# Patient Record
Sex: MALE | Race: WHITE | NOT HISPANIC OR LATINO | Employment: FULL TIME | URBAN - METROPOLITAN AREA
[De-identification: names, ages, dates, MRNs, and addresses within clinical notes are randomized per-mention and may not be internally consistent; named-entity substitution may affect disease eponyms.]

---

## 2020-11-16 ENCOUNTER — VIRTUAL VISIT (OUTPATIENT)
Dept: FAMILY MEDICINE | Facility: CLINIC | Age: 40
End: 2020-11-16
Payer: COMMERCIAL

## 2020-11-16 DIAGNOSIS — J01.90 ACUTE SINUSITIS WITH SYMPTOMS > 10 DAYS: Primary | ICD-10-CM

## 2020-11-16 PROCEDURE — 99203 OFFICE O/P NEW LOW 30 MIN: CPT | Mod: 95 | Performed by: PHYSICIAN ASSISTANT

## 2020-11-16 NOTE — PROGRESS NOTES
"Tavo Key is a 40 year old male who is being evaluated via a billable telephone visit.      The patient has been notified of following:     \"This telephone visit will be conducted via a call between you and your physician/provider. We have found that certain health care needs can be provided without the need for a physical exam.  This service lets us provide the care you need with a short phone conversation.  If a prescription is necessary we can send it directly to your pharmacy.  If lab work is needed we can place an order for that and you can then stop by our lab to have the test done at a later time.    Telephone visits are billed at different rates depending on your insurance coverage. During this emergency period, for some insurers they may be billed the same as an in-person visit.  Please reach out to your insurance provider with any questions.    If during the course of the call the physician/provider feels a telephone visit is not appropriate, you will not be charged for this service.\"    Patient has given verbal consent for Telephone visit?  Yes    What phone number would you like to be contacted at? [phone]    How would you like to obtain your AVS? Mail a copy    Subjective     Tavo Key is a 40 year old male who presents via phone visit today for the following health issues:    HPI     Acute Illness  Acute illness concerns: sinus infection  Onset/Duration: 3 days ago, had headache could not hear out of right ear  Symptoms:  Fever: no  Chills/Sweats: YES, chills  Headache (location?): YES  Sinus Pressure: YES  Conjunctivitis:  no  Ear Pain: no, right ear feels plugged  Rhinorrhea: no  Congestion: YES  Sore Throat: no  Cough: no  Wheeze: no  Decreased Appetite: no  Nausea: no  Vomiting: no  Diarrhea: no  Dysuria/Freq.: no  Dysuria or Hematuria: no  Fatigue/Achiness: YES. fatigue  Sick/Strep Exposure: no  Therapies tried and outcome: Advil cold and sinus has not helped much, OTC ear " cleanser did not help.   There is no problem list on file for this patient.     Current Outpatient Medications   Medication     amoxicillin-clavulanate (AUGMENTIN) 875-125 MG tablet     No current facility-administered medications for this visit.            Review of Systems   Constitutional, HEENT, cardiovascular, pulmonary, gi and gu systems are negative, except as otherwise noted.       Objective          Vitals:  No vitals were obtained today due to virtual visit.    healthy, alert and no distress  PSYCH: Alert and oriented times 3; coherent speech, normal   rate and volume, able to articulate logical thoughts, able   to abstract reason, no tangential thoughts, no hallucinations   or delusions  His affect is normal  RESP: No cough, no audible wheezing, able to talk in full sentences  Remainder of exam unable to be completed due to telephone visits          Assessment/Plan:    Assessment & Plan     Acute sinusitis with symptoms > 10 days  10+ days of symptoms. This prescription is given with a discussion of side effects, risks and proper use.  Instructions are given to follow up if not improving or symptoms change or worsen as discussed.   - amoxicillin-clavulanate (AUGMENTIN) 875-125 MG tablet; Take 1 tablet by mouth 2 times daily        No follow-ups on file.    YEVGENIY ABRAMS PA-C  [facility]    Phone call duration:  5 minutes

## 2020-12-27 ENCOUNTER — OFFICE VISIT (OUTPATIENT)
Dept: URGENT CARE | Facility: URGENT CARE | Age: 40
End: 2020-12-27
Payer: COMMERCIAL

## 2020-12-27 VITALS
DIASTOLIC BLOOD PRESSURE: 84 MMHG | TEMPERATURE: 97.4 F | OXYGEN SATURATION: 96 % | RESPIRATION RATE: 18 BRPM | SYSTOLIC BLOOD PRESSURE: 126 MMHG | WEIGHT: 252.4 LBS | HEART RATE: 73 BPM

## 2020-12-27 DIAGNOSIS — R10.9 RIGHT FLANK PAIN: ICD-10-CM

## 2020-12-27 DIAGNOSIS — M54.41 ACUTE RIGHT-SIDED LOW BACK PAIN WITH RIGHT-SIDED SCIATICA: Primary | ICD-10-CM

## 2020-12-27 DIAGNOSIS — Z87.442 HISTORY OF KIDNEY STONES: ICD-10-CM

## 2020-12-27 LAB
ALBUMIN UR-MCNC: NEGATIVE MG/DL
APPEARANCE UR: CLEAR
BILIRUB UR QL STRIP: NEGATIVE
COLOR UR AUTO: YELLOW
GLUCOSE UR STRIP-MCNC: NEGATIVE MG/DL
HGB UR QL STRIP: NEGATIVE
KETONES UR STRIP-MCNC: NEGATIVE MG/DL
LEUKOCYTE ESTERASE UR QL STRIP: NEGATIVE
NITRATE UR QL: NEGATIVE
PH UR STRIP: 6 PH (ref 5–7)
SOURCE: NORMAL
SP GR UR STRIP: >1.03 (ref 1–1.03)
UROBILINOGEN UR STRIP-ACNC: 0.2 EU/DL (ref 0.2–1)

## 2020-12-27 PROCEDURE — 99214 OFFICE O/P EST MOD 30 MIN: CPT | Performed by: NURSE PRACTITIONER

## 2020-12-27 PROCEDURE — 81003 URINALYSIS AUTO W/O SCOPE: CPT | Performed by: NURSE PRACTITIONER

## 2020-12-27 RX ORDER — CYCLOBENZAPRINE HCL 5 MG
5-10 TABLET ORAL 3 TIMES DAILY PRN
Qty: 24 TABLET | Refills: 0 | Status: SHIPPED | OUTPATIENT
Start: 2020-12-27 | End: 2021-08-23

## 2020-12-27 RX ORDER — NAPROXEN 500 MG/1
500 TABLET ORAL 2 TIMES DAILY WITH MEALS
Qty: 20 TABLET | Refills: 0 | Status: SHIPPED | OUTPATIENT
Start: 2020-12-27 | End: 2021-08-23

## 2020-12-27 RX ORDER — PROPRANOLOL HYDROCHLORIDE 160 MG/1
CAPSULE, EXTENDED RELEASE ORAL
COMMUNITY
Start: 2020-12-09 | End: 2021-12-09

## 2020-12-27 ASSESSMENT — PAIN SCALES - GENERAL: PAINLEVEL: MODERATE PAIN (5)

## 2020-12-27 NOTE — PATIENT INSTRUCTIONS
Patient Education     Back Care Tips     Caring for your back  These are things you can do to prevent a recurrence of acute back pain and to reduce symptoms from chronic back pain:    Stay at a healthy weight. If you are overweight, losing weight will help most types of back pain.    Exercise is an important part of recovery from most types of back pain. The muscles behind and in front of the spine support the back. This means strengthening both the back muscles and the abdominal muscles will provide better support for your spine.     Swimming and brisk walking are good overall exercises to improve your fitness level.    Practice safe lifting methods (see below).    Practice good posture when sitting, standing, and walking. Don't sit for a long time. This puts more stress on the lower back than standing or walking.    Wear quality shoes with good arch support. Foot and ankle alignment can affect back symptoms. Don't wear high heels.    Therapeutic massage can help relax the back muscles without stretching them.    During the first 24 to 72 hours after an acute injury or flare-up of chronic back pain, put an ice pack on the painful area for 20 minutes and then remove it for 20 minutes. Do thisover a period of 60 to 90 minutes, or several times a day. As a safety precaution, don't use a heating pad at bedtime. Sleeping on a heating pad can lead to skin burns or tissue damage.    You can alternate using ice and heat.  Medicines  Talk with your healthcare provider before using medicines, especially if you have other health problems or are taking other medicines.    You may use over-the-counter medicines, such as acetaminophen, ibuprofen, or naprosyn to control pain, unless your healthcare provider prescribed other pain medicine. Talk with your healthcare provider before taking any medicines if you have a chronic condition such as diabetes, liver or kidney disease, stomach ulcers, or digestive bleeding, or are taking  blood thinners.    Be careful if you are given prescription pain medicines, opioids, or medicine for muscle spasm. They can cause drowsiness, and affect your coordination, reflexes, and judgment. Don't drive or operate heavy machinery while taking these types of medicines. Take prescription pain medicine only as prescribed by your healthcare provider.  Lumbar stretch  This simple stretch will help relax muscle spasm and keep your back more limber. If exercise makes your back pain worse, don t do it.    Lie on your back with your knees bent and both feet on the ground.    Slowly raise your left knee to your chest as you flatten your lower back against the floor. Hold for 5 seconds.    Relax and repeat the exercise with your right knee.    Do 10 of these exercises for each leg.  Safe lifting method    Don t bend over at the waist to lift an object off the floor.  Instead, bend your knees and hips in a squat.     Keep your back and head upright    Hold the object close to your body, directly in front of you.    Straighten your legs to lift the object.     Lower the object to the floor in the reverse fashion.    If you must slide something across the floor, push it.    Posture tips  Sitting  Sit in chairs with straight backs or low-back support. Keep your knees lower than your hips, with your feet flat on the floor.  When driving, sit up straight. Adjust the seat forward so you are not leaning toward the steering wheel.  A small pillow or rolled towel behind your lower back may help if you are driving long distances.   Standing  When standing for long periods, shift most of your weight to one leg at a time. Switch legs every few minutes.   Sleeping  The best way to sleep is on your side with your knees bent. Put a low pillow under your head to support your neck in a neutral spine position. Don't use thick pillows that bend your neck to one side. Put a pillow between your legs to further relax your lower back. If you  sleep on your back, put pillows under your knees to support your legs in a slightly flexed position. Use a firm mattress. If your mattress sags, replace it, or use a 1/2-inch plywood board under the mattress to add support.  Follow-up care  Follow up with your healthcare provider, or as advised.  If X-rays, a CT scan or an MRI scan were taken, they may be reviewed by a radiologist. You will be told of any new findings that may affect your care.  Call 911  Call 911 if any of the following occur:    Trouble breathing    Confusion    Very drowsy    Fainting or loss of consciousness    Rapid or very slow heart rate    Loss of  bowel or bladder control  When to seek medical advice  Call your healthcare provider right away if any of the following occur:    Pain becomes worse or spreads to your arms or legs    Weakness or numbness in one or both arms or legs    Numbness in the groin area  [employer] last reviewed this educational content on 11/1/2019 2000-2020 [employer]. [address] [place] [address]. All rights reserved. This information is not intended as a substitute for professional medical care. Always follow your healthcare professional's instructions.           Patient Education     Exercises to Strengthen Your Lower Back  Strong lower back and abdominal muscles work together to support your spine. The exercises below will help strengthen the lower back. It's important that you start exercising slowly and increase levels gradually.   Always start any exercise program with stretching. If you feel pain while doing any of these exercises, stop and talk to your doctor about a more specific exercise program that better suits your condition.    Low back stretch  The point of stretching is to make you more flexible and increase your range of motion. Stretch only as much as you are able. Stretch slowly. Don't push your stretch to the limit. If at any point you feel pain while stretching, this is  your (temporary) limit.     Lie on your back with your knees bent and both feet on the ground.    Slowly raise your left knee to your chest as you flatten your lower back against the floor. Hold for 5 seconds.    Relax and repeat the exercise with your right knee.    Do 10 of these exercises for each leg.    Repeat hugging both knees to your chest at the same time.  Building lower back strength  Start your exercise routine with 10 to 30 minutes a day, 1 to 3 times a day.  Initial exercises  Lying on your back:  1. Ankle pumps. Move your foot up and down, towards your head, and then away. Repeat 10 times with each foot.  2. Heel slides. Slowly bend your knee, drawing the heel of your foot towards you. Then slide your heel/foot from you, straightening your knee. Don't lift your foot off the floor (this is not a leg lift).  3. Abdominal contraction. Bend your knees and put your hands on your stomach. Tighten your stomach muscles. Hold for 5 seconds, then relax. Repeat 10 times.  4. Straight leg raise. Bend one leg at the knee and keep the other leg straight. Tighten your stomach muscles. Slowly lift your straight leg 6 to 12 inches off the floor and hold for up to 5 seconds. Repeat 10 times on each side.  Standin. Wall squats. Stand with your back against the wall. Move your feet about 12 inches away from the wall. Tighten your stomach muscles, and slowly bend your knees until they are at about a 45 degree angle. Don't go down too far. Hold about 5 seconds. Then slowly return to your starting position. Repeat 10 times.  2. Heel raises. Stand facing the wall. Slowly raise the heels of your feet up and down, while keeping your toes on the floor. If you have trouble balancing, you can touch the wall with your hands. Repeat 10 times.  More advanced exercises  When you feel comfortable enough, try these exercises.  1. Kneeling lumbar extension. Start on your hands and knees. At the same time, raise and straighten your  right arm and left leg until they are parallel to the ground. Hold for 2 seconds and come back slowly to a starting position. Repeat with left arm and right leg, alternating 10 times.  2. Prone lumbar extension. Lie face down, arms extended overhead, palms on the floor. At the same time, raise your right arm and left leg as high as comfortably possible. Hold for 10 seconds and slowly return to start. Repeat with left arm and right leg, alternating 10 times. Gradually build up to 20 times. (Advanced: Repeat this exercise raising both arms and both legs a few inches off the floor at the same time. Hold for 5 seconds and release.)  3. Pelvic tilt. Lie on the floor on your back with your knees bent at 90 degrees. Your feet should be flat on the floor. Inhale, exhale, then slowly contract your abdominal muscles bringing your navel toward your spine. Let your pelvis rock back until your lower back is flat on the floor. Hold for 10 seconds while breathing smoothly.  4. Abdominal crunch. Perform a pelvic tilt (above) flattening your lower back against the floor. Holding the tension in your abdominal muscles, take another breath and raise your shoulder blades off the ground (this is not a full sit-up). Keep your head in line with your body (don t bend your neck forward). Hold for 2 seconds, then slowly lower.  [employer] last reviewed this educational content on 8/1/2019 2000-2020 [employer]. [address], [place] [address]. All rights reserved. This information is not intended as a substitute for professional medical care. Always follow your healthcare professional's instructions.

## 2020-12-27 NOTE — PROGRESS NOTES
SUBJECTIVE:   Tavo Key is a 40 year old male presenting with a chief complaint of   Chief Complaint   Patient presents with     Back Pain     Injured his back on 12/18/20- pain was improving but stopped getting better a few days ago. Pt has been having pain down right leg also      Back Pain    Onset of symptoms was 9 day(s) ago.  Location: right low back  Radiation: radiates into the right buttocks and radiates into the right leg  Context: No injury  Course of symptoms is waxing and waning. Started mid-day then progressively worsened. Had been improving, but is no longer improving  Severity moderate 5-6/10 currently, does get up to 7/10, does not wake him at night  Current and Associated symptoms: pain  Denies: fecal incontinence, urinary incontinence, lower extremity numbness, lower extremity weakness and paresthesia    Aggravating Factors: standing and sitting  Alleviating: laying down  Therapies to improve symptoms include: ibuprofen 800 mg every 6-8 hours which helps temporarily. Ice twice daily helps temporarily  Past history: no prior back problems or back surgery  Denies a history of kidney disease. He does have a history of kidney stones. Denies dysuria, hematuria, urinary frequency, fever, chills. He has not had any kidney stones for the past 6 years. This pain feels different than past kidney stone pain. No history of HTN.    Problem list, Medication list, Allergies, and Medical history reviewed in EPIC.    ROS:  Review of systems negative except for noted above    OBJECTIVE  /84 (BP Location: Left arm, Patient Position: Sitting, Cuff Size: Adult Large)   Pulse 73   Temp 97.4  F (36.3  C) (Tympanic)   Resp 18   Wt 114.5 kg (252 lb 6.4 oz)   SpO2 96%     Physical Exam  Constitutional:       General: He is not in acute distress.     Appearance: He is obese. He is not ill-appearing, toxic-appearing or diaphoretic.   Cardiovascular:      Rate and Rhythm: Normal rate and regular rhythm.       Heart sounds: Normal heart sounds.   Pulmonary:      Effort: Pulmonary effort is normal. No respiratory distress.      Breath sounds: Normal breath sounds. No wheezing, rhonchi or rales.   Abdominal:      Tenderness: There is right CVA tenderness. There is no left CVA tenderness.   Musculoskeletal:      Lumbar back: He exhibits decreased range of motion. He exhibits no bony tenderness.      Comments: Negative bilateral straight leg raises, no spinal or paraspinal pain with palpation, Decreased ROM with lumbar flexion, left lateral flexion, full extension and right lateral flexion.   Skin:     General: Skin is warm and dry.      Findings: No bruising, erythema or rash.   Neurological:      General: No focal deficit present.      Sensory: No sensory deficit.      Coordination: Coordination normal.      Deep Tendon Reflexes: Reflexes normal.      Comments: Able to walk on heels, toes, and straight line walk       Labs:  Results for orders placed or performed in visit on 12/27/20 (from the past 24 hour(s))   UA reflex to Microscopic and Culture    Specimen: Midstream Urine   Result Value Ref Range    Color Urine Yellow     Appearance Urine Clear     Glucose Urine Negative NEG^Negative mg/dL    Bilirubin Urine Negative NEG^Negative    Ketones Urine Negative NEG^Negative mg/dL    Specific Gravity Urine >1.030 1.003 - 1.035    Blood Urine Negative NEG^Negative    pH Urine 6.0 5.0 - 7.0 pH    Protein Albumin Urine Negative NEG^Negative mg/dL    Urobilinogen Urine 0.2 0.2 - 1.0 EU/dL    Nitrite Urine Negative NEG^Negative    Leukocyte Esterase Urine Negative NEG^Negative    Source Midstream Urine      ASSESSMENT:      ICD-10-CM    1. Acute right-sided low back pain with right-sided sciatica  M54.41 naproxen (NAPROSYN) 500 MG tablet     cyclobenzaprine (FLEXERIL) 5 MG tablet   2. Right flank pain  R10.9 UA reflex to Microscopic and Culture   3. History of kidney stones  Z87.442 UA reflex to Microscopic and Culture       PLAN:    UA completed to rule out hematuria with history of kidney stones and right CVA tenderness which was reviewed with patient during visit and normal- no sign of kidney infection or kidney stone. Xray not indicated at this time. Discussed most acute back pain will resolve in 2-4 weeks. Recommended walking, gentle stretching, avoiding lifting. Prescriptions sent to pharmacy for naproxen 500 mg every 12 hours with food for up to 10 days- stop ibuprofen and do not take with NSAIDs. May take tylenol as needed. Prescription sent to pharmacy for cyclobenzaprine 5-10 mg every 8 hours as needed for pain, potential side effects discussed. Start with 1 tab at a time at bedtime, may add another if not effective after an hour. Do not drive or operate machinery while taking. Continue alternating ice and heat three times daily for 20 minutes at a time.     Follow-up with PCP if symptoms persist for 7 days, and sooner if symptoms worsen or new symptoms develop.     Discussed red flag symptoms which warrant immediate visit in emergency room    All questions were answered and patient verbalized understanding. AVS reviewed with patient.     Emmy Caal, RUDDY, APRN, CNP 12/27/2020 10:03 AM

## 2021-03-05 ENCOUNTER — VIRTUAL VISIT (OUTPATIENT)
Dept: FAMILY MEDICINE | Facility: CLINIC | Age: 41
End: 2021-03-05
Payer: COMMERCIAL

## 2021-03-05 DIAGNOSIS — G47.30 SLEEP APNEA WITH USE OF CONTINUOUS POSITIVE AIRWAY PRESSURE (CPAP): ICD-10-CM

## 2021-03-05 DIAGNOSIS — I10 HYPERTENSION GOAL BP (BLOOD PRESSURE) < 140/90: ICD-10-CM

## 2021-03-05 DIAGNOSIS — K21.00 GASTROESOPHAGEAL REFLUX DISEASE WITH ESOPHAGITIS, UNSPECIFIED WHETHER HEMORRHAGE: ICD-10-CM

## 2021-03-05 DIAGNOSIS — E11.9 TYPE 2 DIABETES MELLITUS WITHOUT COMPLICATION, UNSPECIFIED WHETHER LONG TERM INSULIN USE (H): Primary | ICD-10-CM

## 2021-03-05 DIAGNOSIS — F90.9 ATTENTION DEFICIT HYPERACTIVITY DISORDER (ADHD), UNSPECIFIED ADHD TYPE: ICD-10-CM

## 2021-03-05 DIAGNOSIS — J45.30 MILD PERSISTENT ASTHMA WITHOUT COMPLICATION: ICD-10-CM

## 2021-03-05 DIAGNOSIS — G43.809 OTHER MIGRAINE WITHOUT STATUS MIGRAINOSUS, NOT INTRACTABLE: ICD-10-CM

## 2021-03-05 DIAGNOSIS — L85.3 DRY SKIN: ICD-10-CM

## 2021-03-05 DIAGNOSIS — N52.9 ERECTILE DYSFUNCTION, UNSPECIFIED ERECTILE DYSFUNCTION TYPE: ICD-10-CM

## 2021-03-05 DIAGNOSIS — F41.1 GAD (GENERALIZED ANXIETY DISORDER): ICD-10-CM

## 2021-03-05 DIAGNOSIS — F31.62 BIPOLAR MIXED AFFECTIVE DISORDER, MODERATE (H): ICD-10-CM

## 2021-03-05 DIAGNOSIS — E66.9 OBESITY, UNSPECIFIED CLASSIFICATION, UNSPECIFIED OBESITY TYPE, UNSPECIFIED WHETHER SERIOUS COMORBIDITY PRESENT: ICD-10-CM

## 2021-03-05 PROCEDURE — 99214 OFFICE O/P EST MOD 30 MIN: CPT | Mod: 95 | Performed by: PHYSICIAN ASSISTANT

## 2021-03-05 RX ORDER — LAMOTRIGINE 200 MG/1
200 TABLET ORAL DAILY
COMMUNITY
Start: 2021-01-05 | End: 2023-11-01

## 2021-03-05 RX ORDER — CLONAZEPAM 0.5 MG/1
0.5 TABLET ORAL
COMMUNITY
Start: 2021-01-05 | End: 2022-08-30

## 2021-03-05 RX ORDER — NARATRIPTAN 2.5 MG/1
TABLET ORAL
COMMUNITY
Start: 2020-12-09 | End: 2022-01-03

## 2021-03-05 RX ORDER — AMLODIPINE BESYLATE 10 MG/1
10 TABLET ORAL
COMMUNITY
Start: 2020-12-09 | End: 2021-12-23

## 2021-03-05 RX ORDER — ARIPIPRAZOLE 5 MG/1
5 TABLET ORAL
COMMUNITY
Start: 2021-01-05 | End: 2022-08-30

## 2021-03-05 RX ORDER — HYDROCHLOROTHIAZIDE 25 MG/1
25 TABLET ORAL
COMMUNITY
Start: 2020-10-05 | End: 2021-10-01

## 2021-03-05 RX ORDER — LITHIUM CARBONATE 450 MG
450 TABLET, EXTENDED RELEASE ORAL
COMMUNITY
Start: 2020-09-01 | End: 2022-01-03

## 2021-03-05 RX ORDER — DEXTROAMPHETAMINE SACCHARATE, AMPHETAMINE ASPARTATE MONOHYDRATE, DEXTROAMPHETAMINE SULFATE AND AMPHETAMINE SULFATE 3.75; 3.75; 3.75; 3.75 MG/1; MG/1; MG/1; MG/1
15 CAPSULE, EXTENDED RELEASE ORAL EVERY MORNING
COMMUNITY
Start: 2021-02-26 | End: 2023-11-01 | Stop reason: DRUGHIGH

## 2021-03-05 RX ORDER — TADALAFIL 10 MG/1
10 TABLET ORAL
COMMUNITY
Start: 2020-12-09 | End: 2022-01-03

## 2021-03-05 RX ORDER — ATORVASTATIN CALCIUM 20 MG/1
20 TABLET, FILM COATED ORAL DAILY
Qty: 90 TABLET | Refills: 3 | Status: SHIPPED | OUTPATIENT
Start: 2021-03-05 | End: 2022-02-21

## 2021-03-05 RX ORDER — ALBUTEROL SULFATE 90 UG/1
2 AEROSOL, METERED RESPIRATORY (INHALATION)
COMMUNITY
Start: 2019-11-19 | End: 2021-10-26

## 2021-03-05 RX ORDER — FLUTICASONE PROPIONATE AND SALMETEROL XINAFOATE 230; 21 UG/1; UG/1
2 AEROSOL, METERED RESPIRATORY (INHALATION)
COMMUNITY
Start: 2020-10-05 | End: 2021-10-26

## 2021-03-05 RX ORDER — METFORMIN HCL 500 MG
1000 TABLET, EXTENDED RELEASE 24 HR ORAL
COMMUNITY
Start: 2020-10-05 | End: 2021-03-05

## 2021-03-05 RX ORDER — METFORMIN HCL 500 MG
1000 TABLET, EXTENDED RELEASE 24 HR ORAL 2 TIMES DAILY WITH MEALS
COMMUNITY
Start: 2021-03-05 | End: 2021-12-09

## 2021-03-05 NOTE — PROGRESS NOTES
Tavo is a 40 year old who is being evaluated via a billable video visit.      How would you like to obtain your AVS? MyChart  If the video visit is dropped, the invitation should be resent by: Text to cell phone: [phone]  Will anyone else be joining your video visit? No    Video Start Time: 930 AM     Assessment & Plan     Establish care visit - previously getting care in [place].    Type 2 diabetes mellitus without complication, unspecified whether long term insulin use (H)  Will see me in the spring.  - atorvastatin (LIPITOR) 20 MG tablet; Take 1 tablet (20 mg) by mouth daily (at bedtime)    Bipolar mixed affective disorder, moderate (H)  Managed by psychiatry  - [facility] HEALTH [facility]FERR[facility]  [phone]; We will contact you to schedule the appointment or please call with any questions    Mild persistent asthma without complication  Stable, by report.    MATTI (generalized anxiety disorder)  He has a therapist and a psychiatrist.  He will need a psychiatrist locally a referral is given.  - MENTAL HEALTH REFERRAL  - Adult; Psychiatry; Psychiatry; [facility] [phone]; We will contact you to schedule the appointment or please call with any questions    Other migraine without status migrainosus, not intractable  Stable, no major concerns.    Attention deficit hyperactivity disorder (ADHD), unspecified ADHD type  No concerns, managed by psychiatry.  - MENTAL HEALTH REFERRAL  - [phone]; We will contact you to schedule the appointment or please call with any questions    Erectile dysfunction, unspecified erectile dysfunction type  Probably related to medications.    Gastroesophageal reflux disease with esophagitis, unspecified whether hemorrhage  Currently controlled with over-the-counter meds.    Sleep apnea with use of continuous  positive airway pressure (CPAP)  Using his CPAP regularly.    Obesity, unspecified classification, unspecified obesity type, unspecified whether serious comorbidity present  He has tried weight loss meds in the past, this might be something we can explore in the future.    Hypertension goal BP (blood pressure) < 140/90  Stable by report.    Dry skin  Use more lotion     No follow-ups on file.    VEENA SULLIVAN [facility] LIZETT Vo is a 40 year old who presents for the following health issues     HPI       New Patient/Transfer of Care  He was living in [place] and has moved to our area recently.  He would like to establish care and get a new psychiatrist.  He has a chronic psychiatric history that is well controlled.  He has a new diagnosis of type 2 diabetes.  He has controlled asthma, sleep apnea, hypertension, hyperlipidemia    He has no concerns today.    Patient Active Problem List   Diagnosis     Bipolar mixed affective disorder, moderate (H)     Mild persistent asthma without complication     MATTI (generalized anxiety disorder)     Other migraine without status migrainosus, not intractable     Attention deficit hyperactivity disorder (ADHD), unspecified ADHD type     Erectile dysfunction, unspecified erectile dysfunction type     Gastroesophageal reflux disease with esophagitis, unspecified whether hemorrhage     Sleep apnea with use of continuous positive airway pressure (CPAP)     Obesity, unspecified classification, unspecified obesity type, unspecified whether serious comorbidity present      Current Outpatient Medications   Medication     albuterol (PROAIR HFA/PROVENTIL HFA/VENTOLIN HFA) 108 (90 Base) MCG/ACT inhaler     amLODIPine (NORVASC) 10 MG tablet     amphetamine-dextroamphetamine (ADDERALL XR) 15 MG 24 hr capsule     ARIPiprazole (ABILIFY) 5 MG tablet     atorvastatin (LIPITOR) 20 MG tablet     clonazePAM (KLONOPIN) 0.5 MG tablet      cyclobenzaprine (FLEXERIL) 5 MG tablet     fluticasone-salmeterol (ADVAIR-HFA) 230-21 MCG/ACT inhaler     hydrochlorothiazide (HYDRODIURIL) 25 MG tablet     lamoTRIgine (LAMICTAL) 200 MG tablet     lithium (ESKALITH CR/LITHOBID) 450 MG CR tablet     metFORMIN (GLUCOPHAGE-XR) 500 MG 24 hr tablet     naproxen (NAPROSYN) 500 MG tablet     naratriptan (AMERGE) 2.5 MG tablet     propranolol ER (INDERAL LA) 160 MG 24 hr capsule     tadalafil (CIALIS) 10 MG tablet     No current facility-administered medications for this visit.     Review of Systems   Constitutional, HEENT, cardiovascular, pulmonary, GI, , musculoskeletal, neuro, skin, endocrine and psych systems are negative, except as otherwise noted.      Objective           Vitals:  No vitals were obtained today due to virtual visit.    Physical Exam   GENERAL: Healthy, alert and no distress  EYES: Eyes grossly normal to inspection.  No discharge or erythema, or obvious scleral/conjunctival abnormalities.  RESP: No audible wheeze, cough, or visible cyanosis.  No visible retractions or increased work of breathing.    SKIN: Visible skin clear. No significant rash, abnormal pigmentation or lesions.  NEURO: Cranial nerves grossly intact.  Mentation and speech appropriate for age.  PSYCH: Mentation appears normal, affect normal/bright, judgement and insight intact, normal speech and appearance well-groomed.          Video-Visit Details    Type of service:  Video Visit    Video End Time:1000 AM     Originating Location (pt. Location): Home    Distant Location (provider location):  [facility]     Platform used for Video Visit: [employer]

## 2021-03-07 ENCOUNTER — HEALTH MAINTENANCE LETTER (OUTPATIENT)
Age: 41
End: 2021-03-07

## 2021-03-25 ENCOUNTER — MYC MEDICAL ADVICE (OUTPATIENT)
Dept: FAMILY MEDICINE | Facility: CLINIC | Age: 41
End: 2021-03-25

## 2021-03-25 DIAGNOSIS — G43.809 OTHER MIGRAINE WITHOUT STATUS MIGRAINOSUS, NOT INTRACTABLE: Primary | ICD-10-CM

## 2021-03-25 RX ORDER — NARATRIPTAN 2.5 MG/1
2.5 TABLET ORAL
Qty: 12 TABLET | Refills: 5 | Status: SHIPPED | OUTPATIENT
Start: 2021-03-25 | End: 2022-01-03

## 2021-03-25 NOTE — TELEPHONE ENCOUNTER
"[employer] message to PCP      Patient want information updated regarding his family history, not sure how to update this.    \"my older sister (Blaire) passed away from a grand mal seizure due to epilepsy in 2018.\"      Ana Luisa Barrios RN  "

## 2021-03-29 ENCOUNTER — IMMUNIZATION (OUTPATIENT)
Dept: NURSING | Facility: CLINIC | Age: 41
End: 2021-03-29
Payer: COMMERCIAL

## 2021-03-29 PROCEDURE — 0001A PR COVID VAC PFIZER DIL RECON 30 MCG/0.3 ML IM: CPT

## 2021-03-29 PROCEDURE — 91300 PR COVID VAC PFIZER DIL RECON 30 MCG/0.3 ML IM: CPT

## 2021-04-19 ENCOUNTER — IMMUNIZATION (OUTPATIENT)
Dept: NURSING | Facility: CLINIC | Age: 41
End: 2021-04-19
Attending: INTERNAL MEDICINE
Payer: COMMERCIAL

## 2021-04-19 PROCEDURE — 0002A PR COVID VAC PFIZER DIL RECON 30 MCG/0.3 ML IM: CPT

## 2021-04-19 PROCEDURE — 91300 PR COVID VAC PFIZER DIL RECON 30 MCG/0.3 ML IM: CPT

## 2021-06-20 ENCOUNTER — HEALTH MAINTENANCE LETTER (OUTPATIENT)
Age: 41
End: 2021-06-20

## 2021-08-23 ENCOUNTER — VIRTUAL VISIT (OUTPATIENT)
Dept: FAMILY MEDICINE | Facility: CLINIC | Age: 41
End: 2021-08-23
Payer: COMMERCIAL

## 2021-08-23 DIAGNOSIS — Z13.220 SCREENING FOR HYPERLIPIDEMIA: ICD-10-CM

## 2021-08-23 DIAGNOSIS — F41.1 GAD (GENERALIZED ANXIETY DISORDER): ICD-10-CM

## 2021-08-23 DIAGNOSIS — E11.9 TYPE 2 DIABETES MELLITUS WITHOUT COMPLICATION, WITHOUT LONG-TERM CURRENT USE OF INSULIN (H): ICD-10-CM

## 2021-08-23 DIAGNOSIS — F31.62 BIPOLAR MIXED AFFECTIVE DISORDER, MODERATE (H): Primary | ICD-10-CM

## 2021-08-23 DIAGNOSIS — F90.9 ATTENTION DEFICIT HYPERACTIVITY DISORDER (ADHD), UNSPECIFIED ADHD TYPE: ICD-10-CM

## 2021-08-23 DIAGNOSIS — E34.8 PINEAL GLAND CYST: ICD-10-CM

## 2021-08-23 PROCEDURE — 99214 OFFICE O/P EST MOD 30 MIN: CPT | Mod: 95 | Performed by: PHYSICIAN ASSISTANT

## 2021-08-23 RX ORDER — LITHIUM CARBONATE 300 MG/1
300 TABLET, FILM COATED, EXTENDED RELEASE ORAL DAILY
Qty: 90 TABLET | Refills: 1 | Status: SHIPPED | OUTPATIENT
Start: 2021-08-23 | End: 2023-12-01

## 2021-08-23 RX ORDER — ALPRAZOLAM 1 MG
TABLET ORAL
Qty: 3 TABLET | Refills: 0 | Status: SHIPPED | OUTPATIENT
Start: 2021-08-23 | End: 2022-08-30

## 2021-08-23 NOTE — PATIENT INSTRUCTIONS
To Schedule Imaging (To Schedule Imaging)  [facility] Imaging Scheduling Phone Number: [phone]  Or   [facility] Scheduling Phone number: [phone]  Or  [facility] Scheduling Phone number: [phone]

## 2021-08-23 NOTE — PROGRESS NOTES
Tavo is a 41 year old who is being evaluated via a billable video visit.      How would you like to obtain your AVS? MyChart  If the video visit is dropped, the invitation should be resent by: Send to e-mail at: [email or web address]  Will anyone else be joining your video visit? No     Video Start Time: 910 AM     Assessment & Plan     Bipolar mixed affective disorder, moderate (H)  Stable  Hasn't found a local psychiatrist yet. Referral placed  Off his Lithium but wants to get back on for better mood stability - will restart at lower dose (used to be on 1500 mg).   Follow up PRN   - MENTAL HEALTH REFERRAL  - Adult; Psychiatry; Psychiatry; [facility] [phone]; We will contact you to schedule the appointment or please call with any questions; Future  - lithium ER (LITHOBID) 300 MG CR tablet; Take 1 tablet (300 mg) by mouth daily    Type 2 diabetes mellitus without complication, without long-term current use of insulin (H)  Recheck labs  We've not seen him face to face yet  Plan to in the spring?    - HEMOGLOBIN A1C; Future  - BASIC METABOLIC PANEL; Future  - Lipid panel reflex to direct LDL Fasting; Future  - Albumin Random Urine Quantitative with Creat Ratio; Future  - Hepatitis C Screen Reflex to HCV RNA Quant and Genotype; Future    MATTI (generalized anxiety disorder)  Stable   - MENTAL HEALTH REFERRAL  - Adult; Psychiatry; Psychiatry; [facility] [phone]; We will contact you to schedule the appointment or please call with any questions; Future    Attention deficit hyperactivity disorder (ADHD), unspecified ADHD type  Stable   - MENTAL HEALTH REFERRAL  - Adult; Psychiatry; Psychiatry; [facility] [phone]; We will contact you to schedule the appointment or please call with any questions; Future    Screening for hyperlipidemia  Recheck     Pineal gland cyst  History of. I reviewed his chart and [facility] notes. He is required to have an MRI yearly. Has  claustrophobia. Managed fine with oral Alprazolam.   - MR Brain w/o & w Contrast; Future  - ALPRAZolam (XANAX) 1 MG tablet; 1/2 to 1 tablet, 30 minutes before MRI      Return in about 3 months (around 11/23/2021) for Routine Visit.    YEVGENIY ABRAMS PA-C  [facility] LIZETT Vo is a 41 year old who presents for the following health issues     HPI     Diabetes Follow-up    How often are you checking your blood sugar? Two times daily  Blood sugar testing frequency justification:  Uncontrolled diabetes  What time of day are you checking your blood sugars (select all that apply)?  Before and after meals  Have you had any blood sugars above 200?  Yes 200s  Have you had any blood sugars below 70?  No    What symptoms do you notice when your blood sugar is low?  None    What concerns do you have today about your diabetes? Other: sweats when eating and trying to lower glucose readings     Do you have any of these symptoms? (Select all that apply)  No numbness or tingling in feet.  No redness, sores or blisters on feet.  No complaints of excessive thirst.  No reports of blurry vision.  No significant changes to weight.    Have you had a diabetic eye exam in the last 12 months? No        BP Readings from Last 2 Encounters:   12/27/20 126/84     No results found for: A1C, LDL      How many servings of fruits and vegetables do you eat daily?  1-2    On average, how many sweetened beverages do you drink each day (Examples: soda, juice, sweet tea, etc.  Do NOT count diet or artificially sweetened beverages)?   0    How many days per week do you exercise enough to make your heart beat faster? 3 or less    How many minutes a day do you exercise enough to make your heart beat faster? 20 - 29    How many days per week do you miss taking your medication? 0    Patient would like to get an MRI order to check on penial gland cyst.     Mood recheck - wants a new psychiatrist.     Review of Systems    Constitutional, HEENT, cardiovascular, pulmonary, GI, , musculoskeletal, neuro, skin, endocrine and psych systems are negative, except as otherwise noted.      Objective           Vitals:  No vitals were obtained today due to virtual visit.    Physical Exam   GENERAL: Healthy, alert and no distress  EYES: Eyes grossly normal to inspection.  No discharge or erythema, or obvious scleral/conjunctival abnormalities.  RESP: No audible wheeze, cough, or visible cyanosis.  No visible retractions or increased work of breathing.    SKIN: Visible skin clear. No significant rash, abnormal pigmentation or lesions.  NEURO: Cranial nerves grossly intact.  Mentation and speech appropriate for age.  PSYCH: Mentation appears normal, affect normal/bright, judgement and insight intact, normal speech and appearance well-groomed.            Video-Visit Details    Type of service:  Video Visit    Video End Time:9:30 AM    Originating Location (pt. Location): Home    Distant Location (provider location):  [facility]     Platform used for Video Visit: [email or web address]

## 2021-09-01 ENCOUNTER — LAB (OUTPATIENT)
Dept: LAB | Facility: CLINIC | Age: 41
End: 2021-09-01
Payer: COMMERCIAL

## 2021-09-01 DIAGNOSIS — E11.9 TYPE 2 DIABETES MELLITUS WITHOUT COMPLICATION, WITHOUT LONG-TERM CURRENT USE OF INSULIN (H): ICD-10-CM

## 2021-09-01 LAB
ANION GAP SERPL CALCULATED.3IONS-SCNC: 5 MMOL/L (ref 3–14)
BUN SERPL-MCNC: 21 MG/DL (ref 7–30)
CALCIUM SERPL-MCNC: 9.7 MG/DL (ref 8.5–10.1)
CHLORIDE BLD-SCNC: 105 MMOL/L (ref 94–109)
CHOLEST SERPL-MCNC: 192 MG/DL
CO2 SERPL-SCNC: 27 MMOL/L (ref 20–32)
CREAT SERPL-MCNC: 0.88 MG/DL (ref 0.66–1.25)
CREAT UR-MCNC: 67 MG/DL
FASTING STATUS PATIENT QL REPORTED: ABNORMAL
GFR SERPL CREATININE-BSD FRML MDRD: >90 ML/MIN/1.73M2
GLUCOSE BLD-MCNC: 178 MG/DL (ref 70–99)
HBA1C MFR BLD: 7.5 % (ref 0–5.6)
HCV AB SERPL QL IA: NONREACTIVE
HDLC SERPL-MCNC: 42 MG/DL
LDLC SERPL CALC-MCNC: 101 MG/DL
MICROALBUMIN UR-MCNC: 12 MG/L
MICROALBUMIN/CREAT UR: 17.91 MG/G CR (ref 0–17)
NONHDLC SERPL-MCNC: 150 MG/DL
POTASSIUM BLD-SCNC: 4.2 MMOL/L (ref 3.4–5.3)
SODIUM SERPL-SCNC: 137 MMOL/L (ref 133–144)
TRIGL SERPL-MCNC: 245 MG/DL

## 2021-09-01 PROCEDURE — 80061 LIPID PANEL: CPT

## 2021-09-01 PROCEDURE — 83036 HEMOGLOBIN GLYCOSYLATED A1C: CPT

## 2021-09-01 PROCEDURE — 80048 BASIC METABOLIC PNL TOTAL CA: CPT

## 2021-09-01 PROCEDURE — 82043 UR ALBUMIN QUANTITATIVE: CPT

## 2021-09-01 PROCEDURE — 36415 COLL VENOUS BLD VENIPUNCTURE: CPT

## 2021-09-01 PROCEDURE — 86803 HEPATITIS C AB TEST: CPT

## 2021-09-27 ENCOUNTER — ANCILLARY PROCEDURE (OUTPATIENT)
Dept: MRI IMAGING | Facility: CLINIC | Age: 41
End: 2021-09-27
Attending: PHYSICIAN ASSISTANT
Payer: COMMERCIAL

## 2021-09-27 DIAGNOSIS — E34.8 PINEAL GLAND CYST: ICD-10-CM

## 2021-09-27 PROCEDURE — 70553 MRI BRAIN STEM W/O & W/DYE: CPT | Mod: TC | Performed by: RADIOLOGY

## 2021-09-27 PROCEDURE — A9585 GADOBUTROL INJECTION: HCPCS | Performed by: RADIOLOGY

## 2021-09-27 RX ORDER — GADOBUTROL 604.72 MG/ML
11 INJECTION INTRAVENOUS ONCE
Status: COMPLETED | OUTPATIENT
Start: 2021-09-27 | End: 2021-09-27

## 2021-09-27 RX ADMIN — GADOBUTROL 11 ML: 604.72 INJECTION INTRAVENOUS at 08:37

## 2021-10-01 ENCOUNTER — VIRTUAL VISIT (OUTPATIENT)
Dept: FAMILY MEDICINE | Facility: CLINIC | Age: 41
End: 2021-10-01
Payer: COMMERCIAL

## 2021-10-01 DIAGNOSIS — I10 HYPERTENSION GOAL BP (BLOOD PRESSURE) < 140/90: ICD-10-CM

## 2021-10-01 DIAGNOSIS — R80.9 PROTEINURIA, UNSPECIFIED TYPE: ICD-10-CM

## 2021-10-01 DIAGNOSIS — E66.01 MORBID OBESITY (H): ICD-10-CM

## 2021-10-01 DIAGNOSIS — E11.9 TYPE 2 DIABETES MELLITUS WITHOUT COMPLICATION, WITHOUT LONG-TERM CURRENT USE OF INSULIN (H): Primary | ICD-10-CM

## 2021-10-01 PROCEDURE — 99214 OFFICE O/P EST MOD 30 MIN: CPT | Mod: 95 | Performed by: PHYSICIAN ASSISTANT

## 2021-10-01 RX ORDER — LOSARTAN POTASSIUM 50 MG/1
50 TABLET ORAL DAILY
Qty: 90 TABLET | Refills: 0 | Status: SHIPPED | OUTPATIENT
Start: 2021-10-01 | End: 2021-12-23

## 2021-10-01 RX ORDER — LIRAGLUTIDE 6 MG/ML
1.8 INJECTION SUBCUTANEOUS DAILY
Qty: 30 ML | Refills: 0 | Status: SHIPPED | OUTPATIENT
Start: 2021-10-01 | End: 2021-12-08

## 2021-10-01 NOTE — PROGRESS NOTES
Tavo is a 41 year old who is being evaluated via a billable telephone visit.      What phone number would you like to be contacted at? [phone]  How would you like to obtain your AVS? MyChart    Assessment & Plan     Type 2 diabetes mellitus without complication, without long-term current use of insulin (H)  Lab Results   Component Value Date    A1C 7.5 09/01/2021     He wants to aggressively manage  Maxed out on Metformin  RX sent for Victoza for weight / diabetes. I expect an insurance silva that may require alternative or a lengthy process for authorization  Will have him check pharmacy later and then message me   This prescription is given with a discussion of side effects, risks and proper use.  Instructions are given to follow up if not improving or symptoms change or worsen as discussed.   - liraglutide (VICTOZA) 18 MG/3ML solution; Inject 1.8 mg Subcutaneous daily  - insulin pen needle (32G X 4 MM) 32G X 4 MM miscellaneous; Use 1 pen needles daily or as directed.    Morbid obesity (H)  Counseling. Will see if this helps  - liraglutide (VICTOZA) 18 MG/3ML solution; Inject 1.8 mg Subcutaneous daily    Hypertension goal BP (blood pressure) < 140/90  BP stable. However, has some mild albuminuria. Recommend change hydrochlorothiazide to ACE or ARB. ARB chosen as less interaction with psych meds.   This prescription is given with a discussion of side effects, risks and proper use.  Instructions are given to follow up if not improving or symptoms change or worsen as discussed.   - losartan (COZAAR) 50 MG tablet; Take 1 tablet (50 mg) by mouth daily    Proteinuria, unspecified type  As above  - losartan (COZAAR) 50 MG tablet; Take 1 tablet (50 mg) by mouth daily    Return in about 2 weeks (around 10/15/2021) for Update me Via My Chart - No Charge.    YEVGENIY ABRAMS PA-C  [facility]    Willian Vo is a 41 year old who presents for the following health issues     HPI      Discuss labs from 09/01/2021.     Lab Results   Component Value Date    A1C 7.5 09/01/2021        Patient Active Problem List   Diagnosis     Bipolar mixed affective disorder, moderate (H)     Mild persistent asthma without complication     MATTI (generalized anxiety disorder)     Other migraine without status migrainosus, not intractable     Attention deficit hyperactivity disorder (ADHD), unspecified ADHD type     Erectile dysfunction, unspecified erectile dysfunction type     Gastroesophageal reflux disease with esophagitis, unspecified whether hemorrhage     Sleep apnea with use of continuous positive airway pressure (CPAP)     Obesity, unspecified classification, unspecified obesity type, unspecified whether serious comorbidity present     Pineal gland cyst     Diabetes mellitus, type 2 (H)      Current Outpatient Medications   Medication     albuterol (PROAIR HFA/PROVENTIL HFA/VENTOLIN HFA) 108 (90 Base) MCG/ACT inhaler     ALPRAZolam (XANAX) 1 MG tablet     amLODIPine (NORVASC) 10 MG tablet     amphetamine-dextroamphetamine (ADDERALL XR) 15 MG 24 hr capsule     ARIPiprazole (ABILIFY) 5 MG tablet     aspirin (ASA) 81 MG EC tablet     atorvastatin (LIPITOR) 20 MG tablet     clonazePAM (KLONOPIN) 0.5 MG tablet     fluticasone-salmeterol (ADVAIR-HFA) 230-21 MCG/ACT inhaler     insulin pen needle (32G X 4 MM) 32G X 4 MM miscellaneous     lamoTRIgine (LAMICTAL) 200 MG tablet     liraglutide (VICTOZA) 18 MG/3ML solution     lithium ER (LITHOBID) 300 MG CR tablet     losartan (COZAAR) 50 MG tablet     metFORMIN (GLUCOPHAGE-XR) 500 MG 24 hr tablet     naratriptan (AMERGE) 2.5 MG tablet     naratriptan (AMERGE) 2.5 MG tablet     propranolol ER (INDERAL LA) 160 MG 24 hr capsule     tadalafil (CIALIS) 10 MG tablet     lithium (ESKALITH CR/LITHOBID) 450 MG CR tablet     No current facility-administered medications for this visit.          Review of Systems   Constitutional, HEENT, cardiovascular, pulmonary, GI, ,  musculoskeletal, neuro, skin, endocrine and psych systems are negative, except as otherwise noted.      Objective           Vitals:  No vitals were obtained today due to virtual visit.    Physical Exam   healthy, alert and no distress  PSYCH: Alert and oriented times 3; coherent speech, normal   rate and volume, able to articulate logical thoughts, able   to abstract reason, no tangential thoughts, no hallucinations   or delusions  His affect is normal  RESP: No cough, no audible wheezing, able to talk in full sentences  Remainder of exam unable to be completed due to telephone visits          Phone call duration: 12 minutes

## 2021-10-11 ENCOUNTER — HEALTH MAINTENANCE LETTER (OUTPATIENT)
Age: 41
End: 2021-10-11

## 2021-10-15 ENCOUNTER — TELEPHONE (OUTPATIENT)
Dept: FAMILY MEDICINE | Facility: CLINIC | Age: 41
End: 2021-10-15

## 2021-10-15 ENCOUNTER — VIRTUAL VISIT (OUTPATIENT)
Dept: FAMILY MEDICINE | Facility: CLINIC | Age: 41
End: 2021-10-15
Payer: COMMERCIAL

## 2021-10-15 DIAGNOSIS — R30.0 DYSURIA: ICD-10-CM

## 2021-10-15 DIAGNOSIS — E11.9 TYPE 2 DIABETES MELLITUS WITHOUT COMPLICATION, WITHOUT LONG-TERM CURRENT USE OF INSULIN (H): Primary | ICD-10-CM

## 2021-10-15 PROCEDURE — 99213 OFFICE O/P EST LOW 20 MIN: CPT | Mod: 95 | Performed by: PHYSICIAN ASSISTANT

## 2021-10-15 RX ORDER — SULFAMETHOXAZOLE/TRIMETHOPRIM 800-160 MG
1 TABLET ORAL 2 TIMES DAILY
Qty: 20 TABLET | Refills: 0 | Status: SHIPPED | OUTPATIENT
Start: 2021-10-15 | End: 2021-10-25

## 2021-10-15 NOTE — TELEPHONE ENCOUNTER
Called patient and left a voicemail message that I was calling to schedule a lab only appointment.    ERI Pemberton  [facility]

## 2021-10-15 NOTE — TELEPHONE ENCOUNTER
Angel Kent PA-C [facility]    Please call Tavo to do urine sample, today if possible - here or [place] would be fine.     Left VM to scheduled lab only appt for urine.     Gladys Roque MA

## 2021-10-15 NOTE — PROGRESS NOTES
Taov is a 41 year old who is being evaluated via a billable video visit.      How would you like to obtain your AVS? MyChart  If the video visit is dropped, the invitation should be resent by: Text to cell phone: [phone]  Will anyone else be joining your video visit? No     Video Start Time: 820 AM    Assessment & Plan     Type 2 diabetes mellitus without complication, without long-term current use of insulin (H)  Lab Results   Component Value Date    A1C 7.5 09/01/2021     He's doing well on new regimen of medication / Victoza was paid for and tolerated. He's happy with his 120 glucose ranges    Dysuria  A few days. No fever. No CVA pain reported.   Has had UTIs in the past. Thinks this is the same  Will treat but would ask he get us a urine first today. I sent in orders.  Victoza may cause dysuria, but I think that's less likely. Will await final UA results before formal go ahead to start antibiotics.   - UA with Microscopic reflex to Culture - lab collect; Future  - sulfamethoxazole-trimethoprim (BACTRIM DS) 800-160 MG tablet; Take 1 tablet by mouth 2 times daily for 10 days    No follow-ups on file.    VEENA SULLIVAN [facility]   Tavo is a 41 year old who presents for the following health issues     HPI     Genitourinary - Male  Onset/Duration: yesterday morning, history of kidney and UTIs  Description:   Dysuria (painful urination): YES, pain in between as well, constant burning  Hematuria (blood in urine): no  Frequency: YES  Waking at night to urinate: YES, quite a bit of trouble sleeping last night  Hesitancy (delay in urine): no  Retention (unable to empty): no  Decrease in urinary flow: no  Incontinence: no  Progression of Symptoms:  Same, better a couple of times  Accompanying Signs & Symptoms:  Fever: no  Back/Flank pain: no  Urethral discharge: no  Testicle lumps/masses/pain: no  Nausea and/or vomiting: no  Abdominal pain: no  History:   History of  frequent UTI s: no, but have had UTIs before  History of kidney stones: YES  History of hernias: no  Personal or Family history of Prostate problems: no  Sexually active: YES  Precipitating or alleviating factors: none  Therapies tried and outcome: ibuprofen has not helped much    Patient Active Problem List   Diagnosis     Bipolar mixed affective disorder, moderate (H)     Mild persistent asthma without complication     MATTI (generalized anxiety disorder)     Other migraine without status migrainosus, not intractable     Attention deficit hyperactivity disorder (ADHD), unspecified ADHD type     Erectile dysfunction, unspecified erectile dysfunction type     Gastroesophageal reflux disease with esophagitis, unspecified whether hemorrhage     Sleep apnea with use of continuous positive airway pressure (CPAP)     Obesity, unspecified classification, unspecified obesity type, unspecified whether serious comorbidity present     Pineal gland cyst     Diabetes mellitus, type 2 (H)      Current Outpatient Medications   Medication     albuterol (PROAIR HFA/PROVENTIL HFA/VENTOLIN HFA) 108 (90 Base) MCG/ACT inhaler     ALPRAZolam (XANAX) 1 MG tablet     amLODIPine (NORVASC) 10 MG tablet     amphetamine-dextroamphetamine (ADDERALL XR) 15 MG 24 hr capsule     ARIPiprazole (ABILIFY) 5 MG tablet     aspirin (ASA) 81 MG EC tablet     atorvastatin (LIPITOR) 20 MG tablet     clonazePAM (KLONOPIN) 0.5 MG tablet     fluticasone-salmeterol (ADVAIR-HFA) 230-21 MCG/ACT inhaler     insulin pen needle (32G X 4 MM) 32G X 4 MM miscellaneous     lamoTRIgine (LAMICTAL) 200 MG tablet     liraglutide (VICTOZA) 18 MG/3ML solution     lithium ER (LITHOBID) 300 MG CR tablet     losartan (COZAAR) 50 MG tablet     metFORMIN (GLUCOPHAGE-XR) 500 MG 24 hr tablet     naratriptan (AMERGE) 2.5 MG tablet     naratriptan (AMERGE) 2.5 MG tablet     propranolol ER (INDERAL LA) 160 MG 24 hr capsule     sulfamethoxazole-trimethoprim (BACTRIM DS) 800-160 MG tablet      tadalafil (CIALIS) 10 MG tablet     lithium (ESKALITH CR/LITHOBID) 450 MG CR tablet     No current facility-administered medications for this visit.        Review of Systems   Constitutional, HEENT, cardiovascular, pulmonary, gi and gu systems are negative, except as otherwise noted.      Objective           Vitals:  No vitals were obtained today due to virtual visit.    Physical Exam   GENERAL: Healthy, alert and no distress  EYES: Eyes grossly normal to inspection.  No discharge or erythema, or obvious scleral/conjunctival abnormalities.  RESP: No audible wheeze, cough, or visible cyanosis.  No visible retractions or increased work of breathing.    SKIN: Visible skin clear. No significant rash, abnormal pigmentation or lesions.  NEURO: Cranial nerves grossly intact.  Mentation and speech appropriate for age.  PSYCH: Mentation appears normal, affect normal/bright, judgement and insight intact, normal speech and appearance well-groomed.          Video-Visit Details    Type of service:  Video Visit    Video End Time:840 AM    Originating Location (pt. Location): Home    Distant Location (provider location):  [facility]     Platform used for Video Visit: [employer]

## 2021-10-18 NOTE — TELEPHONE ENCOUNTER
Patient had a lab appointment for today but cancelled per message that antibiotics started and symptoms have gone away.    ERI Pemberton  [facility]

## 2021-10-26 ENCOUNTER — MYC MEDICAL ADVICE (OUTPATIENT)
Dept: FAMILY MEDICINE | Facility: CLINIC | Age: 41
End: 2021-10-26

## 2021-10-26 DIAGNOSIS — G43.809 OTHER MIGRAINE WITHOUT STATUS MIGRAINOSUS, NOT INTRACTABLE: Primary | ICD-10-CM

## 2021-10-26 DIAGNOSIS — J45.30 MILD PERSISTENT ASTHMA WITHOUT COMPLICATION: Primary | ICD-10-CM

## 2021-10-26 DIAGNOSIS — F31.62 BIPOLAR MIXED AFFECTIVE DISORDER, MODERATE (H): ICD-10-CM

## 2021-10-26 RX ORDER — ALBUTEROL SULFATE 90 UG/1
2 AEROSOL, METERED RESPIRATORY (INHALATION) EVERY 4 HOURS PRN
Qty: 18 G | Refills: 5 | Status: SHIPPED | OUTPATIENT
Start: 2021-10-26 | End: 2022-08-30

## 2021-10-26 RX ORDER — FLUTICASONE PROPIONATE AND SALMETEROL XINAFOATE 230; 21 UG/1; UG/1
2 AEROSOL, METERED RESPIRATORY (INHALATION) 2 TIMES DAILY
Qty: 12 G | Refills: 5 | Status: SHIPPED | OUTPATIENT
Start: 2021-10-26 | End: 2023-06-22

## 2021-10-26 RX ORDER — NARATRIPTAN 2.5 MG/1
TABLET ORAL
Qty: 12 TABLET | Refills: 5 | Status: SHIPPED | OUTPATIENT
Start: 2021-10-26 | End: 2023-05-22

## 2021-10-26 NOTE — TELEPHONE ENCOUNTER
Routing to PCP- see nancy salomon    Routing refill request to provider for review/approval because:  Medication is reported/historical    Are you okay filling inhalers?    Rx pending approval if appropriate    Madison Freeman RN

## 2021-10-26 NOTE — TELEPHONE ENCOUNTER
Routing refill request to provider for review/approval because:  Serotonin agonist request needs review    BP under 140/90 in past 12 month    You last refilled 3/5/21    Madison Freeman RN

## 2021-11-01 ENCOUNTER — TRANSFERRED RECORDS (OUTPATIENT)
Dept: HEALTH INFORMATION MANAGEMENT | Facility: CLINIC | Age: 41
End: 2021-11-01
Payer: COMMERCIAL

## 2021-11-01 LAB — RETINOPATHY: NEGATIVE

## 2021-11-11 ENCOUNTER — IMMUNIZATION (OUTPATIENT)
Dept: NURSING | Facility: CLINIC | Age: 41
End: 2021-11-11
Payer: COMMERCIAL

## 2021-11-11 PROCEDURE — 90686 IIV4 VACC NO PRSV 0.5 ML IM: CPT

## 2021-11-11 PROCEDURE — 0004A PR COVID VAC PFIZER DIL RECON 30 MCG/0.3 ML IM: CPT

## 2021-11-11 PROCEDURE — 91300 PR COVID VAC PFIZER DIL RECON 30 MCG/0.3 ML IM: CPT

## 2021-11-11 PROCEDURE — 90471 IMMUNIZATION ADMIN: CPT

## 2021-12-05 ENCOUNTER — HEALTH MAINTENANCE LETTER (OUTPATIENT)
Age: 41
End: 2021-12-05

## 2021-12-09 DIAGNOSIS — E11.9 TYPE 2 DIABETES MELLITUS WITHOUT COMPLICATION, WITHOUT LONG-TERM CURRENT USE OF INSULIN (H): Primary | ICD-10-CM

## 2021-12-09 DIAGNOSIS — F41.1 GAD (GENERALIZED ANXIETY DISORDER): ICD-10-CM

## 2021-12-09 RX ORDER — PROPRANOLOL HYDROCHLORIDE 160 MG/1
160 CAPSULE, EXTENDED RELEASE ORAL DAILY
Qty: 90 CAPSULE | Refills: 0 | Status: SHIPPED | OUTPATIENT
Start: 2021-12-09 | End: 2022-02-10

## 2021-12-09 RX ORDER — METFORMIN HCL 500 MG
1000 TABLET, EXTENDED RELEASE 24 HR ORAL 2 TIMES DAILY WITH MEALS
Qty: 360 TABLET | Refills: 0 | Status: SHIPPED | OUTPATIENT
Start: 2021-12-09 | End: 2022-01-03

## 2021-12-09 NOTE — TELEPHONE ENCOUNTER
Routing refill request to provider for review/approval because:  Patient needs to be seen because:  follow up needed in November    Jaime Melchor, RN, BSN, PHN  [facility]

## 2021-12-17 ENCOUNTER — TELEPHONE (OUTPATIENT)
Dept: LAB | Facility: CLINIC | Age: 41
End: 2021-12-17
Payer: COMMERCIAL

## 2021-12-17 NOTE — TELEPHONE ENCOUNTER
Does not appear that this is an active medication.  He has an upcoming visit to establish care with Criselda Walsh PA-C 1/3/22,  should address any medications at that visit.      Ana Luisa Barrios RN

## 2021-12-17 NOTE — TELEPHONE ENCOUNTER
Patient last filled hydrochlorothiazide 25mg one tablet daily 9/20 for 90 days, Wondering if this medication has been discontinued or should continue to be on it.     Please advise.     Thank you,  Magali Bauer, PharmD  [facility]  [phone]

## 2021-12-21 ENCOUNTER — TELEPHONE (OUTPATIENT)
Dept: FAMILY MEDICINE | Facility: CLINIC | Age: 41
End: 2021-12-21
Payer: COMMERCIAL

## 2021-12-21 NOTE — TELEPHONE ENCOUNTER
Prior Authorization Retail Medication Request    Medication/Dose: Victoza 18mg/3ml SOPN  ICD code (if different than what is on RX):  na  Previously Tried and Failed:  na  Rationale:  STEP EDIT NOT MET, PLEASE CONTACT  PRESCRIBER.  STEP THERAPY- NEW INS    Insurance Name:  [facility] [employer]  Insurance ID:  [id number]      Pharmacy Information (if different than what is on RX)  Name:  [facility]  Phone:  [phone]        Marcus Maravilla    [facility]   [address] [place] [address]   Phone: [phone]  Fax: [phone]

## 2021-12-22 NOTE — TELEPHONE ENCOUNTER
Central Prior Authorization Team   Phone: [phone]      PA Initiation    Medication: Victoza 18mg/3ml SOPN-Initiated  Insurance Company: [employer] - Phone [phone] Fax [phone]  Pharmacy Filling the Rx: [place] [facility] - [address] [facility]  Filling Pharmacy Phone: [phone]  Filling Pharmacy Fax:    Start Date: 12/22/2021

## 2021-12-22 NOTE — TELEPHONE ENCOUNTER
Prior Authorization Approval    Authorization Effective Date: 12/21/2021  Authorization Expiration Date: 12/21/2022  Medication: Victoza 18mg/3ml SOPN-APPROVED  Approved Dose/Quantity:   Reference #:     Insurance Company: [employer] - Phone [phone] Fax [phone]  Expected CoPay:       CoPay Card Available:      Foundation Assistance Needed:    Which Pharmacy is filling the prescription (Not needed for infusion/clinic administered): [facility] - [address] [facility]  Pharmacy Notified: Yes  Patient Notified: No    Pharmacy will notify patient when medication is ready.

## 2022-01-02 ASSESSMENT — ENCOUNTER SYMPTOMS
WEAKNESS: 0
HEMATOCHEZIA: 0
CHILLS: 0
PALPITATIONS: 0
CONSTIPATION: 0
HEARTBURN: 1
DIARRHEA: 0
SORE THROAT: 0
NAUSEA: 1
COUGH: 0
ARTHRALGIAS: 0
HEMATURIA: 0
JOINT SWELLING: 0
SHORTNESS OF BREATH: 0
DIZZINESS: 0
ABDOMINAL PAIN: 0
EYE PAIN: 0
NERVOUS/ANXIOUS: 1
FEVER: 0
DYSURIA: 0
PARESTHESIAS: 0
MYALGIAS: 0
FREQUENCY: 0
HEADACHES: 1

## 2022-01-03 ENCOUNTER — OFFICE VISIT (OUTPATIENT)
Dept: FAMILY MEDICINE | Facility: CLINIC | Age: 42
End: 2022-01-03
Payer: COMMERCIAL

## 2022-01-03 ENCOUNTER — TELEPHONE (OUTPATIENT)
Dept: FAMILY MEDICINE | Facility: CLINIC | Age: 42
End: 2022-01-03

## 2022-01-03 VITALS
HEIGHT: 68 IN | TEMPERATURE: 97.9 F | HEART RATE: 80 BPM | BODY MASS INDEX: 39.86 KG/M2 | OXYGEN SATURATION: 97 % | SYSTOLIC BLOOD PRESSURE: 143 MMHG | DIASTOLIC BLOOD PRESSURE: 97 MMHG | WEIGHT: 263 LBS

## 2022-01-03 DIAGNOSIS — F31.62 BIPOLAR MIXED AFFECTIVE DISORDER, MODERATE (H): ICD-10-CM

## 2022-01-03 DIAGNOSIS — Z00.00 ROUTINE GENERAL MEDICAL EXAMINATION AT A HEALTH CARE FACILITY: Primary | ICD-10-CM

## 2022-01-03 DIAGNOSIS — I10 HYPERTENSION GOAL BP (BLOOD PRESSURE) < 140/90: ICD-10-CM

## 2022-01-03 DIAGNOSIS — R80.9 PROTEINURIA, UNSPECIFIED TYPE: ICD-10-CM

## 2022-01-03 DIAGNOSIS — G47.30 SLEEP APNEA WITH USE OF CONTINUOUS POSITIVE AIRWAY PRESSURE (CPAP): ICD-10-CM

## 2022-01-03 DIAGNOSIS — G43.809 OTHER MIGRAINE WITHOUT STATUS MIGRAINOSUS, NOT INTRACTABLE: ICD-10-CM

## 2022-01-03 DIAGNOSIS — F41.1 GAD (GENERALIZED ANXIETY DISORDER): ICD-10-CM

## 2022-01-03 DIAGNOSIS — E11.9 TYPE 2 DIABETES MELLITUS WITHOUT COMPLICATION, WITHOUT LONG-TERM CURRENT USE OF INSULIN (H): ICD-10-CM

## 2022-01-03 DIAGNOSIS — N52.9 ERECTILE DYSFUNCTION, UNSPECIFIED ERECTILE DYSFUNCTION TYPE: Primary | ICD-10-CM

## 2022-01-03 DIAGNOSIS — E66.01 MORBID OBESITY (H): ICD-10-CM

## 2022-01-03 DIAGNOSIS — N52.9 ERECTILE DYSFUNCTION, UNSPECIFIED ERECTILE DYSFUNCTION TYPE: ICD-10-CM

## 2022-01-03 LAB — HBA1C MFR BLD: 6.2 % (ref 0–5.6)

## 2022-01-03 PROCEDURE — 99396 PREV VISIT EST AGE 40-64: CPT | Performed by: PHYSICIAN ASSISTANT

## 2022-01-03 PROCEDURE — 83036 HEMOGLOBIN GLYCOSYLATED A1C: CPT | Performed by: PHYSICIAN ASSISTANT

## 2022-01-03 PROCEDURE — 36415 COLL VENOUS BLD VENIPUNCTURE: CPT | Performed by: PHYSICIAN ASSISTANT

## 2022-01-03 PROCEDURE — 99214 OFFICE O/P EST MOD 30 MIN: CPT | Mod: 25 | Performed by: PHYSICIAN ASSISTANT

## 2022-01-03 RX ORDER — SILDENAFIL CITRATE 20 MG/1
20-100 TABLET ORAL DAILY PRN
Qty: 30 TABLET | Refills: 11 | Status: SHIPPED | OUTPATIENT
Start: 2022-01-03 | End: 2023-06-22

## 2022-01-03 RX ORDER — FLUTICASONE PROPIONATE 50 MCG
1 SPRAY, SUSPENSION (ML) NASAL DAILY
Qty: 16 G | Refills: 11 | Status: SHIPPED | OUTPATIENT
Start: 2022-01-03

## 2022-01-03 RX ORDER — LOSARTAN POTASSIUM 50 MG/1
50 TABLET ORAL DAILY
Qty: 90 TABLET | Refills: 3 | Status: SHIPPED | OUTPATIENT
Start: 2022-01-03 | End: 2023-01-11

## 2022-01-03 RX ORDER — LIRAGLUTIDE 6 MG/ML
1.8 INJECTION SUBCUTANEOUS DAILY
Qty: 30 ML | Refills: 0 | Status: SHIPPED | OUTPATIENT
Start: 2022-01-03 | End: 2022-06-13

## 2022-01-03 RX ORDER — TADALAFIL 10 MG/1
10 TABLET ORAL DAILY PRN
Qty: 6 TABLET | Refills: 11 | Status: SHIPPED | OUTPATIENT
Start: 2022-01-03 | End: 2022-01-03

## 2022-01-03 RX ORDER — AMLODIPINE BESYLATE 10 MG/1
10 TABLET ORAL DAILY
Qty: 90 TABLET | Refills: 3 | Status: SHIPPED | OUTPATIENT
Start: 2022-01-03 | End: 2023-01-23

## 2022-01-03 RX ORDER — METFORMIN HCL 500 MG
1000 TABLET, EXTENDED RELEASE 24 HR ORAL 2 TIMES DAILY WITH MEALS
Qty: 360 TABLET | Refills: 3 | Status: SHIPPED | OUTPATIENT
Start: 2022-01-03 | End: 2022-12-12

## 2022-01-03 ASSESSMENT — ASTHMA QUESTIONNAIRES
QUESTION_4 LAST FOUR WEEKS HOW OFTEN HAVE YOU USED YOUR RESCUE INHALER OR NEBULIZER MEDICATION (SUCH AS ALBUTEROL): NOT AT ALL
QUESTION_2 LAST FOUR WEEKS HOW OFTEN HAVE YOU HAD SHORTNESS OF BREATH: THREE TO SIX TIMES A WEEK
ACT_TOTALSCORE: 20
QUESTION_1 LAST FOUR WEEKS HOW MUCH OF THE TIME DID YOUR ASTHMA KEEP YOU FROM GETTING AS MUCH DONE AT WORK, SCHOOL OR AT HOME: A LITTLE OF THE TIME
QUESTION_3 LAST FOUR WEEKS HOW OFTEN DID YOUR ASTHMA SYMPTOMS (WHEEZING, COUGHING, SHORTNESS OF BREATH, CHEST TIGHTNESS OR PAIN) WAKE YOU UP AT NIGHT OR EARLIER THAN USUAL IN THE MORNING: ONCE OR TWICE
QUESTION_5 LAST FOUR WEEKS HOW WOULD YOU RATE YOUR ASTHMA CONTROL: WELL CONTROLLED

## 2022-01-03 ASSESSMENT — ENCOUNTER SYMPTOMS
FREQUENCY: 0
ABDOMINAL PAIN: 0
FEVER: 0
DIZZINESS: 0
MYALGIAS: 0
PARESTHESIAS: 0
DIARRHEA: 0
JOINT SWELLING: 0
CHILLS: 0
NAUSEA: 1
HEADACHES: 1
HEARTBURN: 1
EYE PAIN: 0
NERVOUS/ANXIOUS: 1
HEMATURIA: 0
PALPITATIONS: 0
CONSTIPATION: 0
WEAKNESS: 0
SORE THROAT: 0
COUGH: 0
DYSURIA: 0
SHORTNESS OF BREATH: 0
ARTHRALGIAS: 0
HEMATOCHEZIA: 0

## 2022-01-03 ASSESSMENT — MIFFLIN-ST. JEOR: SCORE: 2067.46

## 2022-01-03 NOTE — RESULT ENCOUNTER NOTE
Naga Vo,     Your A1C has improved a lot! Great work. We should see you back in 3-6 months for another check.   Criselda Walsh PA-C

## 2022-01-03 NOTE — TELEPHONE ENCOUNTER
Tadalafil is not covered by insurance and is too expensive-he is ok with trying sildenafil 20mg , it is much less expensive. If you agree please send a prescription  Thank you  Cleo Becker [facility]  [address] [place] [address]  [place] [id number] [phone]

## 2022-01-04 ASSESSMENT — ASTHMA QUESTIONNAIRES: ACT_TOTALSCORE: 20

## 2022-01-05 ENCOUNTER — MYC MEDICAL ADVICE (OUTPATIENT)
Dept: FAMILY MEDICINE | Facility: CLINIC | Age: 42
End: 2022-01-05
Payer: COMMERCIAL

## 2022-01-05 ENCOUNTER — TELEPHONE (OUTPATIENT)
Dept: SLEEP MEDICINE | Facility: CLINIC | Age: 42
End: 2022-01-05
Payer: COMMERCIAL

## 2022-01-05 NOTE — TELEPHONE ENCOUNTER
Received Rx for CPAP supplies. Left message for patient to return call if he needs to [facility] with [facility].

## 2022-02-10 ENCOUNTER — MYC MEDICAL ADVICE (OUTPATIENT)
Dept: FAMILY MEDICINE | Facility: CLINIC | Age: 42
End: 2022-02-10
Payer: COMMERCIAL

## 2022-02-10 DIAGNOSIS — F41.1 GAD (GENERALIZED ANXIETY DISORDER): ICD-10-CM

## 2022-02-10 RX ORDER — PROPRANOLOL HYDROCHLORIDE 160 MG/1
160 CAPSULE, EXTENDED RELEASE ORAL DAILY
Qty: 90 CAPSULE | Refills: 0 | Status: SHIPPED | OUTPATIENT
Start: 2022-02-10 | End: 2022-05-09

## 2022-02-21 DIAGNOSIS — E11.9 TYPE 2 DIABETES MELLITUS WITHOUT COMPLICATION, UNSPECIFIED WHETHER LONG TERM INSULIN USE (H): ICD-10-CM

## 2022-02-21 RX ORDER — ATORVASTATIN CALCIUM 20 MG/1
20 TABLET, FILM COATED ORAL DAILY
Qty: 90 TABLET | Refills: 3 | Status: SHIPPED | OUTPATIENT
Start: 2022-02-21 | End: 2023-02-07

## 2022-02-21 NOTE — TELEPHONE ENCOUNTER
Routing to New PCP- previous pt of Bobby Kent who no longer works at [employer]    Madison Freeman RN

## 2022-02-23 ENCOUNTER — LAB (OUTPATIENT)
Dept: LAB | Facility: CLINIC | Age: 42
End: 2022-02-23
Payer: COMMERCIAL

## 2022-02-23 DIAGNOSIS — Z79.899 LITHIUM USE: Primary | ICD-10-CM

## 2022-02-23 DIAGNOSIS — R30.0 DYSURIA: ICD-10-CM

## 2022-02-23 DIAGNOSIS — Z13.9 SCREENING FOR UNSPECIFIED CONDITION: ICD-10-CM

## 2022-02-23 LAB
ALBUMIN UR-MCNC: NEGATIVE MG/DL
APPEARANCE UR: CLEAR
BACTERIA #/AREA URNS HPF: NORMAL /HPF
BILIRUB UR QL STRIP: NEGATIVE
CHOLEST SERPL-MCNC: 135 MG/DL
COLOR UR AUTO: YELLOW
FASTING STATUS PATIENT QL REPORTED: YES
FASTING STATUS PATIENT QL REPORTED: YES
GLUCOSE BLD-MCNC: 136 MG/DL (ref 70–99)
GLUCOSE UR STRIP-MCNC: NEGATIVE MG/DL
HDLC SERPL-MCNC: 42 MG/DL
HGB UR QL STRIP: NEGATIVE
KETONES UR STRIP-MCNC: NEGATIVE MG/DL
LDLC SERPL CALC-MCNC: 55 MG/DL
LEUKOCYTE ESTERASE UR QL STRIP: NEGATIVE
LITHIUM SERPL-SCNC: 0.6 MMOL/L
NITRATE UR QL: NEGATIVE
NONHDLC SERPL-MCNC: 93 MG/DL
PH UR STRIP: 6 [PH] (ref 5–7)
RBC #/AREA URNS AUTO: NORMAL /HPF
SP GR UR STRIP: 1.02 (ref 1–1.03)
TRIGL SERPL-MCNC: 190 MG/DL
UROBILINOGEN UR STRIP-ACNC: 0.2 E.U./DL
WBC #/AREA URNS AUTO: NORMAL /HPF

## 2022-02-23 PROCEDURE — 99000 SPECIMEN HANDLING OFFICE-LAB: CPT

## 2022-02-23 PROCEDURE — 36415 COLL VENOUS BLD VENIPUNCTURE: CPT

## 2022-02-23 PROCEDURE — 80061 LIPID PANEL: CPT

## 2022-02-23 PROCEDURE — 81001 URINALYSIS AUTO W/SCOPE: CPT

## 2022-02-23 PROCEDURE — 80175 DRUG SCREEN QUAN LAMOTRIGINE: CPT | Mod: 90

## 2022-02-23 PROCEDURE — 82947 ASSAY GLUCOSE BLOOD QUANT: CPT

## 2022-02-23 PROCEDURE — 80178 ASSAY OF LITHIUM: CPT

## 2022-03-02 LAB — LAMOTRIGINE SERPL-MCNC: 2.4 UG/ML

## 2022-03-11 ENCOUNTER — LAB (OUTPATIENT)
Dept: LAB | Facility: CLINIC | Age: 42
End: 2022-03-11
Payer: COMMERCIAL

## 2022-03-11 DIAGNOSIS — Z79.899 HISTORY OF LONG-TERM TREATMENT WITH HIGH-RISK MEDICATION: Primary | ICD-10-CM

## 2022-03-11 LAB
AMPHETAMINES UR QL: DETECTED
BARBITURATES UR QL SCN: NOT DETECTED
BENZODIAZ UR QL SCN: NOT DETECTED
BUPRENORPHINE UR QL: NOT DETECTED
CANNABINOIDS UR QL: NOT DETECTED
COCAINE UR QL SCN: NOT DETECTED
D-METHAMPHET UR QL: NOT DETECTED
ETHANOL UR QL SCN: NORMAL
METHADONE UR QL SCN: NOT DETECTED
OPIATES UR QL SCN: NOT DETECTED
OXYCODONE UR QL SCN: NOT DETECTED
PCP UR QL SCN: NOT DETECTED
PROPOXYPH UR QL: NOT DETECTED
TRICYCLICS UR QL SCN: NOT DETECTED

## 2022-03-11 PROCEDURE — 80306 DRUG TEST PRSMV INSTRMNT: CPT

## 2022-03-11 PROCEDURE — 80320 DRUG SCREEN QUANTALCOHOLS: CPT

## 2022-04-15 ENCOUNTER — LAB (OUTPATIENT)
Dept: LAB | Facility: CLINIC | Age: 42
End: 2022-04-15
Payer: COMMERCIAL

## 2022-04-15 DIAGNOSIS — Z79.899 HIGH RISK MEDICATION USE: Primary | ICD-10-CM

## 2022-04-15 DIAGNOSIS — E11.9 DIABETES MELLITUS, TYPE 2 (H): ICD-10-CM

## 2022-04-15 LAB
AMPHETAMINES UR QL: DETECTED
BARBITURATES UR QL SCN: NOT DETECTED
BENZODIAZ UR QL SCN: NOT DETECTED
BUPRENORPHINE UR QL: NOT DETECTED
CANNABINOIDS UR QL: NOT DETECTED
COCAINE UR QL SCN: NOT DETECTED
D-METHAMPHET UR QL: NOT DETECTED
METHADONE UR QL SCN: NOT DETECTED
OPIATES UR QL SCN: NOT DETECTED
OXYCODONE UR QL SCN: NOT DETECTED
PCP UR QL SCN: NOT DETECTED
PROPOXYPH UR QL: NOT DETECTED
TRICYCLICS UR QL SCN: NOT DETECTED

## 2022-04-15 PROCEDURE — 80306 DRUG TEST PRSMV INSTRMNT: CPT

## 2022-05-06 DIAGNOSIS — F41.1 GAD (GENERALIZED ANXIETY DISORDER): ICD-10-CM

## 2022-05-09 RX ORDER — PROPRANOLOL HYDROCHLORIDE 160 MG/1
160 CAPSULE, EXTENDED RELEASE ORAL DAILY
Qty: 90 CAPSULE | Refills: 0 | Status: SHIPPED | OUTPATIENT
Start: 2022-05-09 | End: 2022-08-03

## 2022-05-22 ENCOUNTER — HEALTH MAINTENANCE LETTER (OUTPATIENT)
Age: 42
End: 2022-05-22

## 2022-06-12 DIAGNOSIS — E11.9 TYPE 2 DIABETES MELLITUS WITHOUT COMPLICATION, WITHOUT LONG-TERM CURRENT USE OF INSULIN (H): ICD-10-CM

## 2022-06-12 DIAGNOSIS — E66.01 MORBID OBESITY (H): ICD-10-CM

## 2022-06-13 RX ORDER — LIRAGLUTIDE 6 MG/ML
1.8 INJECTION SUBCUTANEOUS DAILY
Qty: 27 ML | Refills: 0 | Status: SHIPPED | OUTPATIENT
Start: 2022-06-13 | End: 2022-08-30

## 2022-06-14 NOTE — TELEPHONE ENCOUNTER
Prescription approved per [facility] Refill Protocol.    Erika Levin RN

## 2022-08-02 DIAGNOSIS — F41.1 GAD (GENERALIZED ANXIETY DISORDER): ICD-10-CM

## 2022-08-03 RX ORDER — PROPRANOLOL HYDROCHLORIDE 160 MG/1
160 CAPSULE, EXTENDED RELEASE ORAL DAILY
Qty: 90 CAPSULE | Refills: 0 | Status: SHIPPED | OUTPATIENT
Start: 2022-08-03 | End: 2022-08-30

## 2022-08-03 NOTE — TELEPHONE ENCOUNTER
Routing refill request to provider for review/approval because:  Failed Protocol    Penelope Florentino RN BSN  [facility]

## 2022-08-11 ENCOUNTER — LAB (OUTPATIENT)
Dept: LAB | Facility: CLINIC | Age: 42
End: 2022-08-11
Payer: COMMERCIAL

## 2022-08-11 DIAGNOSIS — E11.9 TYPE 2 DIABETES MELLITUS WITHOUT COMPLICATION, WITHOUT LONG-TERM CURRENT USE OF INSULIN (H): ICD-10-CM

## 2022-08-11 LAB
ANION GAP SERPL CALCULATED.3IONS-SCNC: 9 MMOL/L (ref 3–14)
BUN SERPL-MCNC: 18 MG/DL (ref 7–30)
CALCIUM SERPL-MCNC: 9.7 MG/DL (ref 8.5–10.1)
CHLORIDE BLD-SCNC: 111 MMOL/L (ref 94–109)
CO2 SERPL-SCNC: 20 MMOL/L (ref 20–32)
CREAT SERPL-MCNC: 0.87 MG/DL (ref 0.66–1.25)
CREAT UR-MCNC: 177 MG/DL
GFR SERPL CREATININE-BSD FRML MDRD: >90 ML/MIN/1.73M2
GLUCOSE BLD-MCNC: 120 MG/DL (ref 70–99)
HBA1C MFR BLD: 5.7 % (ref 0–5.6)
MICROALBUMIN UR-MCNC: 10 MG/L
MICROALBUMIN/CREAT UR: 5.65 MG/G CR (ref 0–17)
POTASSIUM BLD-SCNC: 4.2 MMOL/L (ref 3.4–5.3)
SODIUM SERPL-SCNC: 140 MMOL/L (ref 133–144)

## 2022-08-11 PROCEDURE — 36415 COLL VENOUS BLD VENIPUNCTURE: CPT

## 2022-08-11 PROCEDURE — 83036 HEMOGLOBIN GLYCOSYLATED A1C: CPT

## 2022-08-11 PROCEDURE — 82043 UR ALBUMIN QUANTITATIVE: CPT

## 2022-08-11 PROCEDURE — 80048 BASIC METABOLIC PNL TOTAL CA: CPT

## 2022-08-12 NOTE — RESULT ENCOUNTER NOTE
Tavo,     Your A1C continues to improve. We can discuss your labs more at your appointment.   Criselda Walsh PA-C

## 2022-08-30 ENCOUNTER — OFFICE VISIT (OUTPATIENT)
Dept: FAMILY MEDICINE | Facility: CLINIC | Age: 42
End: 2022-08-30
Payer: COMMERCIAL

## 2022-08-30 VITALS
HEART RATE: 86 BPM | OXYGEN SATURATION: 97 % | TEMPERATURE: 98.8 F | WEIGHT: 254.8 LBS | DIASTOLIC BLOOD PRESSURE: 74 MMHG | SYSTOLIC BLOOD PRESSURE: 128 MMHG | BODY MASS INDEX: 38.74 KG/M2

## 2022-08-30 DIAGNOSIS — H61.21 IMPACTED CERUMEN OF RIGHT EAR: ICD-10-CM

## 2022-08-30 DIAGNOSIS — L60.0 INGROWN TOENAIL: ICD-10-CM

## 2022-08-30 DIAGNOSIS — E66.01 MORBID OBESITY (H): ICD-10-CM

## 2022-08-30 DIAGNOSIS — F41.1 GAD (GENERALIZED ANXIETY DISORDER): ICD-10-CM

## 2022-08-30 DIAGNOSIS — J45.30 MILD PERSISTENT ASTHMA WITHOUT COMPLICATION: ICD-10-CM

## 2022-08-30 DIAGNOSIS — E11.9 TYPE 2 DIABETES MELLITUS WITHOUT COMPLICATION, WITHOUT LONG-TERM CURRENT USE OF INSULIN (H): Primary | ICD-10-CM

## 2022-08-30 PROCEDURE — 99214 OFFICE O/P EST MOD 30 MIN: CPT | Mod: 25 | Performed by: PHYSICIAN ASSISTANT

## 2022-08-30 PROCEDURE — 69209 REMOVE IMPACTED EAR WAX UNI: CPT | Mod: RT | Performed by: PHYSICIAN ASSISTANT

## 2022-08-30 RX ORDER — LIRAGLUTIDE 6 MG/ML
1.8 INJECTION SUBCUTANEOUS DAILY
Qty: 27 ML | Refills: 3 | Status: SHIPPED | OUTPATIENT
Start: 2022-08-30 | End: 2023-02-10

## 2022-08-30 RX ORDER — HYDROXYZINE PAMOATE 50 MG/1
100 CAPSULE ORAL 2 TIMES DAILY
COMMUNITY
Start: 2022-08-07 | End: 2023-11-01

## 2022-08-30 RX ORDER — ZIPRASIDONE HYDROCHLORIDE 20 MG/1
20 CAPSULE ORAL 2 TIMES DAILY WITH MEALS
COMMUNITY
Start: 2022-08-26 | End: 2023-08-21

## 2022-08-30 RX ORDER — ALBUTEROL SULFATE 90 UG/1
2 AEROSOL, METERED RESPIRATORY (INHALATION) EVERY 4 HOURS PRN
Qty: 18 G | Refills: 5 | Status: SHIPPED | OUTPATIENT
Start: 2022-08-30 | End: 2023-06-22

## 2022-08-30 RX ORDER — PROPRANOLOL HYDROCHLORIDE 160 MG/1
160 CAPSULE, EXTENDED RELEASE ORAL DAILY
Qty: 90 CAPSULE | Refills: 1 | Status: SHIPPED | OUTPATIENT
Start: 2022-08-30 | End: 2023-04-17

## 2022-08-30 RX ORDER — GABAPENTIN 100 MG/1
100 CAPSULE ORAL 3 TIMES DAILY
COMMUNITY
Start: 2022-08-11 | End: 2023-11-01

## 2022-08-30 ASSESSMENT — ASTHMA QUESTIONNAIRES
QUESTION_3 LAST FOUR WEEKS HOW OFTEN DID YOUR ASTHMA SYMPTOMS (WHEEZING, COUGHING, SHORTNESS OF BREATH, CHEST TIGHTNESS OR PAIN) WAKE YOU UP AT NIGHT OR EARLIER THAN USUAL IN THE MORNING: NOT AT ALL
QUESTION_1 LAST FOUR WEEKS HOW MUCH OF THE TIME DID YOUR ASTHMA KEEP YOU FROM GETTING AS MUCH DONE AT WORK, SCHOOL OR AT HOME: SOME OF THE TIME
QUESTION_4 LAST FOUR WEEKS HOW OFTEN HAVE YOU USED YOUR RESCUE INHALER OR NEBULIZER MEDICATION (SUCH AS ALBUTEROL): ONCE A WEEK OR LESS
QUESTION_6 LAST FOUR WEEKS HOW MANY DAYS DID YOUR CHILD WHEEZE DURING THE DAY BECAUSE OF ASTHMA: NOT AT ALL
QUESTION_2 LAST FOUR WEEKS HOW OFTEN HAVE YOU HAD SHORTNESS OF BREATH: ONCE A DAY
QUESTION_2 HOW MUCH OF A PROBLEM IS YOUR ASTHMA WHEN YOU RUN, EXCERCISE OR PLAY SPORTS: IT'S A LITTLE PROBLEM BUT IT'S OKAY.
ACT_TOTALSCORE_PEDS: 25
QUESTION_5 LAST FOUR WEEKS HOW WOULD YOU RATE YOUR ASTHMA CONTROL: WELL CONTROLLED
QUESTION_4 DO YOU WAKE UP DURING THE NIGHT BECAUSE OF YOUR ASTHMA: NO, NONE OF THE TIME.
QUESTION_7 LAST FOUR WEEKS HOW MANY DAYS DID YOUR CHILD WAKE UP DURING THE NIGHT BECAUSE OF ASTHMA: NOT AT ALL
QUESTION_1 HOW IS YOUR ASTHMA TODAY: GOOD
QUESTION_5 LAST FOUR WEEKS HOW MANY DAYS DID YOUR CHILD HAVE ANY DAYTIME ASTHMA SYMPTOMS: NOT AT ALL
ACT_TOTALSCORE: 18
ACT_TOTALSCORE_PEDS: 25
ACT_TOTALSCORE: 18
QUESTION_3 DO YOU COUGH BECAUSE OF YOUR ASTHMA: NO, NONE OF THE TIME.

## 2022-08-30 NOTE — PROGRESS NOTES
"  Assessment & Plan     Type 2 diabetes mellitus without complication, without long-term current use of insulin (H)  Continue with the victoza. Great improvement on A1C with weight loss as well.   - liraglutide (VICTOZA PEN) 18 MG/3ML solution; Inject 1.8 mg Subcutaneous daily  - insulin pen needle (32G X 4 MM) 32G X 4 MM miscellaneous; Use 1 pen needles daily or as directed.  - Orthopedic  Referral; Future  Morbid obesity (H)  Weight loss will help improve DM.   - liraglutide (VICTOZA PEN) 18 MG/3ML solution; Inject 1.8 mg Subcutaneous daily    MATTI (generalized anxiety disorder)  Stable. Seeing Juanjo regularly.   - propranolol ER (INDERAL LA) 160 MG 24 hr capsule; Take 1 capsule (160 mg) by mouth daily    Mild persistent asthma without complication  Stable. Refilled.   - albuterol (PROAIR HFA/PROVENTIL HFA/VENTOLIN HFA) 108 (90 Base) MCG/ACT inhaler; Inhale 2 puffs into the lungs every 4 hours as needed for shortness of breath / dyspnea or wheezing    Ingrown toenail  Referral placed for removal  - Orthopedic  Referral; Future    Cerumen impaction  Resolved.              BMI:   Estimated body mass index is 38.74 kg/m  as calculated from the following:    Height as of 1/3/22: 1.727 m (5' 8\").    Weight as of this encounter: 115.6 kg (254 lb 12.8 oz).           Return in about 6 months (around 2/28/2023) for Diabetes.    VEENA [facility] DAVID Vo is a 42 year old, presenting for the following health issues:  Diabetes and Health Maintenance      History of Present Illness       Diabetes:   He presents for follow up of diabetes.  He is checking home blood glucose a few times a month. He checks blood glucose before meals.  Blood glucose is never over 200 and never under 70. He is aware of hypoglycemia symptoms including shakiness. He has no concerns regarding his diabetes at this time.  He is not experiencing numbness or burning in feet, excessive " thirst, blurry vision, weight changes or redness, sores or blisters on feet.         He eats 2-3 servings of fruits and vegetables daily.He consumes 0 sweetened beverage(s) daily.He exercises with enough effort to increase his heart rate 10 to 19 minutes per day.  He exercises with enough effort to increase his heart rate 3 or less days per week. He is missing 7 dose(s) of medications per week.  He is not taking prescribed medications regularly due to remembering to take.     Decreased portion sizes.   No nausea lately.        Review of Systems         Objective    /74 (BP Location: Left arm, Patient Position: Chair, Cuff Size: Adult Large)   Pulse 86   Temp 98.8  F (37.1  C) (Oral)   Wt 115.6 kg (254 lb 12.8 oz)   SpO2 97%   BMI 38.74 kg/m    Body mass index is 38.74 kg/m .  Physical Exam   GENERAL: healthy, alert and no distress  HENT: Right TM occluded by wax. Provider used water lavage to clear cerumen. ear canals and TM's normal, nose and mouth without ulcers or lesions  Diabetic foot exam: normal DP and PT pulses, no trophic changes or ulcerative lesions, normal sensory exam and right great toenail curled and ingrowing, no infection but tender to touch.                     .  ..

## 2022-09-24 ENCOUNTER — HEALTH MAINTENANCE LETTER (OUTPATIENT)
Age: 42
End: 2022-09-24

## 2022-09-28 ENCOUNTER — OFFICE VISIT (OUTPATIENT)
Dept: PODIATRY | Facility: CLINIC | Age: 42
End: 2022-09-28
Attending: PHYSICIAN ASSISTANT
Payer: COMMERCIAL

## 2022-09-28 VITALS
HEIGHT: 68 IN | WEIGHT: 254 LBS | DIASTOLIC BLOOD PRESSURE: 78 MMHG | SYSTOLIC BLOOD PRESSURE: 114 MMHG | BODY MASS INDEX: 38.49 KG/M2 | OXYGEN SATURATION: 96 % | HEART RATE: 90 BPM

## 2022-09-28 DIAGNOSIS — L60.0 INGROWN TOENAIL: ICD-10-CM

## 2022-09-28 DIAGNOSIS — E11.9 TYPE 2 DIABETES MELLITUS WITHOUT COMPLICATION, WITHOUT LONG-TERM CURRENT USE OF INSULIN (H): ICD-10-CM

## 2022-09-28 PROCEDURE — 99203 OFFICE O/P NEW LOW 30 MIN: CPT | Performed by: PODIATRIST

## 2022-09-28 NOTE — NURSING NOTE
"Chief Complaint   Patient presents with     Right Foot - Toenail       Vitals:    09/28/22 1437   Weight: 115.2 kg (254 lb)   Height: 1.727 m (5' 8\")     Wt Readings from Last 1 Encounters:   09/28/22 115.2 kg (254 lb)     Ht Readings from Last 1 Encounters:   09/28/22 1.727 m (5' 8\")       [facility], 9/28/2022 2:37 PM    "

## 2022-09-28 NOTE — PROGRESS NOTES
Assessment:      ICD-10-CM    1. Type 2 diabetes mellitus without complication, without long-term current use of insulin (H)  E11.9 Orthopedic  Referral   2. Ingrown toenail  L60.0 Orthopedic  Referral          Plan:  No orders of the defined types were placed in this encounter.      Discussed the etiology and treatment of the condition with the patient.  Discussed treatment options.    Does not want permanent removal    Foot Care nurse for trimming nails      Return:  No follow-ups on file.    Adela Daley DPM                Chief Complaint:     No chief complaint on file.    right ingrown toenail    HPI:  Tavo Key is a 42 year old year old male who presents for evaluation of ingrown toenail.        Past Medical & Surgical History:  Past Medical History:   Diagnosis Date     Depressive disorder      Diabetes (H) 10/01/2020     Hypertension      Uncomplicated asthma       No past surgical history on file.   Family History   Problem Relation Age of Onset     Diabetes Other      Breast Cancer Mother      Depression Mother      Mental Illness Mother      Depression Father      Depression Sister      Anxiety Disorder Sister      Mental Illness Sister         Social History:  ?  History   Smoking Status     Former Smoker     Packs/day: 0.00     Years: 0.00     Quit date: 2010   Smokeless Tobacco     Never Used     History   Drug Use Unknown     Social History    Substance and Sexual Activity      Alcohol use: Not Currently      Allergies:  ?   No Known Allergies     Medications:    Current Outpatient Medications   Medication     albuterol (PROAIR HFA/PROVENTIL HFA/VENTOLIN HFA) 108 (90 Base) MCG/ACT inhaler     amLODIPine (NORVASC) 10 MG tablet     amphetamine-dextroamphetamine (ADDERALL XR) 15 MG 24 hr capsule     aspirin (ASA) 81 MG EC tablet     atorvastatin (LIPITOR) 20 MG tablet     fluticasone (FLONASE) 50 MCG/ACT nasal spray     fluticasone-salmeterol (ADVAIR-HFA) 230-21 MCG/ACT  inhaler     gabapentin (NEURONTIN) 100 MG capsule     hydrOXYzine (VISTARIL) 50 MG capsule     insulin pen needle (32G X 4 MM) 32G X 4 MM miscellaneous     lamoTRIgine (LAMICTAL) 200 MG tablet     liraglutide (VICTOZA PEN) 18 MG/3ML solution     lithium ER (LITHOBID) 300 MG CR tablet     losartan (COZAAR) 50 MG tablet     metFORMIN (GLUCOPHAGE-XR) 500 MG 24 hr tablet     naratriptan (AMERGE) 2.5 MG tablet     propranolol ER (INDERAL LA) 160 MG 24 hr capsule     sildenafil (REVATIO) 20 MG tablet     ziprasidone (GEODON) 20 MG capsule     No current facility-administered medications for this visit.       Physical Exam:  ?  Vitals:  There were no vitals taken for this visit.   General:  WD/WN, in NAD.  A&O x3.  Dermatologic:    Onychocryptosis presenthallux right.  Paronychia present.  Purulence absent.  Skin texture, turgor is normal.  Vascular:  Pulses palpable right.  Digital capillary refill time normal right.  Skin temperature is normal to affected digit(s).  Neurologic:    Gross sensation normal.  Gait and balance normal.  Musculoskeletal:  Maximal pain to palpation of affected nail border(s).  Gross deformity absent.

## 2022-09-28 NOTE — LETTER
9/28/2022         RE: Tavo Key  [address] [facility]  [place] [address]        Dear Colleague,    Thank you for referring your patient, Tavo Key, to the [facility]. Please see a copy of my visit note below.      Assessment:      ICD-10-CM    1. Type 2 diabetes mellitus without complication, without long-term current use of insulin (H)  E11.9 Orthopedic  Referral   2. Ingrown toenail  L60.0 Orthopedic  Referral          Plan:  No orders of the defined types were placed in this encounter.      Discussed the etiology and treatment of the condition with the patient.  Discussed treatment options.    Does not want permanent removal    Foot Care nurse for trimming nails      Return:  No follow-ups on file.    Adela Daley DPM                Chief Complaint:     No chief complaint on file.    right ingrown toenail    HPI:  Tavo Key is a 42 year old year old male who presents for evaluation of ingrown toenail.        Past Medical & Surgical History:  Past Medical History:   Diagnosis Date     Depressive disorder      Diabetes (H) 10/01/2020     Hypertension      Uncomplicated asthma       No past surgical history on file.   Family History   Problem Relation Age of Onset     Diabetes Other      Breast Cancer Mother      Depression Mother      Mental Illness Mother      Depression Father      Depression Sister      Anxiety Disorder Sister      Mental Illness Sister         Social History:  ?  History   Smoking Status     Former Smoker     Packs/day: 0.00     Years: 0.00     Quit date: 2010   Smokeless Tobacco     Never Used     History   Drug Use Unknown     Social History    Substance and Sexual Activity      Alcohol use: Not Currently      Allergies:  ?   No Known Allergies     Medications:    Current Outpatient Medications   Medication     albuterol (PROAIR HFA/PROVENTIL HFA/VENTOLIN HFA) 108 (90 Base) MCG/ACT inhaler     amLODIPine (NORVASC) 10  MG tablet     amphetamine-dextroamphetamine (ADDERALL XR) 15 MG 24 hr capsule     aspirin (ASA) 81 MG EC tablet     atorvastatin (LIPITOR) 20 MG tablet     fluticasone (FLONASE) 50 MCG/ACT nasal spray     fluticasone-salmeterol (ADVAIR-HFA) 230-21 MCG/ACT inhaler     gabapentin (NEURONTIN) 100 MG capsule     hydrOXYzine (VISTARIL) 50 MG capsule     insulin pen needle (32G X 4 MM) 32G X 4 MM miscellaneous     lamoTRIgine (LAMICTAL) 200 MG tablet     liraglutide (VICTOZA PEN) 18 MG/3ML solution     lithium ER (LITHOBID) 300 MG CR tablet     losartan (COZAAR) 50 MG tablet     metFORMIN (GLUCOPHAGE-XR) 500 MG 24 hr tablet     naratriptan (AMERGE) 2.5 MG tablet     propranolol ER (INDERAL LA) 160 MG 24 hr capsule     sildenafil (REVATIO) 20 MG tablet     ziprasidone (GEODON) 20 MG capsule     No current facility-administered medications for this visit.       Physical Exam:  ?  Vitals:  There were no vitals taken for this visit.   General:  WD/WN, in NAD.  A&O x3.  Dermatologic:    Onychocryptosis presenthallux right.  Paronychia present.  Purulence absent.  Skin texture, turgor is normal.  Vascular:  Pulses palpable right.  Digital capillary refill time normal right.  Skin temperature is normal to affected digit(s).  Neurologic:    Gross sensation normal.  Gait and balance normal.  Musculoskeletal:  Maximal pain to palpation of affected nail border(s).  Gross deformity absent.                Again, thank you for allowing me to participate in the care of your patient.        Sincerely,        Adela Daley DPM

## 2022-09-29 ENCOUNTER — IMMUNIZATION (OUTPATIENT)
Dept: NURSING | Facility: CLINIC | Age: 42
End: 2022-09-29
Payer: COMMERCIAL

## 2022-09-29 PROCEDURE — 91312 COVID-19,PF,PFIZER BOOSTER BIVALENT: CPT

## 2022-09-29 PROCEDURE — 90686 IIV4 VACC NO PRSV 0.5 ML IM: CPT

## 2022-09-29 PROCEDURE — 90471 IMMUNIZATION ADMIN: CPT

## 2022-09-29 PROCEDURE — 0124A COVID-19,PF,PFIZER BOOSTER BIVALENT: CPT

## 2022-10-19 ENCOUNTER — MYC MEDICAL ADVICE (OUTPATIENT)
Dept: FAMILY MEDICINE | Facility: CLINIC | Age: 42
End: 2022-10-19

## 2022-11-04 ENCOUNTER — ALLIED HEALTH/NURSE VISIT (OUTPATIENT)
Dept: FAMILY MEDICINE | Facility: CLINIC | Age: 42
End: 2022-11-04
Payer: COMMERCIAL

## 2022-11-04 DIAGNOSIS — Z23 ENCOUNTER FOR IMMUNIZATION: Primary | ICD-10-CM

## 2022-11-04 PROCEDURE — 90746 HEPB VACCINE 3 DOSE ADULT IM: CPT

## 2022-11-04 PROCEDURE — 90471 IMMUNIZATION ADMIN: CPT

## 2022-11-04 PROCEDURE — 90677 PCV20 VACCINE IM: CPT

## 2022-11-04 PROCEDURE — 90472 IMMUNIZATION ADMIN EACH ADD: CPT

## 2022-11-30 ENCOUNTER — ALLIED HEALTH/NURSE VISIT (OUTPATIENT)
Dept: FAMILY MEDICINE | Facility: CLINIC | Age: 42
End: 2022-11-30
Payer: COMMERCIAL

## 2022-11-30 DIAGNOSIS — Z23 NEED FOR VACCINATION: Primary | ICD-10-CM

## 2022-11-30 PROCEDURE — 99207 PR NO CHARGE NURSE ONLY: CPT

## 2022-11-30 PROCEDURE — 90746 HEPB VACCINE 3 DOSE ADULT IM: CPT

## 2022-11-30 PROCEDURE — 90471 IMMUNIZATION ADMIN: CPT

## 2022-12-25 ENCOUNTER — APPOINTMENT (OUTPATIENT)
Dept: CT IMAGING | Facility: HOSPITAL | Age: 42
End: 2022-12-25
Attending: EMERGENCY MEDICINE
Payer: COMMERCIAL

## 2022-12-25 ENCOUNTER — HOSPITAL ENCOUNTER (EMERGENCY)
Facility: HOSPITAL | Age: 42
Discharge: HOME | End: 2022-12-25
Attending: EMERGENCY MEDICINE | Admitting: EMERGENCY MEDICINE
Payer: COMMERCIAL

## 2022-12-25 VITALS
WEIGHT: 260 LBS | SYSTOLIC BLOOD PRESSURE: 120 MMHG | RESPIRATION RATE: 14 BRPM | TEMPERATURE: 98.9 F | DIASTOLIC BLOOD PRESSURE: 81 MMHG | OXYGEN SATURATION: 97 % | BODY MASS INDEX: 40.81 KG/M2 | HEART RATE: 84 BPM | HEIGHT: 67 IN

## 2022-12-25 DIAGNOSIS — N20.1 URETEROLITHIASIS: ICD-10-CM

## 2022-12-25 DIAGNOSIS — R10.32 ABDOMINAL PAIN, LEFT LOWER QUADRANT: ICD-10-CM

## 2022-12-25 LAB
ALBUMIN UR-MCNC: NEGATIVE MG/DL
ANION GAP SERPL CALCULATED.3IONS-SCNC: 13 MMOL/L (ref 7–15)
APPEARANCE UR: CLEAR
BACTERIA #/AREA URNS HPF: ABNORMAL /HPF
BASOPHILS # BLD AUTO: 0.1 10E3/UL (ref 0–0.2)
BASOPHILS NFR BLD AUTO: 1 %
BILIRUB UR QL STRIP: NEGATIVE
BUN SERPL-MCNC: 13.2 MG/DL (ref 6–20)
CALCIUM SERPL-MCNC: 9.6 MG/DL (ref 8.6–10)
CHLORIDE SERPL-SCNC: 104 MMOL/L (ref 98–107)
COLOR UR AUTO: COLORLESS
CREAT SERPL-MCNC: 1.03 MG/DL (ref 0.67–1.17)
DEPRECATED HCO3 PLAS-SCNC: 23 MMOL/L (ref 22–29)
EOSINOPHIL # BLD AUTO: 0.3 10E3/UL (ref 0–0.7)
EOSINOPHIL NFR BLD AUTO: 3 %
ERYTHROCYTE [DISTWIDTH] IN BLOOD BY AUTOMATED COUNT: 13.2 % (ref 10–15)
GFR SERPL CREATININE-BSD FRML MDRD: >90 ML/MIN/1.73M2
GLUCOSE BLDC GLUCOMTR-MCNC: 194 MG/DL (ref 70–99)
GLUCOSE SERPL-MCNC: 187 MG/DL (ref 70–99)
GLUCOSE UR STRIP-MCNC: NEGATIVE MG/DL
HCT VFR BLD AUTO: 41.3 % (ref 40–53)
HGB BLD-MCNC: 13.8 G/DL (ref 13.3–17.7)
HGB UR QL STRIP: ABNORMAL
IMM GRANULOCYTES # BLD: 0 10E3/UL
IMM GRANULOCYTES NFR BLD: 0 %
KETONES UR STRIP-MCNC: NEGATIVE MG/DL
LEUKOCYTE ESTERASE UR QL STRIP: NEGATIVE
LYMPHOCYTES # BLD AUTO: 2.7 10E3/UL (ref 0.8–5.3)
LYMPHOCYTES NFR BLD AUTO: 29 %
MCH RBC QN AUTO: 25.1 PG (ref 26.5–33)
MCHC RBC AUTO-ENTMCNC: 33.4 G/DL (ref 31.5–36.5)
MCV RBC AUTO: 75 FL (ref 78–100)
MONOCYTES # BLD AUTO: 0.7 10E3/UL (ref 0–1.3)
MONOCYTES NFR BLD AUTO: 7 %
MUCOUS THREADS #/AREA URNS LPF: PRESENT /LPF
NEUTROPHILS # BLD AUTO: 5.6 10E3/UL (ref 1.6–8.3)
NEUTROPHILS NFR BLD AUTO: 60 %
NITRATE UR QL: NEGATIVE
NRBC # BLD AUTO: 0 10E3/UL
NRBC BLD AUTO-RTO: 0 /100
PH UR STRIP: 5.5 [PH] (ref 5–7)
PLATELET # BLD AUTO: 250 10E3/UL (ref 150–450)
POTASSIUM SERPL-SCNC: 4 MMOL/L (ref 3.4–5.3)
RBC # BLD AUTO: 5.49 10E6/UL (ref 4.4–5.9)
RBC URINE: 66 /HPF
SODIUM SERPL-SCNC: 140 MMOL/L (ref 136–145)
SP GR UR STRIP: 1.01 (ref 1–1.03)
SQUAMOUS EPITHELIAL: <1 /HPF
UROBILINOGEN UR STRIP-MCNC: <2 MG/DL
WBC # BLD AUTO: 9.4 10E3/UL (ref 4–11)
WBC URINE: 1 /HPF

## 2022-12-25 PROCEDURE — 85025 COMPLETE CBC W/AUTO DIFF WBC: CPT | Performed by: EMERGENCY MEDICINE

## 2022-12-25 PROCEDURE — 99284 EMERGENCY DEPT VISIT MOD MDM: CPT | Mod: 25

## 2022-12-25 PROCEDURE — 36415 COLL VENOUS BLD VENIPUNCTURE: CPT | Performed by: EMERGENCY MEDICINE

## 2022-12-25 PROCEDURE — 81001 URINALYSIS AUTO W/SCOPE: CPT | Performed by: EMERGENCY MEDICINE

## 2022-12-25 PROCEDURE — 80048 BASIC METABOLIC PNL TOTAL CA: CPT | Performed by: EMERGENCY MEDICINE

## 2022-12-25 PROCEDURE — 74176 CT ABD & PELVIS W/O CONTRAST: CPT

## 2022-12-25 PROCEDURE — 250N000013 HC RX MED GY IP 250 OP 250 PS 637: Performed by: EMERGENCY MEDICINE

## 2022-12-25 RX ORDER — KETOROLAC TROMETHAMINE 30 MG/ML
30 INJECTION, SOLUTION INTRAMUSCULAR; INTRAVENOUS ONCE
Status: DISCONTINUED | OUTPATIENT
Start: 2022-12-25 | End: 2022-12-25

## 2022-12-25 RX ORDER — OXYCODONE HYDROCHLORIDE 5 MG/1
5 TABLET ORAL ONCE
Status: COMPLETED | OUTPATIENT
Start: 2022-12-25 | End: 2022-12-25

## 2022-12-25 RX ORDER — DIMENHYDRINATE 50 MG
50 TABLET ORAL EVERY 6 HOURS PRN
Qty: 28 TABLET | Refills: 0 | Status: SHIPPED | OUTPATIENT
Start: 2022-12-25 | End: 2023-01-01

## 2022-12-25 RX ORDER — ACETAMINOPHEN 325 MG/1
975 TABLET ORAL ONCE
Status: COMPLETED | OUTPATIENT
Start: 2022-12-25 | End: 2022-12-25

## 2022-12-25 RX ORDER — ACETAMINOPHEN 500 MG
1000 TABLET ORAL EVERY 6 HOURS
Qty: 56 TABLET | Refills: 0 | Status: SHIPPED | OUTPATIENT
Start: 2022-12-25 | End: 2023-01-01

## 2022-12-25 RX ORDER — OXYCODONE HYDROCHLORIDE 5 MG/1
5 TABLET ORAL EVERY 4 HOURS PRN
Qty: 12 TABLET | Refills: 0 | Status: SHIPPED | OUTPATIENT
Start: 2022-12-25 | End: 2022-12-29

## 2022-12-25 RX ORDER — DIMENHYDRINATE 50 MG
50 TABLET ORAL AT BEDTIME
Qty: 7 TABLET | Refills: 0 | Status: SHIPPED | OUTPATIENT
Start: 2022-12-25 | End: 2023-01-01

## 2022-12-25 RX ADMIN — OXYCODONE HYDROCHLORIDE 5 MG: 5 TABLET ORAL at 20:52

## 2022-12-25 RX ADMIN — ACETAMINOPHEN 975 MG: 325 TABLET ORAL at 20:51

## 2022-12-25 RX ADMIN — Medication 50 MG: at 20:52

## 2022-12-25 ASSESSMENT — ACTIVITIES OF DAILY LIVING (ADL): ADLS_ACUITY_SCORE: 35

## 2022-12-26 NOTE — ED TRIAGE NOTES
patient indicates he has left groin pain since this morning ut has increased through the afternoon. Patient denies nausea and vomiting and he states that he has had urinary frequency as well.

## 2022-12-26 NOTE — ED PROVIDER NOTES
EMERGENCY DEPARTMENT ENCOUNTER      NAME: Tavo Key  AGE: 42 year old male  YOB: 1980  MRN: [id number]  EVALUATION DATE & TIME: 12/25/2022  8:04 PM    PCP: Criselda Walsh    ED PROVIDER: Nelia Holden MD    Chief Complaint   Patient presents with     Groin Pain     Frequency         FINAL IMPRESSION:  1. Ureterolithiasis    2. Abdominal pain, left lower quadrant          ED COURSE & MEDICAL DECISION MAKING:    Pertinent Labs & Imaging studies reviewed. (See chart for details)  42 year old male with history of HTN, asthma, DM and previous ureterolithiasis who presents to the Emergency Department for evaluation of left lower quadrant abdominal pain with urinary frequency/dysuria.  Differential includes ureterolithiasis, UTI/pyelonephritis, diverticulitis.  Clinically there is no palpable hernia and he does not have any significant pain radiating in the testicles suspect a torsion or epididymo orchitis.    Patient placed on monitor, IV established and blood obtained.  Patient given oxycodone, Tylenol, Dramamine.  Urinalysis with microscopic hematuria.  CBC, BMP unremarkable.  CT abdomen pelvis duplicated left renal collecting system and the left ureter to the distal pelvis with mild left hydronephrosis and hydroureter to the ureterovesicular junction.  Small stone at the ureterovesicular junction and 4 tiny intrarenal stones.  Heart rate normalized after the above.  Patient felt improved after the above.  Will be discharged home with symptom management and outpatient [facility] follow-up.      ED Course as of 12/25/22 2206   Sun Dec 25, 2022   2010 Pulse(!): 122   2038 RBC Urine(!): 66       Medical Decision Making    History:    Supplemental history from: Family Member/Significant Other    External Record(s) reviewed: Outpatient Record: [facility]    Work Up:    Chart documentation includes differential considered and any EKGs or imaging independently interpreted by provider.    In additional to work up  documented, I considered the following work up: See chart documentation, if applicable.    External consultation:    Discussion of management with another provider: See chart documentation, if applicable    Complicating factors:    Care impacted by chronic illness: Diabetes    Care affected by social determinants of health: N/A    Disposition considerations: Discharge. I prescribed additional prescription strength medication(s) as charted. N/A.    8:12 PM I met the patient and performed my initial interview and exam.    9:34 PM I rechecked and updated the patient. Spoke about plan for discharge.     At the conclusion of the encounter I discussed the results of all of the tests and the disposition. The questions were answered. The patient or family acknowledged understanding and was agreeable with the care plan.      MEDICATIONS GIVEN IN THE EMERGENCY:  Medications   dimenhyDRINATE chew tab 50 mg (50 mg Oral Given 12/25/22 2052)   acetaminophen (TYLENOL) tablet 975 mg (975 mg Oral Given 12/25/22 2051)   oxyCODONE (ROXICODONE) tablet 5 mg (5 mg Oral Given 12/25/22 2052)       NEW PRESCRIPTIONS STARTED AT TODAY'S ER VISIT  Discharge Medication List as of 12/25/2022  9:43 PM      START taking these medications    Details   acetaminophen (TYLENOL) 500 MG tablet Take 2 tablets (1,000 mg) by mouth every 6 hours for 7 days, Disp-56 tablet, R-0, Local Print      !! dimenhyDRINATE (DRAMAMINE) 50 MG tablet Take 1 tablet (50 mg) by mouth At Bedtime for 7 days, Disp-7 tablet, R-0, Local Print      !! dimenhyDRINATE (DRAMAMINE) 50 MG tablet Take 1 tablet (50 mg) by mouth every 6 hours as needed for other (kidney stone pain management), Disp-28 tablet, R-0, Local Print      oxyCODONE (ROXICODONE) 5 MG tablet Take 1 tablet (5 mg) by mouth every 4 hours as needed for severe pain (7-10) If pain is not improved with acetaminophen and ibuprofen., Disp-12 tablet, R-0, Local Print       !! - Potential duplicate medications found. Please  "discuss with provider.             =================================================================    HPI    Patient information was obtained from: Patient and wife    Use of Intrepreter: N/A      Tavo Key is a 42 year old male with pertinent medical history of nephrolithiasis, and type 2 diabetes mellitus who presents for evaluation of groin pain.     Patient reports that he began getting left groin pain today that wraps slightly into his testicle that feels similar to past kidney stones he has had. He passed his previous stones on his own. Patient took ibuprofen at 1800. Patient has had increased frequency and dysuria, is unsure if there is any blood. Previously patient had his stones diagnosed by ultrasound in [place]. His pain is a 6/10 but it comes in waves. His wife states this is the \"most uncomfortable\" she has ever seen him. He has not checked his blood sugar yet today. Last stone in 2012. Denies any nausea or any other complaints at this time.       REVIEW OF SYSTEMS  Constitutional:  Denies fever, chills, weight loss or weakness  GI:  Denies abdominal pain, nausea, vomiting, diarrhea  : Positive for groin pain, frequency, and dysuria.  Musculoskeletal:  Denies any new muscle/joint pain, swelling or loss of function.  Skin:  Denies rash, pallor  All other systems negative unless noted in HPI.      PAST MEDICAL HISTORY:  Past Medical History:   Diagnosis Date     Depressive disorder      Diabetes (H) 10/01/2020     Hypertension      Uncomplicated asthma        PAST SURGICAL HISTORY:  History reviewed. No pertinent surgical history.    CURRENT MEDICATIONS:    Prior to Admission Medications   Prescriptions Last Dose Informant Patient Reported? Taking?   albuterol (PROAIR HFA/PROVENTIL HFA/VENTOLIN HFA) 108 (90 Base) MCG/ACT inhaler   No No   Sig: Inhale 2 puffs into the lungs every 4 hours as needed for shortness of breath / dyspnea or wheezing   amLODIPine (NORVASC) 10 MG tablet   No No   Sig: Take " 1 tablet (10 mg) by mouth daily   amphetamine-dextroamphetamine (ADDERALL XR) 15 MG 24 hr capsule   Yes No   Sig: Take 15 mg by mouth   aspirin (ASA) 81 MG EC tablet   Yes No   Sig: Take 1 tablet (81 mg) by mouth daily   atorvastatin (LIPITOR) 20 MG tablet   No No   Sig: Take 1 tablet (20 mg) by mouth daily (at bedtime)   fluticasone (FLONASE) 50 MCG/ACT nasal spray   No No   Sig: Spray 1 spray into both nostrils daily   Patient taking differently: Spray 1 spray into both nostrils daily   fluticasone-salmeterol (ADVAIR-HFA) 230-21 MCG/ACT inhaler   No No   Sig: Inhale 2 puffs into the lungs 2 times daily   gabapentin (NEURONTIN) 100 MG capsule   Yes No   hydrOXYzine (VISTARIL) 50 MG capsule   Yes No   insulin pen needle (32G X 4 MM) 32G X 4 MM miscellaneous   No No   Sig: Use 1 pen needles daily or as directed.   lamoTRIgine (LAMICTAL) 200 MG tablet   Yes No   Sig: Take 200 mg by mouth   liraglutide (VICTOZA PEN) 18 MG/3ML solution   No No   Sig: Inject 1.8 mg Subcutaneous daily   lithium ER (LITHOBID) 300 MG CR tablet   No No   Sig: Take 1 tablet (300 mg) by mouth daily   losartan (COZAAR) 50 MG tablet   No No   Sig: Take 1 tablet (50 mg) by mouth daily   metFORMIN (GLUCOPHAGE XR) 500 MG 24 hr tablet   No No   Sig: TAKE 2 TABLETS (1,000 MG) BY MOUTH 2 TIMES DAILY (WITH MEALS)   naratriptan (AMERGE) 2.5 MG tablet   No No   Sig: TAKE 1 TABLET (2.5 MG) BY MOUTH AT ONSET OF HEADACHE FOR MIGRAINE MAY REPEAT IN 4 HOURS. MAX 2 TABLETS/24 HOURS.   propranolol ER (INDERAL LA) 160 MG 24 hr capsule   No No   Sig: Take 1 capsule (160 mg) by mouth daily   sildenafil (REVATIO) 20 MG tablet   No No   Sig: Take 1-5 tablets ( mg) by mouth daily as needed (erectile dysfunction.)   ziprasidone (GEODON) 20 MG capsule   Yes No      Facility-Administered Medications: None       ALLERGIES:  No Known Allergies    FAMILY HISTORY:  Family History   Problem Relation Age of Onset     Diabetes Other      Breast Cancer Mother       "Depression Mother      Mental Illness Mother      Depression Father      Depression Sister      Anxiety Disorder Sister      Mental Illness Sister        SOCIAL HISTORY:  Social History     Tobacco Use     Smoking status: Former     Packs/day: 0.00     Years: 0.00     Pack years: 0.00     Types: Cigarettes     Quit date:      Years since quittin.     Smokeless tobacco: Never   Substance Use Topics     Alcohol use: Not Currently     Drug use: Never        VITALS:  Patient Vitals for the past 24 hrs:   BP Temp Temp src Pulse Resp SpO2 Height Weight   22 214 120/81 -- -- -- 14 -- -- --   22 120/81 -- -- 84 -- 97 % -- --   22 121/81 -- -- -- -- -- -- --   22 -- -- -- 89 -- 96 % -- --   22 (!) 143/96 98.9  F (37.2  C) Temporal (!) 122 20 97 % 1.702 m (5' 7\") 117.9 kg (260 lb)       PHYSICAL EXAM    General Appearance: Well-appearing, well-nourished, no acute distress   Head:  Normocephalic  Eyes:  conjunctiva/corneas clear  ENT:  membranes are moist without pallor  Neck:  Supple  Cardio: Tachycardic in the 120s, regular rhythm  Pulm:  No respiratory distress  Back:   No CVA tenderness, normal ROM  Abdomen:  Soft, non distended,no rebound or guarding. LLQ tenderness. No palpable hernia. Obese.   Extremities: Moves all extremities normally, normal gait  Skin:  Skin warm, dry, no rashes  Neuro:  Alert and oriented ×3, moving all extremities, no gross sensory defects     RADIOLOGY/LABS:  Reviewed all pertinent imaging. Please see official radiology report. All pertinent labs reviewed and interpreted.    Results for orders placed or performed during the hospital encounter of 22   CT Abdomen Pelvis w/o Contrast    Impression    IMPRESSION:   1.  Duplicated left renal collecting system and the left ureter to the distal pelvis, normal variant anatomy. Mild left hydronephrosis and hydroureter to the ureterovesical junction containing a small stone, accounting for " patient's symptoms. There are 4   additional tiny sand-like calyceal tip stones in the left kidney. No calculi or obstruction on the right.    2.  Diffusely fatty infiltrated liver with focal sparing adjacent to the gallbladder fossa. No mechanical bowel obstruction, free gas or free fluid. Stomach is distended with residual food material and fluid. Normal appendix.     Basic metabolic panel   Result Value Ref Range    Sodium 140 136 - 145 mmol/L    Potassium 4.0 3.4 - 5.3 mmol/L    Chloride 104 98 - 107 mmol/L    Carbon Dioxide (CO2) 23 22 - 29 mmol/L    Anion Gap 13 7 - 15 mmol/L    Urea Nitrogen 13.2 6.0 - 20.0 mg/dL    Creatinine 1.03 0.67 - 1.17 mg/dL    Calcium 9.6 8.6 - 10.0 mg/dL    Glucose 187 (H) 70 - 99 mg/dL    GFR Estimate >90 >60 mL/min/1.73m2   UA with Microscopic reflex to Culture    Specimen: Urine, Clean Catch   Result Value Ref Range    Color Urine Colorless Colorless, Straw, Light Yellow, Yellow    Appearance Urine Clear Clear    Glucose Urine Negative Negative mg/dL    Bilirubin Urine Negative Negative    Ketones Urine Negative Negative mg/dL    Specific Gravity Urine 1.008 1.001 - 1.030    Blood Urine >1.0 mg/dL (A) Negative    pH Urine 5.5 5.0 - 7.0    Protein Albumin Urine Negative Negative mg/dL    Urobilinogen Urine <2.0 <2.0 mg/dL    Nitrite Urine Negative Negative    Leukocyte Esterase Urine Negative Negative    Bacteria Urine Few (A) None Seen /HPF    Mucus Urine Present (A) None Seen /LPF    RBC Urine 66 (H) <=2 /HPF    WBC Urine 1 <=5 /HPF    Squamous Epithelials Urine <1 <=1 /HPF   CBC with platelets and differential   Result Value Ref Range    WBC Count 9.4 4.0 - 11.0 10e3/uL    RBC Count 5.49 4.40 - 5.90 10e6/uL    Hemoglobin 13.8 13.3 - 17.7 g/dL    Hematocrit 41.3 40.0 - 53.0 %    MCV 75 (L) 78 - 100 fL    MCH 25.1 (L) 26.5 - 33.0 pg    MCHC 33.4 31.5 - 36.5 g/dL    RDW 13.2 10.0 - 15.0 %    Platelet Count 250 150 - 450 10e3/uL    % Neutrophils 60 %    % Lymphocytes 29 %    %  Monocytes 7 %    % Eosinophils 3 %    % Basophils 1 %    % Immature Granulocytes 0 %    NRBCs per 100 WBC 0 <1 /100    Absolute Neutrophils 5.6 1.6 - 8.3 10e3/uL    Absolute Lymphocytes 2.7 0.8 - 5.3 10e3/uL    Absolute Monocytes 0.7 0.0 - 1.3 10e3/uL    Absolute Eosinophils 0.3 0.0 - 0.7 10e3/uL    Absolute Basophils 0.1 0.0 - 0.2 10e3/uL    Absolute Immature Granulocytes 0.0 <=0.4 10e3/uL    Absolute NRBCs 0.0 10e3/uL   Glucose by meter   Result Value Ref Range    GLUCOSE BY METER POCT 194 (H) 70 - 99 mg/dL       The creation of this record is based on the scribe s observations of the work being performed by Nelia Holden MD and the provider s statements to them. It was created on her behalf by Parth Xavier, a trained medical scribe. This document has been checked and approved by the attending provider.    Nelia Holden MD  Emergency Medicine  [facility] EMERGENCY DEPARTMENT  [address] [place] [id number] [phone]  Dept: [phone]     Nelia Holden MD  12/25/22 1757

## 2022-12-30 ENCOUNTER — LAB (OUTPATIENT)
Dept: LAB | Facility: CLINIC | Age: 42
End: 2022-12-30
Payer: COMMERCIAL

## 2022-12-30 DIAGNOSIS — E11.9 DIABETES MELLITUS, TYPE 2 (H): Primary | ICD-10-CM

## 2022-12-30 DIAGNOSIS — N20.1 URETEROLITHIASIS: ICD-10-CM

## 2022-12-30 PROCEDURE — 99000 SPECIMEN HANDLING OFFICE-LAB: CPT

## 2022-12-30 PROCEDURE — 82365 CALCULUS SPECTROSCOPY: CPT | Mod: 90

## 2023-01-01 LAB
APPEARANCE STONE: NORMAL
COMPN STONE: NORMAL
SPECIMEN WT: 54 MG

## 2023-01-21 DIAGNOSIS — I10 HYPERTENSION GOAL BP (BLOOD PRESSURE) < 140/90: ICD-10-CM

## 2023-01-21 DIAGNOSIS — R80.9 PROTEINURIA, UNSPECIFIED TYPE: ICD-10-CM

## 2023-01-23 RX ORDER — AMLODIPINE BESYLATE 10 MG/1
10 TABLET ORAL DAILY
Qty: 90 TABLET | Refills: 1 | Status: SHIPPED | OUTPATIENT
Start: 2023-01-23 | End: 2023-06-22

## 2023-01-25 ENCOUNTER — LAB (OUTPATIENT)
Dept: LAB | Facility: CLINIC | Age: 43
End: 2023-01-25
Payer: COMMERCIAL

## 2023-01-25 DIAGNOSIS — N20.1 URETEROLITHIASIS: ICD-10-CM

## 2023-01-25 DIAGNOSIS — Z13.220 SCREENING FOR HYPERLIPIDEMIA: ICD-10-CM

## 2023-01-25 DIAGNOSIS — E11.9 DIABETES MELLITUS, TYPE 2 (H): ICD-10-CM

## 2023-01-25 LAB
CHOLEST SERPL-MCNC: 151 MG/DL
FASTING STATUS PATIENT QL REPORTED: ABNORMAL
HBA1C MFR BLD: 6.9 % (ref 0–5.6)
HDLC SERPL-MCNC: 43 MG/DL
LDLC SERPL CALC-MCNC: 50 MG/DL
NONHDLC SERPL-MCNC: 108 MG/DL
TRIGL SERPL-MCNC: 289 MG/DL

## 2023-01-25 PROCEDURE — 83036 HEMOGLOBIN GLYCOSYLATED A1C: CPT

## 2023-01-25 PROCEDURE — 80061 LIPID PANEL: CPT

## 2023-01-25 PROCEDURE — 36415 COLL VENOUS BLD VENIPUNCTURE: CPT

## 2023-01-26 NOTE — RESULT ENCOUNTER NOTE
Tavo,     Here is a copy of your labs. We will review these at your upcoming appointment. Your A1C while still at goal has increased quite a bit in the last 6 months.   Criselda Walsh PA-C

## 2023-02-10 ENCOUNTER — OFFICE VISIT (OUTPATIENT)
Dept: FAMILY MEDICINE | Facility: CLINIC | Age: 43
End: 2023-02-10
Payer: COMMERCIAL

## 2023-02-10 ENCOUNTER — TELEPHONE (OUTPATIENT)
Dept: FAMILY MEDICINE | Facility: CLINIC | Age: 43
End: 2023-02-10

## 2023-02-10 VITALS
WEIGHT: 262.8 LBS | BODY MASS INDEX: 41.25 KG/M2 | DIASTOLIC BLOOD PRESSURE: 81 MMHG | HEIGHT: 67 IN | TEMPERATURE: 99.2 F | SYSTOLIC BLOOD PRESSURE: 115 MMHG | HEART RATE: 94 BPM | OXYGEN SATURATION: 97 %

## 2023-02-10 DIAGNOSIS — E11.29 TYPE 2 DIABETES MELLITUS WITH OTHER DIABETIC KIDNEY COMPLICATION (H): ICD-10-CM

## 2023-02-10 DIAGNOSIS — E11.9 TYPE 2 DIABETES MELLITUS WITHOUT COMPLICATION, WITHOUT LONG-TERM CURRENT USE OF INSULIN (H): ICD-10-CM

## 2023-02-10 DIAGNOSIS — F31.62 BIPOLAR MIXED AFFECTIVE DISORDER, MODERATE (H): ICD-10-CM

## 2023-02-10 DIAGNOSIS — E66.01 MORBID OBESITY (H): ICD-10-CM

## 2023-02-10 PROCEDURE — 99214 OFFICE O/P EST MOD 30 MIN: CPT | Performed by: PHYSICIAN ASSISTANT

## 2023-02-10 RX ORDER — LIRAGLUTIDE 6 MG/ML
3 INJECTION SUBCUTANEOUS DAILY
Qty: 27 ML | Refills: 3 | Status: SHIPPED | OUTPATIENT
Start: 2023-02-10 | End: 2023-02-10

## 2023-02-10 ASSESSMENT — ASTHMA QUESTIONNAIRES: ACT_TOTALSCORE: 25

## 2023-02-10 NOTE — PROGRESS NOTES
"  Assessment & Plan     Bipolar mixed affective disorder, moderate (H)  Managed by outpatient provider with [facility] Transitioning to new psych    Morbid obesity (H)  Not currently exercising, discussed increasing exercise when the weather improves and he is able to safely walk outside.     Type 2 diabetes mellitus without complication, without long-term current use of insulin (H)  Type 2 diabetes mellitus with other diabetic kidney complication (H)   Fasting morning blood sugars are averaging 165, discussed exercise. Will continue on current diabetes management.   - insulin pen needle (32G X 4 MM) 32G X 4 MM miscellaneous; Use 2 pen needles daily or as directed.  - liraglutide - Weight Management (SAXENDA) 18 MG/3ML pen; Inject 3 mg Subcutaneous daily             BMI:   Estimated body mass index is 41.16 kg/m  as calculated from the following:    Height as of this encounter: 1.702 m (5' 7\").    Weight as of this encounter: 119.2 kg (262 lb 12.8 oz).   Weight management plan: Discussed healthy diet and exercise guidelines    Regular exercise    Return in about 6 months (around 8/10/2023) for Physical Exam.    Criselda Walsh PA-C  [facility]  The student SPARKLE Lora acted as a scribe and the encounter documented above was completely performed by myself and the documentation reflects the work I have performed today.   Criselda Dorsey   Tavo is a 42 year old, presenting for the following health issues:  Diabetes and Health Maintenance      History of Present Illness       Diabetes:   He presents for follow up of diabetes.  He is checking home blood glucose one time daily. He checks blood glucose before meals.  Blood glucose is sometimes over 200 and never under 70. When his blood glucose is low, the patient is asymptomatic for confusion, blurred vision, lethargy and reports not feeling dizzy, shaky, or weak.  He is concerned about other.  He is having weight gain. " "The patient has not had a diabetic eye exam in the last 12 months.        He went to the ER on christmas for kidney stones and was pass the big stone that day. He has not had any pain associated with stones since. He states is fasting morning blood sugars are averaging 165. He has improved on his eating habits. He has not been able to exercise much by going on walks due to the weather, but plans to in the Spring.  He still has a right big toe ingrown toe nail that causes him pain. He saw podiatrist but, would like a new referral to see an alternative provider.  He is having insomnia. He is seeing Juanjo for this issue and they are scheduling with new psychiatrist for him at the end of the month.     Review of Systems   CONSTITUTIONAL: NEGATIVE for fever, chills, change in weight  ENT/MOUTH: NEGATIVE for ear, mouth and throat problems  RESP: POSITIVE for cough-productive, sore throat, congestion   CV: NEGATIVE for chest pain, palpitations      Objective    /81 (BP Location: Right arm, Patient Position: Chair, Cuff Size: Adult Large)   Pulse 94   Temp 99.2  F (37.3  C) (Oral)   Ht 1.702 m (5' 7\")   Wt 119.2 kg (262 lb 12.8 oz)   SpO2 97%   BMI 41.16 kg/m    Body mass index is 41.16 kg/m .  Physical Exam   GENERAL: healthy, alert and no distress  EYES: Eyes grossly normal to inspection, PERRL and conjunctivae and sclerae normal  HENT: Nose and mouth without ulcers or lesions  NECK: no adenopathy, no asymmetry, masses  RESP: lungs clear to auscultation - no rales, rhonchi or wheezes  CV: regular rate and rhythm, normal S1 S2, no S3 or S4, no murmur, click or rub, no peripheral edema   ABDOMEN: soft, nontender, no hepatosplenomegaly, no masses and bowel sounds normal  MS: no gross musculoskeletal defects noted, no edema  BACK: no CVA tenderness  PSYCH: mentation appears normal                "

## 2023-02-15 NOTE — TELEPHONE ENCOUNTER
Central Prior Authorization Team - Phone: [phone]     PA Initiation    Medication: Saxenda 18mg/3ml- PA INITIATED  Insurance Company:    Pharmacy Filling the Rx: [facility] - [id number]  Filling Pharmacy Phone: [phone]  Filling Pharmacy Fax:    Start Date: 2/15/2023

## 2023-02-15 NOTE — TELEPHONE ENCOUNTER
Prior Authorization Follow Up    Received fax for additional clinical questions. Completed form and returned via fax. Waiting for determination.    PA DEPT  will follow up again on status in 1 to 2 business days.

## 2023-02-17 NOTE — TELEPHONE ENCOUNTER
Prior Authorization Follow Up    Called [employer]  to check status of SAXENDA    Request is in review. due To note high call volume, may take additional time. Typically determinations with in 1 to 5 business days.    PA DEPT  will follow up again on status in 1 to 2 business days.

## 2023-02-20 NOTE — TELEPHONE ENCOUNTER
Central Prior Authorization Team - Phone: [phone]     PRIOR AUTHORIZATION DENIED    Medication: Saxenda 18mg/3ml- PA DENIED    Denial Date: 2/19/2023    Denial Rational:                                                             Appeal Information:  If the provider would like to appeal, please provide a letter of medical necessity and route back to the team. Otherwise you can close the encounter. Thank you, Central PA Team

## 2023-03-01 ENCOUNTER — ALLIED HEALTH/NURSE VISIT (OUTPATIENT)
Dept: FAMILY MEDICINE | Facility: CLINIC | Age: 43
End: 2023-03-01
Payer: COMMERCIAL

## 2023-03-01 DIAGNOSIS — Z23 NEED FOR VACCINATION: Primary | ICD-10-CM

## 2023-03-01 PROCEDURE — 90471 IMMUNIZATION ADMIN: CPT

## 2023-03-01 PROCEDURE — 90746 HEPB VACCINE 3 DOSE ADULT IM: CPT

## 2023-03-01 PROCEDURE — 99207 PR NO CHARGE NURSE ONLY: CPT

## 2023-03-23 DIAGNOSIS — E11.29 TYPE 2 DIABETES MELLITUS WITH OTHER DIABETIC KIDNEY COMPLICATION (H): ICD-10-CM

## 2023-03-23 RX ORDER — SEMAGLUTIDE 1.34 MG/ML
INJECTION, SOLUTION SUBCUTANEOUS
Qty: 1.5 ML | Refills: 0 | Status: SHIPPED | OUTPATIENT
Start: 2023-03-23 | End: 2023-03-27

## 2023-03-24 DIAGNOSIS — Z79.899 ENCOUNTER FOR LONG-TERM (CURRENT) USE OF MEDICATIONS: Primary | ICD-10-CM

## 2023-03-24 DIAGNOSIS — F41.1 GENERALIZED ANXIETY DISORDER: ICD-10-CM

## 2023-03-27 ENCOUNTER — MYC MEDICAL ADVICE (OUTPATIENT)
Dept: FAMILY MEDICINE | Facility: CLINIC | Age: 43
End: 2023-03-27
Payer: COMMERCIAL

## 2023-03-27 DIAGNOSIS — E11.29 TYPE 2 DIABETES MELLITUS WITH OTHER DIABETIC KIDNEY COMPLICATION (H): Primary | ICD-10-CM

## 2023-03-27 DIAGNOSIS — B35.1 ONYCHOMYCOSIS: ICD-10-CM

## 2023-03-27 RX ORDER — GLUCOSAMINE HCL/CHONDROITIN SU 500-400 MG
CAPSULE ORAL
Qty: 100 EACH | Refills: 3 | Status: SHIPPED | OUTPATIENT
Start: 2023-03-27 | End: 2024-05-13

## 2023-03-27 RX ORDER — PEN NEEDLE, DIABETIC 32GX 5/32"
NEEDLE, DISPOSABLE MISCELLANEOUS
Qty: 100 EACH | Refills: 3 | Status: SHIPPED | OUTPATIENT
Start: 2023-03-27 | End: 2023-05-19

## 2023-03-27 RX ORDER — LIRAGLUTIDE 6 MG/ML
1.8 INJECTION SUBCUTANEOUS DAILY
Qty: 27 ML | Refills: 3 | Status: SHIPPED | OUTPATIENT
Start: 2023-03-27 | End: 2023-05-03

## 2023-03-30 ENCOUNTER — TELEPHONE (OUTPATIENT)
Dept: FAMILY MEDICINE | Facility: CLINIC | Age: 43
End: 2023-03-30
Payer: COMMERCIAL

## 2023-04-03 NOTE — TELEPHONE ENCOUNTER
Prior Authorization Approval    Authorization Effective Date: 4/3/2023  Authorization Expiration Date: 4/3/2024  Medication: VICTOZA 18 MG/3ML solution-APPROVED  Approved Dose/Quantity:   Reference #:     Insurance Company: [employer] - Phone [phone] Fax [phone]  Expected CoPay:       CoPay Card Available:      Foundation Assistance Needed:    Which Pharmacy is filling the prescription (Not needed for infusion/clinic administered): [facility] - [id number] [facility]  Pharmacy Notified: Yes  Patient Notified: No  **Instructed pharmacy to notify patient when script is ready to /ship.**  Patient still has a copay, he probably has a deductible he has to meet first.

## 2023-04-03 NOTE — TELEPHONE ENCOUNTER
Central Prior Authorization Team   Phone: [phone]      PA Initiation    Medication: VICTOZA 18 MG/3ML solution  Insurance Company: [employer] - Phone [phone] Fax [phone]  Pharmacy Filling the Rx: [facility] - [address]  Filling Pharmacy Phone: [phone]  Filling Pharmacy Fax:    Start Date: 4/3/2023    Started PA on [facility] and a response of Cannot find matching patient.  Will contact [facility] to get a paper PA when they open.

## 2023-04-04 RX ORDER — TERBINAFINE HYDROCHLORIDE 250 MG/1
250 TABLET ORAL DAILY
Qty: 90 TABLET | Refills: 0 | Status: SHIPPED | OUTPATIENT
Start: 2023-04-04 | End: 2023-06-29

## 2023-04-07 ENCOUNTER — TELEPHONE (OUTPATIENT)
Dept: FAMILY MEDICINE | Facility: CLINIC | Age: 43
End: 2023-04-07
Payer: COMMERCIAL

## 2023-04-07 DIAGNOSIS — E11.29 TYPE 2 DIABETES MELLITUS WITH OTHER DIABETIC KIDNEY COMPLICATION (H): Primary | ICD-10-CM

## 2023-04-07 NOTE — TELEPHONE ENCOUNTER
Patient came to  his victoza, he declined to pick it up due to the cost of $2275. For 3 months supply. He has a $3000. Deductible to meet. Insurance also said Ozempic would always be $0.00,, he reports it was not working, is it possible the dose was too low? Please follow up with patient  Thank you  Cleo Becker [facility]  [address] [place] [address]  [place] [address]  [phone]

## 2023-04-10 NOTE — TELEPHONE ENCOUNTER
Please call patient.    Pharmacy noted that his vitctoza was too expensive due to his deductible.   I would like him to meet with our [facility] pharmacy to go over medications and options.   Criselda Walsh PA-C

## 2023-04-10 NOTE — TELEPHONE ENCOUNTER
Spoke with patient. Relayed provider's message as written. Patient verbalized understanding and has no further questions at this time. Gave patient number for scheduling appointment with MTM ([phone]).     JOHN DuenasN RN  [facility]

## 2023-04-12 ENCOUNTER — TELEPHONE (OUTPATIENT)
Dept: FAMILY MEDICINE | Facility: CLINIC | Age: 43
End: 2023-04-12
Payer: COMMERCIAL

## 2023-04-12 NOTE — TELEPHONE ENCOUNTER
MTM referral from: [facility] visit (referral by provider)    MTM referral outreach attempt #2 on April 12, 2023 at 1:22 PM      Outcome: Patient not reachable after several attempts, will route to MT Pharmacist/Provider as an FYI.  [facility] scheduling number is [phone].  Thank you for the referral.    David Alvarenga [facility]

## 2023-04-13 ENCOUNTER — TELEPHONE (OUTPATIENT)
Dept: FAMILY MEDICINE | Facility: CLINIC | Age: 43
End: 2023-04-13
Payer: COMMERCIAL

## 2023-04-13 NOTE — TELEPHONE ENCOUNTER
Tavo is a little worried because he will be out of the victoza in 3 days. We were not able to get an appt for him to connect with Madison until 5/10. Please check to see what can be done to help pt.

## 2023-04-14 ENCOUNTER — MYC MEDICAL ADVICE (OUTPATIENT)
Dept: PHARMACY | Facility: CLINIC | Age: 43
End: 2023-04-14
Payer: COMMERCIAL

## 2023-04-14 DIAGNOSIS — E11.9 TYPE 2 DIABETES MELLITUS WITHOUT COMPLICATION, WITHOUT LONG-TERM CURRENT USE OF INSULIN (H): Primary | ICD-10-CM

## 2023-04-14 NOTE — TELEPHONE ENCOUNTER
Called Tavo, no answer, LVM to see mychart.     Madison Wheeler, PharmD  Medication Therapy Management Pharmacist  [phone]

## 2023-04-14 NOTE — TELEPHONE ENCOUNTER
Naga Aburto, I won't be able to see Tavo until 5/10/23, until then, could you send a prescription for Januvia 100 mg daily to replace his Victoza, due to cost?    Thank you!  Madison Wheeler, PharmD  Medication Therapy Management Pharmacist  [phone]

## 2023-04-17 DIAGNOSIS — F41.1 GAD (GENERALIZED ANXIETY DISORDER): ICD-10-CM

## 2023-04-17 RX ORDER — PROPRANOLOL HYDROCHLORIDE 160 MG/1
160 CAPSULE, EXTENDED RELEASE ORAL DAILY
Qty: 90 CAPSULE | Refills: 1 | Status: SHIPPED | OUTPATIENT
Start: 2023-04-17 | End: 2023-08-21

## 2023-05-01 ENCOUNTER — TRANSFERRED RECORDS (OUTPATIENT)
Dept: MULTI SPECIALTY CLINIC | Facility: CLINIC | Age: 43
End: 2023-05-01

## 2023-05-01 LAB — RETINOPATHY: NEGATIVE

## 2023-05-03 ENCOUNTER — VIRTUAL VISIT (OUTPATIENT)
Dept: PHARMACY | Facility: CLINIC | Age: 43
End: 2023-05-03
Attending: PHYSICIAN ASSISTANT
Payer: COMMERCIAL

## 2023-05-03 DIAGNOSIS — F90.9 ATTENTION DEFICIT HYPERACTIVITY DISORDER (ADHD), UNSPECIFIED ADHD TYPE: ICD-10-CM

## 2023-05-03 DIAGNOSIS — E11.9 TYPE 2 DIABETES MELLITUS WITHOUT COMPLICATION, WITHOUT LONG-TERM CURRENT USE OF INSULIN (H): Primary | ICD-10-CM

## 2023-05-03 DIAGNOSIS — N52.9 ERECTILE DYSFUNCTION, UNSPECIFIED ERECTILE DYSFUNCTION TYPE: ICD-10-CM

## 2023-05-03 DIAGNOSIS — B35.1 ONYCHOMYCOSIS: ICD-10-CM

## 2023-05-03 DIAGNOSIS — E78.5 HYPERLIPIDEMIA LDL GOAL <70: ICD-10-CM

## 2023-05-03 DIAGNOSIS — J30.2 SEASONAL ALLERGIC RHINITIS: ICD-10-CM

## 2023-05-03 DIAGNOSIS — I10 BENIGN ESSENTIAL HYPERTENSION: ICD-10-CM

## 2023-05-03 DIAGNOSIS — J45.30 MILD PERSISTENT ASTHMA WITHOUT COMPLICATION: ICD-10-CM

## 2023-05-03 DIAGNOSIS — E66.01 MORBID OBESITY (H): ICD-10-CM

## 2023-05-03 DIAGNOSIS — F31.62 BIPOLAR MIXED AFFECTIVE DISORDER, MODERATE (H): ICD-10-CM

## 2023-05-03 DIAGNOSIS — G43.809 OTHER MIGRAINE WITHOUT STATUS MIGRAINOSUS, NOT INTRACTABLE: ICD-10-CM

## 2023-05-03 DIAGNOSIS — F41.1 GAD (GENERALIZED ANXIETY DISORDER): ICD-10-CM

## 2023-05-03 PROCEDURE — 99605 MTMS BY PHARM NP 15 MIN: CPT | Mod: VID | Performed by: PHARMACIST

## 2023-05-03 PROCEDURE — 99607 MTMS BY PHARM ADDL 15 MIN: CPT | Mod: VID | Performed by: PHARMACIST

## 2023-05-03 RX ORDER — IBUPROFEN 200 MG
400 TABLET ORAL EVERY 4 HOURS PRN
COMMUNITY
End: 2023-12-26

## 2023-05-03 NOTE — Clinical Note
MARIAH MOTA note, thanks!  Madison Wheeler, PharmD Medication Therapy Management Pharmacist [phone]

## 2023-05-03 NOTE — PROGRESS NOTES
Medication Therapy Management (MTM) Encounter    ASSESSMENT:                            Medication Adherence/Access: No issues identified    Type 2 Diabetes/Obesity: A1C at goal <7%. Fasting blood sugars not at goal of  mg/dL. Not clear why fasting blood sugars remain elevated despite starting Trulicity 2 weeks ago. Recommend continuing Trulicity at current dose and re-checking A1C and SMBG values prior to adjusting dose. Recommended that patient check blood sugar at least once daily (either fasting or post-prandial). If additional glycemic control/weight loss is needed at a future visit, can consider increasing the Trulicity dose in 1.5-mg increments after at least 4 weeks on the current dosage to a max dose of 4.5 mg subQ once weekly. Also recommend annual diabetes eye exam.     Hypertension: Stable. BP at goal <130/80 mmHg    Hyperlipidemia: Stable. LDL-C at goal <70 mg/dL    Bipolar Disorder/ MATTI/ ADHD: Stable. It can be difficult to find a psych provider with availabilities. Recommend scheduling an appointment with [facility] and continuing care with [facility] until appointment is available.    Asthma/Allergic Rhinitis: Stable. ACT score at goal >20. Advair was kept on patient's medication list in case he has an exacerbation and requires scheduled asthma medications again. Recommend reviewing proper inhaler technique if he restarts the Advair, rinsing mouth after ICS use. Recommend completing an asthma action plan at next PCP vist    Migraines: Stable.    Onychomycosis: Stable. Recommend checking LFTs in another two weeks, which will be 6 weeks from starting therapy, since terbinafine can elevate liver enzymes.    ED: Stable.    PLAN:                            1. Check blood sugar once daily - either before breakfast or two hours after a meal.     2. Can contact [facility] if you'd like to establish care: [phone], continue with [facility] until then.    3. A1C and liver function tests -  check in two weeks.    Follow-up: Return in about 16 days (around 5/19/2023) for Medication Therapy Management.    SUBJECTIVE/OBJECTIVE:                          Tavo Key is a 42 year old male contacted via secure video for an initial visit. He was referred to me from Criselda Walsh PA-C.      Reason for visit: Diabetes, cost of meds.    Allergies/ADRs: Reviewed in chart  Past Medical History: Reviewed in chart  Tobacco: He reports that he quit smoking about 13 years ago. He has never used smokeless tobacco.  Alcohol: none/very rarely    Medication Adherence/Access:   Patient uses pill box(es).  Patient takes medications 3 time(s) per day.   Per patient, misses medication 0 times per week.   The patient fills medications at [place]: YES.    Type 2 Diabetes/Obesity:   Current therapy includes Trulicity 1.5 mg weekly x 2 weeks and metformin 1000 mg twice daily.  Does not feel like Trulicity is working as well as Victoza was. Weight has been increasing lately. Patient reports no current medication side effects.  SMBG: rarely.     Blood sugar were still up into 250s fasting last week.   Symptoms of low blood sugar? none.  Recent symptoms of high blood sugar? none.    Eye Exam: due  Foot Exam: up to date  Immunizations: up to date  Aspirin: Yes. Primary prevention  Statin: Yes. Atorvastatin  ACEi/ARB: Yes. Losartan  Diet/Exercise: Not discussed  Medication history  -Phentermine 18.75 mg daily  -$2000 left on deductible before insurance will cover Victoza. Victoza worked really well in the past but stopped due to cost   -Copay for Januvia is around $510.   -Patient tried Ozempic previously without benefit but thinks he may not have been on this medication long enough to notice weight loss/glycemic control.   -Trulicity was covered with $0 copay after prior authorization was submitted.     Hemoglobin A1C   Date Value Ref Range Status   01/25/2023 6.9 (H) 0.0 - 5.6 % Final     Comment:     Normal <5.7%   Prediabetes  5.7-6.4%    Diabetes 6.5% or higher     Note: Adopted from ADA consensus guidelines.     Lab Results   Component Value Date    UMALCR 5.65 08/11/2022     Wt Readings from Last 4 Encounters:   02/10/23 262 lb 12.8 oz (119.2 kg)   12/25/22 260 lb (117.9 kg)   09/28/22 254 lb (115.2 kg)   08/30/22 254 lb 12.8 oz (115.6 kg)     Hypertension:   Current medications include amlodipine 10 mg daily. Patient does self-monitor blood pressure at home sometimes but cannot recall values. Patient reports no current medication side effects.  Medication history  -hydrochlorothiazide 25 mg daily    BP Readings from Last 3 Encounters:   02/10/23 115/81   12/25/22 120/81   09/28/22 114/78     Last Comprehensive Metabolic Panel:  Lab Results   Component Value Date     12/25/2022    POTASSIUM 4.0 12/25/2022    CHLORIDE 104 12/25/2022    CO2 23 12/25/2022    ANIONGAP 13 12/25/2022     (H) 12/25/2022    BUN 13.2 12/25/2022    CR 1.03 12/25/2022    GFRESTIMATED >90 12/25/2022    FRANCO 9.6 12/25/2022       Hyperlipidemia:  Current medications include atorvastatin 20 mg daily. Patient reports no significant myalgias or other side effects.  The 10-year ASCVD risk score (Rima PIERCE, et al., 2019) is: 2.2%    Values used to calculate the score:      Age: 42 years      Sex: Male      Is Non- : No      Diabetic: Yes      Tobacco smoker: No      Systolic Blood Pressure: 115 mmHg      Is BP treated: Yes      HDL Cholesterol: 43 mg/dL      Total Cholesterol: 151 mg/dL  Recent Labs   Lab Test 01/25/23  1429 02/23/22  0854   CHOL 151 135   HDL 43 42   LDL 50 55   TRIG 289* 190*       Bipolar Disorder/ MATTI/ ADHD:   Current medications include Adderall XR 15 mg daily, lamotrigine 200 mg daily, lithium  mg daily, gabapentin 100 mg three times daily, ziprasidone 20 mg twice daily, and hydroxyzine 100 mg twice daily.  Pt reports that symptoms are stable and mood has been pretty good. Denies thoughts of harm to self or  others. Patient reports no current medication side effects.  Psych follows: [facility]. Patient interested in finding a  psych provider but has not been able to schedule an appointment yet.       Asthma/Allergic Rhinitis:    Current asthma medications include albuterol HFA 2 puffs every 4 hours PRN, and Flonase 1 spray in both nostrils daily PRN. Patient was previously using ADVAIR-HFA 2 puffs twice daily. He developed thrush a few times while taking the Advair so he stopped using it. He felt like his asthma was well controlled without the Advair so he did not resume it. He has not required his rescue inhaler recently. Patient reports no current medication side effects.  Asthma Action Plan on file: NO  Medication History  Montelukast 10 mg daily  ADVAIR--21 mcg/puff- 2 puffs twice daily          8/30/2022     8:45 AM 11/23/2022    12:14 PM 2/10/2023     8:10 AM   ACT Total Scores   ACT TOTAL SCORE (Goal Greater than or Equal to 20) 18 21 25   In the past 12 months, how many times did you visit the emergency room for your asthma without being admitted to the hospital? 0 0 0   In the past 12 months, how many times were you hospitalized overnight because of your asthma? 0 0 0     Migraines:   Current medications include naratriptan 2.5 mg PRN and propranolol  mg daily. Patient reports no current medication side effects..  Migraines used to occur once a week. Much improved now with migraines 1-2x/month, with a few more due to the warmer weather. Naratriptan is helpful. Last migraine requiring triptan was ~1 mo    Onychomycosis:  Current medications include terbinafine 250 mg daily PO. Patient reports no current medication side effects.  Hasn't noticed too much improvement yet, has been taking daily.  Notes he needs to recheck liver labs for this.    Erectile Dysfunction:  Current medications include sildenafil 20 to 100 mg daily PRN. Is effective, does get a headache from it,  but also had this with Cialis.   Medication history  Tadalafil 10 mg daily PRN    Today's Vitals: There were no vitals taken for this visit.  ----------------      I spent 34 minutes with this patient today. All changes were made via collaborative practice agreement with Criselda Walsh PA-C. A copy of the visit note was provided to the patient's provider(s).    A summary of these recommendations was sent via CD[employer].    Saran Zaidi, Pharmacy Student  Madison Wheeler, PharmD  Medication Therapy Management Pharmacist  [phone]    Telemedicine Visit Details  Type of service:  Video Conference via [employer]  Start Time: 12:35 PM  End Time: 1:04 PM     Medication Therapy Recommendations  Diabetes mellitus, type 2 (H)    Current Medication: dulaglutide (TRULICITY) 1.5 MG/0.5ML pen   Rationale: Medication requires monitoring - Needs additional monitoring   Recommendation: Self-Monitoring   Status: Patient Agreed - Adherence/Education         Onychomycosis    Current Medication: terbinafine (LAMISIL) 250 MG tablet   Rationale: Medication requires monitoring - Needs additional monitoring   Recommendation: Order Lab   Status: Accepted per CPA

## 2023-05-03 NOTE — PATIENT INSTRUCTIONS
"Recommendations from today's MTM visit:                                                       1. Check blood sugar once daily - either before breakfast or two hours after a meal.     2. Can contact [facility] if you'd like to establish care: [phone], continue with [facility] until then.    3. A1C and liver function tests - check in two weeks.    Follow-up: Return in about 16 days (around 5/19/2023).    It was great speaking with you today.  I value your experience and would be very thankful for your time in providing feedback in our clinic survey. In the next few days, you may receive an email or text message from [employer] with a link to a survey related to your  clinical pharmacist.\"     To schedule another MTM appointment, please call the clinic directly or you may call the MTM scheduling line at [phone] or toll-free at [phone].     My Clinical Pharmacist's contact information:                                                      Please feel free to contact me with any questions or concerns you have.     Madison Wheeler, PharmD  Medication Therapy Management Pharmacist  [phone]  "

## 2023-05-08 ENCOUNTER — HEALTH MAINTENANCE LETTER (OUTPATIENT)
Age: 43
End: 2023-05-08

## 2023-05-17 ENCOUNTER — LAB (OUTPATIENT)
Dept: LAB | Facility: CLINIC | Age: 43
End: 2023-05-17
Payer: COMMERCIAL

## 2023-05-17 DIAGNOSIS — F41.1 GENERALIZED ANXIETY DISORDER: ICD-10-CM

## 2023-05-17 DIAGNOSIS — B35.1 ONYCHOMYCOSIS: ICD-10-CM

## 2023-05-17 DIAGNOSIS — E11.9 TYPE 2 DIABETES MELLITUS WITHOUT COMPLICATION, WITHOUT LONG-TERM CURRENT USE OF INSULIN (H): ICD-10-CM

## 2023-05-17 DIAGNOSIS — Z79.899 ENCOUNTER FOR LONG-TERM (CURRENT) USE OF MEDICATIONS: ICD-10-CM

## 2023-05-17 LAB
ALBUMIN SERPL BCG-MCNC: 4.7 G/DL (ref 3.5–5.2)
ALP SERPL-CCNC: 79 U/L (ref 40–129)
ALT SERPL W P-5'-P-CCNC: 64 U/L (ref 10–50)
AST SERPL W P-5'-P-CCNC: 35 U/L (ref 10–50)
BILIRUB DIRECT SERPL-MCNC: <0.2 MG/DL (ref 0–0.3)
BILIRUB SERPL-MCNC: 0.3 MG/DL
CHOLEST SERPL-MCNC: 134 MG/DL
FASTING STATUS PATIENT QL REPORTED: ABNORMAL
GLUCOSE SERPL-MCNC: 259 MG/DL (ref 70–99)
HBA1C MFR BLD: 9.6 % (ref 0–5.6)
HDLC SERPL-MCNC: 38 MG/DL
LDLC SERPL CALC-MCNC: 54 MG/DL
NONHDLC SERPL-MCNC: 96 MG/DL
PROT SERPL-MCNC: 7.2 G/DL (ref 6.4–8.3)
TRIGL SERPL-MCNC: 212 MG/DL

## 2023-05-17 PROCEDURE — 36415 COLL VENOUS BLD VENIPUNCTURE: CPT

## 2023-05-17 PROCEDURE — 99000 SPECIMEN HANDLING OFFICE-LAB: CPT

## 2023-05-17 PROCEDURE — 80076 HEPATIC FUNCTION PANEL: CPT

## 2023-05-17 PROCEDURE — 80061 LIPID PANEL: CPT

## 2023-05-17 PROCEDURE — 80175 DRUG SCREEN QUAN LAMOTRIGINE: CPT | Mod: 90

## 2023-05-17 PROCEDURE — 82947 ASSAY GLUCOSE BLOOD QUANT: CPT

## 2023-05-17 PROCEDURE — 83036 HEMOGLOBIN GLYCOSYLATED A1C: CPT

## 2023-05-17 PROCEDURE — 80178 ASSAY OF LITHIUM: CPT

## 2023-05-18 LAB — LITHIUM SERPL-SCNC: 0.3 MMOL/L (ref 0.6–1.2)

## 2023-05-19 ENCOUNTER — VIRTUAL VISIT (OUTPATIENT)
Dept: PHARMACY | Facility: CLINIC | Age: 43
End: 2023-05-19
Payer: COMMERCIAL

## 2023-05-19 DIAGNOSIS — E66.01 MORBID OBESITY (H): ICD-10-CM

## 2023-05-19 DIAGNOSIS — E11.9 TYPE 2 DIABETES MELLITUS WITHOUT COMPLICATION, WITHOUT LONG-TERM CURRENT USE OF INSULIN (H): Primary | ICD-10-CM

## 2023-05-19 LAB — LAMOTRIGINE SERPL-MCNC: 1.3 UG/ML

## 2023-05-19 PROCEDURE — 99607 MTMS BY PHARM ADDL 15 MIN: CPT | Mod: VID | Performed by: PHARMACIST

## 2023-05-19 PROCEDURE — 99606 MTMS BY PHARM EST 15 MIN: CPT | Mod: VID | Performed by: PHARMACIST

## 2023-05-19 RX ORDER — DULAGLUTIDE 4.5 MG/.5ML
4.5 INJECTION, SOLUTION SUBCUTANEOUS WEEKLY
Qty: 6 ML | Refills: 1 | Status: SHIPPED | OUTPATIENT
Start: 2023-05-19 | End: 2023-08-21 | Stop reason: DRUGHIGH

## 2023-05-19 NOTE — RESULT ENCOUNTER NOTE
Tavo,     One of your liver enzymes increased slightly. Not at a worrisome level, we will just monitor this.   Criselda Walsh PA-C

## 2023-05-19 NOTE — Clinical Note
MARIAH MOTA note, thanks!  Madison Wheeler, PharmD Medication Therapy Management Pharmacist [phone]

## 2023-05-19 NOTE — PROGRESS NOTES
Medication Therapy Management (MTM) Encounter    ASSESSMENT:                            Medication Adherence/Access: No issues identified    Type 2 Diabetes/Obesity: Patient is not meeting A1c goal of < 7%. Self monitoring of blood glucose is not at goal of fasting  mg/dL and post prandial < 180 mg/dL. Patient is not meeting average glucose goal of <150mg/dL. Patient is not meeting goal of > 70% time in target with continuous glucose monitoring.   May benefit from continuing Trulicity another two weeks then increasing dose, and titrating up on Tresiba in the meantime.     PLAN:                            1. Continue Trulicity 3 mg weekly x 2 more weeks, then increase to 4.5 mg weekly.      2. Continue Tresiba 12 units once daily, increase dose by 2 units every three days until fasting blood sugar is  mg/dL, then stay at that dose. Max 20 units/day.    Follow-up: Return in about 3 weeks (around 6/9/2023) for Medication Therapy Management.    SUBJECTIVE/OBJECTIVE:                          Tavo Key is a 43 year old male contacted via secure video for a follow-up visit.  Today's visit is a follow-up MTM visit from 5/3/23.     Reason for visit: diabetes follow-up.    Allergies/ADRs: Reviewed in chart  Past Medical History: Reviewed in chart  Tobacco: He reports that he quit smoking about 13 years ago. He has never used smokeless tobacco.  Alcohol: none/very rarely    Medication Adherence/Access:   Patient uses pill box(es).  Patient takes medications 3 time(s) per day.   Per patient, misses medication 0 times per week.   The patient fills medications at [place]: YES.    Type 2 Diabetes/Obesity:   Trulicity 3 mg weekly x 2 doses 2 weeks   metformin 1000 mg twice daily  Tresiba 12 units once daily (new this week), increasing by 2 units every three days (max 20 units/day)    Patient reports no current medication side effects.  SMBG: started Nasrin 3  We did get connected, blood sugar remain elevated, but  only have a few days of data.   Symptoms of low blood sugar? none.  Recent symptoms of high blood sugar? none.    Eye Exam: due  Foot Exam: up to date  Immunizations: up to date  Aspirin: Yes. Primary prevention  Statin: Yes. Atorvastatin  ACEi/ARB: Yes. Losartan  Diet/Exercise: Not discussed  Medication history  -Phentermine 18.75 mg daily  -$2000 left on deductible before insurance will cover Victoza. Victoza worked really well in the past but stopped due to cost   -Copay for Januvia is around $510.   -Patient tried Ozempic previously without benefit but thinks he may not have been on this medication long enough to notice weight loss/glycemic control.   Lab Results   Component Value Date    UMALCR 5.65 08/11/2022     Lab Results   Component Value Date    A1C 9.6 05/17/2023    A1C 6.9 01/25/2023    A1C 5.7 08/11/2022    A1C 6.2 01/03/2022    A1C 7.5 09/01/2021       Today's Vitals: There were no vitals taken for this visit.  ----------------      I spent 20 minutes with this patient today. All changes were made via collaborative practice agreement with Criselda Walsh PA-C. A copy of the visit note was provided to the patient's provider(s).    A summary of these recommendations was sent via [employer].    Madison Wheeler, PharmD  Medication Therapy Management Pharmacist  [phone]      Telemedicine Visit Details  Type of service:  Video Conference via [employer]  Start Time: 12:05 PM  End Time: 12:25 PM     Medication Therapy Recommendations  Diabetes mellitus, type 2 (H)    Current Medication: Dulaglutide (TRULICITY) 3 MG/0.5ML SOPN   Rationale: Dose too low - Dosage too low - Effectiveness   Recommendation: Increase Dose   Status: Accepted per CPA

## 2023-05-19 NOTE — PATIENT INSTRUCTIONS
"Recommendations from today's MTM visit:                                                         1. Continue Trulicity 3 mg weekly x 2 more weeks, then increase to 4.5 mg weekly.      2. Continue Tresiba 12 units once daily, increase dose by 2 units every three days until fasting blood sugar is  mg/dL, then stay at that dose. Max 20 units/day.    Follow-up: Return in about 3 weeks (around 6/9/2023) for Medication Therapy Management.    It was great speaking with you today.  I value your experience and would be very thankful for your time in providing feedback in our clinic survey. In the next few days, you may receive an email or text message from [employer] tok tok tok with a link to a survey related to your  clinical pharmacist.\"     To schedule another MTM appointment, please call the clinic directly or you may call the MTM scheduling line at [phone] or toll-free at [phone].     My Clinical Pharmacist's contact information:                                                      Please feel free to contact me with any questions or concerns you have.      Madison Wheeler, PharmD  Medication Therapy Management Pharmacist  [phone]     "

## 2023-05-22 DIAGNOSIS — G43.809 OTHER MIGRAINE WITHOUT STATUS MIGRAINOSUS, NOT INTRACTABLE: ICD-10-CM

## 2023-05-22 RX ORDER — NARATRIPTAN 2.5 MG/1
TABLET ORAL
Qty: 12 TABLET | Refills: 5 | Status: SHIPPED | OUTPATIENT
Start: 2023-05-22 | End: 2024-07-08

## 2023-06-07 ENCOUNTER — VIRTUAL VISIT (OUTPATIENT)
Dept: PHARMACY | Facility: CLINIC | Age: 43
End: 2023-06-07
Payer: COMMERCIAL

## 2023-06-07 DIAGNOSIS — E66.01 MORBID OBESITY (H): ICD-10-CM

## 2023-06-07 DIAGNOSIS — E11.9 TYPE 2 DIABETES MELLITUS WITHOUT COMPLICATION, WITHOUT LONG-TERM CURRENT USE OF INSULIN (H): Primary | ICD-10-CM

## 2023-06-07 PROCEDURE — 99606 MTMS BY PHARM EST 15 MIN: CPT | Mod: VID | Performed by: PHARMACIST

## 2023-06-07 RX ORDER — INSULIN DEGLUDEC 100 U/ML
26 INJECTION, SOLUTION SUBCUTANEOUS DAILY
Qty: 30 ML | Refills: 1 | Status: SHIPPED | OUTPATIENT
Start: 2023-06-07 | End: 2023-08-21 | Stop reason: DRUGHIGH

## 2023-06-07 NOTE — PROGRESS NOTES
Medication Therapy Management (MTM) Encounter    ASSESSMENT:                            Medication Adherence/Access: No issues identified    Type 2 Diabetes/Obesity: Patient is not meeting A1c goal of < 7%. Patient is meeting goal of > 70% time in target with continuous glucose monitoring.   May benefit from increasing Tresiba again today, but then maintaining that dose, as he is now on higher dose of Trulicity.     PLAN:                            1. Increase Tresiba to 26 units once daily and stay at that dose.     2. Continue Trulicity 4.5 mg weekly.    Follow-up: Return in about 10 weeks (around 8/16/2023) for Medication Therapy Management.    SUBJECTIVE/OBJECTIVE:                          Tavo Key is a 43 year old male contacted via secure video for a follow-up visit.  Today's visit is a follow-up MTM visit from 5/19/23.     Reason for visit: diabetes follow-up.    Allergies/ADRs: Reviewed in chart  Past Medical History: Reviewed in chart  Tobacco: He reports that he quit smoking about 13 years ago. He has never used smokeless tobacco.  Alcohol: none/very rarely    Medication Adherence/Access:   Patient uses pill box(es).  Patient takes medications 3 time(s) per day.   Per patient, misses medication 0 times per week.   The patient fills medications at [place]: YES.    Type 2 Diabetes/Obesity:   Trulicity 4.5 mg weekly - x 1 dose  metformin 1000 mg twice daily  Tresiba 24 units once daily, has been titrating up     Patient reports no current medication side effects.  SMBG:  Nasrin 3  Time in target last 7 days: 67%    Symptoms of low blood sugar? none.  Recent symptoms of high blood sugar? none.    Eye Exam: due  Foot Exam: up to date  Immunizations: up to date  Aspirin: Yes. Primary prevention  Statin: Yes. Atorvastatin  ACEi/ARB: Yes. Losartan  Diet/Exercise: Not discussed  Medication history  -Phentermine 18.75 mg daily  -$2000 left on deductible before insurance will cover Victoza. Victoza worked  really well in the past but stopped due to cost   -Copay for Januvia is around $510.   -Patient tried Ozempic previously without benefit but thinks he may not have been on this medication long enough to notice weight loss/glycemic control.   Lab Results   Component Value Date    UMALCR 5.65 08/11/2022     Lab Results   Component Value Date    A1C 9.6 05/17/2023    A1C 6.9 01/25/2023    A1C 5.7 08/11/2022    A1C 6.2 01/03/2022    A1C 7.5 09/01/2021     Today's Vitals: There were no vitals taken for this visit.  ----------------      I spent 6 minutes with this patient today. All changes were made via collaborative practice agreement with Criselda Walsh PA-C. A copy of the visit note was provided to the patient's provider(s).    A summary of these recommendations was sent via [employer].    Madison Wheeler, PharmD  Medication Therapy Management Pharmacist  [phone]    Telemedicine Visit Details  Type of service:  Video Conference via [employer]  Start Time: 8:34 am  End Time: 8:40 AM     Medication Therapy Recommendations  Diabetes mellitus, type 2 (H)    Current Medication: insulin degludec (TRESIBA FLEXTOUCH) 100 UNIT/ML pen   Rationale: Dose too low - Dosage too low - Effectiveness   Recommendation: Increase Dose   Status: Accepted per CPA

## 2023-06-07 NOTE — Clinical Note
MARIAH MOTA note, thanks!  Madison Wheeler, PharmD Medication Therapy Management Pharmacist [phone]

## 2023-06-07 NOTE — PATIENT INSTRUCTIONS
"Recommendations from today's MTM visit:                                                         1. Increase Tresiba to 26 units once daily and stay at that dose.     2. Continue Trulicity 4.5 mg weekly.    Follow-up: Return in about 10 weeks (around 8/16/2023) for Medication Therapy Management.    It was great speaking with you today.  I value your experience and would be very thankful for your time in providing feedback in our clinic survey. In the next few days, you may receive an email or text message from [employer] with a link to a survey related to your  clinical pharmacist.\"     To schedule another MTM appointment, please call the clinic directly or you may call the MTM scheduling line at [phone] or toll-free at [phone].     My Clinical Pharmacist's contact information:                                                      Please feel free to contact me with any questions or concerns you have.      Madison Wheeler, PharmD  Medication Therapy Management Pharmacist  [phone]   "

## 2023-06-21 ASSESSMENT — ENCOUNTER SYMPTOMS
NAUSEA: 1
SHORTNESS OF BREATH: 0
CHILLS: 0
FREQUENCY: 0
HEARTBURN: 0
EYE PAIN: 0
ARTHRALGIAS: 0
PARESTHESIAS: 0
HEADACHES: 1
WEAKNESS: 0
ABDOMINAL PAIN: 0
DIARRHEA: 0
HEMATURIA: 0
DIZZINESS: 0
FEVER: 0
HEMATOCHEZIA: 0
PALPITATIONS: 0
COUGH: 0
JOINT SWELLING: 0
SORE THROAT: 0
DYSURIA: 0
CONSTIPATION: 0
MYALGIAS: 0
NERVOUS/ANXIOUS: 1

## 2023-06-21 ASSESSMENT — PATIENT HEALTH QUESTIONNAIRE - PHQ9
10. IF YOU CHECKED OFF ANY PROBLEMS, HOW DIFFICULT HAVE THESE PROBLEMS MADE IT FOR YOU TO DO YOUR WORK, TAKE CARE OF THINGS AT HOME, OR GET ALONG WITH OTHER PEOPLE: SOMEWHAT DIFFICULT
SUM OF ALL RESPONSES TO PHQ QUESTIONS 1-9: 12
SUM OF ALL RESPONSES TO PHQ QUESTIONS 1-9: 12

## 2023-06-22 ENCOUNTER — OFFICE VISIT (OUTPATIENT)
Dept: FAMILY MEDICINE | Facility: CLINIC | Age: 43
End: 2023-06-22
Payer: COMMERCIAL

## 2023-06-22 VITALS
WEIGHT: 250.8 LBS | BODY MASS INDEX: 38.01 KG/M2 | HEART RATE: 86 BPM | OXYGEN SATURATION: 95 % | SYSTOLIC BLOOD PRESSURE: 110 MMHG | DIASTOLIC BLOOD PRESSURE: 79 MMHG | HEIGHT: 68 IN | TEMPERATURE: 98.9 F

## 2023-06-22 DIAGNOSIS — Z79.4 TYPE 2 DIABETES MELLITUS WITHOUT COMPLICATION, WITH LONG-TERM CURRENT USE OF INSULIN (H): ICD-10-CM

## 2023-06-22 DIAGNOSIS — G47.30 SLEEP APNEA WITH USE OF CONTINUOUS POSITIVE AIRWAY PRESSURE (CPAP): ICD-10-CM

## 2023-06-22 DIAGNOSIS — E11.9 TYPE 2 DIABETES MELLITUS WITHOUT COMPLICATION, WITH LONG-TERM CURRENT USE OF INSULIN (H): ICD-10-CM

## 2023-06-22 DIAGNOSIS — K21.00 GASTROESOPHAGEAL REFLUX DISEASE WITH ESOPHAGITIS, UNSPECIFIED WHETHER HEMORRHAGE: ICD-10-CM

## 2023-06-22 DIAGNOSIS — I10 HYPERTENSION GOAL BP (BLOOD PRESSURE) < 140/90: ICD-10-CM

## 2023-06-22 DIAGNOSIS — Z00.00 ROUTINE GENERAL MEDICAL EXAMINATION AT A HEALTH CARE FACILITY: Primary | ICD-10-CM

## 2023-06-22 DIAGNOSIS — E66.9 OBESITY, UNSPECIFIED CLASSIFICATION, UNSPECIFIED OBESITY TYPE, UNSPECIFIED WHETHER SERIOUS COMORBIDITY PRESENT: ICD-10-CM

## 2023-06-22 DIAGNOSIS — N52.9 ERECTILE DYSFUNCTION, UNSPECIFIED ERECTILE DYSFUNCTION TYPE: ICD-10-CM

## 2023-06-22 DIAGNOSIS — R80.9 PROTEINURIA, UNSPECIFIED TYPE: ICD-10-CM

## 2023-06-22 DIAGNOSIS — J45.30 MILD PERSISTENT ASTHMA WITHOUT COMPLICATION: ICD-10-CM

## 2023-06-22 PROCEDURE — 99396 PREV VISIT EST AGE 40-64: CPT | Performed by: PHYSICIAN ASSISTANT

## 2023-06-22 PROCEDURE — 99214 OFFICE O/P EST MOD 30 MIN: CPT | Mod: 25 | Performed by: PHYSICIAN ASSISTANT

## 2023-06-22 RX ORDER — ESOMEPRAZOLE MAGNESIUM 40 MG/1
40 CAPSULE, DELAYED RELEASE ORAL
COMMUNITY
Start: 2023-06-22

## 2023-06-22 RX ORDER — ATORVASTATIN CALCIUM 20 MG/1
TABLET, FILM COATED ORAL
Qty: 90 TABLET | Refills: 1 | Status: SHIPPED | OUTPATIENT
Start: 2023-06-22 | End: 2024-01-22

## 2023-06-22 RX ORDER — ALBUTEROL SULFATE 90 UG/1
2 AEROSOL, METERED RESPIRATORY (INHALATION) EVERY 4 HOURS PRN
Qty: 18 G | Refills: 5 | Status: SHIPPED | OUTPATIENT
Start: 2023-06-22

## 2023-06-22 RX ORDER — LOSARTAN POTASSIUM 50 MG/1
50 TABLET ORAL DAILY
Qty: 90 TABLET | Refills: 1 | Status: SHIPPED | OUTPATIENT
Start: 2023-06-22 | End: 2024-01-02

## 2023-06-22 RX ORDER — SILDENAFIL CITRATE 20 MG/1
20-100 TABLET ORAL DAILY PRN
Qty: 30 TABLET | Refills: 11 | Status: SHIPPED | OUTPATIENT
Start: 2023-06-22 | End: 2024-08-07

## 2023-06-22 RX ORDER — METFORMIN HCL 500 MG
1000 TABLET, EXTENDED RELEASE 24 HR ORAL 2 TIMES DAILY WITH MEALS
Qty: 360 TABLET | Refills: 1 | Status: SHIPPED | OUTPATIENT
Start: 2023-06-22 | End: 2024-04-15

## 2023-06-22 RX ORDER — AMLODIPINE BESYLATE 10 MG/1
10 TABLET ORAL DAILY
Qty: 90 TABLET | Refills: 1 | Status: SHIPPED | OUTPATIENT
Start: 2023-06-22 | End: 2024-04-16

## 2023-06-22 ASSESSMENT — ENCOUNTER SYMPTOMS
NERVOUS/ANXIOUS: 1
SHORTNESS OF BREATH: 0
DIARRHEA: 0
HEARTBURN: 0
FEVER: 0
HEMATOCHEZIA: 0
MYALGIAS: 0
WEAKNESS: 0
EYE PAIN: 0
CONSTIPATION: 0
FREQUENCY: 0
CHILLS: 0
PALPITATIONS: 0
COUGH: 0
ABDOMINAL PAIN: 0
HEMATURIA: 0
JOINT SWELLING: 0
ARTHRALGIAS: 0
NAUSEA: 1
PARESTHESIAS: 0
HEADACHES: 1
DYSURIA: 0
SORE THROAT: 0
DIZZINESS: 0

## 2023-06-22 NOTE — PROGRESS NOTES
SUBJECTIVE:   CC: Tavo is an 43 year old who presents for preventative health visit.       2023     7:27 AM   Additional Questions   Roomed by luisa sánchez     Healthy Habits:     Getting at least 3 servings of Calcium per day:  Yes    Bi-annual eye exam:  Yes    Dental care twice a year:  Yes    Sleep apnea or symptoms of sleep apnea:  Sleep apnea    Diet:  Diabetic    Frequency of exercise:  1 day/week    Duration of exercise:  15-30 minutes    Taking medications regularly:  Yes    Medication side effects:  None    PHQ-2 Total Score: 4    Additional concerns today:  No    Appetite down.   Insulin is helping sugars.     Last eye exam- [facility]- normal.       Today's PHQ-2 Score:       2023     9:37 PM   PHQ-2 (  [employer])   Q1: Little interest or pleasure in doing things 2   Q2: Feeling down, depressed or hopeless 2   PHQ-2 Score 4   Q1: Little interest or pleasure in doing things More than half the days   Q2: Feeling down, depressed or hopeless More than half the days   PHQ-2 Score 4           Social History     Tobacco Use     Smoking status: Former     Packs/day: 0.00     Years: 10.00     Pack years: 0.00     Types: Cigarettes     Quit date: 2010     Years since quittin.4     Smokeless tobacco: Never   Substance Use Topics     Alcohol use: Not Currently             2023     9:36 PM   Alcohol Use   Prescreen: >3 drinks/day or >7 drinks/week? No       Last PSA: No results found for: PSA    Reviewed orders with patient. Reviewed health maintenance and updated orders accordingly - Yes  Lab work is in process    Reviewed and updated as needed this visit by clinical staff   Tobacco  Allergies  Meds  Problems  Med Hx  Surg Hx  Fam Hx          Reviewed and updated as needed this visit by Provider   Tobacco  Allergies  Meds  Problems  Med Hx  Surg Hx  Fam Hx             Review of Systems   Constitutional: Negative for chills and fever.   HENT: Negative for congestion, ear  "pain, hearing loss and sore throat.    Eyes: Negative for pain and visual disturbance.   Respiratory: Negative for cough and shortness of breath.    Cardiovascular: Negative for chest pain, palpitations and peripheral edema.   Gastrointestinal: Positive for nausea. Negative for abdominal pain, constipation, diarrhea, heartburn and hematochezia.   Genitourinary: Positive for impotence. Negative for dysuria, frequency, genital sores, hematuria, penile discharge and urgency.   Musculoskeletal: Negative for arthralgias, joint swelling and myalgias.   Skin: Negative for rash.   Neurological: Positive for headaches. Negative for dizziness, weakness and paresthesias.   Psychiatric/Behavioral: Positive for mood changes. The patient is nervous/anxious.          OBJECTIVE:   /79 (BP Location: Left arm, Patient Position: Chair, Cuff Size: Adult Large)   Pulse 86   Temp 98.9  F (37.2  C) (Oral)   Ht 1.715 m (5' 7.5\")   Wt 113.8 kg (250 lb 12.8 oz)   SpO2 95%   BMI 38.70 kg/m      Physical Exam  GENERAL: healthy, alert and no distress  EYES: Eyes grossly normal to inspection, PERRL and conjunctivae and sclerae normal  HENT: ear canals and TM's normal, nose and mouth without ulcers or lesions  NECK: no adenopathy, no asymmetry, masses, or scars and thyroid normal to palpation  RESP: lungs clear to auscultation - no rales, rhonchi or wheezes  CV: regular rate and rhythm, normal S1 S2, no S3 or S4, no murmur, click or rub, no peripheral edema and peripheral pulses strong  ABDOMEN: soft, nontender, no hepatosplenomegaly, no masses and bowel sounds normal  MS: no gross musculoskeletal defects noted, no edema  SKIN: no suspicious lesions or rashes  NEURO: Normal strength and tone, mentation intact and speech normal  PSYCH: mentation appears normal, affect normal/bright    Diagnostic Test Results:  Labs reviewed in Epic    ASSESSMENT/PLAN:   (Z00.00) Routine general medical examination at a health care facility  (primary " encounter diagnosis)  Comment:   Plan:     (E11.9,  Z79.4) Type 2 diabetes mellitus without complication, with long-term current use of insulin (H)  Comment:   Plan: improving, working with MTM. Follow up as planned.     (E66.9) Obesity, unspecified classification, unspecified obesity type, unspecified whether serious comorbidity present  Comment:   Plan: Improving, continued weight loss will help improve diabetes control.     (I10) Hypertension goal BP (blood pressure) < 140/90  Comment:   Plan: amLODIPine (NORVASC) 10 MG tablet, losartan         (COZAAR) 50 MG tablet        Stable today.     (R80.9) Proteinuria, unspecified type  Comment: stable today.   Plan: amLODIPine (NORVASC) 10 MG tablet, losartan         (COZAAR) 50 MG tablet            (J45.30) Mild persistent asthma without complication  Comment: stable off ICS  Plan: albuterol (PROAIR HFA/PROVENTIL HFA/VENTOLIN         HFA) 108 (90 Base) MCG/ACT inhaler        Monitor for increased albuterol use.     (K21.00) Gastroesophageal reflux disease with esophagitis, unspecified whether hemorrhage  Comment:   Plan: stable on over the counter PPI.     (N52.9) Erectile dysfunction, unspecified erectile dysfunction type  Comment:   Plan: Testosterone total, sildenafil (REVATIO) 20 MG         tablet        Prn use ok, check testosterone with next labs.     (G47.30) Sleep apnea with use of continuous positive airway pressure (CPAP)  Comment:   Plan: CPAP Order for DME - ONLY FOR DME        Patient needs a new machine. Rx provided.             COUNSELING:   Reviewed preventive health counseling, as reflected in patient instructions       Regular exercise       Healthy diet/nutrition       Safe sex practices/STD prevention       Colorectal cancer screening        He reports that he quit smoking about 13 years ago. His smoking use included cigarettes. He has never used smokeless tobacco.            Criselda Walsh PA-C  [facility] [place]  Answers for  HPI/ROS submitted by the patient on 6/21/2023  If you checked off any problems, how difficult have these problems made it for you to do your work, take care of things at home, or get along with other people?: Somewhat difficult  PHQ9 TOTAL SCORE: 12

## 2023-06-29 DIAGNOSIS — B35.1 ONYCHOMYCOSIS: ICD-10-CM

## 2023-06-29 RX ORDER — TERBINAFINE HYDROCHLORIDE 250 MG/1
TABLET ORAL
Qty: 90 TABLET | Refills: 0 | Status: SHIPPED | OUTPATIENT
Start: 2023-06-29 | End: 2023-08-21

## 2023-07-19 ENCOUNTER — MYC MEDICAL ADVICE (OUTPATIENT)
Dept: FAMILY MEDICINE | Facility: CLINIC | Age: 43
End: 2023-07-19
Payer: COMMERCIAL

## 2023-07-19 DIAGNOSIS — B35.1 ONYCHOMYCOSIS: Primary | ICD-10-CM

## 2023-08-21 ENCOUNTER — LAB (OUTPATIENT)
Dept: LAB | Facility: CLINIC | Age: 43
End: 2023-08-21
Payer: COMMERCIAL

## 2023-08-21 ENCOUNTER — TELEPHONE (OUTPATIENT)
Dept: FAMILY MEDICINE | Facility: CLINIC | Age: 43
End: 2023-08-21

## 2023-08-21 ENCOUNTER — OFFICE VISIT (OUTPATIENT)
Dept: PHARMACY | Facility: CLINIC | Age: 43
End: 2023-08-21
Payer: COMMERCIAL

## 2023-08-21 VITALS — DIASTOLIC BLOOD PRESSURE: 88 MMHG | SYSTOLIC BLOOD PRESSURE: 122 MMHG

## 2023-08-21 DIAGNOSIS — E11.9 DIABETES MELLITUS, TYPE 2 (H): Primary | ICD-10-CM

## 2023-08-21 DIAGNOSIS — E11.9 TYPE 2 DIABETES MELLITUS WITHOUT COMPLICATION, WITHOUT LONG-TERM CURRENT USE OF INSULIN (H): Primary | ICD-10-CM

## 2023-08-21 DIAGNOSIS — N52.9 ERECTILE DYSFUNCTION, UNSPECIFIED ERECTILE DYSFUNCTION TYPE: ICD-10-CM

## 2023-08-21 DIAGNOSIS — G43.809 OTHER MIGRAINE WITHOUT STATUS MIGRAINOSUS, NOT INTRACTABLE: ICD-10-CM

## 2023-08-21 DIAGNOSIS — F41.1 GAD (GENERALIZED ANXIETY DISORDER): ICD-10-CM

## 2023-08-21 DIAGNOSIS — F31.62 BIPOLAR MIXED AFFECTIVE DISORDER, MODERATE (H): ICD-10-CM

## 2023-08-21 LAB
CREAT UR-MCNC: 88.8 MG/DL
HBA1C MFR BLD: 6.4 % (ref 0–5.6)
MICROALBUMIN UR-MCNC: <12 MG/L
MICROALBUMIN/CREAT UR: NORMAL MG/G{CREAT}

## 2023-08-21 PROCEDURE — 82043 UR ALBUMIN QUANTITATIVE: CPT

## 2023-08-21 PROCEDURE — 36415 COLL VENOUS BLD VENIPUNCTURE: CPT

## 2023-08-21 PROCEDURE — 99207 PR NO CHARGE LOS: CPT | Performed by: PHARMACIST

## 2023-08-21 PROCEDURE — 83036 HEMOGLOBIN GLYCOSYLATED A1C: CPT

## 2023-08-21 PROCEDURE — 82570 ASSAY OF URINE CREATININE: CPT

## 2023-08-21 PROCEDURE — 84403 ASSAY OF TOTAL TESTOSTERONE: CPT

## 2023-08-21 RX ORDER — PROPRANOLOL HYDROCHLORIDE 80 MG/1
80 TABLET ORAL 2 TIMES DAILY
Qty: 180 TABLET | Refills: 1 | Status: SHIPPED | OUTPATIENT
Start: 2023-08-21 | End: 2024-01-31

## 2023-08-21 RX ORDER — INSULIN DEGLUDEC 100 U/ML
22 INJECTION, SOLUTION SUBCUTANEOUS DAILY
Qty: 30 ML | Refills: 1 | Status: SHIPPED | OUTPATIENT
Start: 2023-08-21 | End: 2023-09-18

## 2023-08-21 RX ORDER — DULAGLUTIDE 3 MG/.5ML
3 INJECTION, SOLUTION SUBCUTANEOUS WEEKLY
Qty: 2 ML | Refills: 3 | Status: SHIPPED | OUTPATIENT
Start: 2023-08-21 | End: 2023-11-24

## 2023-08-21 NOTE — PATIENT INSTRUCTIONS
"Recommendations from today's MTM visit:                                                       Decrease Tresiba to 22 units once daily.   Decrease Trulicity to 3 mg weekly.   Change propranolol to 80 mg twice daily.     Follow-up: Return in about 4 weeks (around 9/18/2023) for Medication Therapy Management.    It was great speaking with you today.  I value your experience and would be very thankful for your time in providing feedback in our clinic survey. In the next few days, you may receive an email or text message from [employer] with a link to a survey related to your  clinical pharmacist.\"     To schedule another MTM appointment, please call the clinic directly or you may call the MTM scheduling line at [phone] or toll-free at [phone].     My Clinical Pharmacist's contact information:                                                      Please feel free to contact me with any questions or concerns you have.      Madison Wheeler, PharmD  Medication Therapy Management Pharmacist  [phone]   "

## 2023-08-21 NOTE — TELEPHONE ENCOUNTER
Please initiate a prior authorization    Thank you,  Ashlyn Low, Pharmacy Technician  [facility] [place]

## 2023-08-21 NOTE — Clinical Note
MARIAH MOTA note, thanks!  Madison Wheeler, PharmD Medication Therapy Management Pharmacist [phone]

## 2023-08-21 NOTE — PROGRESS NOTES
Medication Therapy Management (MTM) Encounter    ASSESSMENT:                            Medication Adherence/Access: No issues identified    Type 2 Diabetes/Obesity: Patient is not meeting A1c goal of < 7% - anticipate will be with recheck today.  Patient is meeting goal of > 70% time in target with continuous glucose monitoring with hypoglycemia and nausea. May benefit from reducing Tresiba and Trulicity.    Migraine prevention: Stable - will see if regular release propranolol is less expensive.     Bipolar/Depression/Anxiety: Plan in place.     PLAN:                            Decrease Tresiba to 22 units once daily.   Decrease Trulicity to 3 mg weekly.   Change propranolol to 80 mg twice daily.     Follow-up: Return in about 4 weeks (around 9/18/2023) for Medication Therapy Management.    SUBJECTIVE/OBJECTIVE:                          Tavo Key is a 43 year old male coming in for a follow-up visit from 6/7/23.     Reason for visit: diabetes follow-up, propranolol cost.    Allergies/ADRs: Reviewed in chart  Past Medical History: Reviewed in chart  Tobacco: He reports that he quit smoking about 13 years ago. His smoking use included cigarettes. He has never used smokeless tobacco.  Alcohol: none/very rarely    Medication Adherence/Access:   Patient uses pill box(es).  Patient takes medications 3 time(s) per day.   Per patient, misses medication 0 times per week.   The patient fills medications at [place]: YES.    Type 2 Diabetes/Obesity:   Trulicity 4.5 mg weekly   Metformin ER 1000 mg twice daily  Tresiba 24 units once daily, has been titrating up     Patient reports very strong ongoing nausea, when it hits it lasts all day lasting 2-3 days, then can drop off for 2-3 days, varies. Nothing OTC has helped.   SMBG:  Nasrin 3        Symptoms of low blood sugar? carrieky  Recent symptoms of high blood sugar? none.    Eye Exam: up to date  Foot Exam: due  Medication history  -Phentermine 18.75 mg daily  -$2000 left on  deductible before insurance will cover Victoza. Victoza worked really well in the past but stopped due to cost   -Copay for Januvia is around $510.   -Patient tried Ozempic previously without benefit but thinks he may not have been on this medication long enough to notice weight loss/glycemic control.   Lab Results   Component Value Date    UMALCR 5.65 08/11/2022     Lab Results   Component Value Date    A1C 9.6 05/17/2023    A1C 6.9 01/25/2023    A1C 5.7 08/11/2022    A1C 6.2 01/03/2022    A1C 7.5 09/01/2021     Migraine prevention:    Propranolol  mg daily - cost increased to $170 for 3 month supply    Naratriptan 2.5 mg as needed    Headaches have been good lately.  States this/these are effective. Denies side effects.   BP Readings from Last 3 Encounters:   08/21/23 122/88   06/22/23 110/79   02/10/23 115/81     Bipolar/Depression/Anxiety:  Stopped ziprazodone in the last 1-2 months, depression is worse, has follow-up this week.     Follows with Juanjo - he's thinking of transferring care to [facility].      Today's Vitals: /88   ----------------      I spent 20 minutes with this patient today. All changes were made via collaborative practice agreement with Criselda Walsh PA-C. A copy of the visit note was provided to the patient's provider(s).    A summary of these recommendations was given to the patient.    Madison Wheeler, PharmD  Medication Therapy Management Pharmacist  [phone]     Medication Therapy Recommendations  Diabetes mellitus, type 2 (H)    Current Medication: Dulaglutide (TRULICITY) 4.5 MG/0.5ML SOPN (Discontinued)   Rationale: Undesirable effect - Adverse medication event - Safety   Recommendation: Decrease Dose   Status: Accepted per CPA          Current Medication: insulin degludec (TRESIBA FLEXTOUCH) 100 UNIT/ML pen (Discontinued)   Rationale: Dose too high - Dosage too high - Safety   Recommendation: Decrease Dose   Status: Accepted per CPA         Other migraine without  status migrainosus, not intractable    Current Medication: propranolol ER (INDERAL LA) 160 MG 24 hr capsule (Discontinued)   Rationale: Cannot afford medication product - Cost - Adherence   Recommendation: Change Medication Formulation    Status: Accepted per CPA

## 2023-08-23 DIAGNOSIS — N52.9 ERECTILE DYSFUNCTION, UNSPECIFIED ERECTILE DYSFUNCTION TYPE: Primary | ICD-10-CM

## 2023-08-23 LAB — TESTOST SERPL-MCNC: 181 NG/DL (ref 240–950)

## 2023-08-23 NOTE — RESULT ENCOUNTER NOTE
Naga Vo,     Great work on your A1C. It is much improved.   Your testosterone level did come back low. We will want to repeat this as an early morning lab sometime in the next month. If it is staying low then we will want to discuss testosterone replacement. You can make a lab only appointment for the repeat at any time.   Criselda Walsh PA-C

## 2023-08-28 ENCOUNTER — TELEPHONE (OUTPATIENT)
Dept: FAMILY MEDICINE | Facility: CLINIC | Age: 43
End: 2023-08-28
Payer: COMMERCIAL

## 2023-08-28 NOTE — TELEPHONE ENCOUNTER
"Left message on voicemail advising patient to return call at [phone].    \"Naga Vo,     Great work on your A1C. It is much improved.  Your testosterone level did come back low. We will want to repeat this as an early morning lab sometime in the next month. If it is staying low then we will want to discuss testosterone replacement. You can make a lab only appointment for the repeat at any time.  Criselda Walsh PA-C\"    Becky PARK, RN  [facility], [place]    "

## 2023-09-06 ENCOUNTER — MYC MEDICAL ADVICE (OUTPATIENT)
Dept: FAMILY MEDICINE | Facility: CLINIC | Age: 43
End: 2023-09-06
Payer: COMMERCIAL

## 2023-09-06 DIAGNOSIS — F31.62 BIPOLAR MIXED AFFECTIVE DISORDER, MODERATE (H): Primary | ICD-10-CM

## 2023-09-08 ENCOUNTER — LAB (OUTPATIENT)
Dept: LAB | Facility: CLINIC | Age: 43
End: 2023-09-08
Payer: COMMERCIAL

## 2023-09-08 DIAGNOSIS — N52.9 ERECTILE DYSFUNCTION, UNSPECIFIED ERECTILE DYSFUNCTION TYPE: ICD-10-CM

## 2023-09-08 PROCEDURE — 84403 ASSAY OF TOTAL TESTOSTERONE: CPT

## 2023-09-08 PROCEDURE — 36415 COLL VENOUS BLD VENIPUNCTURE: CPT

## 2023-09-12 LAB — TESTOST SERPL-MCNC: 185 NG/DL (ref 240–950)

## 2023-09-12 NOTE — RESULT ENCOUNTER NOTE
Naga Vo,     Your testosterone levels continued to come back low. We should have an appointment (virtual is fine) to discuss replacement benefits and risks.   Criselda Walsh PA-C

## 2023-09-18 ENCOUNTER — OFFICE VISIT (OUTPATIENT)
Dept: PHARMACY | Facility: CLINIC | Age: 43
End: 2023-09-18
Payer: COMMERCIAL

## 2023-09-18 VITALS — SYSTOLIC BLOOD PRESSURE: 110 MMHG | WEIGHT: 257.2 LBS | DIASTOLIC BLOOD PRESSURE: 84 MMHG | BODY MASS INDEX: 39.69 KG/M2

## 2023-09-18 DIAGNOSIS — F41.1 GAD (GENERALIZED ANXIETY DISORDER): ICD-10-CM

## 2023-09-18 DIAGNOSIS — F31.62 BIPOLAR MIXED AFFECTIVE DISORDER, MODERATE (H): ICD-10-CM

## 2023-09-18 DIAGNOSIS — G43.809 OTHER MIGRAINE WITHOUT STATUS MIGRAINOSUS, NOT INTRACTABLE: ICD-10-CM

## 2023-09-18 DIAGNOSIS — E11.9 TYPE 2 DIABETES MELLITUS WITHOUT COMPLICATION, WITHOUT LONG-TERM CURRENT USE OF INSULIN (H): Primary | ICD-10-CM

## 2023-09-18 DIAGNOSIS — E66.01 MORBID OBESITY (H): ICD-10-CM

## 2023-09-18 PROCEDURE — 99207 PR NO CHARGE LOS: CPT | Performed by: PHARMACIST

## 2023-09-18 RX ORDER — INSULIN DEGLUDEC 100 U/ML
24 INJECTION, SOLUTION SUBCUTANEOUS DAILY
Qty: 30 ML | Refills: 1 | Status: SHIPPED | OUTPATIENT
Start: 2023-09-18 | End: 2023-12-04

## 2023-09-18 NOTE — PROGRESS NOTES
Medication Therapy Management (MTM) Encounter    ASSESSMENT:                            Medication Adherence/Access: See below for considerations    Type 2 Diabetes/Obesity: Patient is meeting A1c goal of < 7%.  Patient is meeting goal of > 70% time in target with continuous glucose monitoring.     Migraine prevention: worsened, can try Flonase first, before considering returning to propranolol ER or other alternative.    Bipolar/Depression/Anxiety: Plan in place. Education on Vraylar provided, could consider monitoring liver enzymes in 3-6 months.    PLAN:                            For headaches, if sinus/allergy related, try Flonase 2 sprays in each nostril daily and we can see if this helps.  Madison will connect with [facility] on the Viibryd prescription error and see if can refund you copay (reviewed with pharmacy and cannot refund prescription, I let him know).     Follow-up: Return in about 3 months (around 12/18/2023) for Medication Therapy Management.    SUBJECTIVE/OBJECTIVE:                          Tavo Key is a 43 year old male coming in for a follow-up visit from 8/21/23.       Reason for visit: med review follow-up.    Allergies/ADRs: Reviewed in chart  Past Medical History: Reviewed in chart  Tobacco: He reports that he quit smoking about 13 years ago. His smoking use included cigarettes. He has never used smokeless tobacco.  Alcohol: none/very rarely    Medication Adherence/Access:   Patient uses pill box(es).  Patient takes medications 3 time(s) per day.   Per patient, misses medication 0 times per week.   The patient fills medications at [place]: YES.    Plan from last time:  Decrease Tresiba to 22 units once daily.   Decrease Trulicity to 3 mg weekly.   Change propranolol to 80 mg twice daily.     Type 2 Diabetes/Obesity:   Trulicity 3 mg weekly   Metformin ER 1000 mg twice daily  Tresiba 24 units once daily (didn't decrease to 22 units and has no hypoglycemia)    Dose  reduced from 4.5 mg to 3 mg  one month ago due to nausea - he is very happy now has very little if any nausea.  Nothing OTC has helped.   SMBG:  Nasrin 3    Symptoms of low blood sugar? shaky  Recent symptoms of high blood sugar? none.    Eye Exam: up to date  Foot Exam: due  Medication history  -Phentermine 18.75 mg daily  -$2000 left on deductible before insurance will cover Victoza. Victoza worked really well in the past but stopped due to cost   -Copay for Cirilouvia is around $510.   -Patient tried Ozempic previously without benefit but thinks he may not have been on this medication long enough to notice weight loss/glycemic control.   Lab Results   Component Value Date    UMALCR  08/21/2023      Comment:      Unable to calculate, urine albumin and/or urine creatinine is outside detectable limits.  Microalbuminuria is defined as an albumin:creatinine ratio of 17 to 299 for males and 25 to 299 for females. A ratio of albumin:creatinine of 300 or higher is indicative of overt proteinuria.  Due to biologic variability, positive results should be confirmed by a second, first-morning random or 24-hour timed urine specimen. If there is discrepancy, a third specimen is recommended. When 2 out of 3 results are in the microalbuminuria range, this is evidence for incipient nephropathy and warrants increased efforts at glucose control, blood pressure control, and institution of therapy with an angiotensin-converting-enzyme (ACE) inhibitor (if the patient can tolerate it).       Lab Results   Component Value Date    A1C 6.4 08/21/2023    A1C 9.6 05/17/2023    A1C 6.9 01/25/2023    A1C 5.7 08/11/2022    A1C 6.2 01/03/2022     Migraine prevention:    Propranolol 80 mg twice daily (replaced  mg daily due to cost one month ago)    Naratriptan 2.5 mg as needed    Headaches have been more frequent, about every other day in the last five days.  He's not sure he wants to go back to ER formulation of propranolol just yet, thinks  it also could be sinus/allergy related. States this/these are effective. Denies side effects.   BP Readings from Last 3 Encounters:   09/18/23 110/84   08/21/23 122/88   06/22/23 110/79     Bipolar/Depression/Anxiety:  Stopped ziprazodone in the last 1-2 months, depression is worse,   Will be starting Vraylar (cariprazine) 1.5 mg daily - will be picking up in the next day    Follows with Juanjo - but will be establishing with [facility] in November, hasn't been happy with Juanjo, reports they sent prescription for Viibryd instead of Vraylar and he spent a ~$100 copay on the Viibryd he won't use.    Today's Vitals: /84   Wt 257 lb 3.2 oz (116.7 kg)   BMI 39.69 kg/m    ----------------      I spent 20 minutes with this patient today. All changes were made via collaborative practice agreement with Criselda Walsh PA-C. A copy of the visit note was provided to the patient's provider(s).    A summary of these recommendations was given to the patient.    Madison Wheeler, PharmD  Medication Therapy Management Pharmacist  [phone]       Medication Therapy Recommendations  Bipolar mixed affective disorder, moderate (H)    Current Medication: cariprazine (VRAYLAR) 1.5 MG capsule   Rationale: Does not understand instructions - Adherence - Adherence   Recommendation: Provide Education   Status: Patient Agreed - Adherence/Education         Other migraine without status migrainosus, not intractable    Current Medication: fluticasone (FLONASE) 50 MCG/ACT nasal spray   Rationale: Does not understand instructions - Adherence - Adherence   Recommendation: Provide Education   Status: Patient Agreed - Adherence/Education

## 2023-09-18 NOTE — Clinical Note
MARIAH MOTA note, thanks!  Madison Wheeler, PharmD Medication Therapy Management Pharmacist [phone]

## 2023-09-18 NOTE — PATIENT INSTRUCTIONS
"Recommendations from today's MTM visit:                                                       For headaches, if sinus/allergy related, try Flonase 2 sprays in each nostril daily and we can see if this helps.  Madison will connect with [facility] on the Viibryd prescription error and see if can refund you copay.    Follow-up: Return in about 3 months (around 12/18/2023) for Medication Therapy Management.    It was great speaking with you today.  I value your experience and would be very thankful for your time in providing feedback in our clinic survey. In the next few days, you may receive an email or text message from [employer] with a link to a survey related to your  clinical pharmacist.\"     To schedule another MTM appointment, please call the clinic directly or you may call the MTM scheduling line at [phone] or toll-free at [phone].     My Clinical Pharmacist's contact information:                                                      Please feel free to contact me with any questions or concerns you have.      Madison Wheeler, PharmD  Medication Therapy Management Pharmacist  [phone]     "

## 2023-09-22 ENCOUNTER — DOCUMENTATION ONLY (OUTPATIENT)
Dept: SLEEP MEDICINE | Facility: CLINIC | Age: 43
End: 2023-09-22
Payer: COMMERCIAL

## 2023-09-22 DIAGNOSIS — G47.33 OSA (OBSTRUCTIVE SLEEP APNEA): Primary | ICD-10-CM

## 2023-09-22 NOTE — PROGRESS NOTES
Patient was offered choice of vendor and chose [facility].  Patient Tavo Key was set up at [facility] on September 22, 2023. Patient received a Resmed Airsense 11 Pressures were set at  8-15 cm H2O.   Patient s ramp is 5 cm H2O for Off and FLEX/EPR is 2.  Patient received a Resmed Mask name: AIRFIT N20  Nasal mask size Medium, heated tubing and heated humidifier.  Patient has the following compliance requirements: using and visit requirements  Patient has a follow up on TBD.  Idalia Alfonso

## 2023-09-26 ASSESSMENT — ASTHMA QUESTIONNAIRES: ACT_TOTALSCORE: 23

## 2023-09-27 ENCOUNTER — VIRTUAL VISIT (OUTPATIENT)
Dept: FAMILY MEDICINE | Facility: CLINIC | Age: 43
End: 2023-09-27
Payer: COMMERCIAL

## 2023-09-27 DIAGNOSIS — E29.1 HYPOGONADISM MALE: Primary | ICD-10-CM

## 2023-09-27 DIAGNOSIS — E11.9 TYPE 2 DIABETES MELLITUS WITHOUT COMPLICATION, WITH LONG-TERM CURRENT USE OF INSULIN (H): ICD-10-CM

## 2023-09-27 DIAGNOSIS — Z79.4 TYPE 2 DIABETES MELLITUS WITHOUT COMPLICATION, WITH LONG-TERM CURRENT USE OF INSULIN (H): ICD-10-CM

## 2023-09-27 PROCEDURE — 99214 OFFICE O/P EST MOD 30 MIN: CPT | Mod: VID | Performed by: PHYSICIAN ASSISTANT

## 2023-09-27 RX ORDER — TESTOSTERONE 1.62 MG/G
1 GEL TRANSDERMAL DAILY
Qty: 75 G | Refills: 1 | Status: SHIPPED | OUTPATIENT
Start: 2023-09-27 | End: 2023-11-15

## 2023-09-27 NOTE — PROGRESS NOTES
"Tavo is a 43 year old who is being evaluated via a billable video visit.      How would you like to obtain your AVS? MyChart  If the video visit is dropped, the invitation should be resent by: Text to cell phone: [phone]  Will anyone else be joining your video visit? No          Assessment & Plan     Hypogonadism male  Discussed risks and benefits of testosterone therapy- including fertility. Patient willing to try treatment. Would like to try topical first and move to injection if needed. Start androgel and labs in 4-6 weeks. - testosterone (ANDROGEL 1.62 % PUMP) 20.25 MG/ACT gel; Place 1 Pump onto the skin daily Apply from dispenser to clean, dry, intact skin of the upper arms and shoulders.  - Testosterone total; Future  - TSH WITH FREE T4 REFLEX; Future  - CBC with Platelets & Differential; Future  - Comprehensive metabolic panel; Future    Type 2 diabetes mellitus without complication, with long-term current use of insulin (H)  - Hemoglobin A1c; Future             BMI:   Estimated body mass index is 39.69 kg/m  as calculated from the following:    Height as of 6/22/23: 1.715 m (5' 7.5\").    Weight as of 9/18/23: 116.7 kg (257 lb 3.2 oz).           Criselda Walsh PA-C  [facility] 43 [facility], presenting for the following health issues:  Results        9/27/2023    10:52 AM   Additional Questions   Roomed by luisa a       History of Present Illness       Reason for visit:  To go over low testosterone levels    He eats 2-3 servings of fruits and vegetables daily.He consumes 1 sweetened beverage(s) daily.He exercises with enough effort to increase his heart rate 10 to 19 minutes per day.  He exercises with enough effort to increase his heart rate 3 or less days per week.   He is taking medications regularly.         Lab Results              Review of Systems         Objective           Vitals:  No vitals were obtained today due to virtual visit.    Physical " Exam   GENERAL: Healthy, alert and no distress  EYES: Eyes grossly normal to inspection.  No discharge or erythema, or obvious scleral/conjunctival abnormalities.  RESP: No audible wheeze, cough, or visible cyanosis.  No visible retractions or increased work of breathing.    SKIN: Visible skin clear. No significant rash, abnormal pigmentation or lesions.  NEURO: Cranial nerves grossly intact.  Mentation and speech appropriate for age.  PSYCH: Mentation appears normal, affect normal/bright, judgement and insight intact, normal speech and appearance well-groomed.                Video-Visit Details    Type of service:  Video Visit     Originating Location (pt. Location): Home    Distant Location (provider location):  On-site  Platform used for Video Visit: WorkCast

## 2023-10-08 DIAGNOSIS — E11.9 TYPE 2 DIABETES MELLITUS WITHOUT COMPLICATION, WITHOUT LONG-TERM CURRENT USE OF INSULIN (H): ICD-10-CM

## 2023-10-09 RX ORDER — BLOOD-GLUCOSE SENSOR
EACH MISCELLANEOUS
Qty: 2 EACH | Refills: 5 | Status: SHIPPED | OUTPATIENT
Start: 2023-10-09 | End: 2024-03-19

## 2023-10-11 ENCOUNTER — OFFICE VISIT (OUTPATIENT)
Dept: FAMILY MEDICINE | Facility: CLINIC | Age: 43
End: 2023-10-11
Payer: COMMERCIAL

## 2023-10-11 VITALS
DIASTOLIC BLOOD PRESSURE: 79 MMHG | TEMPERATURE: 97.4 F | HEART RATE: 81 BPM | OXYGEN SATURATION: 97 % | WEIGHT: 258 LBS | SYSTOLIC BLOOD PRESSURE: 114 MMHG | RESPIRATION RATE: 18 BRPM | BODY MASS INDEX: 39.1 KG/M2 | HEIGHT: 68 IN

## 2023-10-11 DIAGNOSIS — K13.0 ANGULAR CHEILITIS: Primary | ICD-10-CM

## 2023-10-11 PROCEDURE — 90686 IIV4 VACC NO PRSV 0.5 ML IM: CPT | Performed by: FAMILY MEDICINE

## 2023-10-11 PROCEDURE — 99213 OFFICE O/P EST LOW 20 MIN: CPT | Mod: 25 | Performed by: FAMILY MEDICINE

## 2023-10-11 PROCEDURE — 90471 IMMUNIZATION ADMIN: CPT | Performed by: FAMILY MEDICINE

## 2023-10-11 RX ORDER — CLOTRIMAZOLE AND BETAMETHASONE DIPROPIONATE 10; .5 MG/ML; MG/ML
LOTION TOPICAL 2 TIMES DAILY
Qty: 30 ML | Refills: 3 | Status: SHIPPED | OUTPATIENT
Start: 2023-10-11 | End: 2024-01-31

## 2023-10-11 NOTE — PROGRESS NOTES
"  Assessment & Plan   Problem List Items Addressed This Visit    None  Visit Diagnoses       Angular cheilitis    -  Primary    Relevant Medications    clotrimazole-betamethasone (LOTRISONE) 1-0.05 % external lotion           New diagnosis - discussed multiple lines of treatment, will start with emollient and lotrisone Rx        BMI:   Estimated body mass index is 39.23 kg/m  as calculated from the following:    Height as of this encounter: 1.727 m (5' 8\").    Weight as of this encounter: 117 kg (258 lb).       DO ABIOLA BERNSTEIN [facility] DAVID Vo is a 43 year old, presenting for the following health issues:  Derm Problem (Red spot on right side of mouth)      History of Present Illness       Reason for visit:  Sore spots in the corners of my mouth. They are recurring    He eats 2-3 servings of fruits and vegetables daily.He consumes 1 sweetened beverage(s) daily.He exercises with enough effort to increase his heart rate 10 to 19 minutes per day.  He exercises with enough effort to increase his heart rate 3 or less days per week.   He is taking medications regularly.     Onset years ago, frequency \"off and on\"   When he was on oral lamisil, rash was gone          Review of Systems         Objective    /79 (BP Location: Right arm, Patient Position: Chair, Cuff Size: Adult Large)   Pulse 81   Temp 97.4  F (36.3  C) (Oral)   Resp 18   Ht 1.727 m (5' 8\")   Wt 117 kg (258 lb)   SpO2 97%   BMI 39.23 kg/m    Body mass index is 39.23 kg/m .  Physical Exam        Media Information    Document Information    Other: Photograph   Angular rash   10/11/2023 9:27 AM   Attached To:   Office Visit on 10/11/23 with Shayne Esquivel DO   Source Information    Shayne Esquivel DO  [facility]                       "

## 2023-10-12 ENCOUNTER — TELEPHONE (OUTPATIENT)
Dept: FAMILY MEDICINE | Facility: CLINIC | Age: 43
End: 2023-10-12

## 2023-10-12 NOTE — TELEPHONE ENCOUNTER
Central Prior Authorization Team   Phone: [phone]    PRIOR AUTHORIZATION DENIED    Medication: CLOTRIMAZOLE-BETAMETHASONE 1-0.05 % EX LOTN  Insurance Company: [employer] [place] - Phone [phone] Fax [phone]  Denial Date: 10/12/2023  Denial Rational: MUST TRY/FAIL ONE NON-RX ALTERNATIVE - CLOTRIMAZOLE AND HYDROCORTISONE OR TERBINAFINE AND HYDROCORTISONE      Appeal Information: IF PROVIDER WOULD LIKE TO APPEAL THIS DECISION PLEASE PROVIDE THE PA TEAM WITH A LETTER OF MEDICAL NECESSITY      Patient Notified: No

## 2023-10-13 ENCOUNTER — TELEPHONE (OUTPATIENT)
Dept: FAMILY MEDICINE | Facility: CLINIC | Age: 43
End: 2023-10-13

## 2023-10-13 DIAGNOSIS — K13.0 ANGULAR CHEILITIS: Primary | ICD-10-CM

## 2023-10-13 RX ORDER — CLOTRIMAZOLE AND BETAMETHASONE DIPROPIONATE 10; .64 MG/G; MG/G
CREAM TOPICAL 2 TIMES DAILY
Qty: 45 G | Refills: 3 | Status: SHIPPED | OUTPATIENT
Start: 2023-10-13

## 2023-10-13 NOTE — TELEPHONE ENCOUNTER
Insurance doesn't cover clotrimazole-betamethasone lotion, but in the rejection is asking to try clotrimazole-betamethasone cream instead.  Thank you   Edwina Rivera. Pharmacy Technician   [facility] [place]

## 2023-10-31 DIAGNOSIS — E11.9 TYPE 2 DIABETES MELLITUS WITHOUT COMPLICATION, WITHOUT LONG-TERM CURRENT USE OF INSULIN (H): ICD-10-CM

## 2023-10-31 RX ORDER — FLURBIPROFEN SODIUM 0.3 MG/ML
SOLUTION/ DROPS OPHTHALMIC
Qty: 100 EACH | Refills: 1 | Status: SHIPPED | OUTPATIENT
Start: 2023-10-31 | End: 2023-12-27

## 2023-10-31 ASSESSMENT — PATIENT HEALTH QUESTIONNAIRE - PHQ9
SUM OF ALL RESPONSES TO PHQ QUESTIONS 1-9: 13
10. IF YOU CHECKED OFF ANY PROBLEMS, HOW DIFFICULT HAVE THESE PROBLEMS MADE IT FOR YOU TO DO YOUR WORK, TAKE CARE OF THINGS AT HOME, OR GET ALONG WITH OTHER PEOPLE: VERY DIFFICULT
SUM OF ALL RESPONSES TO PHQ QUESTIONS 1-9: 13

## 2023-10-31 ASSESSMENT — ANXIETY QUESTIONNAIRES
2. NOT BEING ABLE TO STOP OR CONTROL WORRYING: MORE THAN HALF THE DAYS
4. TROUBLE RELAXING: MORE THAN HALF THE DAYS
7. FEELING AFRAID AS IF SOMETHING AWFUL MIGHT HAPPEN: MORE THAN HALF THE DAYS
7. FEELING AFRAID AS IF SOMETHING AWFUL MIGHT HAPPEN: MORE THAN HALF THE DAYS
8. IF YOU CHECKED OFF ANY PROBLEMS, HOW DIFFICULT HAVE THESE MADE IT FOR YOU TO DO YOUR WORK, TAKE CARE OF THINGS AT HOME, OR GET ALONG WITH OTHER PEOPLE?: VERY DIFFICULT
1. FEELING NERVOUS, ANXIOUS, OR ON EDGE: MORE THAN HALF THE DAYS
IF YOU CHECKED OFF ANY PROBLEMS ON THIS QUESTIONNAIRE, HOW DIFFICULT HAVE THESE PROBLEMS MADE IT FOR YOU TO DO YOUR WORK, TAKE CARE OF THINGS AT HOME, OR GET ALONG WITH OTHER PEOPLE: VERY DIFFICULT
GAD7 TOTAL SCORE: 15
3. WORRYING TOO MUCH ABOUT DIFFERENT THINGS: NEARLY EVERY DAY
6. BECOMING EASILY ANNOYED OR IRRITABLE: MORE THAN HALF THE DAYS
5. BEING SO RESTLESS THAT IT IS HARD TO SIT STILL: MORE THAN HALF THE DAYS
GAD7 TOTAL SCORE: 15
GAD7 TOTAL SCORE: 15

## 2023-11-01 ENCOUNTER — VIRTUAL VISIT (OUTPATIENT)
Dept: PSYCHIATRY | Facility: CLINIC | Age: 43
End: 2023-11-01
Payer: COMMERCIAL

## 2023-11-01 DIAGNOSIS — F31.30 BIPOLAR I DISORDER, MOST RECENT EPISODE DEPRESSED (H): Primary | ICD-10-CM

## 2023-11-01 DIAGNOSIS — F31.62 BIPOLAR MIXED AFFECTIVE DISORDER, MODERATE (H): ICD-10-CM

## 2023-11-01 PROCEDURE — 99204 OFFICE O/P NEW MOD 45 MIN: CPT | Mod: VID | Performed by: PSYCHIATRY & NEUROLOGY

## 2023-11-01 RX ORDER — GABAPENTIN 100 MG/1
100 CAPSULE ORAL 3 TIMES DAILY
Qty: 270 CAPSULE | Refills: 1 | Status: SHIPPED | OUTPATIENT
Start: 2023-11-01 | End: 2024-08-23

## 2023-11-01 RX ORDER — DEXTROAMPHETAMINE SULFATE 15 MG/1
15 CAPSULE, EXTENDED RELEASE ORAL DAILY
Qty: 30 CAPSULE | Refills: 0 | Status: SHIPPED | OUTPATIENT
Start: 2023-11-01 | End: 2023-12-26

## 2023-11-01 RX ORDER — HYDROXYZINE PAMOATE 50 MG/1
50 CAPSULE ORAL 3 TIMES DAILY
Qty: 120 CAPSULE | Refills: 5 | Status: SHIPPED | OUTPATIENT
Start: 2023-11-01 | End: 2024-05-22

## 2023-11-01 RX ORDER — LAMOTRIGINE 200 MG/1
200 TABLET ORAL DAILY
Qty: 90 TABLET | Refills: 1 | Status: SHIPPED | OUTPATIENT
Start: 2023-11-01 | End: 2024-05-13

## 2023-11-01 ASSESSMENT — PAIN SCALES - GENERAL: PAINLEVEL: NO PAIN (0)

## 2023-11-01 NOTE — PATIENT INSTRUCTIONS
"Patient Education   Collaborative Care Psychiatry Service  What to Expect  Here's what to expect from your Collaborative Care Psychiatry Service (CCPS).   About CCPS  CCPS means 2 people work together to help you get better. You'll meet with a behavioral health clinician and a psychiatric doctor. A behavioral health clinician helps people with mental health problems by talking with them. A psychiatric doctor helps people by giving them medicine.  How it works  At every visit, you'll see the behavioral health clinician (BHC) first. They'll talk with you about how you're doing and teach you how to feel better.   Then you'll see the psychiatric doctor. This doctor can help you deal with troubling thoughts and feelings by giving you medicine. They'll make sure you know the plan for your care.   CCPS usually takes 3 to 6 visits. If you need more visits, we may have you start seeing a different psychiatric doctor for ongoing care.  If you have any questions or concerns, we'll be glad to talk with you.  About visits  Be open  At your visits, please talk openly about your problems. It may feel hard, but it's the best way for us to help you.  Cancelling visits  If you can't come to your visit, please call us right away at [phone]. If you don't cancel at least 24 hours (1 full day) before your visit, that's \"late cancellation.\"  Being late to visits  Being very late is the same as not showing up. You will be a \"no show\" if:  Your appointment starts with a BHC, and you're more than 15 minutes late for a 30-minute (half hour) visit. This will also cancel your appointment with the psychiatric doctor.  Your appointment is with a psychiatric doctor only, and you're more than 15 minutes late for a 30-minute (half hour) visit.  Your appointment is with a psychiatric doctor only, and you're more than 30 minutes late for a 60-minute (full hour) visit.  If you cancel late or don't show up 2 times within 6 months, we may end your " care.   Getting help between visits  If you need help between visits, you can call us Monday to Friday from 8 a.m. to 4:30 p.m. at [phone].  Emergency care  Call 911 or go to the nearest emergency department if your life or someone else's life is in danger.  Call 988 anytime to reach the national Suicide and Crisis hotline.  Medicine refills  To refill your medicine, call your pharmacy. You can also call [facility]'s Behavioral Access at [phone], Monday to Friday, 8 a.m. to 4:30 p.m. It can take 1 to 3 business days to get a refill.   Forms, letters, and tests  You may have papers to fill out, like FMLA, short-term disability, and workability. We can help you with these forms at your visits, but you must have an appointment. You may need more than 1 visit for this, to be in an intensive therapy program, or both.  Before we can give you medicine for ADHD, we may refer you to get tested for it or confirm it another way.  We may not be able to give you an emotional support animal letter.  We don't do mental health checks ordered by the court.   We don't do mental health testing, but we can refer you to get tested.   Thank you for choosing us for your care.  For informational purposes only. Not to replace the advice of your health care provider. Copyright   2022 [facility]. All rights reserved. [employer] [id number] - 12/22.

## 2023-11-01 NOTE — NURSING NOTE
Is the patient currently in the state of [place]? YES    Visit mode:VIDEO    If the visit is dropped, the patient can be reconnected by: VIDEO VISIT: Send to e-mail at: [email or web address]    Will anyone else be joining the visit? NO  (If patient encounters technical issues they should call [phone])    How would you like to obtain your AVS? MyChart    Are changes needed to the allergy or medication list? Pt stated no changes to allergies and Pt stated no med changes    Reason for visit: Consult    [facility]    Care team has reviewed attendance agreement with patient. Patient advised that two failed appointments within 6 months may lead to termination of current episode of care.
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
## 2023-11-01 NOTE — Clinical Note
Criselda,  Thank you for your consult and care of the patient.  Increasing cariprazine to address depression.  Sincerely, Barney Colon M.D. Consultative Psychiatrist Program Medical Director, Lead Collaborative Care Psychiatry Service

## 2023-11-10 ENCOUNTER — IMMUNIZATION (OUTPATIENT)
Dept: FAMILY MEDICINE | Facility: CLINIC | Age: 43
End: 2023-11-10
Payer: COMMERCIAL

## 2023-11-10 ENCOUNTER — LAB (OUTPATIENT)
Dept: LAB | Facility: CLINIC | Age: 43
End: 2023-11-10
Payer: COMMERCIAL

## 2023-11-10 DIAGNOSIS — E11.9 TYPE 2 DIABETES MELLITUS WITHOUT COMPLICATION, WITH LONG-TERM CURRENT USE OF INSULIN (H): ICD-10-CM

## 2023-11-10 DIAGNOSIS — Z79.4 TYPE 2 DIABETES MELLITUS WITHOUT COMPLICATION, WITH LONG-TERM CURRENT USE OF INSULIN (H): ICD-10-CM

## 2023-11-10 DIAGNOSIS — E29.1 HYPOGONADISM MALE: ICD-10-CM

## 2023-11-10 DIAGNOSIS — Z23 HIGH PRIORITY FOR 2019-NCOV VACCINE: Primary | ICD-10-CM

## 2023-11-10 LAB
ALBUMIN SERPL BCG-MCNC: 4.3 G/DL (ref 3.5–5.2)
ALP SERPL-CCNC: 67 U/L (ref 40–129)
ALT SERPL W P-5'-P-CCNC: 68 U/L (ref 0–70)
ANION GAP SERPL CALCULATED.3IONS-SCNC: 13 MMOL/L (ref 7–15)
AST SERPL W P-5'-P-CCNC: 34 U/L (ref 0–45)
BASOPHILS # BLD AUTO: 0.1 10E3/UL (ref 0–0.2)
BASOPHILS NFR BLD AUTO: 1 %
BILIRUB SERPL-MCNC: 0.4 MG/DL
BUN SERPL-MCNC: 18 MG/DL (ref 6–20)
CALCIUM SERPL-MCNC: 9.7 MG/DL (ref 8.6–10)
CHLORIDE SERPL-SCNC: 103 MMOL/L (ref 98–107)
CREAT SERPL-MCNC: 0.84 MG/DL (ref 0.67–1.17)
DEPRECATED HCO3 PLAS-SCNC: 23 MMOL/L (ref 22–29)
EGFRCR SERPLBLD CKD-EPI 2021: >90 ML/MIN/1.73M2
EOSINOPHIL # BLD AUTO: 0.2 10E3/UL (ref 0–0.7)
EOSINOPHIL NFR BLD AUTO: 2 %
ERYTHROCYTE [DISTWIDTH] IN BLOOD BY AUTOMATED COUNT: 12.5 % (ref 10–15)
GLUCOSE SERPL-MCNC: 182 MG/DL (ref 70–99)
HBA1C MFR BLD: 6.8 % (ref 0–5.6)
HCT VFR BLD AUTO: 42.5 % (ref 40–53)
HGB BLD-MCNC: 14.4 G/DL (ref 13.3–17.7)
IMM GRANULOCYTES # BLD: 0 10E3/UL
IMM GRANULOCYTES NFR BLD: 0 %
LYMPHOCYTES # BLD AUTO: 2.3 10E3/UL (ref 0.8–5.3)
LYMPHOCYTES NFR BLD AUTO: 28 %
MCH RBC QN AUTO: 26 PG (ref 26.5–33)
MCHC RBC AUTO-ENTMCNC: 33.9 G/DL (ref 31.5–36.5)
MCV RBC AUTO: 77 FL (ref 78–100)
MONOCYTES # BLD AUTO: 0.5 10E3/UL (ref 0–1.3)
MONOCYTES NFR BLD AUTO: 6 %
NEUTROPHILS # BLD AUTO: 5 10E3/UL (ref 1.6–8.3)
NEUTROPHILS NFR BLD AUTO: 62 %
PLATELET # BLD AUTO: 238 10E3/UL (ref 150–450)
POTASSIUM SERPL-SCNC: 4.4 MMOL/L (ref 3.4–5.3)
PROT SERPL-MCNC: 7 G/DL (ref 6.4–8.3)
RBC # BLD AUTO: 5.54 10E6/UL (ref 4.4–5.9)
SODIUM SERPL-SCNC: 139 MMOL/L (ref 135–145)
TSH SERPL DL<=0.005 MIU/L-ACNC: 1.82 UIU/ML (ref 0.3–4.2)
WBC # BLD AUTO: 8.1 10E3/UL (ref 4–11)

## 2023-11-10 PROCEDURE — 80053 COMPREHEN METABOLIC PANEL: CPT

## 2023-11-10 PROCEDURE — 91320 SARSCV2 VAC 30MCG TRS-SUC IM: CPT

## 2023-11-10 PROCEDURE — 99207 PR NO CHARGE NURSE ONLY: CPT

## 2023-11-10 PROCEDURE — 84403 ASSAY OF TOTAL TESTOSTERONE: CPT

## 2023-11-10 PROCEDURE — 36415 COLL VENOUS BLD VENIPUNCTURE: CPT

## 2023-11-10 PROCEDURE — 90480 ADMN SARSCOV2 VAC 1/ONLY CMP: CPT

## 2023-11-10 PROCEDURE — 85025 COMPLETE CBC W/AUTO DIFF WBC: CPT

## 2023-11-10 PROCEDURE — 84443 ASSAY THYROID STIM HORMONE: CPT

## 2023-11-10 PROCEDURE — 83036 HEMOGLOBIN GLYCOSYLATED A1C: CPT

## 2023-11-14 ASSESSMENT — ANXIETY QUESTIONNAIRES
6. BECOMING EASILY ANNOYED OR IRRITABLE: MORE THAN HALF THE DAYS
IF YOU CHECKED OFF ANY PROBLEMS ON THIS QUESTIONNAIRE, HOW DIFFICULT HAVE THESE PROBLEMS MADE IT FOR YOU TO DO YOUR WORK, TAKE CARE OF THINGS AT HOME, OR GET ALONG WITH OTHER PEOPLE: VERY DIFFICULT
4. TROUBLE RELAXING: SEVERAL DAYS
5. BEING SO RESTLESS THAT IT IS HARD TO SIT STILL: SEVERAL DAYS
3. WORRYING TOO MUCH ABOUT DIFFERENT THINGS: MORE THAN HALF THE DAYS
1. FEELING NERVOUS, ANXIOUS, OR ON EDGE: MORE THAN HALF THE DAYS
GAD7 TOTAL SCORE: 9
GAD7 TOTAL SCORE: 9
2. NOT BEING ABLE TO STOP OR CONTROL WORRYING: SEVERAL DAYS
7. FEELING AFRAID AS IF SOMETHING AWFUL MIGHT HAPPEN: NOT AT ALL

## 2023-11-15 DIAGNOSIS — E29.1 HYPOGONADISM MALE: ICD-10-CM

## 2023-11-15 LAB — TESTOST SERPL-MCNC: 699 NG/DL (ref 240–950)

## 2023-11-15 RX ORDER — TESTOSTERONE 1.62 MG/G
1 GEL TRANSDERMAL DAILY
Qty: 75 G | Refills: 5 | Status: SHIPPED | OUTPATIENT
Start: 2023-11-15 | End: 2024-08-10

## 2023-11-15 NOTE — RESULT ENCOUNTER NOTE
Tavo,     Your labs look good. Your A1C increased slightly so we should monitor that, but your testosterone level increased nicely. I would keep your testosterone prescription the same at this time.   Criselda Walsh PA-C

## 2023-11-16 ENCOUNTER — VIRTUAL VISIT (OUTPATIENT)
Dept: PSYCHOLOGY | Facility: CLINIC | Age: 43
End: 2023-11-16
Payer: COMMERCIAL

## 2023-11-16 DIAGNOSIS — F31.9 BIPOLAR 1 DISORDER (H): Primary | ICD-10-CM

## 2023-11-16 PROCEDURE — 90791 PSYCH DIAGNOSTIC EVALUATION: CPT | Mod: 95 | Performed by: MARRIAGE & FAMILY THERAPIST

## 2023-11-16 ASSESSMENT — COLUMBIA-SUICIDE SEVERITY RATING SCALE - C-SSRS
MOST RECENT DATE: 56460
3. HAVE YOU BEEN THINKING ABOUT HOW YOU MIGHT KILL YOURSELF?: YES
4. HAVE YOU HAD THESE THOUGHTS AND HAD SOME INTENTION OF ACTING ON THEM?: YES
1. HAVE YOU WISHED YOU WERE DEAD OR WISHED YOU COULD GO TO SLEEP AND NOT WAKE UP?: YES
6. HAVE YOU EVER DONE ANYTHING, STARTED TO DO ANYTHING, OR PREPARED TO DO ANYTHING TO END YOUR LIFE?: NO
LETHALITY/MEDICAL DAMAGE CODE FIRST POTENTIAL ATTEMPT: BEHAVIOR LIKELY TO RESULT IN INJURY BUT NOT LIKELY TO CAUSE DEATH
MOST LETHAL DATE: 56460
FIRST ATTEMPT DATE: 56460
2. HAVE YOU ACTUALLY HAD ANY THOUGHTS OF KILLING YOURSELF?: YES
ATTEMPT LIFETIME: YES
REASONS FOR IDEATION LIFETIME: COMPLETELY TO END OR STOP THE PAIN (YOU COULDN'T GO ON LIVING WITH THE PAIN OR HOW YOU WERE FEELING)
TOTAL  NUMBER OF INTERRUPTED ATTEMPTS LIFETIME: NO
5. HAVE YOU STARTED TO WORK OUT OR WORKED OUT THE DETAILS OF HOW TO KILL YOURSELF? DO YOU INTEND TO CARRY OUT THIS PLAN?: YES
1. IN THE PAST MONTH, HAVE YOU WISHED YOU WERE DEAD OR WISHED YOU COULD GO TO SLEEP AND NOT WAKE UP?: NO
ATTEMPT PAST THREE MONTHS: NO
REASONS FOR IDEATION PAST MONTH: MOSTLY TO END OR STOP THE PAIN (YOU COULDN'T GO ON LIVING WITH THE PAIN OR HOW YOU WERE FEELING)
LETHALITY/MEDICAL DAMAGE CODE MOST LETHAL ACTUAL ATTEMPT: MINOR PHYSICAL DAMAGE
LETHALITY/MEDICAL DAMAGE CODE MOST RECENT ACTUAL ATTEMPT: NO PHYSICAL DAMAGE OR VERY MINOR PHYSICAL DAMAGE
LETHALITY/MEDICAL DAMAGE CODE FIRST ACTUAL ATTEMPT: MINOR PHYSICAL DAMAGE
LETHALITY/MEDICAL DAMAGE CODE MOST RECENT POTENTIAL ATTEMPT: BEHAVIOR LIKELY TO RESULT IN INJURY BUT NOT LIKELY TO CAUSE DEATH
4. HAVE YOU HAD THESE THOUGHTS AND HAD SOME INTENTION OF ACTING ON THEM?: NO
TOTAL  NUMBER OF ABORTED OR SELF INTERRUPTED ATTEMPTS LIFETIME: NO
2. HAVE YOU ACTUALLY HAD ANY THOUGHTS OF KILLING YOURSELF?: YES
TOTAL  NUMBER OF ACTUAL ATTEMPTS LIFETIME: 1
LETHALITY/MEDICAL DAMAGE CODE MOST LETHAL POTENTIAL ATTEMPT: BEHAVIOR LIKELY TO RESULT IN INJURY BUT NOT LIKELY TO CAUSE DEATH
5. HAVE YOU STARTED TO WORK OUT OR WORKED OUT THE DETAILS OF HOW TO KILL YOURSELF? DO YOU INTEND TO CARRY OUT THIS PLAN?: NO

## 2023-11-16 ASSESSMENT — PATIENT HEALTH QUESTIONNAIRE - PHQ9
SUM OF ALL RESPONSES TO PHQ QUESTIONS 1-9: 16
SUM OF ALL RESPONSES TO PHQ QUESTIONS 1-9: 16
10. IF YOU CHECKED OFF ANY PROBLEMS, HOW DIFFICULT HAVE THESE PROBLEMS MADE IT FOR YOU TO DO YOUR WORK, TAKE CARE OF THINGS AT HOME, OR GET ALONG WITH OTHER PEOPLE: VERY DIFFICULT
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
## 2023-11-23 DIAGNOSIS — E11.9 TYPE 2 DIABETES MELLITUS WITHOUT COMPLICATION, WITHOUT LONG-TERM CURRENT USE OF INSULIN (H): ICD-10-CM

## 2023-11-24 RX ORDER — DULAGLUTIDE 3 MG/.5ML
INJECTION, SOLUTION SUBCUTANEOUS
Qty: 2 ML | Refills: 3 | Status: SHIPPED | OUTPATIENT
Start: 2023-11-24 | End: 2024-03-19

## 2023-11-30 ENCOUNTER — VIRTUAL VISIT (OUTPATIENT)
Dept: BEHAVIORAL HEALTH | Facility: CLINIC | Age: 43
End: 2023-11-30
Payer: COMMERCIAL

## 2023-11-30 DIAGNOSIS — F31.9 BIPOLAR 1 DISORDER (H): Primary | ICD-10-CM

## 2023-11-30 DIAGNOSIS — F41.1 GAD (GENERALIZED ANXIETY DISORDER): ICD-10-CM

## 2023-11-30 PROCEDURE — 90832 PSYTX W PT 30 MINUTES: CPT | Mod: 95 | Performed by: COUNSELOR

## 2023-11-30 NOTE — PROGRESS NOTES
[facility]  November 30, 2023      Behavioral Health Clinician Progress Note    Patient Name: Tavo Key           Service Type:  Individual      Service Location:   MyChart / Email (patient reached)     Session Start Time: 1032am  Session End Time: 1103am      Session Length: 16 - 37      Attendees: Patient     Service Modality:  Video Visit:      Provider verified identity through the following two step process.  Patient provided:  Patient photo and Patient was verified at admission/transfer    Telemedicine Visit: The patient's condition can be safely assessed and treated via synchronous audio and visual telemedicine encounter.      Reason for Telemedicine Visit: Services only offered telehealth    Originating Site (Patient Location): Patient's home    Distant Site (Provider Location): Provider Remote Setting- Home Office    Consent:  The patient/guardian has verbally consented to: the potential risks and benefits of telemedicine (video visit) versus in person care; bill my insurance or make self-payment for services provided; and responsibility for payment of non-covered services.     Patient would like the video invitation sent by:  My Chart    Mode of Communication:  Video Conference via [facility]    Distant Location (Provider):  Off-site    As the provider I attest to compliance with applicable laws and regulations related to telemedicine.    Visit Activities (Refresh list every visit): [facility] Only    Diagnostic Assessment Date: 11/16/2023  Shanelle Caruso   Treatment Plan Review Date: in the next few vistis  See Flowsheets for today's PHQ-9 and MATTI-7 results  Previous PHQ-9:       6/21/2023     9:37 PM 10/31/2023    10:12 PM 11/16/2023    12:26 PM   PHQ-9 SCORE   PHQ-9 Total Score MyChart 12 (Moderate depression) 13 (Moderate depression) 16 (Moderately severe depression)   PHQ-9 Total Score 12 13 16     Previous MATTI-7:       10/31/2023    10:23 PM  11/14/2023     5:54 AM   MATTI-7 SCORE   Total Score 15 (severe anxiety) 9 (mild anxiety)   Total Score 15 9           DATA  Extended Session (60+ minutes): No  Interactive Complexity: No  Crisis: No  [facility] Patient: No    Treatment Objective(s) Addressed in This Session:  Target Behavior(s): disease management/lifestyle changes get blood sugar level reduced and improve worry, stress eating    Anxiety: will experience a reduction in anxiety, will develop more effective coping skills to manage anxiety symptoms, and will increase ability to function adaptively  Attention Problems: will develop coping skills to effectively manage attention issues    Current Stressors / Issues:  Medication Questions/Requests: if the Vraylar could be increasing blood sugars and he is trying to track down where this cause could be    MH update: Pt is feeling more fatigued and are working to get their diabetes under control. Pt is having a lot of death anxiety that is impacting their life. Daily anxiety has gone up. Work and having to drive. When there aren't a lot of cars on the road it causes stress. Pt is in the office Tuesday's and Thursday's that are in the office. Pt can take back roads to work. He says that irritability is better. Focus has been more difficult. It's hard to focus at work. He use to use a timer for quite a bit. He will work to get more breaks in once an hour or so.   Stressors: work and driving financial stress- hope is to buy a home  Side Effects: don't think so, drowsiness throughout the day   Mood: yesterday mood shifted downward in the evening and struggled with focus all day and then struggled with the self hatred and mood dropped from there  Appetite: unremarkable, been some stress eating and will be bad when pt is working, a couple weeks ago hasn't helped   Sleep: unremarkable, heavy dreaming and staying sleep, vivid stressful dreams, 50-50 to feeling tired, but not feeling energizes about the day, dream have been  like this for 3-4 weeks     Impulsive spending/spending sprees: not much at all  Suicidality: pt deneis   Self-harm: pt denies, as a teen only  Substance Use: pt denies   Caffeine: sugar free caffeine drink packets  Therapist: think it's a good fit, next appt is in January   Interventions: CBT fact checking, Mindulness eating      Progress on Treatment Objective(s) / Homework:  Satisfactory progress - ACTION (Actively working towards change); Intervened by reinforcing change plan / affirming steps taken    CBT/MI: Pt reports that they are taking breaks at work and walking around. He says that he is driving to work. Pt is hoping to explore and look into EMDR therapy.     [facility] encourages pt to use CBT fact checking, ignoring thoughts and emotions and looking at only the facts and hard evidence we have. [facility] briefly demonstrates mindful eating and explains the benefits of it. [facility] will provide information through "[employer]" for EMDR. [facility] offers to send pt resources and information for ADHD and pt respectfully declines.     Motivational Interviewing    MI Intervention: Co-Developed Goal: reduce worry and stress eating, improve focus, Expressed Empathy/Understanding, Supported Autonomy, Collaboration, Evocation, Permission to raise concern or advise, Open-ended questions, Reflections: simple and complex, Change talk (evoked), and Reframe     Change Talk Expressed by the Patient: Committment to change Activation Taking steps    Provider Response to Change Talk: E - Evoked more info from patient about behavior change, A - Affirmed patient's thoughts, decisions, or attempts at behavior change, R - Reflected patient's change talk, and S - Summarized patient's change talk statements    Also provided psychoeducation about behavioral health condition, symptoms, and treatment options    Assessments completed prior to visit:  The following assessments were completed by patient for this visit:  N/A    Care Plan review completed:  No    Medication Review:  No changes to current psychiatric medication(s)    Medication Compliance:  Yes    Changes in Health Issues:   None reported, blood sugars going way up, before was avg 180 and the last 30 days between 200 and 250 and above and he did message PCP    Chemical Use Review:   Substance Use: Chemical use reviewed, no active concerns identified      Tobacco Use: No current tobacco use.      Assessment: Current Emotional / Mental Status (status of significant symptoms):  Risk status (Self / Other harm or suicidal ideation)  Patient has had a history of suicide attempts: age 15 and self-injurious behavior: as a teen  Patient denies current fears or concerns for personal safety.  Patient denies current or recent suicidal ideation or behaviors.  Patient denies current or recent homicidal ideation or behaviors.  Patient denies current or recent self injurious behavior or ideation.  Patient denies other safety concerns.  A safety and risk management plan has not been developed at this time, however patient was encouraged to call [place] [phone] should there be a change in any of these risk factors.    Appearance:   Appropriate   Eye Contact:   Good   Psychomotor Behavior: Normal   Attitude:   Cooperative  Interested  Orientation:   All  Speech   Rate / Production: Normal    Volume:  Normal   Mood:    Anxious   Affect:    Appropriate   Thought Content:  Clear   Thought Form:  Coherent  Logical   Insight:    Good     Diagnoses:  1. Bipolar 1 disorder (H)    2. MATTI (generalized anxiety disorder)        Collateral Reports Completed:  Communicated with: Barney Colon M.D.     Plan: (Homework, other):  Patient was given information about behavioral services and encouraged to schedule a follow up appointment with the clinic [facility] in 1 month.  He was also given information about mental health symptoms and treatment options .  CD Recommendations: No indications of CD issues.     Paulette Meneses  [facility]    ______________________________________________________________________    Integrated Primary Care Behavioral Health Treatment Plan    Patient's Name: Tavo Key  YOB: 1980    Date of Creation: in the next few visits  Date Treatment Plan Last Reviewed/Revised: in the next visits     DSM5 Diagnoses:   Bipolar 1 disorder (H)    MATTI (generalized anxiety disorder)      Psychosocial / Contextual Factors: works, works a hybrid job, ADHD, health concerns  PROMIS (reviewed every 90 days):   PROMIS 10-Global Health (only subscores and total score):       10/31/2023    10:24 PM 11/14/2023     5:56 AM   PROMIS-10 Scores Only   Global Mental Health Score 7 10   Global Physical Health Score 17 13   PROMIS TOTAL - SUBSCORES 24 23       Referral / Collaboration:  Referral to another professional/service is not indicated at this time.    Anticipated number of session for this episode of care: 4-5  Anticipation frequency of session: Monthly  Anticipated Duration of each session: 16-37 minutes  Treatment plan will be reviewed in 90 days or when goals have been changed.       Patient has reviewed and agreed to the above plan.      MITA Guzman  November 30, 2023

## 2023-12-07 ENCOUNTER — VIRTUAL VISIT (OUTPATIENT)
Dept: PSYCHOLOGY | Facility: CLINIC | Age: 43
End: 2023-12-07
Payer: COMMERCIAL

## 2023-12-07 DIAGNOSIS — F31.9 BIPOLAR 1 DISORDER (H): Primary | ICD-10-CM

## 2023-12-07 PROCEDURE — 90837 PSYTX W PT 60 MINUTES: CPT | Mod: VID | Performed by: MARRIAGE & FAMILY THERAPIST

## 2023-12-08 NOTE — PROGRESS NOTES
[facility]                                     Progress Note    Patient Name: Tavo Key  Date: 12/7/2023         Service Type: Individual      Session Start Time: 4:00PM  Session End Time: 4:55PM     Session Length: 55 minutes    Session #: 2    Attendees: Client attended alone    Service Modality:  Video Visit:      Provider verified identity through the following two step process.  Patient provided:  Patient is known previously to provider    Telemedicine Visit: The patient's condition can be safely assessed and treated via synchronous audio and visual telemedicine encounter.      Reason for Telemedicine Visit: Patient has requested telehealth visit    Originating Site (Patient Location): Patient's home    Distant Site (Provider Location): Provider Remote Setting- Home Office    Consent:  The patient/guardian has verbally consented to: the potential risks and benefits of telemedicine (video visit) versus in person care; bill my insurance or make self-payment for services provided; and responsibility for payment of non-covered services.     Patient would like the video invitation sent by:  My Chart    Mode of Communication:  Video Conference via [facility]    Distant Location (Provider):  Off-site    As the provider I attest to compliance with applicable laws and regulations related to telemedicine.    DATA  Extended Session (53+ minutes):   - Longer session due to limited access to mental health appointments and necessity to address patient's distress / complexity  Interactive Complexity: No  Crisis: No        Progress Since Last Session (Related to Symptoms / Goals / Homework):   Symptoms: Worsening some increased stress related to buying a house    Homework: Partially completed      Episode of Care Goals: Satisfactory progress - PREPARATION (Decided to change - considering how); Intervened by negotiating a change plan and determining options / strategies for behavior change, identifying  triggers, exploring social supports, and working towards setting a date to begin behavior change     Current / Ongoing Stressors and Concerns:   Client shared today that he and his wife bought a house in the past month. He is very excited about it but it has also added stress to their lives. He has had some anxiety about work due to the distraction of the house. He had previous jobs where he was bullied and subsequently fired. Client has some PTSD from these experiences therefore he is anxious about anything happening in this current job that he wants to keep and has been at over 2 years. He finds it difficult to manage anxious thoughts that are founded in some degree of truth. Client has had some health anxiety as his diabetes is not managed fully and he struggles to maintain healthy eating and exercise. Talked about getting more active and physical in the moving process which he hopes will spur him onto continuing with more consistent exercise. He would like to be able to be back in a routine. Also talked more about EMDR and possibly starting it after they are moved into their house and he is feeling more settled. Client likes the idea of trying a new therapy. He has had some issues with applying CBT strategies at times.     Treatment Objective(s) Addressed in This Session:   identify coping strategies for more effectively managing Bipolar Disorder  Work on managing his diabetes     Intervention:   CBT: Cognitive restructuring  Solution Focused: prep for move    Assessments completed prior to visit:  The following assessments were completed by patient for this visit:  PROMIS 10-Global Health (all questions and answers displayed):       10/31/2023    10:24 PM 11/14/2023     5:56 AM   PROMIS 10   In general, would you say your health is: Good Fair   In general, would you say your quality of life is: Good Good   In general, how would you rate your physical health? Good Fair   In general, how would you rate your  mental health, including your mood and your ability to think? Fair Good   In general, how would you rate your satisfaction with your social activities and relationships? Poor Fair   In general, please rate how well you carry out your usual social activities and roles Fair Good   To what extent are you able to carry out your everyday physical activities such as walking, climbing stairs, carrying groceries, or moving a chair? Completely Completely   In the past 7 days, how often have you been bothered by emotional problems such as feeling anxious, depressed, or irritable? Always Often   In the past 7 days, how would you rate your fatigue on average? Mild Severe   In the past 7 days, how would you rate your pain on average, where 0 means no pain, and 10 means worst imaginable pain? 0 2   In general, would you say your health is: 3 2   In general, would you say your quality of life is: 3 3   In general, how would you rate your physical health? 3 2   In general, how would you rate your mental health, including your mood and your ability to think? 2 3   In general, how would you rate your satisfaction with your social activities and relationships? 1 2   In general, please rate how well you carry out your usual social activities and roles. (This includes activities at home, at work and in your community, and responsibilities as a parent, child, spouse, employee, friend, etc.) 2 3   To what extent are you able to carry out your everyday physical activities such as walking, climbing stairs, carrying groceries, or moving a chair? 5 5   In the past 7 days, how often have you been bothered by emotional problems such as feeling anxious, depressed, or irritable? 5 4   In the past 7 days, how would you rate your fatigue on average? 2 4   In the past 7 days, how would you rate your pain on average, where 0 means no pain, and 10 means worst imaginable pain? 0 2   Global Mental Health Score 7 10   Global Physical Health Score 17 13    PROMIS TOTAL - SUBSCORES 24 23     PROMIS 10-Global Health (only subscores and total score):       10/31/2023    10:24 PM 11/14/2023     5:56 AM   PROMIS-10 Scores Only   Global Mental Health Score 7 10   Global Physical Health Score 17 13   PROMIS TOTAL - SUBSCORES 24 23         ASSESSMENT: Current Emotional / Mental Status (status of significant symptoms):   Risk status (Self / Other harm or suicidal ideation)   Patient denies current fears or concerns for personal safety.   Patient denies current or recent suicidal ideation or behaviors.   Patient denies current or recent homicidal ideation or behaviors.   Patient denies current or recent self injurious behavior or ideation.   Patient denies other safety concerns.   Patient reports there has been no change in risk factors since their last session.     Patient reports there has been no change in protective factors since their last session.     Recommended that patient call 911 or go to the local ED should there be a change in any of these risk factors.     Appearance:   Appropriate    Eye Contact:   Good    Psychomotor Behavior: Normal    Attitude:   Cooperative  Friendly   Orientation:   All   Speech    Rate / Production: Normal     Volume:  Normal    Mood:    Anxious  Normal   Affect:    Appropriate    Thought Content:  Clear    Thought Form:  Coherent  Logical    Insight:    Good      Medication Review:   No changes to current psychiatric medication(s)     Medication Compliance:   Yes     Changes in Health Issues:   None reported     Chemical Use Review:   Substance Use: Chemical use reviewed, no active concerns identified      Tobacco Use: No current tobacco use.      Diagnosis:  1. Bipolar 1 disorder (H)        Collateral Reports Completed:   Not Applicable    PLAN: (Patient Tasks / Therapist Tasks / Other)  Homework: Client to focus on moving into new house. Create momentum with physical activity to continue exercising.         Shanelle Caruso, LMFT                                                          ______________________________________________________________________

## 2023-12-11 ENCOUNTER — OFFICE VISIT (OUTPATIENT)
Dept: PHARMACY | Facility: CLINIC | Age: 43
End: 2023-12-11
Payer: COMMERCIAL

## 2023-12-11 VITALS — DIASTOLIC BLOOD PRESSURE: 82 MMHG | BODY MASS INDEX: 41.05 KG/M2 | WEIGHT: 270 LBS | SYSTOLIC BLOOD PRESSURE: 112 MMHG

## 2023-12-11 DIAGNOSIS — F41.1 GAD (GENERALIZED ANXIETY DISORDER): ICD-10-CM

## 2023-12-11 DIAGNOSIS — E11.9 TYPE 2 DIABETES MELLITUS WITHOUT COMPLICATION, WITHOUT LONG-TERM CURRENT USE OF INSULIN (H): Primary | ICD-10-CM

## 2023-12-11 DIAGNOSIS — G43.809 OTHER MIGRAINE WITHOUT STATUS MIGRAINOSUS, NOT INTRACTABLE: ICD-10-CM

## 2023-12-11 DIAGNOSIS — F31.62 BIPOLAR MIXED AFFECTIVE DISORDER, MODERATE (H): ICD-10-CM

## 2023-12-11 DIAGNOSIS — E66.01 MORBID OBESITY (H): ICD-10-CM

## 2023-12-11 PROCEDURE — 99207 PR NO CHARGE LOS: CPT | Performed by: PHARMACIST

## 2023-12-11 RX ORDER — INSULIN ASPART 100 [IU]/ML
INJECTION, SOLUTION INTRAVENOUS; SUBCUTANEOUS
Qty: 15 ML | Refills: 1 | Status: SHIPPED | OUTPATIENT
Start: 2023-12-11 | End: 2023-12-27

## 2023-12-11 NOTE — Clinical Note
MARIAH MOTA note, thanks!  Madison Wheeler, PharmD Medication Therapy Management Pharmacist [phone]

## 2023-12-11 NOTE — PATIENT INSTRUCTIONS
"Recommendations from today's MTM visit:                                                         Start Novolog 6 units 15 minutes before supper.     Follow-up: Return in about 2 weeks (around 12/25/2023) for Medication Therapy Management.    It was great speaking with you today.  I value your experience and would be very thankful for your time in providing feedback in our clinic survey. In the next few days, you may receive an email or text message from [employer] with a link to a survey related to your  clinical pharmacist.\"     To schedule another MTM appointment, please call the clinic directly or you may call the MTM scheduling line at [phone] or toll-free at [phone].     My Clinical Pharmacist's contact information:                                                      Please feel free to contact me with any questions or concerns you have.      Madison Wheeler, PharmD  Medication Therapy Management Pharmacist  [phone]     "

## 2023-12-11 NOTE — PROGRESS NOTES
Medication Therapy Management (MTM) Encounter    ASSESSMENT:                            Medication Adherence/Access: No issues identified    Type 2 Diabetes/Obesity: Patient is meeting A1c goal of < 7%, however doesn't accurately reflect increase in blood sugar - he is only in target range of blood sugar  mg/dL 3% of time.  Vrylar is a second generation antipsychotic, which could be contributing to worsened blood sugar. Discussed mealtime insulin and Jardiance, Jardiance may not be enough, he prefers to start mealtime insulin.     Migraine prevention: Stable.     Bipolar/Depression/Anxiety: Plan in place. Education on Vraylar provided, could consider monitoring liver enzymes in 3-6 months.    PLAN:                            Start Novolog 6 units 15 minutes before supper.     Follow-up: Return in about 2 weeks (around 12/25/2023) for Medication Therapy Management.    SUBJECTIVE/OBJECTIVE:                          Tavo Key is a 43 year old male coming in for a follow-up visit from 9/18/23.       Reason for visit: diabetes.    Allergies/ADRs: Reviewed in chart  Past Medical History: Reviewed in chart  Tobacco: He reports that he quit smoking about 13 years ago. His smoking use included cigarettes. He has never used smokeless tobacco.  Alcohol: none/very rarely    Medication Adherence/Access:   Patient uses pill box(es).  Patient takes medications 3 time(s) per day.   Per patient, misses medication 0 times per week.   The patient fills medications at [facility]: YES.    Diabetes Type 2 Diabetes/Obesity:   Trulicity 3 mg weekly   Metformin ER 1000 mg twice daily  Tresiba 30 units once daily    Noticed blood sugar increased starting 4-6 weeks ago, seemed to happen out of the blue. Vrylar was stopped a week ago by psychiatry if that was possibly contributing.   Trulicity dose reduced from 4.5 mg to 3 mg in the past due to nausea.   Reports no side effects.   Nothing OTC has helped.   SMBG:  Nasrin 3    Current  diabetes symptoms: increased urination, fatigue, hyper hunger   No symptoms of hypoglycemia in the past, does have alarm on Nasrin 2.  Diet/Exercise: did slip a little on eating, but not much at all.  Planning to increase activity with upcoming move and getting out for a walk before it gets dark.  Eating 3-5 meals/day (which includes stress eating/snacking). Trying to keep major meals to three times daily.   Eye Exam: up to date  Foot Exam: due  Medication history  -Phentermine 18.75 mg daily  -$2000 left on deductible before insurance will cover Victoza. Victoza worked really well in the past but stopped due to cost   -Copay for Januvia is around $510.   -Patient tried Ozempic previously without benefit but thinks he may not have been on this medication long enough to notice weight loss/glycemic control.   -Victoza was effective in the past, but insurance stopped covering.      Eye exam is up to date  Foot exam: due  Urine Albumin:   Lab Results   Component Value Date    UMALCR  08/21/2023      Comment:      Unable to calculate, urine albumin and/or urine creatinine is outside detectable limits.  Microalbuminuria is defined as an albumin:creatinine ratio of 17 to 299 for males and 25 to 299 for females. A ratio of albumin:creatinine of 300 or higher is indicative of overt proteinuria.  Due to biologic variability, positive results should be confirmed by a second, first-morning random or 24-hour timed urine specimen. If there is discrepancy, a third specimen is recommended. When 2 out of 3 results are in the microalbuminuria range, this is evidence for incipient nephropathy and warrants increased efforts at glucose control, blood pressure control, and institution of therapy with an angiotensin-converting-enzyme (ACE) inhibitor (if the patient can tolerate it).        Lab Results   Component Value Date    A1C 6.8 (H) 11/10/2023     Migraine prevention:    Propranolol 80 mg twice daily (replaced  mg daily due to  cost)  Naratriptan 2.5 mg as needed    He doesn't think headaches are worse on regular release propranolol. Naritriptan is helpful when he takes.  Denies side effects.   BP Readings from Last 3 Encounters:   12/11/23 112/82   10/11/23 114/79   09/18/23 110/84     Bipolar/Depression/Anxiety:  Lithium 450 mg daily   Lamotrigine 200 mg daily   Hydroxyzine 50 mg three times daily as needed    Stopped Vraylar one week ago by psychiatry as this could be impacting his blood sugars.  Stopped ziprazodone ~3 months ago. Is working with psychiatry and psychology.     Today's Vitals: /82   Wt 270 lb (122.5 kg)   BMI 41.05 kg/m    ----------------      I spent 20 minutes with this patient today. All changes were made via collaborative practice agreement with Criselda Walsh PA-C. A copy of the visit note was provided to the patient's provider(s).    A summary of these recommendations was sent via [employer].    Madison Wheeler, PharmD  Medication Therapy Management Pharmacist  [phone]         Medication Therapy Recommendations  Diabetes mellitus, type 2 (H)    Current Medication: insulin degludec (TRESIBA FLEXTOUCH) 100 UNIT/ML pen   Rationale: Synergistic therapy - Needs additional medication therapy - Indication   Recommendation: Start Medication - NovoLOG FLEXPEN 100 UNIT/ML soln   Status: Accepted per CPA             Taltz Counseling: I discussed with the patient the risks of ixekizumab including but not limited to immunosuppression, serious infections, worsening of inflammatory bowel disease and drug reactions.  The patient understands that monitoring is required including a PPD at baseline and must alert us or the primary physician if symptoms of infection or other concerning signs are noted.

## 2023-12-22 ENCOUNTER — LAB (OUTPATIENT)
Dept: LAB | Facility: CLINIC | Age: 43
End: 2023-12-22
Payer: COMMERCIAL

## 2023-12-22 DIAGNOSIS — F31.62 BIPOLAR MIXED AFFECTIVE DISORDER, MODERATE (H): ICD-10-CM

## 2023-12-22 LAB — LITHIUM SERPL-SCNC: <0.1 MMOL/L (ref 0.6–1.2)

## 2023-12-22 PROCEDURE — 36415 COLL VENOUS BLD VENIPUNCTURE: CPT

## 2023-12-22 PROCEDURE — 80178 ASSAY OF LITHIUM: CPT

## 2023-12-26 ENCOUNTER — VIRTUAL VISIT (OUTPATIENT)
Dept: BEHAVIORAL HEALTH | Facility: CLINIC | Age: 43
End: 2023-12-26
Payer: COMMERCIAL

## 2023-12-26 ENCOUNTER — VIRTUAL VISIT (OUTPATIENT)
Dept: PSYCHIATRY | Facility: CLINIC | Age: 43
End: 2023-12-26
Payer: COMMERCIAL

## 2023-12-26 DIAGNOSIS — F31.30 BIPOLAR I DISORDER, MOST RECENT EPISODE DEPRESSED (H): Primary | ICD-10-CM

## 2023-12-26 DIAGNOSIS — F31.9 BIPOLAR 1 DISORDER (H): ICD-10-CM

## 2023-12-26 DIAGNOSIS — E11.9 TYPE 2 DIABETES MELLITUS WITHOUT COMPLICATION, WITHOUT LONG-TERM CURRENT USE OF INSULIN (H): ICD-10-CM

## 2023-12-26 DIAGNOSIS — F41.1 GAD (GENERALIZED ANXIETY DISORDER): Primary | ICD-10-CM

## 2023-12-26 DIAGNOSIS — F31.62 BIPOLAR MIXED AFFECTIVE DISORDER, MODERATE (H): ICD-10-CM

## 2023-12-26 PROCEDURE — 99214 OFFICE O/P EST MOD 30 MIN: CPT | Mod: VID | Performed by: PSYCHIATRY & NEUROLOGY

## 2023-12-26 PROCEDURE — 90832 PSYTX W PT 30 MINUTES: CPT | Mod: 95 | Performed by: COUNSELOR

## 2023-12-26 RX ORDER — CLONAZEPAM 0.5 MG/1
0.5 TABLET ORAL DAILY PRN
Qty: 15 TABLET | Refills: 3 | Status: SHIPPED | OUTPATIENT
Start: 2023-12-26 | End: 2024-05-20

## 2023-12-26 RX ORDER — DEXTROAMPHETAMINE SULFATE 15 MG/1
15 CAPSULE, EXTENDED RELEASE ORAL DAILY
Qty: 30 CAPSULE | Refills: 0 | Status: SHIPPED | OUTPATIENT
Start: 2023-12-26 | End: 2024-02-06

## 2023-12-26 RX ORDER — INSULIN DEGLUDEC 100 U/ML
30 INJECTION, SOLUTION SUBCUTANEOUS DAILY
Qty: 15 ML | Status: CANCELLED | OUTPATIENT
Start: 2023-12-26

## 2023-12-26 RX ORDER — LITHIUM CARBONATE 450 MG
900 TABLET, EXTENDED RELEASE ORAL AT BEDTIME
Qty: 60 TABLET | Refills: 3 | Status: SHIPPED | OUTPATIENT
Start: 2023-12-26 | End: 2024-02-06

## 2023-12-26 RX ORDER — LITHIUM CARBONATE 450 MG
900 TABLET, EXTENDED RELEASE ORAL DAILY
Qty: 60 TABLET | Refills: 3 | Status: SHIPPED | OUTPATIENT
Start: 2023-12-26 | End: 2023-12-26

## 2023-12-26 ASSESSMENT — ANXIETY QUESTIONNAIRES
GAD7 TOTAL SCORE: 15
2. NOT BEING ABLE TO STOP OR CONTROL WORRYING: MORE THAN HALF THE DAYS
7. FEELING AFRAID AS IF SOMETHING AWFUL MIGHT HAPPEN: MORE THAN HALF THE DAYS
GAD7 TOTAL SCORE: 15
IF YOU CHECKED OFF ANY PROBLEMS ON THIS QUESTIONNAIRE, HOW DIFFICULT HAVE THESE PROBLEMS MADE IT FOR YOU TO DO YOUR WORK, TAKE CARE OF THINGS AT HOME, OR GET ALONG WITH OTHER PEOPLE: VERY DIFFICULT
3. WORRYING TOO MUCH ABOUT DIFFERENT THINGS: MORE THAN HALF THE DAYS
5. BEING SO RESTLESS THAT IT IS HARD TO SIT STILL: SEVERAL DAYS
2. NOT BEING ABLE TO STOP OR CONTROL WORRYING: MORE THAN HALF THE DAYS
6. BECOMING EASILY ANNOYED OR IRRITABLE: NEARLY EVERY DAY
GAD7 TOTAL SCORE: 15
6. BECOMING EASILY ANNOYED OR IRRITABLE: NEARLY EVERY DAY
4. TROUBLE RELAXING: MORE THAN HALF THE DAYS
1. FEELING NERVOUS, ANXIOUS, OR ON EDGE: NEARLY EVERY DAY
4. TROUBLE RELAXING: MORE THAN HALF THE DAYS
IF YOU CHECKED OFF ANY PROBLEMS ON THIS QUESTIONNAIRE, HOW DIFFICULT HAVE THESE PROBLEMS MADE IT FOR YOU TO DO YOUR WORK, TAKE CARE OF THINGS AT HOME, OR GET ALONG WITH OTHER PEOPLE: VERY DIFFICULT
7. FEELING AFRAID AS IF SOMETHING AWFUL MIGHT HAPPEN: MORE THAN HALF THE DAYS
GAD7 TOTAL SCORE: 15
5. BEING SO RESTLESS THAT IT IS HARD TO SIT STILL: SEVERAL DAYS
1. FEELING NERVOUS, ANXIOUS, OR ON EDGE: NEARLY EVERY DAY
3. WORRYING TOO MUCH ABOUT DIFFERENT THINGS: MORE THAN HALF THE DAYS

## 2023-12-26 ASSESSMENT — PATIENT HEALTH QUESTIONNAIRE - PHQ9
SUM OF ALL RESPONSES TO PHQ QUESTIONS 1-9: 14
SUM OF ALL RESPONSES TO PHQ QUESTIONS 1-9: 14
10. IF YOU CHECKED OFF ANY PROBLEMS, HOW DIFFICULT HAVE THESE PROBLEMS MADE IT FOR YOU TO DO YOUR WORK, TAKE CARE OF THINGS AT HOME, OR GET ALONG WITH OTHER PEOPLE: VERY DIFFICULT

## 2023-12-26 ASSESSMENT — PAIN SCALES - GENERAL: PAINLEVEL: NO PAIN (0)

## 2023-12-26 NOTE — TELEPHONE ENCOUNTER
We do see an order for tresiba 12/04 but it wasn't sent to a pharmacy. Please resend.  Thank you   Edwina Rivera. Pharmacy Technician   [facility]     Yes

## 2023-12-26 NOTE — NURSING NOTE
Is the patient currently in the state of [place]? YES    Visit mode:VIDEO    If the visit is dropped, the patient can be reconnected by: VIDEO VISIT: Send to e-mail at: [email or web address]    Will anyone else be joining the visit? NO  (If patient encounters technical issues they should call [phone]    How would you like to obtain your AVS? MyChart    Are changes needed to the allergy or medication list? Pt stated no changes to allergies and Pt stated no med changes    Reason for visit: LAMBERTO GOYAL

## 2023-12-26 NOTE — PROGRESS NOTES
Virtual Visit Details    Type of service:  Video Visit     Originating Location (pt. Location): Home    Distant Location (provider location):  Off-site  Platform used for Video Visit: [facility] Psychiatry Consult Note    IDENTIFICATION   Name: Tavo Key   : 1980/43 year old      Sex:    @ male          Telemedicine Visit: The patient's condition can be safely assessed and treated via synchronous audio and visual telemedicine encounter.        Face to Face/patient Contact total time: 23 minutes  Pre Charting time: 4 minutes; Post charting time, communication and other activities: 1 minutes; Total time 28 minutes  11:44 AM -12:07 PM          SUBJECTIVE   Irritability and anxiety higher. A couple of episodes of irritability recently lasting a day each. Has occurred for years. Depression slightly worse, intermittent and not constantly worse. No positive changes.     Dexedrine - working. Usually taken 5 days a week, not every single day. Takes early AM and wears around 4pm. Lasting most of work day -this is workable. Lasting 7-8 hours. No side effects.     Taking lithium nightly. Lab drawn in afternoon. With increase in lit      The following assessments were completed by patient for this visit:  PROMIS 10-Global Health (only subscores and total score):       10/31/2023    10:24 PM 2023     5:56 AM   PROMIS-10 Scores Only   Global Mental Health Score 7 10   Global Physical Health Score 17 13   PROMIS TOTAL - SUBSCORES 24 23             10/31/2023    10:12 PM 2023    12:26 PM 2023    10:38 AM   PHQ   PHQ-9 Total Score 13 16 14   Q9: Thoughts of better off dead/self-harm past 2 weeks Not at all Not at all Not at all          10/31/2023    10:23 PM 2023     5:54 AM 2023    10:37 AM   MATTI-7 SCORE   Total Score 15 (severe anxiety) 9 (mild anxiety) 15 (severe anxiety)   Total Score 15 9 15    15        OBJECTIVE     Vital Signs:   There were no vitals taken for this  visit.    Labs:  na    Current Medications:  Current Outpatient Medications   Medication    albuterol (PROAIR HFA/PROVENTIL HFA/VENTOLIN HFA) 108 (90 Base) MCG/ACT inhaler    alcohol swab prep pads    amLODIPine (NORVASC) 10 MG tablet    aspirin (ASA) 81 MG EC tablet    atorvastatin (LIPITOR) 20 MG tablet    clotrimazole-betamethasone (LOTRISONE) 1-0.05 % external cream    clotrimazole-betamethasone (LOTRISONE) 1-0.05 % external lotion    Continuous Blood Gluc Sensor (FREESTYLE ALDEN 3 SENSOR) MISC    dextroamphetamine (DEXEDRINE SPANSULE) 15 MG 24 hr capsule    esomeprazole (NEXIUM) 40 MG DR capsule    fluticasone (FLONASE) 50 MCG/ACT nasal spray    gabapentin (NEURONTIN) 100 MG capsule    hydrOXYzine (VISTARIL) 50 MG capsule    ibuprofen (ADVIL/MOTRIN) 200 MG tablet    insulin aspart (NOVOLOG FLEXPEN) 100 UNIT/ML pen    insulin degludec (TRESIBA FLEXTOUCH) 100 UNIT/ML pen    insulin pen needle (B-D U/F) 31G X 5 MM miscellaneous    lamoTRIgine (LAMICTAL) 200 MG tablet    lithium ER (ESKALITH CR/LITHOBID) 450 MG CR tablet    losartan (COZAAR) 50 MG tablet    metFORMIN (GLUCOPHAGE XR) 500 MG 24 hr tablet    naratriptan (AMERGE) 2.5 MG tablet    propranolol (INDERAL) 80 MG tablet    sildenafil (REVATIO) 20 MG tablet    testosterone (ANDROGEL 1.62 % PUMP) 20.25 MG/ACT gel    TRULICITY 3 MG/0.5ML SOPN     No current facility-administered medications for this visit.        The [place] Prescription Monitoring Program has been reviewed and there are no concerns about diversionary activity for controlled substances at this time.        ADDED HISTORY   Clonazepam - took 0.5 twice a day. Feels lithium highest dose was 1500 mg - it was working.       MENTAL STATUS EXAMINATION:   Appearance: Intact attention to grooming and hygiene, casual garb  Attitude:  cooperative   Eye Contact:  Good  Gait and Station: sitting  Psychomotor Behavior: Within normal limits  Oriented to: Grossly person place and time  Attention Span and  Concentration: Grossly intact   Speech:  mildly depressed tone  Language: English  Mood:  sad   Affect: Constricted  Associations:  no loose associations  Thought Process:  logical, linear and goal oriented  Thought Content: No evidence of delusions or suicidal or homicidal ideation plan or intent  Memory: Grossly intact  Fund of Knowledge: Good  Insight:  good  Judgment:  intact, adequate for safety  Impulse Control:  intact        DIAGNOSES:   By history Bipolar disorder type I  By history, ADHD inattentive type  By history Social Anxiety Disorder  By history Generalized Anxiety Disorder      ASSESSMENT:   Patient currently undergoing depression with history of depression, rush, ADHD, anxiety and adverse childhood events.  Titrate Vraylar to address depression symptoms, possibly anxiety.  Due to availability to switch Adderall to Dexedrine.     Today Tavo Key reports no suicidal ideations. In addition, he has notable risk factors for self-harm, including anxiety, 1 suicide attempt. However, risk is mitigated by Religion beliefs, history of seeking help when needed, and support. Therefore, based on all available evidence including the factors cited above, he does not appear to be at imminent risk for self-harm, does not meet criteria for a 72-hr hold, and therefore remains appropriate for ongoing outpatient level of care.       PLAN:     Patient advised of consultative model. Patient will continue to be seen for ongoing consultation and stabilization.  Does not meet criteria for involuntary treatment or hospitalization  Start Dexedrine Spansule 11/1/2023 15 mg daily-Risks, benefits and alternatives discussed.  Patient provides verbal consent to treatment.  Continue lamotrigine 200 mg daily-Risks, benefits and alternatives discussed.  Patient provides verbal consent to treatment.  Continue lithium extended release 450 mg daily level low => 12/26/23 900 mg at bedtime -Risks, benefits and alternatives  discussed.  Patient provides verbal consent  Restart clonazepam 12/26/2023 0.5 mg p.o. daily as needed anxiety/anger-Risks, benefits and alternatives discussed.  Patient provides verbal consent to treatment.  Advised of dependence, addiction, car driving and memory loss risks. 15/month initially  Hydroxyzine 50 mg p.o. 3 times daily with additional 50 mg as needed anxiety-provides well consent to treatment  Propranolol for migraine/headache prevention  Dc'd cariprazine (elevated blood sugars)  Labs - reviewed, follow-up lithium level  Therapy upcoming  Return in 4 weeks    Administrative Billing:   Time spent with patient was greater than 50% of time and/or significant time was spent in counseling and coordination of care regarding above diagnoses and treatment plan. Pre charting time and post charting time/documentation/coordination are done on date of service.      Signed:   Barney Colon M.D.  [facility] Psychiatry Service    Disclaimer: This note consists of symbols derived from keyboarding, dictation and/or voice recognition software. As a result, there may be errors in the script that have gone undetected. Please consider this when interpreting information found in this chart.    eoc

## 2023-12-26 NOTE — PROGRESS NOTES
[facility]  December 26, 2023      Behavioral Health Clinician Progress Note    Patient Name: Tavo Key           Service Type:  Individual      Service Location:   MyChart / Email (patient reached)     Session Start Time: 1115am  Session End Time: 1135am      Session Length: 16 - 37      Attendees: Patient     Service Modality:  Video Visit:      Provider verified identity through the following two step process.  Patient provided:  Patient photo and Patient was verified at admission/transfer    Telemedicine Visit: The patient's condition can be safely assessed and treated via synchronous audio and visual telemedicine encounter.      Reason for Telemedicine Visit: Services only offered telehealth    Originating Site (Patient Location): Patient's home    Distant Site (Provider Location): Provider Remote Setting- Home Office    Consent:  The patient/guardian has verbally consented to: the potential risks and benefits of telemedicine (video visit) versus in person care; bill my insurance or make self-payment for services provided; and responsibility for payment of non-covered services.     Patient would like the video invitation sent by:  My Chart    Mode of Communication:  Video Conference via [facility]    Distant Location (Provider):  Off-site    As the provider I attest to compliance with applicable laws and regulations related to telemedicine.    Visit Activities (Refresh list every visit): [facility] Only    Diagnostic Assessment Date: 11/16/2023  Shanelle Caruso   Treatment Plan Review Date: 3/26/24  See Flowsheets for today's PHQ-9 and MATTI-7 results  Previous PHQ-9:       10/31/2023    10:12 PM 11/16/2023    12:26 PM 12/26/2023    10:38 AM   PHQ-9 SCORE   PHQ-9 Total Score [place] 13 (Moderate depression) 16 (Moderately severe depression) 14 (Moderate depression)   PHQ-9 Total Score 13 16 14     Previous MATTI-7:       10/31/2023    10:23 PM 11/14/2023      5:54 AM 12/26/2023    10:37 AM   MATTI-7 SCORE   Total Score 15 (severe anxiety) 9 (mild anxiety) 15 (severe anxiety)   Total Score 15 9 15    15           DATA  Extended Session (60+ minutes): No  Interactive Complexity: No  Crisis: No  [facility] Patient: No    Treatment Objective(s) Addressed in This Session:  Target Behavior(s): disease management/lifestyle changes get blood sugar level reduced and improve worry and depression, stress eating    Anxiety: will experience a reduction in anxiety, will develop more effective coping skills to manage anxiety symptoms, and will increase ability to function adaptively  Attention Problems: will develop coping skills to effectively manage attention issues    Current Stressors / Issues:  Medication Questions/Requests: noticed depression came back a bit    MH update: Pt reports that anxiety has still been strong. They are a couple days away from closing in the house. He had some stress and was irritable and angry that lasted the whole day. He road it out and used music and talking to focus on mental health instead of focusing on a solutions for what was wrong. Pt drove quite a bit last week and anxiety was less and was higher for others things. Pt is working to pace themselves. He is still working to get diabetes under control. Blood sugars are still high. He has a sensor on his phone. Work is difficult to focus. Pt is working to pack. He has a hard time under a time crunch. He is still taking breaks through the day. Depression- in activity, pessimistic   Stressors:moving, work, worrying about health issues  Side Effects: no  Mood: more down  Appetite: unremarkable  Sleep: unremarkable, getting head aches over night, has a hx of head aches, little bit of stretching before bed    Impulsive spending/spending sprees: not at all    Caffeine: pt denies  Therapist: been tough trying to get started with [facility], had gaps in appointments, waiting after the move  Interventions: ice or heat on  neck to help tension head aches      Progress on Treatment Objective(s) / Homework:  Satisfactory progress - ACTION (Actively working towards change); Intervened by reinforcing change plan / affirming steps taken    CBT/MI: Pt did practice mindful eating. He has done stretching before bed and is continuing to work to reduce his blood sugar levels.     [facility] encourages pt to practice PMR and use ice or heat on neck to help tension head aches.       Motivational Interviewing    MI Intervention: Co-Developed Goal: reduce worry and stress eating, improve focus, Expressed Empathy/Understanding, Supported Autonomy, Collaboration, Evocation, Permission to raise concern or advise, Open-ended questions, Reflections: simple and complex, Change talk (evoked), and Reframe     Change Talk Expressed by the Patient: Committment to change Activation Taking steps    Provider Response to Change Talk: E - Evoked more info from patient about behavior change, A - Affirmed patient's thoughts, decisions, or attempts at behavior change, R - Reflected patient's change talk, and S - Summarized patient's change talk statements    Also provided psychoeducation about behavioral health condition, symptoms, and treatment options    Assessments completed prior to visit:  The following assessments were completed by patient for this visit:  PHQ9:       6/21/2023     9:37 PM 10/31/2023    10:12 PM 11/16/2023    12:26 PM 12/26/2023    10:38 AM   PHQ-9 SCORE   PHQ-9 Total Score MyChart 12 (Moderate depression) 13 (Moderate depression) 16 (Moderately severe depression) 14 (Moderate depression)   PHQ-9 Total Score 12 13 16 14     GAD2:       10/31/2023    10:23 PM 11/14/2023     5:54 AM 12/26/2023    10:37 AM   MATTI-2   Feeling nervous, anxious, or on edge 2 2 3   Not being able to stop or control worrying 2 1 2   MATTI-2 Total Score 4 3 5    5       Care Plan review completed: Yes    Medication Review:  No changes to current psychiatric  medication(s)    Medication Compliance:  Yes    Changes in Health Issues:   None reported    Chemical Use Review:   Substance Use: Chemical use reviewed, no active concerns identified      Tobacco Use: No current tobacco use.      Assessment: Current Emotional / Mental Status (status of significant symptoms):  Risk status (Self / Other harm or suicidal ideation)  Patient has had a history of suicide attempts: age 15 and self-injurious behavior: as a teen  Patient denies current fears or concerns for personal safety.  Patient denies current or recent suicidal ideation or behaviors.  Patient denies current or recent homicidal ideation or behaviors.  Patient denies current or recent self injurious behavior or ideation.  Patient denies other safety concerns.  A safety and risk management plan has not been developed at this time, however patient was encouraged to call [place] [phone] should there be a change in any of these risk factors.    Appearance:   Appropriate   Eye Contact:   Good   Psychomotor Behavior: Normal   Attitude:   Cooperative  Interested  Orientation:   All  Speech   Rate / Production: Normal    Volume:  Normal   Mood:    Anxious  Irritable  Sad   Affect:    Appropriate   Thought Content:  Clear   Thought Form:  Coherent  Logical   Insight:    Good     Diagnoses:  1. MATTI (generalized anxiety disorder)    2. Bipolar 1 disorder (H)        Collateral Reports Completed:  Communicated with: Barney Colon M.D.     Plan: (Homework, other):  Patient was given information about behavioral services and encouraged to schedule a follow up appointment with the clinic [facility] in 1 month.  He was also given information about mental health symptoms and treatment options .  CD Recommendations: No indications of CD issues.     Paulette Meneses, [facility]    ______________________________________________________________________    Integrated Primary Care Behavioral Health Treatment Plan    Patient's Name: Tavo Key  Date  Of Birth: 1980    Date of Creation: 12/26/2023  Date Treatment Plan Last Reviewed/Revised: 12/26/2023    DSM5 Diagnoses:   Bipolar 1 disorder (H)    MATTI (generalized anxiety disorder)      Psychosocial / Contextual Factors: works, works a hybrid job, ADHD, health concerns  PROMIS (reviewed every 90 days):   PROMIS 10-Global Health (only subscores and total score):       10/31/2023    10:24 PM 11/14/2023     5:56 AM   PROMIS-10 Scores Only   Global Mental Health Score 7 10   Global Physical Health Score 17 13   PROMIS TOTAL - SUBSCORES 24 23       Referral / Collaboration:  Referral to another professional/service is not indicated at this time.    Anticipated number of session for this episode of care: 4-5  Anticipation frequency of session: Monthly  Anticipated Duration of each session: 16-37 minutes  Treatment plan will be reviewed in 90 days or when goals have been changed.       MeasurableTreatment Goal(s) related to diagnosis / functional impairment(s)  Goal 1: Patient will reduce anxiety and feeling down     I will know I've met my goal when I Icalm down and am less anxious and irritable.       Objective #A (Patient Action)                          Patient will calm down anxiety and irritability-  frequency will be less and won't last all day and will report progress each visit.   Status:  New: 12/26/2023     Intervention(s)  Therapist will provide support through MI, Acceptance and Commitment Therapy and problem solving model to explore and overcome barriers.      Patient has reviewed and agreed to the above plan.      Paulette Meneses, [facility]  December 26, 2023

## 2023-12-27 ENCOUNTER — OFFICE VISIT (OUTPATIENT)
Dept: PHARMACY | Facility: CLINIC | Age: 43
End: 2023-12-27
Payer: COMMERCIAL

## 2023-12-27 VITALS — BODY MASS INDEX: 41.24 KG/M2 | WEIGHT: 271.2 LBS

## 2023-12-27 DIAGNOSIS — F41.1 GAD (GENERALIZED ANXIETY DISORDER): ICD-10-CM

## 2023-12-27 DIAGNOSIS — E66.01 MORBID OBESITY (H): ICD-10-CM

## 2023-12-27 DIAGNOSIS — E11.9 TYPE 2 DIABETES MELLITUS WITHOUT COMPLICATION, WITHOUT LONG-TERM CURRENT USE OF INSULIN (H): Primary | ICD-10-CM

## 2023-12-27 DIAGNOSIS — F31.62 BIPOLAR MIXED AFFECTIVE DISORDER, MODERATE (H): ICD-10-CM

## 2023-12-27 PROCEDURE — 99207 PR NO CHARGE LOS: CPT | Performed by: PHARMACIST

## 2023-12-27 RX ORDER — FLURBIPROFEN SODIUM 0.3 MG/ML
SOLUTION/ DROPS OPHTHALMIC
Qty: 400 EACH | Refills: 1 | Status: SHIPPED | OUTPATIENT
Start: 2023-12-27 | End: 2024-05-22

## 2023-12-27 RX ORDER — INSULIN ASPART 100 [IU]/ML
6 INJECTION, SOLUTION INTRAVENOUS; SUBCUTANEOUS
Qty: 30 ML | Refills: 1 | Status: SHIPPED | OUTPATIENT
Start: 2023-12-27 | End: 2024-01-31

## 2023-12-27 RX ORDER — INSULIN DEGLUDEC 100 U/ML
30 INJECTION, SOLUTION SUBCUTANEOUS DAILY
Qty: 45 ML | Refills: 1 | Status: SHIPPED | OUTPATIENT
Start: 2023-12-27 | End: 2023-12-27

## 2023-12-27 RX ORDER — INSULIN DEGLUDEC 100 U/ML
30 INJECTION, SOLUTION SUBCUTANEOUS DAILY
Qty: 45 ML | Refills: 1 | Status: SHIPPED | OUTPATIENT
Start: 2023-12-27 | End: 2024-01-31

## 2023-12-27 NOTE — PATIENT INSTRUCTIONS
"Recommendations from today's MTM visit:                                                         Change Novolog to 6 units fifteen minutes before each meal, three times daily - do this for a week.   After a week, if your fasting blood sugars are still > 130 mg/dL, then increase your Tresiba dose 2 units every three days, until fasting blood sugar is  mg/dL.  Maximum of 46 units per day.     Follow-up: Return in about 4 weeks (around 1/24/2024).    It was great speaking with you today.  I value your experience and would be very thankful for your time in providing feedback in our clinic survey. In the next few days, you may receive an email or text message from [facility] with a link to a survey related to your  clinical pharmacist.\"     To schedule another MTM appointment, please call the clinic directly or you may call the MTM scheduling line at [phone] or toll-free at [phone].     My Clinical Pharmacist's contact information:                                                      Please feel free to contact me with any questions or concerns you have.      Madison Wheeler, PharmD  Medication Therapy Management Pharmacist  [phone]     "

## 2023-12-27 NOTE — Clinical Note
MARIAH MOTA note, thanks!  Madison Wheeler, PharmD Medication Therapy Management Pharmacist [phone]

## 2023-12-27 NOTE — PROGRESS NOTES
Medication Therapy Management (MTM) Encounter    ASSESSMENT:                            Medication Adherence/Access: No issues identified    Type 2 Diabetes/Obesity: Patient is meeting A1c goal of < 7%, however doesn't accurately reflect increase in blood sugar - he is only in target range of blood sugar  mg/dL 4% of time.  Vrylar is a second generation antipsychotic, which could have contributed to worsened blood sugar. May benefit from increasing mealtime insulin to each meal, and then titrating up on Tresiba.     Bipolar/Depression/Anxiety: Plan in place.      PLAN:                            Change Novolog to 6 units fifteen minutes before each meal, three times daily - do this for a week.   After a week, if your fasting blood sugars are still > 130 mg/dL, then increase your Tresiba dose 2 units every three days, until fasting blood sugar is  mg/dL.  Maximum of 46 units per day.     Follow-up: Return in about 4 weeks (around 1/24/2024).    SUBJECTIVE/OBJECTIVE:                          Tavo Key is a 43 year old male called for a follow-up visit from 12/11/23.       Reason for visit: diabetes follow-up.    Allergies/ADRs: Reviewed in chart  Past Medical History: Reviewed in chart  Tobacco: He reports that he quit smoking about 13 years ago. His smoking use included cigarettes. He has never used smokeless tobacco.  Alcohol: none/very rarely    Medication Adherence/Access:   Patient uses pill box(es).  Patient takes medications 3 time(s) per day.   Per patient, misses medication 0 times per week.   The patient fills medications at [facility]: YES.    Diabetes Type 2 Diabetes/Obesity:   Trulicity 3 mg weekly   Metformin ER 1000 mg twice daily  Tresiba 30 units once daily  Novolog 6 units 15 minutes before supper.     Vrylar was stopped three weeks ago by psychiatry if that was possibly contributing to high blood sugars, which came out of the blue. He's a bit discouraged as the addition of Novolog  hasn't done much at all for his blood sugars.   Trulicity dose reduced from 4.5 mg to 3 mg in the past due to nausea.   Reports no side effects.   Nothing OTC has helped.   SMBG:  Nasrin 3  Lowest he's gotten was 180-190 last night (skipped a meal earlier in the day)    Current diabetes symptoms: increased urination, fatigue, hyper hunger   No symptoms of hypoglycemia in the past, does have alarm on Nasrin 2.  Diet/Exercise: did slip a little on eating, but not much at all.  Planning to increase activity with upcoming move and getting out for a walk before it gets dark.  Eating 3-5 meals/day (which includes stress eating/snacking). Trying to keep major meals to three times daily.   Eye Exam: up to date  Foot Exam: due  Medication history  -Phentermine 18.75 mg daily  -$2000 left on deductible before insurance will cover Victoza. Victoza worked really well in the past but stopped due to cost   -Copay for Januvia is around $510.   -Patient tried Ozempic previously without benefit but thinks he may not have been on this medication long enough to notice weight loss/glycemic control.   -Victoza was effective in the past, but insurance stopped covering.      Eye exam is up to date  Foot exam: due  Urine Albumin:   Lab Results   Component Value Date    ALCR  08/21/2023      Comment:      Unable to calculate, urine albumin and/or urine creatinine is outside detectable limits.  Microalbuminuria is defined as an albumin:creatinine ratio of 17 to 299 for males and 25 to 299 for females. A ratio of albumin:creatinine of 300 or higher is indicative of overt proteinuria.  Due to biologic variability, positive results should be confirmed by a second, first-morning random or 24-hour timed urine specimen. If there is discrepancy, a third specimen is recommended. When 2 out of 3 results are in the microalbuminuria range, this is evidence for incipient nephropathy and warrants increased efforts at glucose control, blood pressure  control, and institution of therapy with an angiotensin-converting-enzyme (ACE) inhibitor (if the patient can tolerate it).        Lab Results   Component Value Date    A1C 6.8 (H) 11/10/2023     Bipolar/Depression/Anxiety:  Lithium 900 mg daily   Lamotrigine 200 mg daily   Hydroxyzine 50 mg three times daily as needed  Clonazepam 0.5 mg daily as needed    Lithium level was just increased, he plans to get a lithium level in 5 weeks.   Stopped Vraylar three weeks ago by psychiatry as this could be impacting his blood sugars.  Stopped ziprazodone ~3 months ago. Is working with psychiatry and psychology.     Today's Vitals: Wt 271 lb 3.2 oz (123 kg)   BMI 41.24 kg/m    ----------------      I spent 15 minutes with this patient today. All changes were made via collaborative practice agreement with Criselda Walsh PA-C. A copy of the visit note was provided to the patient's provider(s).    A summary of these recommendations was given to the patient.    Madison Wheeler, PharmD  Medication Therapy Management Pharmacist  [phone]         Medication Therapy Recommendations  Diabetes mellitus, type 2 (H)    Current Medication: insulin aspart (NOVOLOG FLEXPEN) 100 UNIT/ML pen (Discontinued)   Rationale: Dose too low - Dosage too low - Effectiveness   Recommendation: Increase Dose   Status: Accepted per CPA          Current Medication: insulin degludec (TRESIBA FLEXTOUCH) 100 UNIT/ML pen   Rationale: Dose too low - Dosage too low - Effectiveness   Recommendation: Increase Dose   Status: Accepted per CPA

## 2023-12-28 ENCOUNTER — OFFICE VISIT (OUTPATIENT)
Dept: FAMILY MEDICINE | Facility: CLINIC | Age: 43
End: 2023-12-28
Payer: COMMERCIAL

## 2023-12-28 VITALS
HEIGHT: 68 IN | RESPIRATION RATE: 18 BRPM | DIASTOLIC BLOOD PRESSURE: 85 MMHG | TEMPERATURE: 98 F | SYSTOLIC BLOOD PRESSURE: 128 MMHG | OXYGEN SATURATION: 98 % | HEART RATE: 102 BPM | WEIGHT: 271.5 LBS | BODY MASS INDEX: 41.15 KG/M2

## 2023-12-28 DIAGNOSIS — M54.2 NECK PAIN: Primary | ICD-10-CM

## 2023-12-28 DIAGNOSIS — G47.30 SLEEP APNEA WITH USE OF CONTINUOUS POSITIVE AIRWAY PRESSURE (CPAP): ICD-10-CM

## 2023-12-28 DIAGNOSIS — E66.01 MORBID OBESITY (H): ICD-10-CM

## 2023-12-28 PROCEDURE — 99213 OFFICE O/P EST LOW 20 MIN: CPT | Performed by: PHYSICIAN ASSISTANT

## 2023-12-28 ASSESSMENT — PAIN SCALES - GENERAL: PAINLEVEL: NO PAIN (0)

## 2023-12-28 NOTE — PROGRESS NOTES
"  Assessment & Plan     Neck pain  Patient will try home exercises and lidocaine patches. Fatigue improved so more movement would be helpful for this.     Sleep apnea with use of continuous positive airway pressure (CPAP)  Stable and doing well with CPAP. Expect increased duration of use as neck pain improves.    Morbid obesity (H)  Patient will increase exercise as able and work on diet.              BMI:   Estimated body mass index is 41.28 kg/m  as calculated from the following:    Height as of this encounter: 1.727 m (5' 8\").    Weight as of this encounter: 123.2 kg (271 lb 8 oz).           VEENA Amador [facility] DAVID Vo is a 43 year old, presenting for the following health issues:  Patient Request (CPAP review)        12/28/2023     8:51 AM   Additional Questions   Roomed by Madeline Cintron       History of Present Illness       Reason for visit:  CPAP review that's required by insurance    He eats 0-1 servings of fruits and vegetables daily.He consumes 1 sweetened beverage(s) daily.He exercises with enough effort to increase his heart rate 10 to 19 minutes per day.  He exercises with enough effort to increase his heart rate 4 days per week.   He is taking medications regularly.       Diabetes-  Working with [facility]- A1C shows good control but blood sugars not in range most of the time.   Weight is up over the last 3 months. Dealing with a lot of fatigue.   Psych- stopped the Vylar.     CPAP has been going well, but sleep is horrible. Waking at 2-3 am and then goes downstairs to sleep in the chair. This is due to his neck pain. Working on improving this.   The mask is comfortable. Better than the old one.   Getting a lot of neck pain and headaches and anxiety that triggers the awakening.   Insurance requires 4 hours per night.                 Review of Systems         Objective    /85 (BP Location: Left arm, Patient Position: Chair, Cuff Size: Adult Large)   Pulse " "102   Temp 98  F (36.7  C) (Oral)   Resp 18   Ht 1.727 m (5' 8\")   Wt 123.2 kg (271 lb 8 oz)   SpO2 98%   BMI 41.28 kg/m    Body mass index is 41.28 kg/m .  Physical Exam   GENERAL: healthy, alert and no distress  Diabetic foot exam: normal DP and PT pulses, no trophic changes or ulcerative lesions, normal sensory exam, and normal monofilament exam- healing right great toe lesion at the end of toe, nail no longer ingrowing.                       "

## 2023-12-29 DIAGNOSIS — I10 HYPERTENSION GOAL BP (BLOOD PRESSURE) < 140/90: ICD-10-CM

## 2023-12-29 DIAGNOSIS — R80.9 PROTEINURIA, UNSPECIFIED TYPE: ICD-10-CM

## 2024-01-02 ENCOUNTER — VIRTUAL VISIT (OUTPATIENT)
Dept: PSYCHOLOGY | Facility: CLINIC | Age: 44
End: 2024-01-02
Payer: COMMERCIAL

## 2024-01-02 DIAGNOSIS — F31.9 BIPOLAR 1 DISORDER (H): Primary | ICD-10-CM

## 2024-01-02 PROCEDURE — 90837 PSYTX W PT 60 MINUTES: CPT | Mod: 95 | Performed by: MARRIAGE & FAMILY THERAPIST

## 2024-01-02 RX ORDER — LOSARTAN POTASSIUM 50 MG/1
50 TABLET ORAL DAILY
Qty: 90 TABLET | Refills: 2 | Status: SHIPPED | OUTPATIENT
Start: 2024-01-02

## 2024-01-02 NOTE — PROGRESS NOTES
[facility]                                     Progress Note    Patient Name: Tavo Key  Date: 1/2/2024         Service Type: Individual      Session Start Time: 8:00AM  Session End Time: 8:55AM     Session Length: 55 minutes    Session #: 3    Attendees: Client attended alone    Service Modality:  Video Visit:      Provider verified identity through the following two step process.  Patient provided:  Patient is known previously to provider    Telemedicine Visit: The patient's condition can be safely assessed and treated via synchronous audio and visual telemedicine encounter.      Reason for Telemedicine Visit: Patient has requested telehealth visit    Originating Site (Patient Location): Patient's home    Distant Site (Provider Location): Provider Remote Setting- Home Office    Consent:  The patient/guardian has verbally consented to: the potential risks and benefits of telemedicine (video visit) versus in person care; bill my insurance or make self-payment for services provided; and responsibility for payment of non-covered services.     Patient would like the video invitation sent by:  My Chart    Mode of Communication:  Video Conference via [employer]    Distant Location (Provider):  Off-site    As the provider I attest to compliance with applicable laws and regulations related to telemedicine.    DATA  Extended Session (53+ minutes):   - Longer session due to limited access to mental health appointments and necessity to address patient's distress / complexity  Interactive Complexity: No  Crisis: No        Progress Since Last Session (Related to Symptoms / Goals / Homework):   Symptoms: Worsening increased frustration    Homework: Partially completed      Episode of Care Goals: Minimal progress - ACTION (Actively working towards change); Intervened by reinforcing change plan / affirming steps taken     Current / Ongoing Stressors and Concerns:   Client has been increasingly angry and  irritable lately. He is triggered by things not going his way and being hard on himself. He becomes quickly frustrated and says there is no warning sign. He can escalate fast and end up punching something or yelling. He finds it very difficult to calm himself down. Talked more about strategies including using his medication more proactively, resorting to physical activity such as running, push ups, jumping jacks, etc to get rid of the adrenaline, be exercising more consistently to manage mood, watching his diet including sugars and caffeine and getting out of the house and around people more often.      Treatment Objective(s) Addressed in This Session:   identify coping strategies for more effectively managing Bipolar Disorder  Work on managing his diabetes     Intervention:   CBT: Cognitive restructuring  Solution Focused: prep for move    Assessments completed prior to visit:  The following assessments were completed by patient for this visit:  PROMIS 10-Global Health (all questions and answers displayed):       10/31/2023    10:24 PM 11/14/2023     5:56 AM   PROMIS 10   In general, would you say your health is: Good Fair   In general, would you say your quality of life is: Good Good   In general, how would you rate your physical health? Good Fair   In general, how would you rate your mental health, including your mood and your ability to think? Fair Good   In general, how would you rate your satisfaction with your social activities and relationships? Poor Fair   In general, please rate how well you carry out your usual social activities and roles Fair Good   To what extent are you able to carry out your everyday physical activities such as walking, climbing stairs, carrying groceries, or moving a chair? Completely Completely   In the past 7 days, how often have you been bothered by emotional problems such as feeling anxious, depressed, or irritable? Always Often   In the past 7 days, how would you rate your  fatigue on average? Mild Severe   In the past 7 days, how would you rate your pain on average, where 0 means no pain, and 10 means worst imaginable pain? 0 2   In general, would you say your health is: 3 2   In general, would you say your quality of life is: 3 3   In general, how would you rate your physical health? 3 2   In general, how would you rate your mental health, including your mood and your ability to think? 2 3   In general, how would you rate your satisfaction with your social activities and relationships? 1 2   In general, please rate how well you carry out your usual social activities and roles. (This includes activities at home, at work and in your community, and responsibilities as a parent, child, spouse, employee, friend, etc.) 2 3   To what extent are you able to carry out your everyday physical activities such as walking, climbing stairs, carrying groceries, or moving a chair? 5 5   In the past 7 days, how often have you been bothered by emotional problems such as feeling anxious, depressed, or irritable? 5 4   In the past 7 days, how would you rate your fatigue on average? 2 4   In the past 7 days, how would you rate your pain on average, where 0 means no pain, and 10 means worst imaginable pain? 0 2   Global Mental Health Score 7 10   Global Physical Health Score 17 13   PROMIS TOTAL - SUBSCORES 24 23     PROMIS 10-Global Health (only subscores and total score):       10/31/2023    10:24 PM 11/14/2023     5:56 AM   PROMIS-10 Scores Only   Global Mental Health Score 7 10   Global Physical Health Score 17 13   PROMIS TOTAL - SUBSCORES 24 23         ASSESSMENT: Current Emotional / Mental Status (status of significant symptoms):   Risk status (Self / Other harm or suicidal ideation)   Patient denies current fears or concerns for personal safety.   Patient denies current or recent suicidal ideation or behaviors.   Patient denies current or recent homicidal ideation or behaviors.   Patient denies  current or recent self injurious behavior or ideation.   Patient denies other safety concerns.   Patient reports there has been no change in risk factors since their last session.     Patient reports there has been no change in protective factors since their last session.     Recommended that patient call 911 or go to the local ED should there be a change in any of these risk factors.     Appearance:   Appropriate    Eye Contact:   Good    Psychomotor Behavior: Normal    Attitude:   Cooperative  Friendly   Orientation:   All   Speech    Rate / Production: Normal     Volume:  Normal    Mood:    Anxious  Normal   Affect:    Appropriate    Thought Content:  Clear    Thought Form:  Coherent  Logical    Insight:    Good      Medication Review:   Changes to psychiatric medications, see updated Medication List in EPIC.      Medication Compliance:   Yes     Changes in Health Issues:   None reported     Chemical Use Review:   Substance Use: Chemical use reviewed, no active concerns identified      Tobacco Use: No current tobacco use.      Diagnosis:  1. Bipolar 1 disorder (H)        Collateral Reports Completed:   Not Applicable    PLAN: (Patient Tasks / Therapist Tasks / Other)  Homework: Client to work on using physical activity and other strategies to manage irritability.        Shanelle Caruso, [facility]                                                         _________________________________________________________________________________________________________________________                                            Individual Treatment Plan    Patient's Name: Tavo Key  YOB: 1980    Date of Creation: 1/2/2024  Date Treatment Plan Last Reviewed/Revised: 1/2/2024    DSM5 Diagnoses: 296.52 Bipolar I Disorder Current or Most Recent Episode Depressed, Moderate  Psychosocial / Contextual Factors: Trauma hx, health, work  PROMIS (reviewed every 90 days): 11/14/2023 Score: 23    Referral /  Collaboration:  Referral to another professional/service is not indicated at this time..    Anticipated number of session for this episode of care: 9-12 sessions  Anticipation frequency of session: Biweekly  Anticipated Duration of each session: 53 or more minutes  Treatment plan will be reviewed in 90 days or when goals have been changed.       MeasurableTreatment Goal(s) related to diagnosis / functional impairment(s)  Goal 1: Patient will lower PHQ9 score to 3 or below.    I will know I've met my goal when I'm less irritable.      Objective #A (Patient Action)    Patient will identify stress management strategies for more effectively managing Bipolar Disorder.  Status: New - Date: 1/2/2024      Intervention(s)  Therapist will teach emotional recognition/identification.      Objective #B  Patient will Identify negative self-talk and behaviors: challenge core beliefs, myths, and actions.  Status: New - Date: 1/2/2024      Intervention(s)  Therapist will  help client work on cognitive restructuring .    Objective #C  Patient will  increase exercise .  Status: New - Date: 1/2/2024      Intervention(s)  Therapist will assign homework to increase level of activity especially aerobic .      Patient has reviewed and agreed to the above plan.      Shanelle Caruso, LMFT  January 2, 2024

## 2024-01-10 ENCOUNTER — VIRTUAL VISIT (OUTPATIENT)
Dept: PSYCHOLOGY | Facility: CLINIC | Age: 44
End: 2024-01-10
Payer: COMMERCIAL

## 2024-01-10 DIAGNOSIS — F31.9 BIPOLAR 1 DISORDER (H): Primary | ICD-10-CM

## 2024-01-10 PROCEDURE — 90837 PSYTX W PT 60 MINUTES: CPT | Mod: 95 | Performed by: MARRIAGE & FAMILY THERAPIST

## 2024-01-10 NOTE — PROGRESS NOTES
[facility]                                     Progress Note    Patient Name: Tavo Key  Date: 1/10/2024         Service Type: Individual      Session Start Time: 2:00PM  Session End Time: 2:55PM     Session Length: 55 minutes    Session #: 4    Attendees: Client attended alone    Service Modality:  Video Visit:      Provider verified identity through the following two step process.  Patient provided:  Patient is known previously to provider    Telemedicine Visit: The patient's condition can be safely assessed and treated via synchronous audio and visual telemedicine encounter.      Reason for Telemedicine Visit: Patient has requested telehealth visit    Originating Site (Patient Location): Patient's home    Distant Site (Provider Location): Provider Remote Setting- Home Office    Consent:  The patient/guardian has verbally consented to: the potential risks and benefits of telemedicine (video visit) versus in person care; bill my insurance or make self-payment for services provided; and responsibility for payment of non-covered services.     Patient would like the video invitation sent by:  My Chart    Mode of Communication:  Video Conference via [facility]    Distant Location (Provider):  Off-site    As the provider I attest to compliance with applicable laws and regulations related to telemedicine.    DATA  Extended Session (53+ minutes):   - Longer session due to limited access to mental health appointments and necessity to address patient's distress / complexity  Interactive Complexity: No  Crisis: No        Progress Since Last Session (Related to Symptoms / Goals / Homework):   Symptoms: Improving less irritability    Homework: Achieved / completed to satisfaction      Episode of Care Goals: Satisfactory progress - ACTION (Actively working towards change); Intervened by reinforcing change plan / affirming steps taken     Current / Ongoing Stressors and Concerns:   Client has felt less  "irritable in the last week. He thinks the increased lithium is helping and he also said he went for walks a few times to clear his head. He is still stressed and confused as they start to pack for their move. He is excited about the fresh start in their new house. Encouraged him to balance his stress about the move with the optimism about their \"forever home\". Talked about focusing on his health once they get settled into the new house. Sleep is sometimes an issue despite using CPAP.     Treatment Objective(s) Addressed in This Session:   identify coping strategies for more effectively managing Bipolar Disorder  Work on managing his diabetes     Intervention:   CBT: Cognitive restructuring  Solution Focused: prep for move    Assessments completed prior to visit:  The following assessments were completed by patient for this visit:  PROMIS 10-Global Health (all questions and answers displayed):       10/31/2023    10:24 PM 11/14/2023     5:56 AM   PROMIS 10   In general, would you say your health is: Good Fair   In general, would you say your quality of life is: Good Good   In general, how would you rate your physical health? Good Fair   In general, how would you rate your mental health, including your mood and your ability to think? Fair Good   In general, how would you rate your satisfaction with your social activities and relationships? Poor Fair   In general, please rate how well you carry out your usual social activities and roles Fair Good   To what extent are you able to carry out your everyday physical activities such as walking, climbing stairs, carrying groceries, or moving a chair? Completely Completely   In the past 7 days, how often have you been bothered by emotional problems such as feeling anxious, depressed, or irritable? Always Often   In the past 7 days, how would you rate your fatigue on average? Mild Severe   In the past 7 days, how would you rate your pain on average, where 0 means no pain, and " 10 means worst imaginable pain? 0 2   In general, would you say your health is: 3 2   In general, would you say your quality of life is: 3 3   In general, how would you rate your physical health? 3 2   In general, how would you rate your mental health, including your mood and your ability to think? 2 3   In general, how would you rate your satisfaction with your social activities and relationships? 1 2   In general, please rate how well you carry out your usual social activities and roles. (This includes activities at home, at work and in your community, and responsibilities as a parent, child, spouse, employee, friend, etc.) 2 3   To what extent are you able to carry out your everyday physical activities such as walking, climbing stairs, carrying groceries, or moving a chair? 5 5   In the past 7 days, how often have you been bothered by emotional problems such as feeling anxious, depressed, or irritable? 5 4   In the past 7 days, how would you rate your fatigue on average? 2 4   In the past 7 days, how would you rate your pain on average, where 0 means no pain, and 10 means worst imaginable pain? 0 2   Global Mental Health Score 7 10   Global Physical Health Score 17 13   PROMIS TOTAL - SUBSCORES 24 23     PROMIS 10-Global Health (only subscores and total score):       10/31/2023    10:24 PM 11/14/2023     5:56 AM   PROMIS-10 Scores Only   Global Mental Health Score 7 10   Global Physical Health Score 17 13   PROMIS TOTAL - SUBSCORES 24 23         ASSESSMENT: Current Emotional / Mental Status (status of significant symptoms):   Risk status (Self / Other harm or suicidal ideation)   Patient denies current fears or concerns for personal safety.   Patient denies current or recent suicidal ideation or behaviors.   Patient denies current or recent homicidal ideation or behaviors.   Patient denies current or recent self injurious behavior or ideation.   Patient denies other safety concerns.   Patient reports there has  been no change in risk factors since their last session.     Patient reports there has been no change in protective factors since their last session.     Recommended that patient call 911 or go to the local ED should there be a change in any of these risk factors.     Appearance:   Appropriate    Eye Contact:   Good    Psychomotor Behavior: Normal    Attitude:   Cooperative  Friendly   Orientation:   All   Speech    Rate / Production: Normal     Volume:  Normal    Mood:    Anxious  Normal   Affect:    Appropriate    Thought Content:  Clear    Thought Form:  Coherent  Logical    Insight:    Good      Medication Review:   Changes to psychiatric medications, see updated Medication List in EPIC.      Medication Compliance:   Yes     Changes in Health Issues:   None reported     Chemical Use Review:   Substance Use: Chemical use reviewed, no active concerns identified      Tobacco Use: No current tobacco use.      Diagnosis:  1. Bipolar 1 disorder (H)        Collateral Reports Completed:   Not Applicable    PLAN: (Patient Tasks / Therapist Tasks / Other)  Homework: Client to work on using physical activity for stress management. Continue focusing on move.        Shanelle Caruso [facility]                                                         _________________________________________________________________________________________________________________________                                            Individual Treatment Plan    Patient's Name: Tavo Key  YOB: 1980    Date of Creation: 1/2/2024  Date Treatment Plan Last Reviewed/Revised: 1/2/2024    DSM5 Diagnoses: 296.52 Bipolar I Disorder Current or Most Recent Episode Depressed, Moderate  Psychosocial / Contextual Factors: Trauma hx, health, work  PROMIS (reviewed every 90 days): 11/14/2023 Score: 23    Referral / Collaboration:  Referral to another professional/service is not indicated at this time..    Anticipated number of session for this  episode of care: 9-12 sessions  Anticipation frequency of session: Biweekly  Anticipated Duration of each session: 53 or more minutes  Treatment plan will be reviewed in 90 days or when goals have been changed.       MeasurableTreatment Goal(s) related to diagnosis / functional impairment(s)  Goal 1: Patient will lower PHQ9 score to 3 or below.    I will know I've met my goal when I'm less irritable.      Objective #A (Patient Action)    Patient will identify stress management strategies for more effectively managing Bipolar Disorder.  Status: New - Date: 1/2/2024      Intervention(s)  Therapist will teach emotional recognition/identification.      Objective #B  Patient will Identify negative self-talk and behaviors: challenge core beliefs, myths, and actions.  Status: New - Date: 1/2/2024      Intervention(s)  Therapist will  help client work on cognitive restructuring .    Objective #C  Patient will  increase exercise .  Status: New - Date: 1/2/2024      Intervention(s)  Therapist will assign homework to increase level of activity especially aerobic .      Patient has reviewed and agreed to the above plan.      Shanelle Caruso, LMFT  January 2, 2024

## 2024-01-18 ENCOUNTER — VIRTUAL VISIT (OUTPATIENT)
Dept: PSYCHOLOGY | Facility: CLINIC | Age: 44
End: 2024-01-18
Payer: COMMERCIAL

## 2024-01-18 DIAGNOSIS — F31.9 BIPOLAR 1 DISORDER (H): Primary | ICD-10-CM

## 2024-01-18 PROCEDURE — 90837 PSYTX W PT 60 MINUTES: CPT | Mod: 95 | Performed by: MARRIAGE & FAMILY THERAPIST

## 2024-01-20 DIAGNOSIS — Z79.4 TYPE 2 DIABETES MELLITUS WITHOUT COMPLICATION, WITH LONG-TERM CURRENT USE OF INSULIN (H): Primary | ICD-10-CM

## 2024-01-20 DIAGNOSIS — E11.9 TYPE 2 DIABETES MELLITUS WITHOUT COMPLICATION, WITH LONG-TERM CURRENT USE OF INSULIN (H): Primary | ICD-10-CM

## 2024-01-22 RX ORDER — ATORVASTATIN CALCIUM 20 MG/1
TABLET, FILM COATED ORAL
Qty: 90 TABLET | Refills: 0 | Status: SHIPPED | OUTPATIENT
Start: 2024-01-22 | End: 2024-04-19

## 2024-01-23 NOTE — PROGRESS NOTES
[facility]                                     Progress Note    Patient Name: Tavo Key  Date: 1/18/2024         Service Type: Individual      Session Start Time: 4:00PM  Session End Time: 4:55PM     Session Length: 55 minutes    Session #: 5    Attendees: Client attended alone    Service Modality:  Video Visit:      Provider verified identity through the following two step process.  Patient provided:  Patient is known previously to provider    Telemedicine Visit: The patient's condition can be safely assessed and treated via synchronous audio and visual telemedicine encounter.      Reason for Telemedicine Visit: Patient has requested telehealth visit    Originating Site (Patient Location): Patient's home    Distant Site (Provider Location): Provider Remote Setting- Home Office    Consent:  The patient/guardian has verbally consented to: the potential risks and benefits of telemedicine (video visit) versus in person care; bill my insurance or make self-payment for services provided; and responsibility for payment of non-covered services.     Patient would like the video invitation sent by:  My Chart    Mode of Communication:  Video Conference via [facility]    Distant Location (Provider):  Off-site    As the provider I attest to compliance with applicable laws and regulations related to telemedicine.    DATA  Extended Session (53+ minutes):   - Longer session due to limited access to mental health appointments and necessity to address patient's distress / complexity  Interactive Complexity: No  Crisis: No        Progress Since Last Session (Related to Symptoms / Goals / Homework):   Symptoms: Improving less irritability    Homework: Achieved / completed to satisfaction      Episode of Care Goals: Satisfactory progress - ACTION (Actively working towards change); Intervened by reinforcing change plan / affirming steps taken     Current / Ongoing Stressors and Concerns:   Client has felt less  irritable but reported one episode of rage during the move. He was unable to self soothe and had to yell. Feels hung over from it for a few days. Feeling more at peace in their new home and says even work has felt more manageable. Client has been trying to use more encouraging self talk which he says has been helping.     Treatment Objective(s) Addressed in This Session:   identify coping strategies for more effectively managing Bipolar Disorder  Work on managing his diabetes     Intervention:   CBT: Cognitive restructuring  Solution Focused: prep for move    Assessments completed prior to visit:  The following assessments were completed by patient for this visit:  PROMIS 10-Global Health (all questions and answers displayed):       10/31/2023    10:24 PM 11/14/2023     5:56 AM   PROMIS 10   In general, would you say your health is: Good Fair   In general, would you say your quality of life is: Good Good   In general, how would you rate your physical health? Good Fair   In general, how would you rate your mental health, including your mood and your ability to think? Fair Good   In general, how would you rate your satisfaction with your social activities and relationships? Poor Fair   In general, please rate how well you carry out your usual social activities and roles Fair Good   To what extent are you able to carry out your everyday physical activities such as walking, climbing stairs, carrying groceries, or moving a chair? Completely Completely   In the past 7 days, how often have you been bothered by emotional problems such as feeling anxious, depressed, or irritable? Always Often   In the past 7 days, how would you rate your fatigue on average? Mild Severe   In the past 7 days, how would you rate your pain on average, where 0 means no pain, and 10 means worst imaginable pain? 0 2   In general, would you say your health is: 3 2   In general, would you say your quality of life is: 3 3   In general, how would you  rate your physical health? 3 2   In general, how would you rate your mental health, including your mood and your ability to think? 2 3   In general, how would you rate your satisfaction with your social activities and relationships? 1 2   In general, please rate how well you carry out your usual social activities and roles. (This includes activities at home, at work and in your community, and responsibilities as a parent, child, spouse, employee, friend, etc.) 2 3   To what extent are you able to carry out your everyday physical activities such as walking, climbing stairs, carrying groceries, or moving a chair? 5 5   In the past 7 days, how often have you been bothered by emotional problems such as feeling anxious, depressed, or irritable? 5 4   In the past 7 days, how would you rate your fatigue on average? 2 4   In the past 7 days, how would you rate your pain on average, where 0 means no pain, and 10 means worst imaginable pain? 0 2   Global Mental Health Score 7 10   Global Physical Health Score 17 13   PROMIS TOTAL - SUBSCORES 24 23     PROMIS 10-Global Health (only subscores and total score):       10/31/2023    10:24 PM 11/14/2023     5:56 AM   PROMIS-10 Scores Only   Global Mental Health Score 7 10   Global Physical Health Score 17 13   PROMIS TOTAL - SUBSCORES 24 23         ASSESSMENT: Current Emotional / Mental Status (status of significant symptoms):   Risk status (Self / Other harm or suicidal ideation)   Patient denies current fears or concerns for personal safety.   Patient denies current or recent suicidal ideation or behaviors.   Patient denies current or recent homicidal ideation or behaviors.   Patient denies current or recent self injurious behavior or ideation.   Patient denies other safety concerns.   Patient reports there has been no change in risk factors since their last session.     Patient reports there has been no change in protective factors since their last session.     Recommended that  patient call 911 or go to the local ED should there be a change in any of these risk factors.     Appearance:   Appropriate    Eye Contact:   Good    Psychomotor Behavior: Normal    Attitude:   Cooperative  Friendly   Orientation:   All   Speech    Rate / Production: Normal     Volume:  Normal    Mood:    Anxious  Normal   Affect:    Appropriate    Thought Content:  Clear    Thought Form:  Coherent  Logical    Insight:    Good      Medication Review:   Changes to psychiatric medications, see updated Medication List in EPIC.      Medication Compliance:   Yes     Changes in Health Issues:   None reported     Chemical Use Review:   Substance Use: Chemical use reviewed, no active concerns identified      Tobacco Use: No current tobacco use.      Diagnosis:  1. Bipolar 1 disorder (H)        Collateral Reports Completed:   Not Applicable    PLAN: (Patient Tasks / Therapist Tasks / Other)  Homework: Client to work on using physical activity for stress management. Continue focusing on completing move.        REYNOLD Vee                                                         _________________________________________________________________________________________________________________________                                            Individual Treatment Plan    Patient's Name: Tavo Key  YOB: 1980    Date of Creation: 1/2/2024  Date Treatment Plan Last Reviewed/Revised: 1/2/2024    DSM5 Diagnoses: 296.52 Bipolar I Disorder Current or Most Recent Episode Depressed, Moderate  Psychosocial / Contextual Factors: Trauma hx, health, work  PROMIS (reviewed every 90 days): 11/14/2023 Score: 23    Referral / Collaboration:  Referral to another professional/service is not indicated at this time..    Anticipated number of session for this episode of care: 9-12 sessions  Anticipation frequency of session: Biweekly  Anticipated Duration of each session: 53 or more minutes  Treatment plan will be  reviewed in 90 days or when goals have been changed.       MeasurableTreatment Goal(s) related to diagnosis / functional impairment(s)  Goal 1: Patient will lower PHQ9 score to 3 or below.    I will know I've met my goal when I'm less irritable.      Objective #A (Patient Action)    Patient will identify stress management strategies for more effectively managing Bipolar Disorder.  Status: New - Date: 1/2/2024      Intervention(s)  Therapist will teach emotional recognition/identification.      Objective #B  Patient will Identify negative self-talk and behaviors: challenge core beliefs, myths, and actions.  Status: New - Date: 1/2/2024      Intervention(s)  Therapist will  help client work on cognitive restructuring .    Objective #C  Patient will  increase exercise .  Status: New - Date: 1/2/2024      Intervention(s)  Therapist will assign homework to increase level of activity especially aerobic .      Patient has reviewed and agreed to the above plan.      Shanelle Caruso, LMFT  January 2, 2024

## 2024-01-31 ENCOUNTER — OFFICE VISIT (OUTPATIENT)
Dept: PHARMACY | Facility: CLINIC | Age: 44
End: 2024-01-31
Payer: COMMERCIAL

## 2024-01-31 VITALS — DIASTOLIC BLOOD PRESSURE: 80 MMHG | SYSTOLIC BLOOD PRESSURE: 108 MMHG

## 2024-01-31 DIAGNOSIS — J30.2 SEASONAL ALLERGIC RHINITIS: ICD-10-CM

## 2024-01-31 DIAGNOSIS — E78.5 HYPERLIPIDEMIA LDL GOAL <70: ICD-10-CM

## 2024-01-31 DIAGNOSIS — E11.9 TYPE 2 DIABETES MELLITUS WITHOUT COMPLICATION, WITHOUT LONG-TERM CURRENT USE OF INSULIN (H): Primary | ICD-10-CM

## 2024-01-31 DIAGNOSIS — J45.30 MILD PERSISTENT ASTHMA WITHOUT COMPLICATION: ICD-10-CM

## 2024-01-31 DIAGNOSIS — E66.01 MORBID OBESITY (H): ICD-10-CM

## 2024-01-31 DIAGNOSIS — F31.62 BIPOLAR MIXED AFFECTIVE DISORDER, MODERATE (H): ICD-10-CM

## 2024-01-31 DIAGNOSIS — G43.809 OTHER MIGRAINE WITHOUT STATUS MIGRAINOSUS, NOT INTRACTABLE: ICD-10-CM

## 2024-01-31 DIAGNOSIS — F90.9 ATTENTION DEFICIT HYPERACTIVITY DISORDER (ADHD), UNSPECIFIED ADHD TYPE: ICD-10-CM

## 2024-01-31 DIAGNOSIS — N52.9 ERECTILE DYSFUNCTION, UNSPECIFIED ERECTILE DYSFUNCTION TYPE: ICD-10-CM

## 2024-01-31 DIAGNOSIS — K21.00 GASTROESOPHAGEAL REFLUX DISEASE WITH ESOPHAGITIS, UNSPECIFIED WHETHER HEMORRHAGE: ICD-10-CM

## 2024-01-31 DIAGNOSIS — F41.1 GAD (GENERALIZED ANXIETY DISORDER): ICD-10-CM

## 2024-01-31 DIAGNOSIS — I10 BENIGN ESSENTIAL HYPERTENSION: ICD-10-CM

## 2024-01-31 PROCEDURE — 99207 PR NO CHARGE LOS: CPT | Performed by: PHARMACIST

## 2024-01-31 RX ORDER — PROPRANOLOL HYDROCHLORIDE 160 MG/1
160 CAPSULE, EXTENDED RELEASE ORAL DAILY
Qty: 90 CAPSULE | Refills: 1 | Status: SHIPPED | OUTPATIENT
Start: 2024-01-31 | End: 2024-01-31

## 2024-01-31 RX ORDER — ACETAMINOPHEN 500 MG
1000 TABLET ORAL 3 TIMES DAILY PRN
COMMUNITY

## 2024-01-31 RX ORDER — INSULIN DEGLUDEC 100 U/ML
34 INJECTION, SOLUTION SUBCUTANEOUS DAILY
Qty: 45 ML | Refills: 1 | Status: SHIPPED | OUTPATIENT
Start: 2024-01-31 | End: 2024-07-22

## 2024-01-31 RX ORDER — PROPRANOLOL HYDROCHLORIDE 60 MG/1
60 TABLET ORAL 3 TIMES DAILY
Qty: 270 TABLET | Refills: 1 | Status: SHIPPED | OUTPATIENT
Start: 2024-01-31 | End: 2024-01-31

## 2024-01-31 RX ORDER — INSULIN ASPART 100 [IU]/ML
INJECTION, SOLUTION INTRAVENOUS; SUBCUTANEOUS
Qty: 30 ML | Refills: 1 | Status: SHIPPED | OUTPATIENT
Start: 2024-01-31 | End: 2024-04-04

## 2024-01-31 RX ORDER — PROPRANOLOL HYDROCHLORIDE 80 MG/1
80 TABLET ORAL 2 TIMES DAILY
Qty: 180 TABLET | Refills: 1 | Status: SHIPPED | OUTPATIENT
Start: 2024-01-31 | End: 2024-08-06

## 2024-01-31 NOTE — Clinical Note
MARIAH MOTA note, thanks!  Madison Wheeler, PharmD Medication Therapy Management Pharmacist [phone]

## 2024-01-31 NOTE — PROGRESS NOTES
Medication Therapy Management (MTM) Encounter    ASSESSMENT:                            Medication Adherence/Access: No issues identified    Diabetes Type 2 Diabetes/Obesity: Type 2 Diabetes/Obesity: Patient is meeting A1c goal of < 7%, however doesn't accurately reflect increase in blood sugar in the last two months.  Time in target has increased but not yet at goal of > 70% in target range of  mg/dL.  May benefit from increasing mealtime insulin at lunch and supper.    Hypertension: Stable. Blood pressure at goal < 140/90.    Hyperlipidemia: Stable.     Asthma/Post-Covid: Ok to use albuterol as needed, follow-up with PCP if cough does not improve.    Bipolar/Depression/Anxiety/ADHD: Advised he avoid ibuprofen and if absolutely needing it due to migraine to limit dose to 200-400 mg due to interaction with lithium.    Migraine prevention:  May benefit from changing to regular release propranolol three times daily.     GERD: Current treatment is effective. Chronic PPI use places patient at an increased risk of C. Diff, hypomagnesemia and lower bone mineral density, these risks were reviewed with the patient.     ED/low testosterone: Stable.     Allergic Rhinitis: Stable.     PLAN:                            Increase Novolog to 6 units before breakfast, 8 units before lunch and 8 units before supper.   Continue Tresiba 34 units once daily.  Change propanolol to 60 mg three times daily       Addendum:  propanolol to 60 mg three times daily is also too expensive ($160) - will change back to propranolol 80 mg twice daily ($78.42 for 90 ds) per patient preference    Follow-up: Return in about 4 weeks (around 2/28/2024) for Medication Therapy Management.    SUBJECTIVE/OBJECTIVE:                          Tavo Key is a 43 year old male coming in for a follow-up visit from 12/27/23.       Reason for visit: diabetes follow-up, med review.    Allergies/ADRs: Reviewed in chart  Past Medical History: Reviewed in  chart  Tobacco: He reports that he quit smoking about 14 years ago. His smoking use included cigarettes. He has never been exposed to tobacco smoke. He has never used smokeless tobacco.  Alcohol: none/very rarely    Medication Adherence/Access:   Patient uses pill box(es).  Patient takes medications 3 time(s) per day.   Per patient, misses medication 0 times per week.   The patient fills medications at [facility]: YES.    Diabetes Type 2 Diabetes/Obesity:   Trulicity 3 mg weekly   Metformin ER 1000 mg twice daily  Tresiba 34 units once daily (did 32 units for a while when he was sick, back up to 34 units the last couple days)  Novolog 6 units three times daily before meals  Aspirin 81 mg daily primary prevention    Trulicity dose reduced from 4.5 mg to 3 mg in the past due to nausea, has a little nausea prior to today but it's cleared up.   Reports no side effects.   SMBG:  Nasrin 3      Current diabetes symptoms: none  No symptoms of hypoglycemia in the past, does have alarm on Nasrin 2.  Diet/Exercise:   Breakfast: skillet breakfast bowl and 2 waffles  Lunch: often a sandwich   Supper: varies, often largest meal of the day  He reports he's trying not to snack too much.  Eye Exam: up to date  Foot Exam: due  Medication history  -Phentermine 18.75 mg daily  -$2000 left on deductible before insurance will cover Victoza. Victoza worked really well in the past but stopped due to cost   -Copay for Januvia is around $510.   -Patient tried Ozempic previously without benefit but thinks he may not have been on this medication long enough to notice weight loss/glycemic control.   -Victoza was effective in the past, but insurance stopped covering.      Eye exam is up to date  Foot exam is up to date  Urine Albumin:   Lab Results   Component Value Date    UMALCR  08/21/2023      Comment:      Unable to calculate, urine albumin and/or urine creatinine is outside detectable limits.  Microalbuminuria is defined as an  albumin:creatinine ratio of 17 to 299 for males and 25 to 299 for females. A ratio of albumin:creatinine of 300 or higher is indicative of overt proteinuria.  Due to biologic variability, positive results should be confirmed by a second, first-morning random or 24-hour timed urine specimen. If there is discrepancy, a third specimen is recommended. When 2 out of 3 results are in the microalbuminuria range, this is evidence for incipient nephropathy and warrants increased efforts at glucose control, blood pressure control, and institution of therapy with an angiotensin-converting-enzyme (ACE) inhibitor (if the patient can tolerate it).        Lab Results   Component Value Date    A1C 6.8 (H) 11/10/2023     Hypertension   Losartan 50 mg daily   Amlodipine 10 mg daily   (Propranolol for migraines)    Patient reports no current medication side effects  He has a blood pressure cuff and monitors on occasion.     BP Readings from Last 3 Encounters:   01/31/24 108/80   12/28/23 128/85   12/11/23 112/82     Pulse Readings from Last 3 Encounters:   12/28/23 102   10/11/23 81   06/22/23 86     Hyperlipidemia   atorvastatin 20 mg daily    Patient reports no significant myalgias or other side effects.     Recent Labs   Lab Test 05/17/23  1030 01/25/23  1429   CHOL 134 151   HDL 38* 43   LDL 54 50   TRIG 212* 289*     Asthma/Post-Covid:    Had covid two weeks ago, he has a continued non-productive, dry cough. Has Advair at home, albuterol  left at home, hasn't been on Advair in a long time. Is using albuterol as needed which is helpful.     Bipolar/Depression/Anxiety/ADHD:  Lithium 900 mg daily   Lamotrigine 200 mg daily   Hydroxyzine 50 mg three times daily, plus additional 50 mg if needed  Clonazepam 0.5 mg daily as needed (~0-3x/week)  Dextroamphetamine ER  15 mg daily x 6 months  Gabapentin 100 mg three times daily for anxiety    Lithium level was low in December, he has been taking it - planning to recheck in the next couple  weeks (now that recovered from covid). Mood he reports has been surprisingly good. Reports dextroamphetamine was changed from adderall, states this/these are effective. Denies side effects.   Stopped Vraylar two months ago by psychiatry as this could be impacting his blood sugars.  Stopped ziprazodone >3 months ago. Is working with psychiatry and psychology.     Migraine prevention:    Propranolol 80 mg twice daily (ER formulation is too expensive - $170)  Naratriptan 2.5 mg as needed  Acetaminophen 1000 mg daily as needed (max)  Ibuprofen 800 mg twice daily as needed (max)    Headaches have been worse the last several week, increasing in frequency, tend to be worse at night, which is typical for him.  He has a history of rebound headaches due to acetaminophen and ibuprofen use - he wants to avoid this, needs to use these during the day because Naratriptan causes drowsiness. Trying to limit ibuprofen due to lithium as well.   BP Readings from Last 3 Encounters:   01/31/24 108/80   12/28/23 128/85   12/11/23 112/82     GERD:   Esomeprazole 40 mg once daily     Patient feels that current regimen is effective.  The patient does not have a history of GI bleed.  Patient has tried a trial off of therapy and is not interested in doing so.    ED/low testosterone:    Androgel 1 pump daily  Sildenafil  mg as needed    States this/these are effective. Denies side effects.     Allergic Rhinitis:   Flonase (fluticasone) nasal spray - 1 spray(s) each nostril once daily as needed    Patient reports no current medication side effects.    Primary symptoms are nasal congestion.   Patient feels that current therapy is effective.     Today's Vitals: /80   ----------------      I spent 40 minutes with this patient today. All changes were made via collaborative practice agreement with Criselda Walsh PA-C. A copy of the visit note was provided to the patient's provider(s).    A summary of these recommendations was given to the  patient.    Madison Wheeler, PharmD  Medication Therapy Management Pharmacist  [phone]       Medication Therapy Recommendations  Diabetes mellitus, type 2 (H)    Current Medication: insulin aspart (NOVOLOG FLEXPEN) 100 UNIT/ML pen (Discontinued)   Rationale: Dose too low - Dosage too low - Effectiveness   Recommendation: Increase Dose   Status: Accepted per CPA         Other migraine without status migrainosus, not intractable    Current Medication: propranolol (INDERAL) 80 MG tablet   Rationale: Dose too low - Dosage too low - Effectiveness   Recommendation: Increase Dose   Status: Accepted per CPA

## 2024-01-31 NOTE — PATIENT INSTRUCTIONS
"Recommendations from today's MTM visit:                                                           Increase Novolog to 6 units before breakfast, 8 units before lunch and 8 units before supper.   Continue Tresiba 34 units once daily.  Change propanolol to 60 mg three times daily     Follow-up: Return in about 4 weeks (around 2/28/2024) for Medication Therapy Management.    It was great speaking with you today.  I value your experience and would be very thankful for your time in providing feedback in our clinic survey. In the next few days, you may receive an email or text message from [employer] with a link to a survey related to your  clinical pharmacist.\"     To schedule another MTM appointment, please call the clinic directly or you may call the MTM scheduling line at [phone] or toll-free at [phone].     My Clinical Pharmacist's contact information:                                                      Please feel free to contact me with any questions or concerns you have.      Madison Wheeler, PharmD  Medication Therapy Management Pharmacist  [phone]     "

## 2024-02-05 ENCOUNTER — LAB (OUTPATIENT)
Dept: LAB | Facility: CLINIC | Age: 44
End: 2024-02-05
Payer: COMMERCIAL

## 2024-02-05 DIAGNOSIS — F31.30 BIPOLAR I DISORDER, MOST RECENT EPISODE DEPRESSED (H): ICD-10-CM

## 2024-02-05 LAB — LITHIUM SERPL-SCNC: 0.54 MMOL/L (ref 0.6–1.2)

## 2024-02-05 PROCEDURE — 36415 COLL VENOUS BLD VENIPUNCTURE: CPT

## 2024-02-05 PROCEDURE — 80178 ASSAY OF LITHIUM: CPT

## 2024-02-06 ENCOUNTER — VIRTUAL VISIT (OUTPATIENT)
Dept: BEHAVIORAL HEALTH | Facility: CLINIC | Age: 44
End: 2024-02-06
Payer: COMMERCIAL

## 2024-02-06 ENCOUNTER — VIRTUAL VISIT (OUTPATIENT)
Dept: PSYCHIATRY | Facility: CLINIC | Age: 44
End: 2024-02-06
Payer: COMMERCIAL

## 2024-02-06 DIAGNOSIS — F31.62 BIPOLAR MIXED AFFECTIVE DISORDER, MODERATE (H): ICD-10-CM

## 2024-02-06 DIAGNOSIS — F31.9 BIPOLAR 1 DISORDER (H): Primary | ICD-10-CM

## 2024-02-06 DIAGNOSIS — F41.1 GAD (GENERALIZED ANXIETY DISORDER): Primary | ICD-10-CM

## 2024-02-06 DIAGNOSIS — F31.30 BIPOLAR I DISORDER, MOST RECENT EPISODE DEPRESSED (H): ICD-10-CM

## 2024-02-06 DIAGNOSIS — F90.0 ADHD (ATTENTION DEFICIT HYPERACTIVITY DISORDER), INATTENTIVE TYPE: ICD-10-CM

## 2024-02-06 DIAGNOSIS — F31.9 BIPOLAR 1 DISORDER (H): ICD-10-CM

## 2024-02-06 PROCEDURE — 90832 PSYTX W PT 30 MINUTES: CPT | Mod: 95 | Performed by: COUNSELOR

## 2024-02-06 PROCEDURE — 99214 OFFICE O/P EST MOD 30 MIN: CPT | Mod: 95 | Performed by: PSYCHIATRY & NEUROLOGY

## 2024-02-06 RX ORDER — DEXTROAMPHETAMINE SULFATE 15 MG/1
15 CAPSULE, EXTENDED RELEASE ORAL DAILY
Qty: 30 CAPSULE | Refills: 0 | Status: SHIPPED | OUTPATIENT
Start: 2024-03-07 | End: 2024-05-22

## 2024-02-06 RX ORDER — LITHIUM CARBONATE 450 MG
900 TABLET, EXTENDED RELEASE ORAL AT BEDTIME
Qty: 60 TABLET | Refills: 3 | Status: SHIPPED | OUTPATIENT
Start: 2024-02-06 | End: 2024-05-22

## 2024-02-06 RX ORDER — DEXTROAMPHETAMINE SULFATE 15 MG/1
15 CAPSULE, EXTENDED RELEASE ORAL DAILY
Qty: 30 CAPSULE | Refills: 0 | Status: SHIPPED | OUTPATIENT
Start: 2024-02-06 | End: 2024-05-22

## 2024-02-06 RX ORDER — DEXTROAMPHETAMINE SULFATE 5 MG/1
5 CAPSULE, EXTENDED RELEASE ORAL DAILY
Qty: 30 CAPSULE | Refills: 0 | Status: SHIPPED | OUTPATIENT
Start: 2024-03-07 | End: 2024-05-22

## 2024-02-06 RX ORDER — LITHIUM CARBONATE 150 MG/1
150 CAPSULE ORAL AT BEDTIME
Qty: 30 CAPSULE | Refills: 3 | Status: SHIPPED | OUTPATIENT
Start: 2024-02-06 | End: 2024-05-22

## 2024-02-06 RX ORDER — DEXTROAMPHETAMINE SULFATE 5 MG/1
5 CAPSULE, EXTENDED RELEASE ORAL DAILY
Qty: 30 CAPSULE | Refills: 0 | Status: SHIPPED | OUTPATIENT
Start: 2024-02-06 | End: 2024-05-22

## 2024-02-06 ASSESSMENT — ANXIETY QUESTIONNAIRES
5. BEING SO RESTLESS THAT IT IS HARD TO SIT STILL: SEVERAL DAYS
2. NOT BEING ABLE TO STOP OR CONTROL WORRYING: MORE THAN HALF THE DAYS
GAD7 TOTAL SCORE: 10
GAD7 TOTAL SCORE: 10
7. FEELING AFRAID AS IF SOMETHING AWFUL MIGHT HAPPEN: NOT AT ALL
4. TROUBLE RELAXING: MORE THAN HALF THE DAYS
1. FEELING NERVOUS, ANXIOUS, OR ON EDGE: MORE THAN HALF THE DAYS
GAD7 TOTAL SCORE: 10
4. TROUBLE RELAXING: MORE THAN HALF THE DAYS
GAD7 TOTAL SCORE: 10
1. FEELING NERVOUS, ANXIOUS, OR ON EDGE: MORE THAN HALF THE DAYS
6. BECOMING EASILY ANNOYED OR IRRITABLE: SEVERAL DAYS
3. WORRYING TOO MUCH ABOUT DIFFERENT THINGS: MORE THAN HALF THE DAYS
5. BEING SO RESTLESS THAT IT IS HARD TO SIT STILL: SEVERAL DAYS
6. BECOMING EASILY ANNOYED OR IRRITABLE: SEVERAL DAYS
GAD7 TOTAL SCORE: 10
IF YOU CHECKED OFF ANY PROBLEMS ON THIS QUESTIONNAIRE, HOW DIFFICULT HAVE THESE PROBLEMS MADE IT FOR YOU TO DO YOUR WORK, TAKE CARE OF THINGS AT HOME, OR GET ALONG WITH OTHER PEOPLE: SOMEWHAT DIFFICULT
GAD7 TOTAL SCORE: 10
IF YOU CHECKED OFF ANY PROBLEMS ON THIS QUESTIONNAIRE, HOW DIFFICULT HAVE THESE PROBLEMS MADE IT FOR YOU TO DO YOUR WORK, TAKE CARE OF THINGS AT HOME, OR GET ALONG WITH OTHER PEOPLE: SOMEWHAT DIFFICULT
8. IF YOU CHECKED OFF ANY PROBLEMS, HOW DIFFICULT HAVE THESE MADE IT FOR YOU TO DO YOUR WORK, TAKE CARE OF THINGS AT HOME, OR GET ALONG WITH OTHER PEOPLE?: SOMEWHAT DIFFICULT
7. FEELING AFRAID AS IF SOMETHING AWFUL MIGHT HAPPEN: NOT AT ALL
7. FEELING AFRAID AS IF SOMETHING AWFUL MIGHT HAPPEN: NOT AT ALL
2. NOT BEING ABLE TO STOP OR CONTROL WORRYING: MORE THAN HALF THE DAYS
8. IF YOU CHECKED OFF ANY PROBLEMS, HOW DIFFICULT HAVE THESE MADE IT FOR YOU TO DO YOUR WORK, TAKE CARE OF THINGS AT HOME, OR GET ALONG WITH OTHER PEOPLE?: SOMEWHAT DIFFICULT
7. FEELING AFRAID AS IF SOMETHING AWFUL MIGHT HAPPEN: NOT AT ALL
3. WORRYING TOO MUCH ABOUT DIFFERENT THINGS: MORE THAN HALF THE DAYS

## 2024-02-06 ASSESSMENT — PATIENT HEALTH QUESTIONNAIRE - PHQ9
SUM OF ALL RESPONSES TO PHQ QUESTIONS 1-9: 11
10. IF YOU CHECKED OFF ANY PROBLEMS, HOW DIFFICULT HAVE THESE PROBLEMS MADE IT FOR YOU TO DO YOUR WORK, TAKE CARE OF THINGS AT HOME, OR GET ALONG WITH OTHER PEOPLE: SOMEWHAT DIFFICULT
SUM OF ALL RESPONSES TO PHQ QUESTIONS 1-9: 11

## 2024-02-06 NOTE — PATIENT INSTRUCTIONS
"Patient Education   Collaborative Care Psychiatry Service  What to Expect  Here's what to expect from your Collaborative Care Psychiatry Service (CCPS).   About CCPS  CCPS means 2 people work together to help you get better. You'll meet with a behavioral health clinician and a psychiatric doctor. A behavioral health clinician helps people with mental health problems by talking with them. A psychiatric doctor helps people by giving them medicine.  How it works  At every visit, you'll see the behavioral health clinician (BHC) first. They'll talk with you about how you're doing and teach you how to feel better.   Then you'll see the psychiatric doctor. This doctor can help you deal with troubling thoughts and feelings by giving you medicine. They'll make sure you know the plan for your care.   CCPS usually takes 3 to 6 visits. If you need more visits, we may have you start seeing a different psychiatric doctor for ongoing care.  If you have any questions or concerns, we'll be glad to talk with you.  About visits  Be open  At your visits, please talk openly about your problems. It may feel hard, but it's the best way for us to help you.  Cancelling visits  If you can't come to your visit, please call us right away at [phone]. If you don't cancel at least 24 hours (1 full day) before your visit, that's \"late cancellation.\"  Being late to visits  Being very late is the same as not showing up. You will be a \"no show\" if:  Your appointment starts with a BHC, and you're more than 15 minutes late for a 30-minute (half hour) visit. This will also cancel your appointment with the psychiatric doctor.  Your appointment is with a psychiatric doctor only, and you're more than 15 minutes late for a 30-minute (half hour) visit.  Your appointment is with a psychiatric doctor only, and you're more than 30 minutes late for a 60-minute (full hour) visit.  If you cancel late or don't show up 2 times within 6 months, we may end your " care.   Getting help between visits  If you need help between visits, you can call us Monday to Friday from 8 a.m. to 4:30 p.m. at [phone].  Emergency care  Call 911 or go to the nearest emergency department if your life or someone else's life is in danger.  Call 988 anytime to reach the national Suicide and Crisis hotline.  Medicine refills  To refill your medicine, call your pharmacy. You can also call [facility]'s [facility]al [facility] at [phone], Monday to Friday, 8 a.m. to 4:30 p.m. It can take 1 to 3 business days to get a refill.   Forms, letters, and tests  You may have papers to fill out, like FMLA, short-term disability, and workability. We can help you with these forms at your visits, but you must have an appointment. You may need more than 1 visit for this, to be in an intensive therapy program, or both.  Before we can give you medicine for ADHD, we may refer you to get tested for it or confirm it another way.  We may not be able to give you an emotional support animal letter.  We don't do mental health checks ordered by the court.   We don't do mental health testing, but we can refer you to get tested.   Thank you for choosing us for your care.  For informational purposes only. Not to replace the advice of your health care provider. Copyright   2022 [facility]. All rights reserved. [employer] [id number] - 12/22.

## 2024-02-06 NOTE — PROGRESS NOTES
Virtual Visit Details    Type of service:  Video Visit     Originating Location (pt. Location): Home    Distant Location (provider location):  Off-site  Platform used for Video Visit: [facility] Psychiatry Consult Note    IDENTIFICATION   Name: Tavo Key   : 1980/43 year old      Sex:    @ male          Telemedicine Visit: The patient's condition can be safely assessed and treated via synchronous audio and visual telemedicine encounter.        Face to Face/patient Contact total time: 24 minutes  Pre Charting time: 2 minutes; Post charting time, communication and other activities: 5 minutes; Total time 31 minutes  11:35 AM -11:59 AM          SUBJECTIVE   Adjusting to change. Working on new habits, stepping outside/taking a break. Mood has been good and stable. Slight irritability - manageable and brief. Slight depressive sx. Anxiety sx remission. Clonazepam every 2-3 days depending on what's going on. Using in anticipation of situations that may trigger anxiety or irritability. Lithium increase without side effects and may have helped with mood.     Dexedrine working good. However feeling foggy the last two days but may be related to sleep, recent COVID-19 infection. Most days going well. Dexedrine is lasting most of the day - even into the evening.         The following assessments were completed by patient for this visit:  PROMIS 10-Global Health (all questions and answers displayed):       10/31/2023    10:24 PM 2023     5:56 AM   PROMIS 10   In general, would you say your health is: Good Fair   In general, would you say your quality of life is: Good Good   In general, how would you rate your physical health? Good Fair   In general, how would you rate your mental health, including your mood and your ability to think? Fair Good   In general, how would you rate your satisfaction with your social activities and relationships? Poor Fair   In general, please rate how well you carry  out your usual social activities and roles Fair Good   To what extent are you able to carry out your everyday physical activities such as walking, climbing stairs, carrying groceries, or moving a chair? Completely Completely   In the past 7 days, how often have you been bothered by emotional problems such as feeling anxious, depressed, or irritable? Always Often   In the past 7 days, how would you rate your fatigue on average? Mild Severe   In the past 7 days, how would you rate your pain on average, where 0 means no pain, and 10 means worst imaginable pain? 0 2   In general, would you say your health is: 3 2   In general, would you say your quality of life is: 3 3   In general, how would you rate your physical health? 3 2   In general, how would you rate your mental health, including your mood and your ability to think? 2 3   In general, how would you rate your satisfaction with your social activities and relationships? 1 2   In general, please rate how well you carry out your usual social activities and roles. (This includes activities at home, at work and in your community, and responsibilities as a parent, child, spouse, employee, friend, etc.) 2 3   To what extent are you able to carry out your everyday physical activities such as walking, climbing stairs, carrying groceries, or moving a chair? 5 5   In the past 7 days, how often have you been bothered by emotional problems such as feeling anxious, depressed, or irritable? 5 4   In the past 7 days, how would you rate your fatigue on average? 2 4   In the past 7 days, how would you rate your pain on average, where 0 means no pain, and 10 means worst imaginable pain? 0 2   Global Mental Health Score 7 10   Global Physical Health Score 17 13   PROMIS TOTAL - SUBSCORES 24 23             11/16/2023    12:26 PM 12/26/2023    10:38 AM 2/6/2024    10:47 AM   PHQ   PHQ-9 Total Score 16 14 11   Q9: Thoughts of better off dead/self-harm past 2 weeks Not at all Not at all  Not at all          11/14/2023     5:54 AM 12/26/2023    10:37 AM 2/6/2024    10:45 AM   MATTI-7 SCORE   Total Score 9 (mild anxiety) 15 (severe anxiety) 10 (moderate anxiety)   Total Score 9 15    15 10    10        OBJECTIVE     Vital Signs:   There were no vitals taken for this visit.    Labs:  Lithium level 0.54 2/5/2024    Current Medications:  Current Outpatient Medications   Medication    acetaminophen (TYLENOL) 500 MG tablet    albuterol (PROAIR HFA/PROVENTIL HFA/VENTOLIN HFA) 108 (90 Base) MCG/ACT inhaler    alcohol swab prep pads    amLODIPine (NORVASC) 10 MG tablet    aspirin (ASA) 81 MG EC tablet    atorvastatin (LIPITOR) 20 MG tablet    clonazePAM (KLONOPIN) 0.5 MG tablet    clotrimazole-betamethasone (LOTRISONE) 1-0.05 % external cream    Continuous Blood Gluc Sensor (FREESTYLE ALDEN 3 SENSOR) MISC    dextroamphetamine (DEXEDRINE SPANSULE) 15 MG 24 hr capsule    esomeprazole (NEXIUM) 40 MG DR capsule    fluticasone (FLONASE) 50 MCG/ACT nasal spray    gabapentin (NEURONTIN) 100 MG capsule    hydrOXYzine (VISTARIL) 50 MG capsule    insulin aspart (NOVOLOG FLEXPEN) 100 UNIT/ML pen    insulin degludec (TRESIBA FLEXTOUCH) 100 UNIT/ML pen    insulin pen needle (B-D U/F) 31G X 5 MM miscellaneous    lamoTRIgine (LAMICTAL) 200 MG tablet    lithium (ESKALITH CR/LITHOBID) 450 MG CR tablet    losartan (COZAAR) 50 MG tablet    metFORMIN (GLUCOPHAGE XR) 500 MG 24 hr tablet    naratriptan (AMERGE) 2.5 MG tablet    propranolol (INDERAL) 80 MG tablet    sildenafil (REVATIO) 20 MG tablet    testosterone (ANDROGEL 1.62 % PUMP) 20.25 MG/ACT gel    TRULICITY 3 MG/0.5ML SOPN     No current facility-administered medications for this visit.        The [place] Prescription Monitoring Program has been reviewed and there are no concerns about diversionary activity for controlled substances at this time.        ADDED HISTORY   Finished with move. S/p COVID - 19 infection. Which in a way helped with going slower.         MENTAL  STATUS EXAMINATION:   Appearance: Intact attention to grooming and hygiene, casual garb  Attitude:  cooperative   Eye Contact:  Good  Gait and Station: sitting  Psychomotor Behavior: Within normal limits  Oriented to: Grossly person place and time  Attention Span and Concentration: Grossly intact   Speech:  mildly depressed tone  Language: English  Mood:  sad   Affect: Constricted  Associations:  no loose associations  Thought Process:  logical, linear and goal oriented  Thought Content: No evidence of delusions or suicidal or homicidal ideation plan or intent  Memory: Grossly intact  Fund of Knowledge: Good  Insight:  good  Judgment:  intact, adequate for safety  Impulse Control:  intact        DIAGNOSES:   By history Bipolar disorder type I  By history, ADHD inattentive type  By history Social Anxiety Disorder  By history Generalized Anxiety Disorder      ASSESSMENT:   Patient currently undergoing depression with history of depression, rush, ADHD, anxiety and adverse childhood events.  Titrate Vraylar to address depression symptoms, possibly anxiety.  Due to availability to switch Adderall to Dexedrine.     Today Tavo Key reports no suicidal ideations. In addition, he has notable risk factors for self-harm, including anxiety, 1 suicide attempt. However, risk is mitigated by Rastafarian beliefs, history of seeking help when needed, and support. Therefore, based on all available evidence including the factors cited above, he does not appear to be at imminent risk for self-harm, does not meet criteria for a 72-hr hold, and therefore remains appropriate for ongoing outpatient level of care.       PLAN:     Patient advised of consultative model. Patient will continue to be seen for ongoing consultation and stabilization.  Does not meet criteria for involuntary treatment or hospitalization  Start Dexedrine Spansule 11/1/2023 15 mg daily efficacy with no significant side effects, fogginess => 2/6/24 20 mg daily  -Risks, benefits and alternatives discussed.  Patient provides verbal consent to treatment.  Continue lamotrigine 200 mg daily-Risks, benefits and alternatives discussed.  Patient provides verbal consent to treatment.  Continue lithium extended release 450 mg daily level low => 12/26/23 900 mg at bedtime lithium level 0.54 efficacy with no side effects =>- 2/6/2024 1050 mg nightly-risks, benefits and alternatives discussed.  Patient provides verbal consent  Restart clonazepam 12/26/2023 0.5 mg p.o. daily as needed anxiety/anger-Risks, benefits and alternatives discussed.  Patient provides verbal consent to treatment.  Advised of dependence, addiction, car driving and memory loss risks. 15/month initially  Hydroxyzine 50 mg p.o. 3 times daily with additional 50 mg as needed anxiety-provides well consent to treatment  Propranolol for migraine/headache prevention  Dc'd cariprazine (elevated blood sugars)  Labs - reviewed, follow-up lithium level  Therapy upcoming  Return in 4 weeks    Administrative Billing:   Time spent with patient was greater than 50% of time and/or significant time was spent in counseling and coordination of care regarding above diagnoses and treatment plan. Pre charting time and post charting time/documentation/coordination are done on date of service.      Signed:   Barney Colon M.D.  [facility] Psychiatry Service    Disclaimer: This note consists of symbols derived from keyboarding, dictation and/or voice recognition software. As a result, there may be errors in the script that have gone undetected. Please consider this when interpreting information found in this chart.    eoc

## 2024-02-06 NOTE — Clinical Note
Criselda,  Patient is doing well. Making a few tweaks.  We do consultation and typically do not keep any patients.  Given this patient was on lithium but doing well with review of comfortable getting him back and continuing medications?  Sincerely, Barney Colon M.D. Consultative Psychiatrist Program Medical Director, [facility] Care Psychiatry Service

## 2024-02-06 NOTE — PROGRESS NOTES
[facility]  2/6/2024      Behavioral Health Clinician Progress Note    Patient Name: Tavo Key           Service Type:  Individual      Service Location:   [facility] / Email (patient reached)     Session Start Time: 1102am  Session End Time: 1125am      Session Length: 16 - 37      Attendees: Patient     Service Modality:  Video Visit:      Provider verified identity through the following two step process.  Patient provided:  Patient photo and Patient was verified at admission/transfer    Telemedicine Visit: The patient's condition can be safely assessed and treated via synchronous audio and visual telemedicine encounter.      Reason for Telemedicine Visit: Services only offered telehealth    Originating Site (Patient Location): Patient's home    Distant Site (Provider Location): Provider Remote Setting- Home Office    Consent:  The patient/guardian has verbally consented to: the potential risks and benefits of telemedicine (video visit) versus in person care; bill my insurance or make self-payment for services provided; and responsibility for payment of non-covered services.     Patient would like the video invitation sent by:  My Chart    Mode of Communication:  Video Conference via [facility]    Distant Location (Provider):  Off-site    As the provider I attest to compliance with applicable laws and regulations related to telemedicine.    Visit Activities (Refresh list every visit): [facility] Only    Diagnostic Assessment Date: 11/16/2023  Shanelle Caruso   Treatment Plan Review Date: 3/26/24  See Flowsheets for today's PHQ-9 and MATTI-7 results  Previous PHQ-9:       11/16/2023    12:26 PM 12/26/2023    10:38 AM 2/6/2024    10:47 AM   PHQ-9 SCORE   PHQ-9 Total Score [place] 16 (Moderately severe depression) 14 (Moderate depression) 11 (Moderate depression)   PHQ-9 Total Score 16 14 11     Previous MATTI-7:       11/14/2023     5:54 AM 12/26/2023    10:37 AM  2/6/2024    10:45 AM   MATTI-7 SCORE   Total Score 9 (mild anxiety) 15 (severe anxiety) 10 (moderate anxiety)   Total Score 9 15    15 10    10           DATA  Extended Session (60+ minutes): No  Interactive Complexity: No  Crisis: No  [facility] Patient: No    Treatment Objective(s) Addressed in This Session:  Target Behavior(s): disease management/lifestyle changes continue routine and structure, reduced snacking and improve exercise    Anxiety: will experience a reduction in anxiety, will develop more effective coping skills to manage anxiety symptoms, and will increase ability to function adaptively  Attention Problems: will develop coping skills to effectively manage attention issues    Current Stressors / Issues:  Medication Questions/Requests: don't think so     update: Pt had COVID after moving and needed to reschedule. Mood is good and anxiety has been average. Anger and irritability is improved. Pt got angry and it was about 5 minutes and was once. Since the move his mood has been better. Different environment has been helpful and pt has bene making plans an projects for the house and having the move behind us. Pt is doing more thinking to themselves and to not worry and to take things slow. To maintain having this in the front of his mind, reminders by his work computer. Pt has been working to take a break and make habits. Blood sugars have been a lot better. Insulin was tweaked and pt isn't snacking as much between meals. Exercising is more difficult. Pt's back went out recently. Pt says focus has been difficult. He is taking 15 minutes breaks every 2.5 hours.     Side Effects: pt denies   Appetite: when taking breaks will pet the dogs instead of snacking, got healthy snacks to snack on, meals are getting better since not moving, got back to cooking at home more  Sleep: head aches are an issue more again, pt has been staying asleep through the night more now    Caffeine: pt denies   Therapist: meeting for  therapy more regularly and therapist is able to remained them  to take time and check in and self care had stayed more in the front of their mind    Progress on Treatment Objective(s) / Homework:  Satisfactory progress - ACTION (Actively working towards change); Intervened by reinforcing change plan / affirming steps taken    CBT/MI: Pt has made many life style changes, to take breaks more often, to cooking more at home after the move, reducing sacks between meals and having reminders to take things slow and not worry as much. He is working to create routines and structure. Pt hasn't experienced anger or irritability for a while other than briefly for 5 minutes.     CBT/MI: [facility] explores with pt what changes he has made and ways that he is able to maintain these changes. [facility] provides education about the benefits to spending time in nature, taking breaks and that using peppermint essential oils may help to improve focus. [facility] celebrates with pt the successes pt has experienced through making changes in their lifestyle and routine.[facility] explores pt's motivating factors to improve exercise and ways he can use previous skills to develop this into a routine.       Motivational Interviewing    MI Intervention: Co-Developed Goal: reduce worry and improve focus, Expressed Empathy/Understanding, Supported Autonomy, Collaboration, Evocation, Permission to raise concern or advise, Open-ended questions, Reflections: simple and complex, Change talk (evoked), and Reframe     Change Talk Expressed by the Patient: Committment to change Activation Taking steps    Provider Response to Change Talk: E - Evoked more info from patient about behavior change, A - Affirmed patient's thoughts, decisions, or attempts at behavior change, R - Reflected patient's change talk, and S - Summarized patient's change talk statements    Also provided psychoeducation about behavioral health condition, symptoms, and treatment options    Assessments completed  prior to visit:  The following assessments were completed by patient for this visit:  PHQ9:       6/21/2023     9:37 PM 10/31/2023    10:12 PM 11/16/2023    12:26 PM 12/26/2023    10:38 AM 2/6/2024    10:47 AM   PHQ-9 SCORE   PHQ-9 Total Score MyChart 12 (Moderate depression) 13 (Moderate depression) 16 (Moderately severe depression) 14 (Moderate depression) 11 (Moderate depression)   PHQ-9 Total Score 12 13 16 14 11     GAD2:       10/31/2023    10:23 PM 11/14/2023     5:54 AM 12/26/2023    10:37 AM 2/6/2024    10:45 AM   MATTI-2   Feeling nervous, anxious, or on edge 2 2 3 2   Not being able to stop or control worrying 2 1 2 2   MATTI-2 Total Score 4 3 5    5 4    4       Care Plan review completed: No    Medication Review:  No changes to current psychiatric medication(s)    Medication Compliance:  Yes    Changes in Health Issues:   None reported    Chemical Use Review:   Substance Use: Chemical use reviewed, no active concerns identified      Tobacco Use: No current tobacco use.      Assessment: Current Emotional / Mental Status (status of significant symptoms):  Risk status (Self / Other harm or suicidal ideation)  Patient has had a history of suicide attempts: age 15 and self-injurious behavior: as a teen  Patient denies current fears or concerns for personal safety.  Patient denies current or recent suicidal ideation or behaviors.  Patient denies current or recent homicidal ideation or behaviors.  Patient denies current or recent self injurious behavior or ideation.  Patient denies other safety concerns.  A safety and risk management plan has not been developed at this time, however patient was encouraged to [facility] should there be a change in any of these risk factors.    Appearance:   Appropriate   Eye Contact:   Good   Psychomotor Behavior: Normal   Attitude:   Cooperative  Interested  Orientation:   All  Speech   Rate / Production: Normal    Volume:  Normal   Mood:    Anxious  Normal,  improving  Affect:    Appropriate   Thought Content:  Clear   Thought Form:  Coherent  Logical   Insight:    Good     Diagnoses:  1. MATTI (generalized anxiety disorder)    2. Bipolar 1 disorder (H)        Collateral Reports Completed:  Communicated with: Barney Colon M.D.     Plan: (Homework, other):  Patient was given information about behavioral services and encouraged to schedule a follow up appointment with the clinic [facility] in 1 month.  He was also given information about mental health symptoms and treatment options .  CD Recommendations: No indications of CD issues.     Paulette Meneses, [facility]    ______________________________________________________________________    Integrated Primary Care Behavioral Health Treatment Plan    Patient's Name: Tavo Key  YOB: 1980    Date of Creation: 12/26/2023  Date Treatment Plan Last Reviewed/Revised: 12/26/2023    DSM5 Diagnoses:   Bipolar 1 disorder (H)    MATTI (generalized anxiety disorder)      Psychosocial / Contextual Factors: works, works a hybrid job, ADHD, health concerns  PROMIS (reviewed every 90 days):   PROMIS 10-Global Health (only subscores and total score):       10/31/2023    10:24 PM 11/14/2023     5:56 AM   PROMIS-10 Scores Only   Global Mental Health Score 7 10   Global Physical Health Score 17 13   PROMIS TOTAL - SUBSCORES 24 23   Will be updated next visit.     Referral / Collaboration:  Referral to another professional/service is not indicated at this time.    Anticipated number of session for this episode of care: 4-5  Anticipation frequency of session: Monthly  Anticipated Duration of each session: 16-37 minutes  Treatment plan will be reviewed in 90 days or when goals have been changed.       MeasurableTreatment Goal(s) related to diagnosis / functional impairment(s)  Goal 1: Patient will reduce anxiety and feeling down     I will know I've met my goal when I Icalm down and am less anxious and irritable.       Objective #A (Patient  Action)                          Patient will calm down anxiety and irritability-  frequency will be less and won't last all day and will report progress each visit.   Status:  New: 12/26/2023     Intervention(s)  Therapist will provide support through MI, Acceptance and Commitment Therapy and problem solving model to explore and overcome barriers.      Patient has reviewed and agreed to the above plan.      MITA Guzman  December 26, 2023

## 2024-02-06 NOTE — NURSING NOTE
Is the patient currently in the state of [place]? YES    Visit mode:VIDEO    If the visit is dropped, the patient can be reconnected by: VIDEO VISIT: Text to cell phone:   Telephone Information:   Mobile [phone]       Will anyone else be joining the visit? NO  (If patient encounters technical issues they should call [phone]    How would you like to obtain your AVS? MyChart    Are changes needed to the allergy or medication list? Pt stated no changes to allergies and Pt stated no med changes    Reason for visit: Catina GOYAL

## 2024-02-07 ENCOUNTER — VIRTUAL VISIT (OUTPATIENT)
Dept: PSYCHOLOGY | Facility: CLINIC | Age: 44
End: 2024-02-07
Payer: COMMERCIAL

## 2024-02-07 DIAGNOSIS — F31.9 BIPOLAR 1 DISORDER (H): Primary | ICD-10-CM

## 2024-02-07 PROCEDURE — 90837 PSYTX W PT 60 MINUTES: CPT | Mod: 95 | Performed by: MARRIAGE & FAMILY THERAPIST
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
## 2024-02-21 ENCOUNTER — DOCUMENTATION ONLY (OUTPATIENT)
Dept: FAMILY MEDICINE | Facility: CLINIC | Age: 44
End: 2024-02-21
Payer: COMMERCIAL

## 2024-02-21 DIAGNOSIS — E11.9 TYPE 2 DIABETES MELLITUS WITHOUT COMPLICATION, WITH LONG-TERM CURRENT USE OF INSULIN (H): Primary | ICD-10-CM

## 2024-02-21 DIAGNOSIS — Z79.4 TYPE 2 DIABETES MELLITUS WITHOUT COMPLICATION, WITH LONG-TERM CURRENT USE OF INSULIN (H): Primary | ICD-10-CM

## 2024-02-29 ENCOUNTER — VIRTUAL VISIT (OUTPATIENT)
Dept: PSYCHOLOGY | Facility: CLINIC | Age: 44
End: 2024-02-29
Payer: COMMERCIAL

## 2024-02-29 DIAGNOSIS — F31.9 BIPOLAR 1 DISORDER (H): Primary | ICD-10-CM

## 2024-02-29 PROCEDURE — 90837 PSYTX W PT 60 MINUTES: CPT | Mod: 95 | Performed by: MARRIAGE & FAMILY THERAPIST
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
## 2024-03-02 ENCOUNTER — HEALTH MAINTENANCE LETTER (OUTPATIENT)
Age: 44
End: 2024-03-02

## 2024-03-04 DIAGNOSIS — E11.9 TYPE 2 DIABETES MELLITUS WITHOUT COMPLICATION, WITHOUT LONG-TERM CURRENT USE OF INSULIN (H): ICD-10-CM

## 2024-03-04 RX ORDER — DULAGLUTIDE 3 MG/.5ML
INJECTION, SOLUTION SUBCUTANEOUS
Qty: 2 ML | Refills: 3 | OUTPATIENT
Start: 2024-03-04

## 2024-03-06 ENCOUNTER — OFFICE VISIT (OUTPATIENT)
Dept: PHARMACY | Facility: CLINIC | Age: 44
End: 2024-03-06
Payer: COMMERCIAL

## 2024-03-06 ENCOUNTER — LAB (OUTPATIENT)
Dept: LAB | Facility: CLINIC | Age: 44
End: 2024-03-06
Payer: COMMERCIAL

## 2024-03-06 VITALS — WEIGHT: 270.8 LBS | BODY MASS INDEX: 41.17 KG/M2 | SYSTOLIC BLOOD PRESSURE: 104 MMHG | DIASTOLIC BLOOD PRESSURE: 76 MMHG

## 2024-03-06 DIAGNOSIS — E11.9 TYPE 2 DIABETES MELLITUS WITHOUT COMPLICATION, WITHOUT LONG-TERM CURRENT USE OF INSULIN (H): Primary | ICD-10-CM

## 2024-03-06 DIAGNOSIS — Z79.4 TYPE 2 DIABETES MELLITUS WITHOUT COMPLICATION, WITH LONG-TERM CURRENT USE OF INSULIN (H): ICD-10-CM

## 2024-03-06 DIAGNOSIS — E11.9 TYPE 2 DIABETES MELLITUS WITHOUT COMPLICATION, WITH LONG-TERM CURRENT USE OF INSULIN (H): ICD-10-CM

## 2024-03-06 DIAGNOSIS — G43.809 OTHER MIGRAINE WITHOUT STATUS MIGRAINOSUS, NOT INTRACTABLE: ICD-10-CM

## 2024-03-06 DIAGNOSIS — K21.00 GASTROESOPHAGEAL REFLUX DISEASE WITH ESOPHAGITIS, UNSPECIFIED WHETHER HEMORRHAGE: ICD-10-CM

## 2024-03-06 LAB — HBA1C MFR BLD: 7.7 % (ref 0–5.6)

## 2024-03-06 PROCEDURE — 83036 HEMOGLOBIN GLYCOSYLATED A1C: CPT

## 2024-03-06 PROCEDURE — 99606 MTMS BY PHARM EST 15 MIN: CPT | Performed by: PHARMACIST

## 2024-03-06 PROCEDURE — 36415 COLL VENOUS BLD VENIPUNCTURE: CPT

## 2024-03-06 NOTE — PATIENT INSTRUCTIONS
"Recommendations from today's MTM visit:                                                         Stop metformin ER x 2 weeks, see if stomach symptoms improve, if not improved restart, increase up from 500 mg once daily every ~4 days until back at 1000 mg twice daily.    Follow-up: Return in about 2 months (around 5/6/2024) for Medication Therapy Management.    It was great speaking with you today.  I value your experience and would be very thankful for your time in providing feedback in our clinic survey. In the next few days, you may receive an email or text message from [employer] with a link to a survey related to your  clinical pharmacist.\"     To schedule another MTM appointment, please call the clinic directly or you may call the MTM scheduling line at [phone] or toll-free at [phone].     My Clinical Pharmacist's contact information:                                                      Please feel free to contact me with any questions or concerns you have.      Madison Wheeler, PharmD  Medication Therapy Management Pharmacist  [phone]     "

## 2024-03-06 NOTE — PROGRESS NOTES
Medication Therapy Management (MTM) Encounter    ASSESSMENT:                            Medication Adherence/Access: No issues identified    Diabetes Type 2 Diabetes/Obesity: Type 2 Diabetes/Obesity: Patient is meeting A1c goal of < 7%, however doesn't accurately reflect increase in blood sugar in the last two months.  Time in target has increased but not yet at goal of > 70% in target range of  mg/dL.  May benefit from increasing mealtime insulin at lunch and supper.    Migraine prevention:  Stable. Has reduced ibuprofen use which is best due to concomitant lithium, he will continue to very minimally use ibuprofen.     Stomach symptoms:  Although symptoms were preexisting to metformin, may benefit from trial without to see if this has worsened anything. He plans to discuss further with Criselda on about this for further evaluation.    PLAN:                            Stop metformin ER x 2 weeks, see if stomach symptoms improve, if not improved restart, increase up from 500 mg once daily every ~4 days until back at 1000 mg twice daily.    Follow-up: Return in about 2 months (around 5/6/2024) for Medication Therapy Management.    SUBJECTIVE/OBJECTIVE:                          Tavo Key is a 43 year old male coming in for a follow-up visit from 1/31/24.       Reason for visit: diabetes follow-up.    Allergies/ADRs: Reviewed in chart  Past Medical History: Reviewed in chart  Tobacco: He reports that he quit smoking about 14 years ago. His smoking use included cigarettes. He has never been exposed to tobacco smoke. He has never used smokeless tobacco.  Alcohol: none/very rarely    Medication Adherence/Access:   Patient uses pill box(es).  Patient takes medications 3 time(s) per day.   Per patient, misses medication 0 times per week.   The patient fills medications at [facility]: YES.    Diabetes   Type 2 Diabetes/Obesity:   Trulicity 3 mg weekly   Metformin ER 1000 mg twice daily, usually with food, no change  without food  Tresiba 34 units once daily  Novolog 6 units before breakfast, 8 units before lunch and 8 units before supper  Aspirin 81 mg daily primary prevention    Trulicity dose reduced from 4.5 mg to 3 mg in the past due to nausea, does have stomach symptoms at baseline, see below.  Reports no side effects.   SMBG:  Nasrin 3      Current diabetes symptoms: none  No symptoms of hypoglycemia in the past, does have alarm on Nasrin 3  Medication history  -Phentermine 18.75 mg daily  -Victoza worked really well in the past but stopped due to cost   -Copay for Cirilouvia is around $510.   -Patient tried Ozempic previously without benefit but thinks he may not have been on this medication long enough to notice weight loss/glycemic control.      Eye exam is up to date  Foot exam is up to date  Urine Albumin:   Lab Results   Component Value Date    UMALCR  08/21/2023      Comment:      Unable to calculate, urine albumin and/or urine creatinine is outside detectable limits.  Microalbuminuria is defined as an albumin:creatinine ratio of 17 to 299 for males and 25 to 299 for females. A ratio of albumin:creatinine of 300 or higher is indicative of overt proteinuria.  Due to biologic variability, positive results should be confirmed by a second, first-morning random or 24-hour timed urine specimen. If there is discrepancy, a third specimen is recommended. When 2 out of 3 results are in the microalbuminuria range, this is evidence for incipient nephropathy and warrants increased efforts at glucose control, blood pressure control, and institution of therapy with an angiotensin-converting-enzyme (ACE) inhibitor (if the patient can tolerate it).        Lab Results   Component Value Date    A1C 7.7 (H) 03/06/2024     Migraine prevention:    Propranolol 80 mg twice daily   Naratriptan 2.5 mg as needed  Acetaminophen 1000 mg daily as needed (max)  Ibuprofen 400 mg daily as needed, very sporadically, minimally now    He was unable to  change propranolol frequency or to ER formulation due to cost. Is still getting headaches, tend to be worse at night, which is typical for him.  He has a history of rebound headaches due to acetaminophen and ibuprofen use - he wants to avoid this, needs to use these during the day because Naratriptan causes drowsiness. Trying to limit ibuprofen due to lithium as well.     Stomach symptoms:    He reports he's had digestive issues for a long time and they continue, has had for 10 years, has been the worst the last 5-6 years. Tried Align - probiotic and gave him really bad stomach cramps, when he stopped taking this the cramps went away. This was all preexisting prior to starting metformin and Trulicity. Symptoms are primarily diarrhea and gas. Does also have heartburn, takes esomeprazole 40 mg daily for this, reports effective.    Today's Vitals: /76   Wt 270 lb 12.8 oz (122.8 kg)   BMI 41.17 kg/m    ----------------      I spent 15 minutes with this patient today. All changes were made via collaborative practice agreement with Criselda Walsh PA-C. A copy of the visit note was provided to the patient's provider(s).    A summary of these recommendations was given to the patient.    Madison Wheeler, PharmD  Medication Therapy Management Pharmacist  [phone]       Medication Therapy Recommendations  No medication therapy recommendations to display

## 2024-03-06 NOTE — Clinical Note
MARIAH MOTA note, thanks!  Madison Wheeler, PharmD Medication Therapy Management Pharmacist [phone]

## 2024-03-13 ENCOUNTER — VIRTUAL VISIT (OUTPATIENT)
Dept: PSYCHOLOGY | Facility: CLINIC | Age: 44
End: 2024-03-13
Payer: COMMERCIAL

## 2024-03-13 DIAGNOSIS — F31.9 BIPOLAR 1 DISORDER (H): Primary | ICD-10-CM

## 2024-03-13 PROCEDURE — 90837 PSYTX W PT 60 MINUTES: CPT | Mod: 95 | Performed by: MARRIAGE & FAMILY THERAPIST

## 2024-03-19 DIAGNOSIS — E11.9 TYPE 2 DIABETES MELLITUS WITHOUT COMPLICATION, WITHOUT LONG-TERM CURRENT USE OF INSULIN (H): ICD-10-CM

## 2024-03-19 RX ORDER — BLOOD-GLUCOSE SENSOR
EACH MISCELLANEOUS
Qty: 2 EACH | Refills: 5 | Status: SHIPPED | OUTPATIENT
Start: 2024-03-19 | End: 2024-09-10

## 2024-03-19 RX ORDER — DULAGLUTIDE 3 MG/.5ML
INJECTION, SOLUTION SUBCUTANEOUS
Qty: 2 ML | Refills: 3 | Status: SHIPPED | OUTPATIENT
Start: 2024-03-19 | End: 2024-07-02

## 2024-03-27 ENCOUNTER — VIRTUAL VISIT (OUTPATIENT)
Dept: PSYCHOLOGY | Facility: CLINIC | Age: 44
End: 2024-03-27
Payer: COMMERCIAL

## 2024-03-27 DIAGNOSIS — F31.9 BIPOLAR 1 DISORDER (H): Primary | ICD-10-CM

## 2024-03-27 PROCEDURE — 90837 PSYTX W PT 60 MINUTES: CPT | Mod: 95 | Performed by: MARRIAGE & FAMILY THERAPIST

## 2024-03-28 NOTE — PROGRESS NOTES
[facility]                                     Progress Note    Patient Name: Tavo Key  Date: 3/27/2024         Service Type: Individual      Session Start Time: 2:00PM  Session End Time: 2:50PM     Session Length: 50 minutes    Session #: 10    Attendees: Client attended alone    Service Modality:  Video Visit:      Provider verified identity through the following two step process.  Patient provided:  Patient is known previously to provider    Telemedicine Visit: The patient's condition can be safely assessed and treated via synchronous audio and visual telemedicine encounter.      Reason for Telemedicine Visit: Patient has requested telehealth visit    Originating Site (Patient Location): Patient's home    Distant Site (Provider Location): Provider Remote Setting- Home Office    Consent:  The patient/guardian has verbally consented to: the potential risks and benefits of telemedicine (video visit) versus in person care; bill my insurance or make self-payment for services provided; and responsibility for payment of non-covered services.     Patient would like the video invitation sent by:  My Chart    Mode of Communication:  Video Conference via [facility]    Distant Location (Provider):  Off-site    As the provider I attest to compliance with applicable laws and regulations related to telemedicine.    DATA  Extended Session (53+ minutes): PROLONGED SERVICE IN THE OUTPATIENT SETTING REQUIRING DIRECT (FACE-TO-FACE) PATIENT CONTACT BEYOND THE USUAL SERVICE:    - Longer session due to limited access to mental health appointments and necessity to address patient's distress / complexity  Interactive Complexity: No  Crisis: No        Progress Since Last Session (Related to Symptoms / Goals / Homework):   Symptoms: Improving less irritability    Homework: Achieved / completed to satisfaction      Episode of Care Goals: Satisfactory progress - ACTION (Actively working towards change); Intervened  by reinforcing change plan / affirming steps taken     Current / Ongoing Stressors and Concerns:   Client is starting to feel better as his head aches have diminished. He also discontinued taking metformin which has helped with his stomach issues. He will increase his insulin versus starting on a different medication. Talked about an event at work where he was able to talk about his mental health and he felt good about this as it was well received. He had some feelings of sadness that evening but not rage. He was able to sing and play music to move through it. Client is feeling more ready to do EMDR and will pace himself in the process and timing of his sessions.     Treatment Objective(s) Addressed in This Session:   identify coping strategies for more effectively managing Bipolar Disorder  Work on managing his diabetes     Intervention:   CBT: Cognitive restructuring  Solution Focused: prep for move    Assessments completed prior to visit:  The following assessments were completed by patient for this visit:  PROMIS 10-Global Health (all questions and answers displayed):       10/31/2023    10:24 PM 11/14/2023     5:56 AM 2/29/2024     5:00 PM   PROMIS 10   In general, would you say your health is: Good Fair    In general, would you say your quality of life is: Good Good    In general, how would you rate your physical health? Good Fair    In general, how would you rate your mental health, including your mood and your ability to think? Fair Good    In general, how would you rate your satisfaction with your social activities and relationships? Poor Fair    In general, please rate how well you carry out your usual social activities and roles Fair Good    To what extent are you able to carry out your everyday physical activities such as walking, climbing stairs, carrying groceries, or moving a chair? Completely Completely    In the past 7 days, how often have you been bothered by emotional problems such as feeling  anxious, depressed, or irritable? Always Often    In the past 7 days, how would you rate your fatigue on average? Mild Severe    In the past 7 days, how would you rate your pain on average, where 0 means no pain, and 10 means worst imaginable pain? 0 2    In general, would you say your health is: 3 2 3   In general, would you say your quality of life is: 3 3 3   In general, how would you rate your physical health? 3 2 3   In general, how would you rate your mental health, including your mood and your ability to think? 2 3 2   In general, how would you rate your satisfaction with your social activities and relationships? 1 2 3   In general, please rate how well you carry out your usual social activities and roles. (This includes activities at home, at work and in your community, and responsibilities as a parent, child, spouse, employee, friend, etc.) 2 3 4   To what extent are you able to carry out your everyday physical activities such as walking, climbing stairs, carrying groceries, or moving a chair? 5 5 4   In the past 7 days, how often have you been bothered by emotional problems such as feeling anxious, depressed, or irritable? 5 4 4   In the past 7 days, how would you rate your fatigue on average? 2 4 2   In the past 7 days, how would you rate your pain on average, where 0 means no pain, and 10 means worst imaginable pain? 0 2 0   Global Mental Health Score 7 10 10   Global Physical Health Score 17 13 16   PROMIS TOTAL - SUBSCORES 24 23 26     PROMIS 10-Global Health (only subscores and total score):       10/31/2023    10:24 PM 11/14/2023     5:56 AM 2/29/2024     5:00 PM   PROMIS-10 Scores Only   Global Mental Health Score 7 10 10   Global Physical Health Score 17 13 16   PROMIS TOTAL - SUBSCORES 24 23 26         ASSESSMENT: Current Emotional / Mental Status (status of significant symptoms):   Risk status (Self / Other harm or suicidal ideation)   Patient denies current fears or concerns for personal  safety.   Patient denies current or recent suicidal ideation or behaviors.   Patient denies current or recent homicidal ideation or behaviors.   Patient denies current or recent self injurious behavior or ideation.   Patient denies other safety concerns.   Patient reports there has been no change in risk factors since their last session.     Patient reports there has been no change in protective factors since their last session.     Recommended that patient call 911 or go to the local ED should there be a change in any of these risk factors.     Appearance:   Appropriate    Eye Contact:   Good    Psychomotor Behavior: Normal    Attitude:   Cooperative  Friendly   Orientation:   All   Speech    Rate / Production: Normal     Volume:  Normal    Mood:    Anxious  Normal   Affect:    Appropriate    Thought Content:  Clear    Thought Form:  Coherent  Logical    Insight:    Good      Medication Review:   No changes to current psychiatric medication(s)     Medication Compliance:   Yes     Changes in Health Issues:   None reported     Chemical Use Review:   Substance Use: Chemical use reviewed, no active concerns identified      Tobacco Use: No current tobacco use.      Diagnosis:  1. Bipolar 1 disorder (H)        Collateral Reports Completed:   Not Applicable    PLAN: (Patient Tasks / Therapist Tasks / Other)  Homework: Client to continue working on self esteem with self advocacy. Therapist to send EMDR referrals.      REYNOLD Vee                                                         _________________________________________________________________________________________________________________________                                            Individual Treatment Plan    Patient's Name: Tavo Key  YOB: 1980    Date of Creation: 1/2/2024  Date Treatment Plan Last Reviewed/Revised: 1/2/2024    DSM5 Diagnoses: 296.52 Bipolar I Disorder Current or Most Recent Episode Depressed,  Moderate  Psychosocial / Contextual Factors: Trauma hx, health, work  PROMIS (reviewed every 90 days): 11/14/2023 Score: 26    Referral / Collaboration:  Referral to another professional/service is not indicated at this time. EMDR has been presented and client will research further.    Anticipated number of session for this episode of care: 9-12 sessions  Anticipation frequency of session: Biweekly  Anticipated Duration of each session: 53 or more minutes  Treatment plan will be reviewed in 90 days or when goals have been changed.       MeasurableTreatment Goal(s) related to diagnosis / functional impairment(s)  Goal 1: Patient will lower PHQ9 score to 3 or below.    I will know I've met my goal when I'm less irritable.      Objective #A (Patient Action)    Patient will identify stress management strategies for more effectively managing Bipolar Disorder.  Status: New - Date: 1/2/2024      Intervention(s)  Therapist will teach emotional recognition/identification.      Objective #B  Patient will Identify negative self-talk and behaviors: challenge core beliefs, myths, and actions.  Status: New - Date: 1/2/2024      Intervention(s)  Therapist will  help client work on cognitive restructuring .    Objective #C  Patient will  increase exercise .  Status: New - Date: 1/2/2024      Intervention(s)  Therapist will assign homework to increase level of activity especially aerobic .      Patient has reviewed and agreed to the above plan.      Shanelle Caruso, LMFT  January 2, 2024

## 2024-04-14 DIAGNOSIS — R80.9 PROTEINURIA, UNSPECIFIED TYPE: ICD-10-CM

## 2024-04-14 DIAGNOSIS — I10 HYPERTENSION GOAL BP (BLOOD PRESSURE) < 140/90: ICD-10-CM

## 2024-04-15 ENCOUNTER — OFFICE VISIT (OUTPATIENT)
Dept: FAMILY MEDICINE | Facility: CLINIC | Age: 44
End: 2024-04-15
Payer: COMMERCIAL

## 2024-04-15 VITALS
RESPIRATION RATE: 19 BRPM | HEIGHT: 68 IN | SYSTOLIC BLOOD PRESSURE: 130 MMHG | BODY MASS INDEX: 41.77 KG/M2 | OXYGEN SATURATION: 96 % | WEIGHT: 275.6 LBS | DIASTOLIC BLOOD PRESSURE: 72 MMHG | TEMPERATURE: 98.4 F | HEART RATE: 81 BPM

## 2024-04-15 DIAGNOSIS — E66.01 MORBID OBESITY (H): ICD-10-CM

## 2024-04-15 DIAGNOSIS — F31.62 BIPOLAR MIXED AFFECTIVE DISORDER, MODERATE (H): ICD-10-CM

## 2024-04-15 DIAGNOSIS — M54.41 ACUTE BILATERAL LOW BACK PAIN WITH RIGHT-SIDED SCIATICA: Primary | ICD-10-CM

## 2024-04-15 DIAGNOSIS — E11.29 TYPE 2 DIABETES MELLITUS WITH OTHER DIABETIC KIDNEY COMPLICATION (H): ICD-10-CM

## 2024-04-15 PROCEDURE — 96127 BRIEF EMOTIONAL/BEHAV ASSMT: CPT | Performed by: PHYSICIAN ASSISTANT

## 2024-04-15 PROCEDURE — G2211 COMPLEX E/M VISIT ADD ON: HCPCS | Performed by: PHYSICIAN ASSISTANT

## 2024-04-15 PROCEDURE — 99214 OFFICE O/P EST MOD 30 MIN: CPT | Performed by: PHYSICIAN ASSISTANT

## 2024-04-15 RX ORDER — ALPRAZOLAM 1 MG
TABLET ORAL
Qty: 3 TABLET | Refills: 0 | Status: SHIPPED | OUTPATIENT
Start: 2024-04-15 | End: 2024-05-22

## 2024-04-15 ASSESSMENT — ASTHMA QUESTIONNAIRES
ACT_TOTALSCORE: 23
QUESTION_4 LAST FOUR WEEKS HOW OFTEN HAVE YOU USED YOUR RESCUE INHALER OR NEBULIZER MEDICATION (SUCH AS ALBUTEROL): NOT AT ALL
QUESTION_3 LAST FOUR WEEKS HOW OFTEN DID YOUR ASTHMA SYMPTOMS (WHEEZING, COUGHING, SHORTNESS OF BREATH, CHEST TIGHTNESS OR PAIN) WAKE YOU UP AT NIGHT OR EARLIER THAN USUAL IN THE MORNING: NOT AT ALL
QUESTION_5 LAST FOUR WEEKS HOW WOULD YOU RATE YOUR ASTHMA CONTROL: WELL CONTROLLED
ACT_TOTALSCORE: 23
QUESTION_2 LAST FOUR WEEKS HOW OFTEN HAVE YOU HAD SHORTNESS OF BREATH: ONCE OR TWICE A WEEK
QUESTION_1 LAST FOUR WEEKS HOW MUCH OF THE TIME DID YOUR ASTHMA KEEP YOU FROM GETTING AS MUCH DONE AT WORK, SCHOOL OR AT HOME: NONE OF THE TIME

## 2024-04-15 ASSESSMENT — PATIENT HEALTH QUESTIONNAIRE - PHQ9
SUM OF ALL RESPONSES TO PHQ QUESTIONS 1-9: 13
SUM OF ALL RESPONSES TO PHQ QUESTIONS 1-9: 13
10. IF YOU CHECKED OFF ANY PROBLEMS, HOW DIFFICULT HAVE THESE PROBLEMS MADE IT FOR YOU TO DO YOUR WORK, TAKE CARE OF THINGS AT HOME, OR GET ALONG WITH OTHER PEOPLE: VERY DIFFICULT

## 2024-04-15 ASSESSMENT — ENCOUNTER SYMPTOMS: BACK PAIN: 1

## 2024-04-15 ASSESSMENT — PAIN SCALES - GENERAL: PAINLEVEL: MILD PAIN (2)

## 2024-04-15 NOTE — PROGRESS NOTES
"  Assessment & Plan     1. Acute bilateral low back pain with right-sided sciatica    2. 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 Bipolar mixed affective disorder, moderate (H)    4. Morbid obesity (H)      DM- uncontrolled. Continue with MTM on this.   Mood managed through psych, continue with them.   Back pain- persistent for >9 months, physical therapy and MRI due to radiculopathy symptoms.   Weight loss would help all conditions, but unable to exercise due to pain.   Limited on NSAIDS, can try diclofenac gel.       The longitudinal plan of care for the diagnosis(es)/condition(s) as documented were addressed during this visit. Due to the added complexity in care, I will continue to support Tavo in the subsequent management and with ongoing continuity of care.        BMI  Estimated body mass index is 41.9 kg/m  as calculated from the following:    Height as of this encounter: 1.727 m (5' 8\").    Weight as of this encounter: 125 kg (275 lb 9.6 oz).       Depression Screening Follow Up        4/15/2024     8:28 AM   PHQ   PHQ-9 Total Score 13   Q9: Thoughts of better off dead/self-harm past 2 weeks Not at all           Follow Up Actions Taken  Crisis resource information provided in After Visit Summary  Referred patient back to current mental health provider.           Willian Vo is a 43 year old, presenting for the following health issues:  Diabetes, Back Pain, RECHECK, and Health Maintenance        4/15/2024     9:05 AM   Additional Questions   Roomed by luisa sánchez     Via the Health Maintenance questionnaire, the patient has reported the following services have been completed -Eye Exam, this information has been sent to the abstraction team.  History of Present Illness       Diabetes:   He presents for follow up of diabetes.   He is checking home blood glucose with a continuous glucose monitor.   He checks blood glucose before meals.  Blood glucose is sometimes over 200 and " never under 70.    He is concerned about blood sugar frequently over 200.   He is having excessive thirst and weight gain.  The patient has had a diabetic eye exam in the last 12 months. Eye exam performed on 05/2023. Location of last eye exam [facility] in [place].        Reason for visit:  Diabetes check up, checkup on numbness in my right thigh and now some in my right thumb, also sudden back pain especially on my left side that's preventing standing and exercise    He eats 2-3 servings of fruits and vegetables daily.He consumes 1 sweetened beverage(s) daily.He exercises with enough effort to increase his heart rate 10 to 19 minutes per day.  He exercises with enough effort to increase his heart rate 3 or less days per week.   He is taking medications regularly.         Pain History:  When did you first notice your pain? X1-2 years   Have you seen anyone else for your pain? Yes -   How has your pain affected your ability to work? 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 Back Pain  Onset/Duration: x1-2 years  Description:   Location of pain: low back left, middle of back left, and left flank  Character of pain: sharp, burning, and intermittent  Pain radiation: none  New numbness or weakness in legs, not attributed to pain: YES- right thigh and right thumb   Intensity:  mild, moderate, severe  Progression of Symptoms: worsening and waxing and waning  History:   Specific cause: none  Pain interferes with job: No  History of back problems:   Any previous MRI or X-rays: None  Sees a specialist for back pain: No  Alleviating factors:   Improved by: sitting down    Precipitating factors:  Worsened by: Lifting, Bending, Standing, and Walking  Therapies tried and outcome: acetaminophen (Tylenol)    Accompanying Signs & Symptoms:  Risk of Fracture: None  Risk of Cauda Equina: None  Risk of Infection: None  Risk of Cancer: None  Risk of  "Ankylosing Spondylitis: Onset at age <35, male, AND morning back stiffness  no                   When sitting, left side, when standing on the right side.   Trouble doing things around the house due to pain.   No weakness in the back.   Numbness on the right leg, but unsure if related. Goes to the knee.     Tylenol for pain- not really helpful.   Back pain not related to bowel or bladder issues.     Sugars high since stopping metformin but GI symptoms resolved. Working with MTM on this. Titrating insulin.           Objective    /72 (BP Location: Left arm, Patient Position: Chair, Cuff Size: Adult Large)   Pulse 81   Temp 98.4  F (36.9  C) (Oral)   Resp 19   Ht 1.727 m (5' 8\")   Wt 125 kg (275 lb 9.6 oz)   SpO2 96%   BMI 41.90 kg/m    Body mass index is 41.9 kg/m .  Physical Exam   GENERAL: alert and no distress  ABDOMEN: soft, nontender  MS: no gross musculoskeletal defects noted, no edema- minimal pain with palpation on the right paraspinal muscles. Negative straight leg raise. Pain with lumbar rotation, extension and flexion. Lateral bending toward the left also painful.   NEURO: Normal strength and tone, mentation intact and speech normalm deep tendon reflexes intact.   PSYCH: mentation appears normal, affect normal/bright            Signed Electronically by: Criselda Walsh PA-C    "

## 2024-04-16 RX ORDER — AMLODIPINE BESYLATE 10 MG/1
10 TABLET ORAL DAILY
Qty: 90 TABLET | Refills: 1 | Status: SHIPPED | OUTPATIENT
Start: 2024-04-16

## 2024-04-19 DIAGNOSIS — E11.9 TYPE 2 DIABETES MELLITUS WITHOUT COMPLICATION, WITH LONG-TERM CURRENT USE OF INSULIN (H): ICD-10-CM

## 2024-04-19 DIAGNOSIS — Z79.4 TYPE 2 DIABETES MELLITUS WITHOUT COMPLICATION, WITH LONG-TERM CURRENT USE OF INSULIN (H): ICD-10-CM

## 2024-04-19 RX ORDER — ATORVASTATIN CALCIUM 20 MG/1
TABLET, FILM COATED ORAL
Qty: 90 TABLET | Refills: 0 | Status: SHIPPED | OUTPATIENT
Start: 2024-04-19 | End: 2024-08-02

## 2024-04-30 ENCOUNTER — VIRTUAL VISIT (OUTPATIENT)
Dept: PSYCHOLOGY | Facility: CLINIC | Age: 44
End: 2024-04-30
Payer: COMMERCIAL

## 2024-04-30 DIAGNOSIS — F31.9 BIPOLAR 1 DISORDER (H): Primary | ICD-10-CM

## 2024-04-30 PROCEDURE — 90837 PSYTX W PT 60 MINUTES: CPT | Mod: 95 | Performed by: MARRIAGE & FAMILY THERAPIST

## 2024-04-30 NOTE — PROGRESS NOTES
[facility]                                     Progress Note    Patient Name: Tavo Key  Date: 4/30/2024         Service Type: Individual      Session Start Time: 2:01PM  Session End Time: 2:56PM     Session Length: 55 minutes    Session #: 11    Attendees: Client attended alone    Service Modality:  Video Visit:      Provider verified identity through the following two step process.  Patient provided:  Patient is known previously to provider    Telemedicine Visit: The patient's condition can be safely assessed and treated via synchronous audio and visual telemedicine encounter.      Reason for Telemedicine Visit: Patient has requested telehealth visit    Originating Site (Patient Location): Patient's home    Distant Site (Provider Location): Provider Remote Setting- Home Office    Consent:  The patient/guardian has verbally consented to: the potential risks and benefits of telemedicine (video visit) versus in person care; bill my insurance or make self-payment for services provided; and responsibility for payment of non-covered services.     Patient would like the video invitation sent by:  My Chart    Mode of Communication:  Video Conference via [facility]    Distant Location (Provider):  Off-site    As the provider I attest to compliance with applicable laws and regulations related to telemedicine.    DATA  Extended Session (53+ minutes): PROLONGED SERVICE IN THE OUTPATIENT SETTING REQUIRING DIRECT (FACE-TO-FACE) PATIENT CONTACT BEYOND THE USUAL SERVICE:    - Longer session due to limited access to mental health appointments and necessity to address patient's distress / complexity  Interactive Complexity: No  Crisis: No        Progress Since Last Session (Related to Symptoms / Goals / Homework):   Symptoms: Worsening new concern    Homework: Partially completed      Episode of Care Goals: Minimal progress - PREPARATION (Decided to change - considering how); Intervened by negotiating a  change plan and determining options / strategies for behavior change, identifying triggers, exploring social supports, and working towards setting a date to begin behavior change       Current / Ongoing Stressors and Concerns:   Client shares today about his concern regarding the polyamorous relationship he has been in since 2019. He has actually known this women since childhood but only dating for the past 5 years. His wife Natalie has been supportive of the relationship he has with Mindy and is providing support in light of new developments. Client stated that Mindy has told client that her feelings have changed for him and also mentioned her concerns with finances related to not being in more of a committed relationship. Client feels blind-sided in that she was not very forthcoming with her feelings or offered an opportunity to work on any issues she may have been having. Client is unsure if the relationship is over and states that things didn't go well when they tried to talk about it on Sunday. Client became defensive and emotional and their conversation ended. He is hoping to get more information still and says this has triggered many feelings of rejection for him. Processed thoughts and feelings and encouraged client to leverage his resiliency in managing his emotions. Attempt to get closure by asking questions.     Treatment Objective(s) Addressed in This Session:   identify coping strategies for more effectively managing Bipolar Disorder  Work on managing his diabetes     Intervention:   CBT: Cognitive restructuring  Solution Focused: prep for move    Assessments completed prior to visit:  The following assessments were completed by patient for this visit:  PROMIS 10-Global Health (all questions and answers displayed):       10/31/2023    10:24 PM 11/14/2023     5:56 AM 2/29/2024     5:00 PM   PROMIS 10   In general, would you say your health is: Good Fair    In general, would you say your quality of life  is: Good Good    In general, how would you rate your physical health? Good Fair    In general, how would you rate your mental health, including your mood and your ability to think? Fair Good    In general, how would you rate your satisfaction with your social activities and relationships? Poor Fair    In general, please rate how well you carry out your usual social activities and roles Fair Good    To what extent are you able to carry out your everyday physical activities such as walking, climbing stairs, carrying groceries, or moving a chair? Completely Completely    In the past 7 days, how often have you been bothered by emotional problems such as feeling anxious, depressed, or irritable? Always Often    In the past 7 days, how would you rate your fatigue on average? Mild Severe    In the past 7 days, how would you rate your pain on average, where 0 means no pain, and 10 means worst imaginable pain? 0 2    In general, would you say your health is: 3 2 3   In general, would you say your quality of life is: 3 3 3   In general, how would you rate your physical health? 3 2 3   In general, how would you rate your mental health, including your mood and your ability to think? 2 3 2   In general, how would you rate your satisfaction with your social activities and relationships? 1 2 3   In general, please rate how well you carry out your usual social activities and roles. (This includes activities at home, at work and in your community, and responsibilities as a parent, child, spouse, employee, friend, etc.) 2 3 4   To what extent are you able to carry out your everyday physical activities such as walking, climbing stairs, carrying groceries, or moving a chair? 5 5 4   In the past 7 days, how often have you been bothered by emotional problems such as feeling anxious, depressed, or irritable? 5 4 4   In the past 7 days, how would you rate your fatigue on average? 2 4 2   In the past 7 days, how would you rate your pain on  average, where 0 means no pain, and 10 means worst imaginable pain? 0 2 0   Global Mental Health Score 7 10 10   Global Physical Health Score 17 13 16   PROMIS TOTAL - SUBSCORES 24 23 26     PROMIS 10-Global Health (only subscores and total score):       10/31/2023    10:24 PM 11/14/2023     5:56 AM 2/29/2024     5:00 PM   PROMIS-10 Scores Only   Global Mental Health Score 7 10 10   Global Physical Health Score 17 13 16   PROMIS TOTAL - SUBSCORES 24 23 26         ASSESSMENT: Current Emotional / Mental Status (status of significant symptoms):   Risk status (Self / Other harm or suicidal ideation)   Patient denies current fears or concerns for personal safety.   Patient denies current or recent suicidal ideation or behaviors.   Patient denies current or recent homicidal ideation or behaviors.   Patient denies current or recent self injurious behavior or ideation.   Patient denies other safety concerns.   Patient reports there has been no change in risk factors since their last session.     Patient reports there has been no change in protective factors since their last session.     Recommended that patient call 911 or go to the local ED should there be a change in any of these risk factors.     Appearance:   Appropriate    Eye Contact:   Good    Psychomotor Behavior: Normal    Attitude:   Cooperative  Friendly   Orientation:   All   Speech    Rate / Production: Normal     Volume:  Normal    Mood:    Anxious  Depressed  Sad  Grieving   Affect:    Tearful Worrisome    Thought Content:  Clear    Thought Form:  Coherent  Logical    Insight:    Good      Medication Review:   No changes to current psychiatric medication(s)     Medication Compliance:   Yes     Changes in Health Issues:   None reported     Chemical Use Review:   Substance Use: Chemical use reviewed, no active concerns identified      Tobacco Use: No current tobacco use.      Diagnosis:  1. Bipolar 1 disorder (H)        Collateral Reports Completed:   Not  Applicable    PLAN: (Patient Tasks / Therapist Tasks / Other)  Homework: Client to ask Mindy jeffers questions to help him gain clarity and work towards closure if the relationship is not continuing.      Shanelle Rashidelvira, [facility]                                                         _________________________________________________________________________________________________________________________                                            Individual Treatment Plan    Patient's Name: Tavo Key  YOB: 1980    Date of Creation: 1/2/2024  Date Treatment Plan Last Reviewed/Revised: 1/2/2024    DSM5 Diagnoses: 296.52 Bipolar I Disorder Current or Most Recent Episode Depressed, Moderate  Psychosocial / Contextual Factors: Trauma hx, health, work  PROMIS (reviewed every 90 days): 11/14/2023 Score: 26    Referral / Collaboration:  Referral to another professional/service is not indicated at this time. EMDR has been presented and client will research further.    Anticipated number of session for this episode of care: 9-12 sessions  Anticipation frequency of session: Biweekly  Anticipated Duration of each session: 53 or more minutes  Treatment plan will be reviewed in 90 days or when goals have been changed.       MeasurableTreatment Goal(s) related to diagnosis / functional impairment(s)  Goal 1: Patient will lower PHQ9 score to 3 or below.    I will know I've met my goal when I'm less irritable.      Objective #A (Patient Action)    Patient will identify stress management strategies for more effectively managing Bipolar Disorder.  Status: New - Date: 1/2/2024      Intervention(s)  Therapist will teach emotional recognition/identification.      Objective #B  Patient will Identify negative self-talk and behaviors: challenge core beliefs, myths, and actions.  Status: New - Date: 1/2/2024      Intervention(s)  Therapist will  help client work on cognitive restructuring .    Objective #C  Patient will   increase exercise .  Status: New - Date: 1/2/2024      Intervention(s)  Therapist will assign homework to increase level of activity especially aerobic .      Patient has reviewed and agreed to the above plan.      Shanelle Caruso, LMFT  January 2, 2024

## 2024-05-08 ENCOUNTER — VIRTUAL VISIT (OUTPATIENT)
Dept: PSYCHOLOGY | Facility: CLINIC | Age: 44
End: 2024-05-08
Payer: COMMERCIAL

## 2024-05-08 DIAGNOSIS — F31.9 BIPOLAR 1 DISORDER (H): Primary | ICD-10-CM

## 2024-05-08 PROCEDURE — 90837 PSYTX W PT 60 MINUTES: CPT | Mod: 95 | Performed by: MARRIAGE & FAMILY THERAPIST

## 2024-05-08 NOTE — PROGRESS NOTES
[facility]                                     Progress Note    Patient Name: Tavo Key  Date: 5/8/2024         Service Type: Individual      Session Start Time: 1:00PM  Session End Time: 1:55PM     Session Length: 55 minutes    Session #: 12    Attendees: Client attended alone    Service Modality:  Video Visit:      Provider verified identity through the following two step process.  Patient provided:  Patient is known previously to provider    Telemedicine Visit: The patient's condition can be safely assessed and treated via synchronous audio and visual telemedicine encounter.      Reason for Telemedicine Visit: Patient has requested telehealth visit    Originating Site (Patient Location): Patient's home    Distant Site (Provider Location): Provider Remote Setting- Home Office    Consent:  The patient/guardian has verbally consented to: the potential risks and benefits of telemedicine (video visit) versus in person care; bill my insurance or make self-payment for services provided; and responsibility for payment of non-covered services.     Patient would like the video invitation sent by:  My Chart    Mode of Communication:  Video Conference via [facility]    Distant Location (Provider):  Off-site    As the provider I attest to compliance with applicable laws and regulations related to telemedicine.    DATA  Extended Session (53+ minutes): PROLONGED SERVICE IN THE OUTPATIENT SETTING REQUIRING DIRECT (FACE-TO-FACE) PATIENT CONTACT BEYOND THE USUAL SERVICE:    - Longer session due to limited access to mental health appointments and necessity to address patient's distress / complexity  Interactive Complexity: No  Crisis: No        Progress Since Last Session (Related to Symptoms / Goals / Homework):   Symptoms: Worsening new concern    Homework: Partially completed      Episode of Care Goals: Minimal progress - PREPARATION (Decided to change - considering how); Intervened by negotiating a  change plan and determining options / strategies for behavior change, identifying triggers, exploring social supports, and working towards setting a date to begin behavior change       Current / Ongoing Stressors and Concerns:   Client continues to deal with depression and also learned that his karleyon's father . Client stated that his relationship with Mindy is over and he is having a lot of feelings about especially some anger that there is no point in expressing. Mindy said some insensitive things in the process of them talking which Brain is upset about. He has had a lot of feelings of not being good enough. Therapist urges client to practice self-compassion. Talked about silver linings such as drawing closer to Natalie, his friends and being more open with people in his life.     Treatment Objective(s) Addressed in This Session:   identify coping strategies for more effectively managing Bipolar Disorder  Work on managing his diabetes     Intervention:   CBT: Cognitive restructuring  Solution Focused: prep for move    Assessments completed prior to visit:  The following assessments were completed by patient for this visit:  PROMIS 10-Global Health (all questions and answers displayed):       10/31/2023    10:24 PM 2023     5:56 AM 2024     5:00 PM   PROMIS 10   In general, would you say your health is: Good Fair    In general, would you say your quality of life is: Good Good    In general, how would you rate your physical health? Good Fair    In general, how would you rate your mental health, including your mood and your ability to think? Fair Good    In general, how would you rate your satisfaction with your social activities and relationships? Poor Fair    In general, please rate how well you carry out your usual social activities and roles Fair Good    To what extent are you able to carry out your everyday physical activities such as walking, climbing stairs, carrying groceries, or moving a  chair? Completely Completely    In the past 7 days, how often have you been bothered by emotional problems such as feeling anxious, depressed, or irritable? Always Often    In the past 7 days, how would you rate your fatigue on average? Mild Severe    In the past 7 days, how would you rate your pain on average, where 0 means no pain, and 10 means worst imaginable pain? 0 2    In general, would you say your health is: 3 2 3   In general, would you say your quality of life is: 3 3 3   In general, how would you rate your physical health? 3 2 3   In general, how would you rate your mental health, including your mood and your ability to think? 2 3 2   In general, how would you rate your satisfaction with your social activities and relationships? 1 2 3   In general, please rate how well you carry out your usual social activities and roles. (This includes activities at home, at work and in your community, and responsibilities as a parent, child, spouse, employee, friend, etc.) 2 3 4   To what extent are you able to carry out your everyday physical activities such as walking, climbing stairs, carrying groceries, or moving a chair? 5 5 4   In the past 7 days, how often have you been bothered by emotional problems such as feeling anxious, depressed, or irritable? 5 4 4   In the past 7 days, how would you rate your fatigue on average? 2 4 2   In the past 7 days, how would you rate your pain on average, where 0 means no pain, and 10 means worst imaginable pain? 0 2 0   Global Mental Health Score 7 10 10   Global Physical Health Score 17 13 16   PROMIS TOTAL - SUBSCORES 24 23 26     PROMIS 10-Global Health (only subscores and total score):       10/31/2023    10:24 PM 11/14/2023     5:56 AM 2/29/2024     5:00 PM   PROMIS-10 Scores Only   Global Mental Health Score 7 10 10   Global Physical Health Score 17 13 16   PROMIS TOTAL - SUBSCORES 24 23 26         ASSESSMENT: Current Emotional / Mental Status (status of significant  symptoms):   Risk status (Self / Other harm or suicidal ideation)   Patient denies current fears or concerns for personal safety.   Patient denies current or recent suicidal ideation or behaviors.   Patient denies current or recent homicidal ideation or behaviors.   Patient denies current or recent self injurious behavior or ideation.   Patient denies other safety concerns.   Patient reports there has been no change in risk factors since their last session.     Patient reports there has been no change in protective factors since their last session.     Recommended that patient call 911 or go to the local ED should there be a change in any of these risk factors.     Appearance:   Appropriate    Eye Contact:   Good    Psychomotor Behavior: Normal    Attitude:   Cooperative  Friendly   Orientation:   All   Speech    Rate / Production: Normal     Volume:  Normal    Mood:    Anxious  Depressed  Sad  Grieving   Affect:    Tearful Worrisome    Thought Content:  Clear    Thought Form:  Coherent  Logical    Insight:    Good      Medication Review:   No changes to current psychiatric medication(s)     Medication Compliance:   Yes     Changes in Health Issues:   None reported     Chemical Use Review:   Substance Use: Chemical use reviewed, no active concerns identified      Tobacco Use: No current tobacco use.      Diagnosis:  1. Bipolar 1 disorder (H)      Collateral Reports Completed:   Not Applicable    PLAN: (Patient Tasks / Therapist Tasks / Other)  Homework: Client to use gummies as needed for sleep, practice self compassion and give himself time to grieve.        REYNOLD Vee                                                         _________________________________________________________________________________________________________________________                                            Individual Treatment Plan    Patient's Name: Tavo Key  YOB: 1980    Date of Creation:  1/2/2024  Date Treatment Plan Last Reviewed/Revised: 1/2/2024    DSM5 Diagnoses: 296.52 Bipolar I Disorder Current or Most Recent Episode Depressed, Moderate  Psychosocial / Contextual Factors: Trauma hx, health, work  PROMIS (reviewed every 90 days): 11/14/2023 Score: 26    Referral / Collaboration:  Referral to another professional/service is not indicated at this time. EMDR has been presented and client will research further.    Anticipated number of session for this episode of care: 9-12 sessions  Anticipation frequency of session: Biweekly  Anticipated Duration of each session: 53 or more minutes  Treatment plan will be reviewed in 90 days or when goals have been changed.       MeasurableTreatment Goal(s) related to diagnosis / functional impairment(s)  Goal 1: Patient will lower PHQ9 score to 3 or below.    I will know I've met my goal when I'm less irritable.      Objective #A (Patient Action)    Patient will identify stress management strategies for more effectively managing Bipolar Disorder.  Status: New - Date: 1/2/2024      Intervention(s)  Therapist will teach emotional recognition/identification.      Objective #B  Patient will Identify negative self-talk and behaviors: challenge core beliefs, myths, and actions.  Status: New - Date: 1/2/2024      Intervention(s)  Therapist will  help client work on cognitive restructuring .    Objective #C  Patient will  increase exercise .  Status: New - Date: 1/2/2024      Intervention(s)  Therapist will assign homework to increase level of activity especially aerobic .      Patient has reviewed and agreed to the above plan.      REYNOLD Vee  January 2, 2024

## 2024-05-13 ENCOUNTER — MYC REFILL (OUTPATIENT)
Dept: PSYCHIATRY | Facility: CLINIC | Age: 44
End: 2024-05-13

## 2024-05-13 DIAGNOSIS — F31.30 BIPOLAR I DISORDER, MOST RECENT EPISODE DEPRESSED (H): ICD-10-CM

## 2024-05-13 DIAGNOSIS — E11.29 TYPE 2 DIABETES MELLITUS WITH OTHER DIABETIC KIDNEY COMPLICATION (H): ICD-10-CM

## 2024-05-13 RX ORDER — ISOPROPYL ALCOHOL 0.75 G/1
SWAB TOPICAL
Qty: 100 EACH | Refills: 3 | Status: SHIPPED | OUTPATIENT
Start: 2024-05-13

## 2024-05-13 RX ORDER — LAMOTRIGINE 200 MG/1
200 TABLET ORAL DAILY
Qty: 90 TABLET | Refills: 1 | Status: SHIPPED | OUTPATIENT
Start: 2024-05-13 | End: 2024-05-22 | Stop reason: DRUGHIGH

## 2024-05-13 NOTE — TELEPHONE ENCOUNTER
Date of Last Office Visit: 2/6/24  Date of Next Office Visit: 5/22/24  No shows since last visit: 0  Cancellations since last visit: 0    Medication requested: lamoTRIgine (LAMICTAL) 200 MG tablet  Date last ordered: 11/1/23 Qty: 90 Refills: 1     Lapse in medication adherence greater than 5 days?: no - reviewed dispense report. Lamotrigine last dispensed on 2/15/24 for a 90 day supply.   If yes, call patient and gather details: na  Medication refill request verified as identical to current order?: yes  Result of Last DAM, VPA, Li+ Level, CBC, or Carbamazepine Level (at or since last visit): N/A    Last visit treatment plan:     PLAN:      Patient advised of consultative model. Patient will continue to be seen for ongoing consultation and stabilization.  Does not meet criteria for involuntary treatment or hospitalization  Start Dexedrine Spansule 11/1/2023 15 mg daily efficacy with no significant side effects, fogginess => 2/6/24 20 mg daily -Risks, benefits and alternatives discussed.  Patient provides verbal consent to treatment.  Continue lamotrigine 200 mg daily-Risks, benefits and alternatives discussed.  Patient provides verbal consent to treatment.  Continue lithium extended release 450 mg daily level low => 12/26/23 900 mg at bedtime lithium level 0.54 efficacy with no side effects =>- 2/6/2024 1050 mg nightly-risks, benefits and alternatives discussed.  Patient provides verbal consent  Restart clonazepam 12/26/2023 0.5 mg p.o. daily as needed anxiety/anger-Risks, benefits and alternatives discussed.  Patient provides verbal consent to treatment.  Advised of dependence, addiction, car driving and memory loss risks. 15/month initially  Hydroxyzine 50 mg p.o. 3 times daily with additional 50 mg as needed anxiety-provides well consent to treatment  Propranolol for migraine/headache prevention  Dc'd cariprazine (elevated blood sugars)  Labs - reviewed, follow-up lithium level  Therapy upcoming  Return in 4  weeks    []Medication refilled per  Medication Refill in Ambulatory Care  policy.  [x]Medication unable to be refilled by RN due to criteria not met as indicated below:    []Eligibility - not seen in the last year   []Supervision - no future appointment   []Compliance - no shows, cancellations or lapse in therapy   []Verification - order discrepancy   []Controlled medication   [x]Medication not included in policy   []90-day supply request   []Other

## 2024-05-20 ENCOUNTER — MYC REFILL (OUTPATIENT)
Dept: PSYCHIATRY | Facility: CLINIC | Age: 44
End: 2024-05-20
Payer: COMMERCIAL

## 2024-05-20 DIAGNOSIS — F31.30 BIPOLAR I DISORDER, MOST RECENT EPISODE DEPRESSED (H): ICD-10-CM

## 2024-05-20 RX ORDER — CLONAZEPAM 0.5 MG/1
0.5 TABLET ORAL DAILY PRN
Qty: 15 TABLET | Refills: 3 | Status: SHIPPED | OUTPATIENT
Start: 2024-05-20 | End: 2024-07-26

## 2024-05-20 NOTE — TELEPHONE ENCOUNTER
Date of Last Office Visit: 2/6/24  Date of Next Office Visit: 5/22/24  No shows since last visit: 0  Cancellations since last visit: 0    Medication requested: clonazePAM (KLONOPIN) 0.5 MG tablet  Date last ordered: 12/26/24 Qty: 15 Refills: 3     Review of [place] ?: Yes  Medication last sold date: 4/18/24 Qty filled: 15  Other [place] substance on [place] ?: Yes  If yes, is this a new medication?: no    Lapse in medication adherence greater than 5 days?: No  If yes, call patient and gather details: na  Medication refill request verified as identical to current order?: yes  Result of Last DAM, VPA, Li+ Level, CBC, or Carbamazepine Level (at or since last visit): N/A    Last visit treatment plan: Start Dexedrine Spansule 11/1/2023 15 mg daily efficacy with no significant side effects, fogginess => 2/6/24 20 mg daily -Risks, benefits and alternatives discussed.  Patient provides verbal consent to treatment.  Continue lamotrigine 200 mg daily-Risks, benefits and alternatives discussed.  Patient provides verbal consent to treatment.  Continue lithium extended release 450 mg daily level low => 12/26/23 900 mg at bedtime lithium level 0.54 efficacy with no side effects =>- 2/6/2024 1050 mg nightly-risks, benefits and alternatives discussed.  Patient provides verbal consent  Restart clonazepam 12/26/2023 0.5 mg p.o. daily as needed anxiety/anger-Risks, benefits and alternatives discussed.  Patient provides verbal consent to treatment.  Advised of dependence, addiction, car driving and memory loss risks. 15/month initially  Hydroxyzine 50 mg p.o. 3 times daily with additional 50 mg as needed anxiety-provides well consent to treatment  Propranolol for migraine/headache prevention  Dc'd cariprazine (elevated blood sugars)  Labs - reviewed, follow-up lithium level  Therapy upcoming  Return in 4 weeks    []Medication refilled per  Medication Refill in Ambulatory Care  policy.  [x]Medication unable to be refilled by RN due to  criteria not met as indicated below:    []Eligibility - not seen in the last year   []Supervision - no future appointment   []Compliance - no shows, cancellations or lapse in therapy   []Verification - order discrepancy   [x]Controlled medication   []Medication not included in policy   []90-day supply request   []Other

## 2024-05-21 ENCOUNTER — THERAPY VISIT (OUTPATIENT)
Dept: PHYSICAL THERAPY | Facility: CLINIC | Age: 44
End: 2024-05-21
Attending: PHYSICIAN ASSISTANT
Payer: COMMERCIAL

## 2024-05-21 DIAGNOSIS — M54.41 ACUTE BILATERAL LOW BACK PAIN WITH RIGHT-SIDED SCIATICA: ICD-10-CM

## 2024-05-21 PROCEDURE — 97161 PT EVAL LOW COMPLEX 20 MIN: CPT | Mod: GP | Performed by: PHYSICAL THERAPIST

## 2024-05-21 PROCEDURE — 97112 NEUROMUSCULAR REEDUCATION: CPT | Mod: GP | Performed by: PHYSICAL THERAPIST

## 2024-05-21 PROCEDURE — 97110 THERAPEUTIC EXERCISES: CPT | Mod: GP | Performed by: PHYSICAL THERAPIST

## 2024-05-21 NOTE — PROGRESS NOTES
PHYSICAL THERAPY EVALUATION  Type of Visit: Evaluation    See electronic medical record for Abuse and Falls Screening details.    Subjective       Presenting condition or subjective complaint: Lower back pain especially on the sites potentially causing numbness in my right thigh. Onset of LBP since last summer. No known injury. Had an MRI ordered however due to insurance issues did not complete it.   Date of onset: 04/15/24 (MD order date)    Relevant medical history: Diabetes; High blood pressure; Mental Illness; Pain at night or rest; Sleep disorder like apnea   Dates & types of surgery:      Prior diagnostic imaging/testing results:       Prior therapy history for the same diagnosis, illness or injury: No      Prior Level of Function  Transfers:   Ambulation:   ADL:   IADL:     Living Environment  Social support: With a significant other or spouse   Type of home: House   Stairs to enter the home: Yes 1 Is there a railing: No   Ramp: No   Stairs inside the home: Yes 4 Is there a railing: Yes   Help at home: Self Cares (home health aide/personal care attendant, family, etc)  Equipment owned:       Employment: Yes   Hobbies/Interests: music, coding    Patient goals for therapy: Stand, walk, exercise Decreasing the pain and set up to really get back to exercising safely.     Pain assessment:      Objective   LUMBAR SPINE EVALUATIONPAIN: Pain Level at Rest: 3/10  Pain Level with Use: 9/10  Pain Location: R side, occasionally on the L. Notes numbness/tingling in R lateral thigh. Denies sx's lower or above there.    Pain Quality: Aching, Numb, and Sharp  Pain Frequency: intermittent  Pain is Worst: activity dependent  Pain is Exacerbated By: standing, leaning over counter (cooking in the kitchen), walking, prolonged sitting.  Pain is Relieved By: laying flat on his back, Tylenol, heating pads on occasion  Pain Progression: Unchanged  INTEGUMENTARY (edema, incisions):   POSTURE: Sitting Posture: Rounded  shoulders, Lordosis decreased, Thoracic kyphosis increased  GAIT:   Weightbearing Status:   Assistive Device(s):   Gait Deviations:   BALANCE/PROPRIOCEPTION:   WEIGHTBEARING ALIGNMENT:  Normal pelvic alignment. Positive stork testing noted on the R.  NON-WEIGHTBEARING ALIGNMENT:    ROM:   (Degrees) Left AROM Left PROM  Right AROM Right PROM   Hip Flexion  WNL  WNL   Hip Extension       Hip Abduction       Hip Adduction       Hip Internal Rotation  WNL  WNL   Hip External Rotation  WNL  WNL   Knee Flexion  WNL  WNL   Knee Extension  WNL  WNL   Lumbar Side bend To knee + pain To knee + pain   Lumbar Flexion To shin + pain upon return   Lumbar Extension -25% + pain and stiffness   Pain:   End feel:   PELVIC/SI SCREEN:   STRENGTH: WNL    MYOTOMES:  Normal except slight decreased sensation in L3-4 region  DTR S:   CORD SIGNS:   DERMATOMES:   NEURAL TENSION:    Left Right   SLR Negative  Negative    SLR with DF Negative  Negative    Femoral Nerve     Slump Negative  Negative    Frank (Lumbar)     Frank (Thoracic)     Frank (Cervical)     Median     Ulnar     Radial        FLEXIBILITY:   LUMBAR/HIP Special Tests:    PELVIS/SI SPECIAL TESTS:   FUNCTIONAL TESTS:   PALPATION: WNL  SPINAL SEGMENTAL CONCLUSIONS:  Slight lack of PA mobility in lower lumbar spine. Mild pain on the L.      Assessment & Plan   CLINICAL IMPRESSIONS  Medical Diagnosis: Acute LBP    Treatment Diagnosis: LBP   Impression/Assessment: Patient is a 44 year old male with LBP complaints.  The following significant findings have been identified: Pain, Decreased ROM/flexibility, Decreased joint mobility, Decreased strength, Impaired muscle performance, Decreased activity tolerance, and Impaired posture. These impairments interfere with their ability to perform self care tasks, work tasks, recreational activities, household chores, driving , household mobility, and community mobility as compared to previous level of function.     Clinical Decision Making  (Complexity):  Clinical Presentation: Stable/Uncomplicated  Clinical Presentation Rationale: based on medical and personal factors listed in PT evaluation  Clinical Decision Making (Complexity): Low complexity    PLAN OF CARE  Treatment Interventions:  Interventions: Manual Therapy, Neuromuscular Re-education, Therapeutic Activity, Therapeutic Exercise, Self-Care/Home Management    Long Term Goals     PT Goal 1  Goal Identifier: Standing  Goal Description: Pt to be able to  the kitchen and cook a meal without pain  Rationale: to maximize safety and independence with performance of ADLs and functional tasks;to maximize safety and independence within the home;to maximize safety and independence with self cares  Target Date: 07/16/24      Frequency of Treatment: 1x/wk  Duration of Treatment: 8 weeks    Recommended Referrals to Other Professionals:   Education Assessment:   Learner/Method: No Barriers to Learning    Risks and benefits of evaluation/treatment have been explained.   Patient/Family/caregiver agrees with Plan of Care.     Evaluation Time:     PT Eval, Low Complexity Minutes (20387): 20       Signing Clinician: Ashley Robertson PT

## 2024-05-21 NOTE — PROGRESS NOTES
[facility]  5/22/2024      Behavioral Health Clinician Progress Note    Patient Name: Tavo Key           Service Type:  Individual      Service Location:   MyChart / Email (patient reached)     Session Start Time: 133pm  Session End Time: 2pm      Session Length: 16 - 37      Attendees: Patient     Service Modality:  Video Visit:      Provider verified identity through the following two step process.  Patient provided:  Patient photo, Patient is known previously to provider, and Patient was verified at admission/transfer    Telemedicine Visit: The patient's condition can be safely assessed and treated via synchronous audio and visual telemedicine encounter.      Reason for Telemedicine Visit: Services only offered telehealth    Originating Site (Patient Location): Patient's home    Distant Site (Provider Location): [facility]ENT[facility] HE[facility]    Consent:  The patient/guardian has verbally consented to: the potential risks and benefits of telemedicine (video visit) versus in person care; bill my insurance or make self-payment for services provided; and responsibility for payment of non-covered services.     Patient would like the video invitation sent by:  My Chart    Mode of Communication:  Video Conference via [facility]    Distant Location (Provider):  On-site    As the provider I attest to compliance with applicable laws and regulations related to telemedicine.    Visit Activities (Refresh list every visit): [facility] Only    Diagnostic Assessment Date: 11/16/2023  Shanelle Caruso   Treatment Plan Review Date: 8/20/24  See Flowsheets for today's PHQ-9 and MATTI-7 results  Previous PHQ-9:       2/6/2024    10:47 AM 4/15/2024     8:28 AM 5/22/2024     1:15 PM   PHQ-9 SCORE   PHQ-9 Total Score [place] 11 (Moderate depression) 13 (Moderate depression) 11 (Moderate depression)   PHQ-9 Total Score 11 13 11     Previous MATTI-7:        12/26/2023    10:37 AM 2/6/2024    10:45 AM 5/22/2024     1:15 PM   MATTI-7 SCORE   Total Score 15 (severe anxiety) 10 (moderate anxiety) 14 (moderate anxiety)   Total Score 15    15 10    10 14    14           DATA  Extended Session (60+ minutes): No  Interactive Complexity: No  Crisis: No  [facility] Patient: No    Treatment Objective(s) Addressed in This Session:  Target Behavior(s): disease management/lifestyle changes continue routine and structure, get outside and let natural light in the house, try to reduce stress    Depressed Mood: Decrease frequency and intensity of feeling down, depressed, hopeless  Improve quantity and quality of night time sleep / decrease daytime naps  Improve diet, appetite, mindful eating, and / or meal planning  Anxiety: will experience a reduction in anxiety, will develop more effective coping skills to manage anxiety symptoms, and will increase ability to function adaptively  Attention Problems: will develop coping skills to effectively manage attention issues    Current Stressors / Issues:  Questions/Thoughts for Dr. Colon:    Better/worse: head aches had improved, eating outside and getting natural light outside   ADLs: Appetite- stress eating has increased, pt has tried to grab apples which kick up blood sugar, sleep- bad for awhile and has gotten better, past 4-5 nights he hasn't gotten out of bed and managing the stress better, hygiene- good   Current Symptoms: Pt reports that irritable has gotten worse. He is dealing with more stress. He is using self talk to take a break or back off. Pt says that there is a low of light coming in the house. He has been eating dinner outside. Anxiety has been worse as well especially social anxiety. To cope he will push himself to go into work. Pt has experienced neighbors coming and talking with him. He has been getting up and walking around a bit more. Sticking to a schedule and routine are things he is doing. Pt started PT and he will be working on  pain and his core to be able to walk longer. He is planning to try to practice mindfulness/ meditations. Not as much experiencing of depression sx. Blood sugars are higher.     He said it stopped 2 weeks ago, the month long depression that had ideation and guilt and shame and had pt and wife concerned. He has tired to find triggers and it seems to come on by itself.     Side Effects:   Suicidality: pt denies, 2 weeks ago a little ideation, comes with the irritability   Therapist: seeing therapist once a week, stress management, trauma and trying to move towards EMDR   [facility] recommendations: guided visualization to help manage stress, schedule worry time, getting outside    Progress towards goals: better irritability and with stress it increased again    Progress on Treatment Objective(s) / Homework:  Satisfactory progress - ACTION (Actively working towards change); Intervened by reinforcing change plan / affirming steps taken     CBT/MI: Pt has worked to get outside more and let natural light in. He has also eaten dinner outside as well. To manage anger and irritability, he continues to practice past behaviors and habits he built into a routine. Taking a break and sitting outside if things irritate him.     CBT: Provides psychoeducation about the benefits of being in nature related to mood and focus. [facility] encourages pt if he is feeling irritable or angry to use guided visualization of being somewhere peaceful and taking things in with the 5 senses there in his mind. [facility] also recommends if pt has difficulty sleeping again due to stressors at work and his mind racing to schedule worry time and write down worries/ thoughts and make a plan to address them.       Motivational Interviewing    MI Intervention: Co-Developed Goal: reduce worry, irritability and improve focus, Expressed Empathy/Understanding, Supported Autonomy, Collaboration, Evocation, Permission to raise concern or advise, Open-ended questions, Reflections:  simple and complex, Change talk (evoked), and Reframe     Change Talk Expressed by the Patient: Committment to change Activation Taking steps    Provider Response to Change Talk: E - Evoked more info from patient about behavior change, A - Affirmed patient's thoughts, decisions, or attempts at behavior change, R - Reflected patient's change talk, and S - Summarized patient's change talk statements    Also provided psychoeducation about behavioral health condition, symptoms, and treatment options    Assessments completed prior to visit:  The following assessments were completed by patient for this visit:  PHQ9:       6/21/2023     9:37 PM 10/31/2023    10:12 PM 11/16/2023    12:26 PM 12/26/2023    10:38 AM 2/6/2024    10:47 AM 4/15/2024     8:28 AM 5/22/2024     1:15 PM   PHQ-9 SCORE   PHQ-9 Total Score MyChart 12 (Moderate depression) 13 (Moderate depression) 16 (Moderately severe depression) 14 (Moderate depression) 11 (Moderate depression) 13 (Moderate depression) 11 (Moderate depression)   PHQ-9 Total Score 12 13 16 14 11 13 11     GAD2:       10/31/2023    10:23 PM 11/14/2023     5:54 AM 12/26/2023    10:37 AM 2/6/2024    10:45 AM 5/22/2024     1:15 PM   MATTI-2   Feeling nervous, anxious, or on edge 2 2 3 2 3   Not being able to stop or control worrying 2 1 2 2 3   MATTI-2 Total Score 4 3 5    5 4    4 6    6     PROMIS 10-Global Health (only subscores and total score):       10/31/2023    10:24 PM 11/14/2023     5:56 AM 2/29/2024     5:00 PM   PROMIS-10 Scores Only   Global Mental Health Score 7 10 10   Global Physical Health Score 17 13 16   PROMIS TOTAL - SUBSCORES 24 23 26       Care Plan review completed: Yes    Medication Review:  No changes to current psychiatric medication(s)    Medication Compliance:  Yes    Changes in Health Issues:   None reported    Chemical Use Review:   Substance Use: Chemical use reviewed, no active concerns identified      Tobacco Use: No current tobacco use.       Assessment: Current Emotional / Mental Status (status of significant symptoms):  Risk status (Self / Other harm or suicidal ideation)  Patient has had a history of suicide attempts: age 15 and self-injurious behavior: as a teen  Patient denies current fears or concerns for personal safety.  Patient denies current or recent suicidal ideation or behaviors.  Patient denies current or recent homicidal ideation or behaviors.  Patient denies current or recent self injurious behavior or ideation.  Patient denies other safety concerns.  A safety and risk management plan has not been developed at this time, however patient was encouraged to call [place] [phone] should there be a change in any of these risk factors.    Appearance:   Appropriate    Eye Contact:   Good   Psychomotor Behavior: Normal , seated  Attitude:   Cooperative  Interested Pleasant  Orientation:   All  Speech   Rate / Production: Normal    Volume:  Normal   Mood:    Anxious  Irritable  Sad   Affect:    Appropriate   Thought Content:  Clear   Thought Form:  Coherent  Logical   Insight:    Good     Diagnoses:  1. MATTI (generalized anxiety disorder)    2. Bipolar 1 disorder (H)        Collateral Reports Completed:  Communicated with: Barney Colon M.D.     Plan: (Homework, other):  Patient was given information about behavioral services and encouraged to schedule a follow up appointment with the clinic [facility] in 1 month.  He was also given information about mental health symptoms and treatment options .  CD Recommendations: No indications of CD issues.     Paulette Meneses, MITA    ______________________________________________________________________    Integrated Primary Care Behavioral Health Treatment Plan    Patient's Name: Tavo Key  YOB: 1980    Date of Creation: 12/26/2023  Date Treatment Plan Last Reviewed/Revised: 5/22/2024    DSM5 Diagnoses:   Bipolar 1 disorder (H)    MATTI (generalized anxiety disorder)      Psychosocial /  "Contextual Factors: works a hybrid job, ADHD, health concerns, irritability with relationships and work  PROMIS (reviewed every 90 days):   PROMIS 10-Global Health (only subscores and total score):       10/31/2023    10:24 PM 11/14/2023     5:56 AM 2/29/2024     5:00 PM   PROMIS-10 Scores Only   Global Mental Health Score 7 10 10   Global Physical Health Score 17 13 16   PROMIS TOTAL - SUBSCORES 24 23 26   Will be updated next visit due to time constraints.     Referral / Collaboration:  Referral to another professional/service is not indicated at this time.    Anticipated number of session for this episode of care: 4-5  Anticipation frequency of session: Monthly  Anticipated Duration of each session: 16-37 minutes  Treatment plan will be reviewed in 90 days or when goals have been changed.       MeasurableTreatment Goal(s) related to diagnosis / functional impairment(s)  Goal 1: Patient will reduce anxiety, irritability and feeling down     I will know I've met my goal when the irritability was getting better and unfortunately got worse again.\"     Objective #A (Patient Action)                          Patient will calm down anxiety and irritability-  frequency will be less and won't last all day and will report progress each visit.   Status:  Continued: 5/22/2024     Intervention(s)  Therapist will provide support through MI, Acceptance and Commitment Therapy and problem solving model to explore and overcome barriers.      Patient has reviewed and agreed to the above plan.      Paulette Meneses, [facility]  May 22, 2024     "

## 2024-05-22 ENCOUNTER — VIRTUAL VISIT (OUTPATIENT)
Dept: PSYCHOLOGY | Facility: CLINIC | Age: 44
End: 2024-05-22
Payer: COMMERCIAL

## 2024-05-22 ENCOUNTER — VIRTUAL VISIT (OUTPATIENT)
Dept: PSYCHIATRY | Facility: CLINIC | Age: 44
End: 2024-05-22
Payer: COMMERCIAL

## 2024-05-22 ENCOUNTER — VIRTUAL VISIT (OUTPATIENT)
Dept: BEHAVIORAL HEALTH | Facility: CLINIC | Age: 44
End: 2024-05-22
Payer: COMMERCIAL

## 2024-05-22 DIAGNOSIS — F90.0 ADHD (ATTENTION DEFICIT HYPERACTIVITY DISORDER), INATTENTIVE TYPE: ICD-10-CM

## 2024-05-22 DIAGNOSIS — F31.30 BIPOLAR I DISORDER, MOST RECENT EPISODE DEPRESSED (H): ICD-10-CM

## 2024-05-22 DIAGNOSIS — F31.9 BIPOLAR 1 DISORDER (H): Primary | ICD-10-CM

## 2024-05-22 DIAGNOSIS — F31.62 BIPOLAR MIXED AFFECTIVE DISORDER, MODERATE (H): ICD-10-CM

## 2024-05-22 DIAGNOSIS — F41.1 GAD (GENERALIZED ANXIETY DISORDER): Primary | ICD-10-CM

## 2024-05-22 DIAGNOSIS — E11.9 TYPE 2 DIABETES MELLITUS WITHOUT COMPLICATION, WITHOUT LONG-TERM CURRENT USE OF INSULIN (H): ICD-10-CM

## 2024-05-22 DIAGNOSIS — F31.9 BIPOLAR 1 DISORDER (H): ICD-10-CM

## 2024-05-22 PROCEDURE — 99214 OFFICE O/P EST MOD 30 MIN: CPT | Mod: 95 | Performed by: PSYCHIATRY & NEUROLOGY

## 2024-05-22 PROCEDURE — 90837 PSYTX W PT 60 MINUTES: CPT | Mod: 95 | Performed by: MARRIAGE & FAMILY THERAPIST

## 2024-05-22 PROCEDURE — 90832 PSYTX W PT 30 MINUTES: CPT | Mod: 95 | Performed by: COUNSELOR

## 2024-05-22 RX ORDER — DEXMETHYLPHENIDATE HYDROCHLORIDE 20 MG/1
20 CAPSULE, EXTENDED RELEASE ORAL DAILY
Qty: 30 CAPSULE | Refills: 0 | Status: SHIPPED | OUTPATIENT
Start: 2024-05-22 | End: 2024-06-21

## 2024-05-22 RX ORDER — DEXMETHYLPHENIDATE HYDROCHLORIDE 20 MG/1
20 CAPSULE, EXTENDED RELEASE ORAL DAILY
Qty: 30 CAPSULE | Refills: 0 | Status: SHIPPED | OUTPATIENT
Start: 2024-07-21 | End: 2024-07-26 | Stop reason: DRUGHIGH

## 2024-05-22 RX ORDER — LITHIUM CARBONATE 450 MG
900 TABLET, EXTENDED RELEASE ORAL AT BEDTIME
Qty: 60 TABLET | Refills: 3 | Status: SHIPPED | OUTPATIENT
Start: 2024-05-22 | End: 2024-07-26

## 2024-05-22 RX ORDER — LAMOTRIGINE 100 MG/1
250 TABLET ORAL DAILY
Qty: 75 TABLET | Refills: 2 | Status: SHIPPED | OUTPATIENT
Start: 2024-05-22 | End: 2024-07-26

## 2024-05-22 RX ORDER — DEXMETHYLPHENIDATE HYDROCHLORIDE 20 MG/1
20 CAPSULE, EXTENDED RELEASE ORAL DAILY
Qty: 30 CAPSULE | Refills: 0 | Status: SHIPPED | OUTPATIENT
Start: 2024-06-21 | End: 2024-07-21

## 2024-05-22 RX ORDER — LITHIUM CARBONATE 150 MG/1
150 CAPSULE ORAL AT BEDTIME
Qty: 30 CAPSULE | Refills: 3 | Status: SHIPPED | OUTPATIENT
Start: 2024-05-22 | End: 2024-07-26

## 2024-05-22 RX ORDER — DEXTROAMPHETAMINE SULFATE 15 MG/1
15 CAPSULE, EXTENDED RELEASE ORAL DAILY
Qty: 30 CAPSULE | Refills: 0 | Status: CANCELLED | OUTPATIENT
Start: 2024-05-22

## 2024-05-22 RX ORDER — HYDROXYZINE PAMOATE 50 MG/1
50 CAPSULE ORAL 3 TIMES DAILY
Qty: 120 CAPSULE | Refills: 5 | Status: SHIPPED | OUTPATIENT
Start: 2024-05-22

## 2024-05-22 RX ORDER — PEN NEEDLE, DIABETIC 31 GX3/16"
NEEDLE, DISPOSABLE MISCELLANEOUS
Qty: 400 EACH | Refills: 0 | Status: SHIPPED | OUTPATIENT
Start: 2024-05-22 | End: 2024-08-05

## 2024-05-22 ASSESSMENT — ANXIETY QUESTIONNAIRES
IF YOU CHECKED OFF ANY PROBLEMS ON THIS QUESTIONNAIRE, HOW DIFFICULT HAVE THESE PROBLEMS MADE IT FOR YOU TO DO YOUR WORK, TAKE CARE OF THINGS AT HOME, OR GET ALONG WITH OTHER PEOPLE: VERY DIFFICULT
8. IF YOU CHECKED OFF ANY PROBLEMS, HOW DIFFICULT HAVE THESE MADE IT FOR YOU TO DO YOUR WORK, TAKE CARE OF THINGS AT HOME, OR GET ALONG WITH OTHER PEOPLE?: VERY DIFFICULT
5. BEING SO RESTLESS THAT IT IS HARD TO SIT STILL: SEVERAL DAYS
GAD7 TOTAL SCORE: 14
6. BECOMING EASILY ANNOYED OR IRRITABLE: NEARLY EVERY DAY
1. FEELING NERVOUS, ANXIOUS, OR ON EDGE: NEARLY EVERY DAY
GAD7 TOTAL SCORE: 14
8. IF YOU CHECKED OFF ANY PROBLEMS, HOW DIFFICULT HAVE THESE MADE IT FOR YOU TO DO YOUR WORK, TAKE CARE OF THINGS AT HOME, OR GET ALONG WITH OTHER PEOPLE?: VERY DIFFICULT
7. FEELING AFRAID AS IF SOMETHING AWFUL MIGHT HAPPEN: SEVERAL DAYS
4. TROUBLE RELAXING: MORE THAN HALF THE DAYS
7. FEELING AFRAID AS IF SOMETHING AWFUL MIGHT HAPPEN: SEVERAL DAYS
6. BECOMING EASILY ANNOYED OR IRRITABLE: NEARLY EVERY DAY
IF YOU CHECKED OFF ANY PROBLEMS ON THIS QUESTIONNAIRE, HOW DIFFICULT HAVE THESE PROBLEMS MADE IT FOR YOU TO DO YOUR WORK, TAKE CARE OF THINGS AT HOME, OR GET ALONG WITH OTHER PEOPLE: VERY DIFFICULT
2. NOT BEING ABLE TO STOP OR CONTROL WORRYING: NEARLY EVERY DAY
5. BEING SO RESTLESS THAT IT IS HARD TO SIT STILL: SEVERAL DAYS
1. FEELING NERVOUS, ANXIOUS, OR ON EDGE: NEARLY EVERY DAY
GAD7 TOTAL SCORE: 14
7. FEELING AFRAID AS IF SOMETHING AWFUL MIGHT HAPPEN: SEVERAL DAYS
3. WORRYING TOO MUCH ABOUT DIFFERENT THINGS: SEVERAL DAYS
7. FEELING AFRAID AS IF SOMETHING AWFUL MIGHT HAPPEN: SEVERAL DAYS
GAD7 TOTAL SCORE: 14
3. WORRYING TOO MUCH ABOUT DIFFERENT THINGS: SEVERAL DAYS
2. NOT BEING ABLE TO STOP OR CONTROL WORRYING: NEARLY EVERY DAY
GAD7 TOTAL SCORE: 14
4. TROUBLE RELAXING: MORE THAN HALF THE DAYS
GAD7 TOTAL SCORE: 14

## 2024-05-22 ASSESSMENT — PATIENT HEALTH QUESTIONNAIRE - PHQ9
SUM OF ALL RESPONSES TO PHQ QUESTIONS 1-9: 11
SUM OF ALL RESPONSES TO PHQ QUESTIONS 1-9: 11
10. IF YOU CHECKED OFF ANY PROBLEMS, HOW DIFFICULT HAVE THESE PROBLEMS MADE IT FOR YOU TO DO YOUR WORK, TAKE CARE OF THINGS AT HOME, OR GET ALONG WITH OTHER PEOPLE: VERY DIFFICULT

## 2024-05-22 ASSESSMENT — PAIN SCALES - GENERAL: PAINLEVEL: MODERATE PAIN (4)

## 2024-05-22 NOTE — Clinical Note
Naga Aburto,  Can you order an EKG? We aren't able to order them at our clinics. Routine screening for being on stimulants. Thank you!  Barney

## 2024-05-22 NOTE — PROGRESS NOTES
Virtual Visit Details    Type of service:  Video Visit     Originating Location (pt. Location): Home    Distant Location (provider location):  On-site  Platform used for Video Visit: [facility] Psychiatry Consult Note    IDENTIFICATION   Name: Tavo Key   : 1980/44 year old      Sex:    @ male          Telemedicine Visit: The patient's condition can be safely assessed and treated via synchronous audio and visual telemedicine encounter.        Face to Face/patient Contact total time: 23 minutes  Pre Charting time: 5 minutes; Post charting time, communication and other activities: 1 minutes; Total time 29 minutes  2:10PM -2:33 PM          SUBJECTIVE     History of Present Illness  The patient presents via virtual visit for evaluation of multiple medical concerns.    The patient reports an exacerbation of her irritability, characterized by frequent anger and yelling, accompanied by suicidal ideation. These episodes, which have been disrupting his social interactions, have intensified over the past week. Prior to this, his  condition was significantly better.  He attributes her irritability to depression, noting that her depressive symptoms ceased approximately 2 weeks ago, but prior to that, he experienced significant last depression for a duration of 1 month.     He denies any manic symptoms since has last visit a few months ago, but does report disruptive sleep, which he attributes to anxiety and overdrive. denies any side effects from his medications, including drowsiness or dizziness. Her current medication regimen includes lithium 150 mg (2 450 mg pills) and 150 mg (1 150 mg pill).  He finds Dexedrine 15 mg effective for focus, rating her focus at 7 out of 10.  Has distractibility varies, with some days being hyper-focused while others can be easily distracted. Her insurance has remained stable, and she is anticipating a change in her work provider in 2024.  He takes lamotrigine 200  mg daily, which he believes is effective, although she experienced a significant decrease in symptoms.  He believes lamotrigine was beneficial in 2010 or 2011 when he was going through a divorce, which exacerbated her mental health.  He takes hydroxyzine daily and clonazepam approximately twice a week.  His primary care provider prescribed Xanax for an MRI, but not for long-term use.    Supplemental Information  His blood sugars have been higher. They have been a constant silva to get them under control and then they go up again. They have to increase the insulin which works and then it goes up again. She has been working with [facility] with medication management pretty frequently.        The following assessments were completed by patient for this visit:  PROMIS 10-Global Health (only subscores and total score):       10/31/2023    10:24 PM 11/14/2023     5:56 AM 2/29/2024     5:00 PM   PROMIS-10 Scores Only   Global Mental Health Score 7 10 10   Global Physical Health Score 17 13 16   PROMIS TOTAL - SUBSCORES 24 23 26             2/6/2024    10:47 AM 4/15/2024     8:28 AM 5/22/2024     1:15 PM   PHQ   PHQ-9 Total Score 11 13 11   Q9: Thoughts of better off dead/self-harm past 2 weeks Not at all Not at all Not at all          12/26/2023    10:37 AM 2/6/2024    10:45 AM 5/22/2024     1:15 PM   MATTI-7 SCORE   Total Score 15 (severe anxiety) 10 (moderate anxiety) 14 (moderate anxiety)   Total Score 15    15 10    10 14    14        OBJECTIVE     Vital Signs:   There were no vitals taken for this visit.    Labs:    Results            Current Medications:  Current Outpatient Medications   Medication Sig Dispense Refill    clonazePAM (KLONOPIN) 0.5 MG tablet Take 1 tablet (0.5 mg) by mouth daily as needed for anxiety 15 tablet 3    dextroamphetamine (DEXEDRINE SPANSULE) 15 MG 24 hr capsule Take 1 capsule (15 mg) by mouth daily Take along with a 5 mg pill for a total of 20 mg daily 30 capsule 0    dextroamphetamine  (DEXEDRINE SPANSULE) 15 MG 24 hr capsule Take 1 capsule (15 mg) by mouth daily Take along with a 5 mg pill for a total of 20 mg daily 30 capsule 0    acetaminophen (TYLENOL) 500 MG tablet Take 1,000 mg by mouth 3 times daily as needed for mild pain      albuterol (PROAIR HFA/PROVENTIL HFA/VENTOLIN HFA) 108 (90 Base) MCG/ACT inhaler Inhale 2 puffs into the lungs every 4 hours as needed for shortness of breath or wheezing 18 g 5    Alcohol Swabs (B-D SINGLE USE SWABS REGULAR) PADS USE TO SWAB AREA OF INJECTION/FREIDA AS DIRECTED. 100 each 3    ALPRAZolam (XANAX) 1 MG tablet 1/2 to 1 tablet, 30 minutes before MRI 3 tablet 0    amLODIPine (NORVASC) 10 MG tablet TAKE 1 TABLET (10 MG) BY MOUTH DAILY 90 tablet 1    aspirin (ASA) 81 MG EC tablet Take 1 tablet (81 mg) by mouth daily      atorvastatin (LIPITOR) 20 MG tablet +++NEED APPOINTMENT+++TAKE ONE TABLET BY MOUTH EVERY NIGHT AT BEDTIME 90 tablet 0    clotrimazole-betamethasone (LOTRISONE) 1-0.05 % external cream Apply topically 2 times daily Angles of mouth 45 g 3    Continuous Blood Gluc Sensor (FREESTYLE ALDEN 3 SENSOR) MISC USE AND CHANGE 1 SENSOR EVERY 14 DAYS 2 each 5    dextroamphetamine (DEXEDRINE SPANSULE) 5 MG 24 hr capsule Take 1 capsule (5 mg) by mouth daily Take along with a 15 mg pill for a total of 20 mg daily (Patient not taking: Reported on 5/22/2024) 30 capsule 0    dextroamphetamine (DEXEDRINE SPANSULE) 5 MG 24 hr capsule Take 1 capsule (5 mg) by mouth daily Take along with a 15 mg pill for a total of 20 mg daily (Patient not taking: Reported on 5/22/2024) 30 capsule 0    esomeprazole (NEXIUM) 40 MG DR capsule Take 1 capsule (40 mg) by mouth every morning (before breakfast) Take 30-60 minutes before eating.      fluticasone (FLONASE) 50 MCG/ACT nasal spray Spray 1 spray into both nostrils daily (Patient taking differently: Spray 1 spray into both nostrils daily as needed for rhinitis or allergies) 16 g 11    gabapentin (NEURONTIN) 100 MG capsule Take 1  capsule (100 mg) by mouth 3 times daily 270 capsule 1    hydrOXYzine (VISTARIL) 50 MG capsule Take 1 capsule (50 mg) by mouth 3 times daily Take an additional tablet a day as needed for anxiety 120 capsule 5    insulin aspart (NOVOLOG FLEXPEN) 100 UNIT/ML pen Inject subcutaneously 10 units before breakfast, 10 units before lunch and 30 units before supper. 30 mL 1    insulin degludec (TRESIBA FLEXTOUCH) 100 UNIT/ML pen Inject 34 Units Subcutaneous daily Increase dose by 2 units every three days until fasting blood sugar is between  mg/dL. Max 46 units/day. 45 mL 1    insulin pen needle (PENTIPS) 31G X 5 MM miscellaneous USE 4 PEN NEEDLES DAILY OR AS DIRECTED. 400 each 0    lamoTRIgine (LAMICTAL) 200 MG tablet Take 1 tablet (200 mg) by mouth daily 90 tablet 1    lithium (ESKALITH CR/LITHOBID) 450 MG CR tablet Take 2 tablets (900 mg) by mouth at bedtime Take along with a 150 mg lithium capsule for a total of 1050 mg nightly 60 tablet 3    lithium (ESKALITH) 150 MG capsule Take 1 capsule (150 mg) by mouth at bedtime Take along with two 450 mg extended release pills for a total of 1050 mg nightly 30 capsule 3    losartan (COZAAR) 50 MG tablet TAKE 1 TABLET (50 MG) BY MOUTH DAILY 90 tablet 2    naratriptan (AMERGE) 2.5 MG tablet TAKE 1 TABLET (2.5 MG) BY MOUTH AT ONSET OF HEADACHE FOR MIGRAINE MAY REPEAT IN 4 HOURS. MAX 2 TABLETS/24 HOURS. 12 tablet 5    propranolol (INDERAL) 80 MG tablet Take 1 tablet (80 mg) by mouth 2 times daily 180 tablet 1    sildenafil (REVATIO) 20 MG tablet Take 1-5 tablets ( mg) by mouth daily as needed (erectile dysfunction.) 30 tablet 11    testosterone (ANDROGEL 1.62 % PUMP) 20.25 MG/ACT gel Place 1 Pump onto the skin daily Apply from dispenser to clean, dry, intact skin of the upper arms and shoulders. 75 g 5    TRULICITY 3 MG/0.5ML SOPN INJECT 3 MG SUBCUTANEOUSLY ONCE A WEEK 2 mL 3     No current facility-administered medications for this visit.            ADDED HISTORY   Took  Ritalin as a child.     Physical exam    Physical Exam          MENTAL STATUS EXAMINATION:   Appearance: Intact attention to grooming and hygiene, casual garb  Attitude:  cooperative   Eye Contact:  Good  Gait and Station: sitting  Psychomotor Behavior: Within normal limits  Oriented to: Grossly person place and time  Attention Span and Concentration: Grossly intact   Speech:  mildly depressed tone  Language: English  Mood:  sad   Affect: Constricted  Associations:  no loose associations  Thought Process:  logical, linear and goal oriented  Thought Content: No evidence of delusions or suicidal or homicidal ideation plan or intent  Memory: Grossly intact  Fund of Knowledge: Good  Insight:  good  Judgment:  intact, adequate for safety  Impulse Control:  intact        DIAGNOSES:   By history Bipolar disorder type I  By history, ADHD inattentive type  By history Social Anxiety Disorder  By history Generalized Anxiety Disorder      ASSESSMENT:   Patient currently undergoing depression with history of depression, rush, ADHD, anxiety and adverse childhood events.   Sx improving but challenges remain with depression, ADHD medications.     Today Tavo Key reports no suicidal ideations. In addition, he has notable risk factors for self-harm, including anxiety, 1 suicide attempt. However, risk is mitigated by Buddhist beliefs, history of seeking help when needed, and support. Therefore, based on all available evidence including the factors cited above, he does not appear to be at imminent risk for self-harm, does not meet criteria for a 72-hr hold, and therefore remains appropriate for ongoing outpatient level of care.       PLAN:     Patient advised of consultative model. Patient will continue to be seen for ongoing consultation and stabilization.  Does not meet criteria for involuntary treatment or hospitalization  Start Dexedrine Spansule 11/1/2023 15 mg daily efficacy with no significant side effects, fogginess =>  2/6/24 20 mg daily efficacious 5 mg to make 20 mg not affordable => 5/22/24 focalin XR 20 mg qam-Risks, benefits and alternatives discussed.  Patient provides verbal consent to treatment.  Continue lamotrigine 200 mg daily bouts of depression with irritability => 5/22/14 250 mg daily-Risks, benefits and alternatives discussed.  Patient provides verbal consent to treatment.  Continue lithium extended release 450 mg daily level low => 12/26/23 900 mg at bedtime lithium level 0.54 efficacy with no side effects =>- 2/6/2024 1050 mg nightly-risks, benefits and alternatives discussed.  Patient provides verbal consent  Restart clonazepam 12/26/2023 0.5 mg p.o. daily as needed anxiety/anger-Risks, benefits and alternatives discussed.  Patient provides verbal consent to treatment.  Advised of dependence, addiction, car driving and memory loss risks. 15/month initially  Hydroxyzine 50 mg p.o. 3 times daily with additional 50 mg as needed anxiety-provides well consent to treatment  Propranolol for migraine/headache prevention  Dc'd cariprazine (elevated blood sugars)  Labs - reviewed, follow-up lithium level  Therapy upcoming  Return in 4 weeks    Assessment & Plan  1. Depression and irritability.  A lithium level test will be conducted, to be conducted 12 hours post the last dose of lithium. The patient's thyroid will be monitored every 6 to 12 months while on lithium, typically once stable, annually. The dosage of lamotrigine will be increased to 250 mg, and if insufficient, the dosage will be increased to 300 mg. Lithium refills will be provided. The patient's clonazepam prescription will be refilled. Dexedrine will be removed, and Focalin extended release 20 mg once daily will be prescribed. The potential side effects, risks, and benefits of Focalin have been discussed with the patient. An EKG will be conducted through the patient's primary care provider.    Follow-up  The patient is scheduled for a follow-up visit in 6  weeks.      Administrative Billing:   Time spent with patient was greater than 50% of time and/or significant time was spent in counseling and coordination of care regarding above diagnoses and treatment plan. Pre charting time and post charting time/documentation/coordination are done on date of service.      Signed:   Barney Colon M.D.  [facility] Psychiatry Service    Disclaimer: This note consists of symbols derived from keyboarding, dictation and/or voice recognition software. As a result, there may be errors in the script that have gone undetected. Please consider this when interpreting information found in this chart.    Consent was obtained from the patient to use an AI documentation tool in the creation of this note.     eoc

## 2024-05-22 NOTE — PROGRESS NOTES
[facility]                                     Progress Note    Patient Name: Tavo Key  Date: 5/22/2024         Service Type: Individual      Session Start Time: 12:00PM  Session End Time: 12:55PM     Session Length: 55 minutes    Session #: 13    Attendees: Client attended alone    Service Modality:  Video Visit:      Provider verified identity through the following two step process.  Patient provided:  Patient is known previously to provider    Telemedicine Visit: The patient's condition can be safely assessed and treated via synchronous audio and visual telemedicine encounter.      Reason for Telemedicine Visit: Patient has requested telehealth visit    Originating Site (Patient Location): Patient's home    Distant Site (Provider Location): Provider Remote Setting- Home Office    Consent:  The patient/guardian has verbally consented to: the potential risks and benefits of telemedicine (video visit) versus in person care; bill my insurance or make self-payment for services provided; and responsibility for payment of non-covered services.     Patient would like the video invitation sent by:  My Chart    Mode of Communication:  Video Conference via [facility]    Distant Location (Provider):  Off-site    As the provider I attest to compliance with applicable laws and regulations related to telemedicine.    DATA  Extended Session (53+ minutes): PROLONGED SERVICE IN THE OUTPATIENT SETTING REQUIRING DIRECT (FACE-TO-FACE) PATIENT CONTACT BEYOND THE USUAL SERVICE:    - Longer session due to limited access to mental health appointments and necessity to address patient's distress / complexity  Interactive Complexity: No  Crisis: No        Progress Since Last Session (Related to Symptoms / Goals / Homework):   Symptoms: Worsening new concern    Homework: Partially completed      Episode of Care Goals: Minimal progress - PREPARATION (Decided to change - considering how); Intervened by negotiating a  change plan and determining options / strategies for behavior change, identifying triggers, exploring social supports, and working towards setting a date to begin behavior change       Current / Ongoing Stressors and Concerns:   Client has had increased irritability and attributes it to stress. He has a challenging client he got frustrated about earlier today. Says his irritability feels more intense than previously. Will be meeting  with his Psychiatrist later today and may need to tweek his medication. He is working on things with Mindy which is also stressful because he feels guarded. He is unclear about where their intimacy is and may need to clarify some things. Also to be evaluating if this relationship is healthy for him in general given the communication challenges. He hopes they can ultimately be friends if things don't work out romantically. Client is doing PT for his back pain. Was told to walk 10 minutes 3 times a day.     Treatment Objective(s) Addressed in This Session:   identify coping strategies for more effectively managing Bipolar Disorder  Work on managing his diabetes     Intervention:   CBT: Cognitive restructuring  Solution Focused: prep for move    Assessments completed prior to visit:  The following assessments were completed by patient for this visit:  PROMIS 10-Global Health (all questions and answers displayed):       10/31/2023    10:24 PM 11/14/2023     5:56 AM 2/29/2024     5:00 PM   PROMIS 10   In general, would you say your health is: Good Fair    In general, would you say your quality of life is: Good Good    In general, how would you rate your physical health? Good Fair    In general, how would you rate your mental health, including your mood and your ability to think? Fair Good    In general, how would you rate your satisfaction with your social activities and relationships? Poor Fair    In general, please rate how well you carry out your usual social activities and roles Fair  Good    To what extent are you able to carry out your everyday physical activities such as walking, climbing stairs, carrying groceries, or moving a chair? Completely Completely    In the past 7 days, how often have you been bothered by emotional problems such as feeling anxious, depressed, or irritable? Always Often    In the past 7 days, how would you rate your fatigue on average? Mild Severe    In the past 7 days, how would you rate your pain on average, where 0 means no pain, and 10 means worst imaginable pain? 0 2    In general, would you say your health is: 3 2 3   In general, would you say your quality of life is: 3 3 3   In general, how would you rate your physical health? 3 2 3   In general, how would you rate your mental health, including your mood and your ability to think? 2 3 2   In general, how would you rate your satisfaction with your social activities and relationships? 1 2 3   In general, please rate how well you carry out your usual social activities and roles. (This includes activities at home, at work and in your community, and responsibilities as a parent, child, spouse, employee, friend, etc.) 2 3 4   To what extent are you able to carry out your everyday physical activities such as walking, climbing stairs, carrying groceries, or moving a chair? 5 5 4   In the past 7 days, how often have you been bothered by emotional problems such as feeling anxious, depressed, or irritable? 5 4 4   In the past 7 days, how would you rate your fatigue on average? 2 4 2   In the past 7 days, how would you rate your pain on average, where 0 means no pain, and 10 means worst imaginable pain? 0 2 0   Global Mental Health Score 7 10 10   Global Physical Health Score 17 13 16   PROMIS TOTAL - SUBSCORES 24 23 26     PROMIS 10-Global Health (only subscores and total score):       10/31/2023    10:24 PM 11/14/2023     5:56 AM 2/29/2024     5:00 PM   PROMIS-10 Scores Only   Global Mental Health Score 7 10 10   Global  Physical Health Score 17 13 16   PROMIS TOTAL - SUBSCORES 24 23 26         ASSESSMENT: Current Emotional / Mental Status (status of significant symptoms):   Risk status (Self / Other harm or suicidal ideation)   Patient denies current fears or concerns for personal safety.   Patient denies current or recent suicidal ideation or behaviors.   Patient denies current or recent homicidal ideation or behaviors.   Patient denies current or recent self injurious behavior or ideation.   Patient denies other safety concerns.   Patient reports there has been no change in risk factors since their last session.     Patient reports there has been no change in protective factors since their last session.     Recommended that patient call 911 or go to the local ED should there be a change in any of these risk factors.     Appearance:   Appropriate    Eye Contact:   Good    Psychomotor Behavior: Normal    Attitude:   Cooperative  Friendly   Orientation:   All   Speech    Rate / Production: Normal     Volume:  Normal    Mood:    Anxious  Irritable    Affect:    Appropriate    Thought Content:  Clear    Thought Form:  Coherent  Logical    Insight:    Good      Medication Review:   No changes to current psychiatric medication(s)     Medication Compliance:   Yes     Changes in Health Issues:   None reported     Chemical Use Review:   Substance Use: Chemical use reviewed, no active concerns identified      Tobacco Use: No current tobacco use.      Diagnosis:  1. Bipolar 1 disorder (H)      Collateral Reports Completed:   Not Applicable    PLAN: (Patient Tasks / Therapist Tasks / Other)  Homework: Continue with clarifying conversations with Mindy. Work on PT and adjust/use medication as needed.      Shanelle Caruso, REYNOLD                                                         _________________________________________________________________________________________________________________________                                             Individual Treatment Plan    Patient's Name: Tavo Key  YOB: 1980    Date of Creation: 1/2/2024  Date Treatment Plan Last Reviewed/Revised: 5/22/2024    DSM5 Diagnoses: 296.52 Bipolar I Disorder Current or Most Recent Episode Depressed, Moderate  Psychosocial / Contextual Factors: Trauma hx, health, work  PROMIS (reviewed every 90 days): 2/29/2024 Score: 26    Referral / Collaboration:  Referral to another professional/service is not indicated at this time. EMDR has been presented and client will research further.    Anticipated number of session for this episode of care: 9-12 sessions  Anticipation frequency of session: Biweekly  Anticipated Duration of each session: 53 or more minutes  Treatment plan will be reviewed in 90 days or when goals have been changed.       MeasurableTreatment Goal(s) related to diagnosis / functional impairment(s)  Goal 1: Patient will lower PHQ9 score to 3 or below.    I will know I've met my goal when I'm less irritable.      Objective #A (Patient Action)    Patient will identify stress management strategies for more effectively managing Bipolar Disorder.  Status: Continued - Date(s):5/22/2024     Intervention(s)  Therapist will teach emotional recognition/identification.      Objective #B  Patient will Identify negative self-talk and behaviors: challenge core beliefs, myths, and actions.  Status: Continued - Date(s):5/22/2024     Intervention(s)  Therapist will  help client work on cognitive restructuring .    Objective #C  Patient will  increase exercise .  Status: Continued - Date(s): 5/22/2024    Intervention(s)  Therapist will assign homework to increase level of activity especially aerobic .      Patient has reviewed and agreed to the above plan.      Shanelle Caruso, REYNOLD  May 22, 2024

## 2024-05-22 NOTE — PATIENT INSTRUCTIONS
"Patient Education   Collaborative Care Psychiatry Service  What to Expect  Here's what to expect from your Collaborative Care Psychiatry Service (CCPS).   About CCPS  CCPS means 2 people work together to help you get better. You'll meet with a behavioral health clinician and a psychiatric doctor. A behavioral health clinician helps people with mental health problems by talking with them. A psychiatric doctor helps people by giving them medicine.  How it works  At every visit, you'll see the behavioral health clinician (BHC) first. They'll talk with you about how you're doing and teach you how to feel better.   Then you'll see the psychiatric doctor. This doctor can help you deal with troubling thoughts and feelings by giving you medicine. They'll make sure you know the plan for your care.   CCPS usually takes 3 to 6 visits. If you need more visits, we may have you start seeing a different psychiatric doctor for ongoing care.  If you have any questions or concerns, we'll be glad to talk with you.  About visits  Be open  At your visits, please talk openly about your problems. It may feel hard, but it's the best way for us to help you.  Cancelling visits  If you can't come to your visit, please call us right away at [phone]. If you don't cancel at least 24 hours (1 full day) before your visit, that's \"late cancellation.\"  Being late to visits  Being very late is the same as not showing up. You will be a \"no show\" if:  Your appointment starts with a BHC, and you're more than 15 minutes late for a 30-minute (half hour) visit. This will also cancel your appointment with the psychiatric doctor.  Your appointment is with a psychiatric doctor only, and you're more than 15 minutes late for a 30-minute (half hour) visit.  Your appointment is with a psychiatric doctor only, and you're more than 30 minutes late for a 60-minute (full hour) visit.  If you cancel late or don't show up 2 times within 6 months, we may end your " care.   Getting help between visits  If you need help between visits, you can call us Monday to Friday from 8 a.m. to 4:30 p.m. at [phone].  Emergency care  Call 911 or go to the nearest emergency department if your life or someone else's life is in danger.  Call 988 anytime to reach the national Suicide and Crisis hotline.  Medicine refills  To refill your medicine, call your pharmacy. You can also call [facility]'s [facility] at [phone], Monday to Friday, 8 a.m. to 4:30 p.m. It can take 1 to 3 business days to get a refill.   Forms, letters, and tests  You may have papers to fill out, like FMLA, short-term disability, and workability. We can help you with these forms at your visits, but you must have an appointment. You may need more than 1 visit for this, to be in an intensive therapy program, or both.  Before we can give you medicine for ADHD, we may refer you to get tested for it or confirm it another way.  We may not be able to give you an emotional support animal letter.  We don't do mental health checks ordered by the court.   We don't do mental health testing, but we can refer you to get tested.   Thank you for choosing us for your care.  For informational purposes only. Not to replace the advice of your health care provider. Copyright   2022 [facility]. All rights reserved. [employer] [id number] - 12/22.

## 2024-05-22 NOTE — NURSING NOTE
Is the patient currently in the state of [place]? YES    Visit mode:VIDEO    If the visit is dropped, the patient can be reconnected by: VIDEO VISIT: Text to cell phone:   Telephone Information:   Mobile [phone]       Will anyone else be joining the visit? NO  (If patient encounters technical issues they should call [phone]    How would you like to obtain your AVS? MyChart    Are changes needed to the allergy or medication list? No    Are refills needed on medications prescribed by this physician? YES  dextroamphetamine (DEXEDRINE SPANSULE) 15 MG 24 hr capsule     Pt has not been taking the 5mg dose of this medication due to price.     Reason for visit: RECHECK    Linn GOYAL

## 2024-05-23 ENCOUNTER — PATIENT OUTREACH (OUTPATIENT)
Dept: CARE COORDINATION | Facility: CLINIC | Age: 44
End: 2024-05-23

## 2024-05-23 DIAGNOSIS — F31.62 BIPOLAR MIXED AFFECTIVE DISORDER, MODERATE (H): Primary | ICD-10-CM

## 2024-05-29 ENCOUNTER — VIRTUAL VISIT (OUTPATIENT)
Dept: PSYCHOLOGY | Facility: CLINIC | Age: 44
End: 2024-05-29
Payer: COMMERCIAL

## 2024-05-29 DIAGNOSIS — F31.9 BIPOLAR 1 DISORDER (H): Primary | ICD-10-CM

## 2024-05-29 PROCEDURE — 90837 PSYTX W PT 60 MINUTES: CPT | Mod: 95 | Performed by: MARRIAGE & FAMILY THERAPIST

## 2024-05-29 NOTE — PROGRESS NOTES
[facility]                                     Progress Note    Patient Name: Tavo Key  Date: 5/29/2024         Service Type: Individual      Session Start Time: 11:00AM  Session End Time: 11:55AM     Session Length: 55 minutes    Session #: 14    Attendees: Client attended alone    Service Modality:  Video Visit:      Provider verified identity through the following two step process.  Patient provided:  Patient is known previously to provider    Telemedicine Visit: The patient's condition can be safely assessed and treated via synchronous audio and visual telemedicine encounter.      Reason for Telemedicine Visit: Patient has requested telehealth visit    Originating Site (Patient Location): Patient's home    Distant Site (Provider Location): Provider Remote Setting- Home Office    Consent:  The patient/guardian has verbally consented to: the potential risks and benefits of telemedicine (video visit) versus in person care; bill my insurance or make self-payment for services provided; and responsibility for payment of non-covered services.     Patient would like the video invitation sent by:  My Chart    Mode of Communication:  Video Conference via [facility]    Distant Location (Provider):  Off-site    As the provider I attest to compliance with applicable laws and regulations related to telemedicine.    DATA  Extended Session (53+ minutes): PROLONGED SERVICE IN THE OUTPATIENT SETTING REQUIRING DIRECT (FACE-TO-FACE) PATIENT CONTACT BEYOND THE USUAL SERVICE:    - Longer session due to limited access to mental health appointments and necessity to address patient's distress / complexity  Interactive Complexity: No  Crisis: No        Progress Since Last Session (Related to Symptoms / Goals / Homework):   Symptoms: Worsening new concern    Homework: Partially completed      Episode of Care Goals: Minimal progress - PREPARATION (Decided to change - considering how); Intervened by negotiating a  change plan and determining options / strategies for behavior change, identifying triggers, exploring social supports, and working towards setting a date to begin behavior change       Current / Ongoing Stressors and Concerns:   Client is feeling increasingly depressed and experienced symptoms of derealization over the weekend. Says he had something similar when he went through his divorce. He feels the persistent stress over the last 3 months or longer is contributing to current low mood. He has little motivation for work or socializing. Seems to generally not care which is unusual for him especially socially. Has not made the med changes recommended from last doctor visit. No suicidal ideation but did review plan to go to [facility] if symptoms worsen. Client has spoken to his wife about his current symptoms and he will alert her to any changes in how he's thinking and feeling. Talked about using grounding strategies such as walking in the grass barefoot, counting, etc. Discussed DBT as possible treatment modality to explore in lieu of EMDR. Also encouraged client to reflect back on all the trauma he has survived to reassure himself that he will get through this difficult time as well. Will discontinue sleep gummies for now and follow up with medical team.     Treatment Objective(s) Addressed in This Session:   identify coping strategies for more effectively managing Bipolar Disorder  Work on managing his diabetes     Intervention:   CBT: Cognitive restructuring  Solution Focused: prep for move    Assessments completed prior to visit:  The following assessments were completed by patient for this visit:  PROMIS 10-Global Health (all questions and answers displayed):       10/31/2023    10:24 PM 11/14/2023     5:56 AM 2/29/2024     5:00 PM   PROMIS 10   In general, would you say your health is: Good Fair    In general, would you say your quality of life is: Good Good    In general, how would you rate your physical  health? Good Fair    In general, how would you rate your mental health, including your mood and your ability to think? Fair Good    In general, how would you rate your satisfaction with your social activities and relationships? Poor Fair    In general, please rate how well you carry out your usual social activities and roles Fair Good    To what extent are you able to carry out your everyday physical activities such as walking, climbing stairs, carrying groceries, or moving a chair? Completely Completely    In the past 7 days, how often have you been bothered by emotional problems such as feeling anxious, depressed, or irritable? Always Often    In the past 7 days, how would you rate your fatigue on average? Mild Severe    In the past 7 days, how would you rate your pain on average, where 0 means no pain, and 10 means worst imaginable pain? 0 2    In general, would you say your health is: 3 2 3   In general, would you say your quality of life is: 3 3 3   In general, how would you rate your physical health? 3 2 3   In general, how would you rate your mental health, including your mood and your ability to think? 2 3 2   In general, how would you rate your satisfaction with your social activities and relationships? 1 2 3   In general, please rate how well you carry out your usual social activities and roles. (This includes activities at home, at work and in your community, and responsibilities as a parent, child, spouse, employee, friend, etc.) 2 3 4   To what extent are you able to carry out your everyday physical activities such as walking, climbing stairs, carrying groceries, or moving a chair? 5 5 4   In the past 7 days, how often have you been bothered by emotional problems such as feeling anxious, depressed, or irritable? 5 4 4   In the past 7 days, how would you rate your fatigue on average? 2 4 2   In the past 7 days, how would you rate your pain on average, where 0 means no pain, and 10 means worst imaginable  pain? 0 2 0   Global Mental Health Score 7 10 10   Global Physical Health Score 17 13 16   PROMIS TOTAL - SUBSCORES 24 23 26     PROMIS 10-Global Health (only subscores and total score):       10/31/2023    10:24 PM 11/14/2023     5:56 AM 2/29/2024     5:00 PM   PROMIS-10 Scores Only   Global Mental Health Score 7 10 10   Global Physical Health Score 17 13 16   PROMIS TOTAL - SUBSCORES 24 23 26         ASSESSMENT: Current Emotional / Mental Status (status of significant symptoms):   Risk status (Self / Other harm or suicidal ideation)   Patient denies current fears or concerns for personal safety.   Patient denies current or recent suicidal ideation or behaviors.   Patient denies current or recent homicidal ideation or behaviors.   Patient denies current or recent self injurious behavior or ideation.   Patient denies other safety concerns.   Patient reports there has been no change in risk factors since their last session.     Patient reports there has been no change in protective factors since their last session.     Recommended that patient call 911 or go to the local ED should there be a change in any of these risk factors.     Appearance:   Appropriate    Eye Contact:   Good    Psychomotor Behavior: Normal    Attitude:   Cooperative  Friendly   Orientation:   All   Speech    Rate / Production: Normal     Volume:  Normal    Mood:    Anxious  Irritable    Affect:    Appropriate    Thought Content:  Clear    Thought Form:  Coherent  Logical    Insight:    Good      Medication Review:   No changes to current psychiatric medication(s)     Medication Compliance:   Yes     Changes in Health Issues:   None reported     Chemical Use Review:   Substance Use: Chemical use reviewed, no active concerns identified      Tobacco Use: No current tobacco use.      Diagnosis:  1. Bipolar 1 disorder (H)      Collateral Reports Completed:   Not Applicable    PLAN: (Patient Tasks / Therapist Tasks / Other)  Homework:Start changes in  medication, refill lithium, discontinue gummies and use grounding strategies.      Shanelle Rashidf, LMFT                                                         _________________________________________________________________________________________________________________________                                            Individual Treatment Plan    Patient's Name: Tavo Key  YOB: 1980    Date of Creation: 1/2/2024  Date Treatment Plan Last Reviewed/Revised: 5/22/2024    DSM5 Diagnoses: 296.52 Bipolar I Disorder Current or Most Recent Episode Depressed, Moderate  Psychosocial / Contextual Factors: Trauma hx, health, work  PROMIS (reviewed every 90 days): 2/29/2024 Score: 26    Referral / Collaboration:  Referral to another professional/service is not indicated at this time. EMDR has been presented and client will research further.    Anticipated number of session for this episode of care: 9-12 sessions  Anticipation frequency of session: Biweekly  Anticipated Duration of each session: 53 or more minutes  Treatment plan will be reviewed in 90 days or when goals have been changed.       MeasurableTreatment Goal(s) related to diagnosis / functional impairment(s)  Goal 1: Patient will lower PHQ9 score to 3 or below.    I will know I've met my goal when I'm less irritable.      Objective #A (Patient Action)    Patient will identify stress management strategies for more effectively managing Bipolar Disorder.  Status: Continued - Date(s):5/22/2024     Intervention(s)  Therapist will teach emotional recognition/identification.      Objective #B  Patient will Identify negative self-talk and behaviors: challenge core beliefs, myths, and actions.  Status: Continued - Date(s):5/22/2024     Intervention(s)  Therapist will  help client work on cognitive restructuring .    Objective #C  Patient will  increase exercise .  Status: Continued - Date(s): 5/22/2024    Intervention(s)  Therapist will assign  homework to increase level of activity especially aerobic .      Patient has reviewed and agreed to the above plan.      Shanelle Caruso, LMFT  May 22, 2024

## 2024-06-03 ENCOUNTER — THERAPY VISIT (OUTPATIENT)
Dept: PHYSICAL THERAPY | Facility: CLINIC | Age: 44
End: 2024-06-03
Attending: PHYSICIAN ASSISTANT
Payer: COMMERCIAL

## 2024-06-03 DIAGNOSIS — M54.41 ACUTE BILATERAL LOW BACK PAIN WITH RIGHT-SIDED SCIATICA: Primary | ICD-10-CM

## 2024-06-03 PROCEDURE — 97112 NEUROMUSCULAR REEDUCATION: CPT | Mod: GP | Performed by: PHYSICAL THERAPIST

## 2024-06-03 PROCEDURE — 97110 THERAPEUTIC EXERCISES: CPT | Mod: GP | Performed by: PHYSICAL THERAPIST

## 2024-06-05 ENCOUNTER — VIRTUAL VISIT (OUTPATIENT)
Dept: PSYCHOLOGY | Facility: CLINIC | Age: 44
End: 2024-06-05
Payer: COMMERCIAL

## 2024-06-05 DIAGNOSIS — F31.9 BIPOLAR 1 DISORDER (H): Primary | ICD-10-CM

## 2024-06-05 PROCEDURE — 90837 PSYTX W PT 60 MINUTES: CPT | Mod: 95 | Performed by: MARRIAGE & FAMILY THERAPIST

## 2024-06-05 NOTE — PROGRESS NOTES
[facility]                                     Progress Note    Patient Name: Tavo Key  Date: 6/5/2024         Service Type: Individual      Session Start Time: 11:00AM  Session End Time: 11:57AM     Session Length: 57 minutes    Session #: 15    Attendees: Client attended alone    Service Modality:  Video Visit:      Provider verified identity through the following two step process.  Patient provided:  Patient is known previously to provider    Telemedicine Visit: The patient's condition can be safely assessed and treated via synchronous audio and visual telemedicine encounter.      Reason for Telemedicine Visit: Patient has requested telehealth visit    Originating Site (Patient Location): Patient's home    Distant Site (Provider Location): Provider Remote Setting- Home Office    Consent:  The patient/guardian has verbally consented to: the potential risks and benefits of telemedicine (video visit) versus in person care; bill my insurance or make self-payment for services provided; and responsibility for payment of non-covered services.     Patient would like the video invitation sent by:  My Chart    Mode of Communication:  Video Conference via [facility]    Distant Location (Provider):  Off-site    As the provider I attest to compliance with applicable laws and regulations related to telemedicine.    DATA  Extended Session (53+ minutes): PROLONGED SERVICE IN THE OUTPATIENT SETTING REQUIRING DIRECT (FACE-TO-FACE) PATIENT CONTACT BEYOND THE USUAL SERVICE:    - Longer session due to limited access to mental health appointments and necessity to address patient's distress / complexity  Interactive Complexity: No  Crisis: No        Progress Since Last Session (Related to Symptoms / Goals / Homework):   Symptoms: No change working on triggers    Homework: Partially completed      Episode of Care Goals: Minimal progress - PREPARATION (Decided to change - considering how); Intervened by  negotiating a change plan and determining options / strategies for behavior change, identifying triggers, exploring social supports, and working towards setting a date to begin behavior change     Current / Ongoing Stressors and Concerns:   Client continues to try to manage his emotions which he has experienced as reactive and difficult to manage. He talked about getting upset with his wife last night because she told him she said something about his mood. He felt triggered by thoughts of not being good enough and became very angry and had suicidal thoughts. He recognized that this is typically the trigger that cause him to over react. He discussed it with Natalie and was able to calm down and regulate his emotions. Says he has also done some reading on DBT and is interested in more information. He talks today about the status of his relationship with Mindy which has been stressful for client. He remains unsure if she will be able to meet his needs. He is trying to balance working on things with realizing it may not work out. Client has started his medication changes but says it is too soon to know if the increased lamictal or new ADHD medication is helping. Also has had some insomnia the past 2 days which has not helped with his mood. Client will continue to consider ways to slow down his reactivity and not be so triggered.     Treatment Objective(s) Addressed in This Session:   identify coping strategies for more effectively managing Bipolar Disorder  Work on managing his diabetes     Intervention:   CBT: Cognitive restructuring  Solution Focused: prep for move    Assessments completed prior to visit:  The following assessments were completed by patient for this visit:  PROMIS 10-Global Health (all questions and answers displayed):       10/31/2023    10:24 PM 11/14/2023     5:56 AM 2/29/2024     5:00 PM 6/5/2024     8:00 PM   PROMIS 10   In general, would you say your health is: Good Fair     In general, would you  say your quality of life is: Good Good     In general, how would you rate your physical health? Good Fair     In general, how would you rate your mental health, including your mood and your ability to think? Fair Good     In general, how would you rate your satisfaction with your social activities and relationships? Poor Fair     In general, please rate how well you carry out your usual social activities and roles Fair Good     To what extent are you able to carry out your everyday physical activities such as walking, climbing stairs, carrying groceries, or moving a chair? Completely Completely     In the past 7 days, how often have you been bothered by emotional problems such as feeling anxious, depressed, or irritable? Always Often     In the past 7 days, how would you rate your fatigue on average? Mild Severe     In the past 7 days, how would you rate your pain on average, where 0 means no pain, and 10 means worst imaginable pain? 0 2     In general, would you say your health is: 3 2 3 2   In general, would you say your quality of life is: 3 3 3 3   In general, how would you rate your physical health? 3 2 3 2   In general, how would you rate your mental health, including your mood and your ability to think? 2 3 2 2   In general, how would you rate your satisfaction with your social activities and relationships? 1 2 3 3   In general, please rate how well you carry out your usual social activities and roles. (This includes activities at home, at work and in your community, and responsibilities as a parent, child, spouse, employee, friend, etc.) 2 3 4 3   To what extent are you able to carry out your everyday physical activities such as walking, climbing stairs, carrying groceries, or moving a chair? 5 5 4 4   In the past 7 days, how often have you been bothered by emotional problems such as feeling anxious, depressed, or irritable? 5 4 4 3   In the past 7 days, how would you rate your fatigue on average? 2 4 2 3    In the past 7 days, how would you rate your pain on average, where 0 means no pain, and 10 means worst imaginable pain? 0 2 0 2   Global Mental Health Score 7 10 10 11   Global Physical Health Score 17 13 16 13   PROMIS TOTAL - SUBSCORES 24 23 26 24     PROMIS 10-Global Health (only subscores and total score):       10/31/2023    10:24 PM 11/14/2023     5:56 AM 2/29/2024     5:00 PM 6/5/2024     8:00 PM   PROMIS-10 Scores Only   Global Mental Health Score 7 10 10 11   Global Physical Health Score 17 13 16 13   PROMIS TOTAL - SUBSCORES 24 23 26 24         ASSESSMENT: Current Emotional / Mental Status (status of significant symptoms):   Risk status (Self / Other harm or suicidal ideation)   Patient denies current fears or concerns for personal safety.   Patient denies current or recent suicidal ideation or behaviors.   Patient denies current or recent homicidal ideation or behaviors.   Patient denies current or recent self injurious behavior or ideation.   Patient denies other safety concerns.   Patient reports there has been no change in risk factors since their last session.     Patient reports there has been no change in protective factors since their last session.     Recommended that patient call 911 or go to the local ED should there be a change in any of these risk factors.     Appearance:   Appropriate    Eye Contact:   Good    Psychomotor Behavior: Normal    Attitude:   Cooperative  Friendly   Orientation:   All   Speech    Rate / Production: Normal     Volume:  Normal    Mood:    Anxious  Irritable    Affect:    Appropriate    Thought Content:  Clear    Thought Form:  Coherent  Logical    Insight:    Good      Medication Review:   Changes to psychiatric medications, see updated Medication List in EPIC.      Medication Compliance:   Yes     Changes in Health Issues:   None reported     Chemical Use Review:   Substance Use: Chemical use reviewed, no active concerns identified      Tobacco Use: No current  tobacco use.      Diagnosis:  1. Bipolar 1 disorder (H)      Collateral Reports Completed:   Not Applicable    PLAN: (Patient Tasks / Therapist Tasks / Other)  Homework:Continue observing changes with med adjustment and look into DBT.      ShanelleREYNOLD Weller                                                         _________________________________________________________________________________________________________________________                                            Individual Treatment Plan    Patient's Name: Tavo Key  YOB: 1980    Date of Creation: 1/2/2024  Date Treatment Plan Last Reviewed/Revised: 6/5/2024    DSM5 Diagnoses: 296.52 Bipolar I Disorder Current or Most Recent Episode Depressed, Moderate  Psychosocial / Contextual Factors: Trauma hx, health, work  PROMIS (reviewed every 90 days): 6/5/2024 Score: 24    Referral / Collaboration:  Referral to another professional/service is not indicated at this time. EMDR has been presented and client will research further.    Anticipated number of session for this episode of care: 9-12 sessions  Anticipation frequency of session: Biweekly  Anticipated Duration of each session: 53 or more minutes  Treatment plan will be reviewed in 90 days or when goals have been changed.       MeasurableTreatment Goal(s) related to diagnosis / functional impairment(s)  Goal 1: Patient will lower PHQ9 score to 3 or below.    I will know I've met my goal when I'm less irritable.      Objective #A (Patient Action)    Patient will identify stress management strategies for more effectively managing Bipolar Disorder.  Status: Continued - Date(s): 6/5/2024     Intervention(s)  Therapist will teach emotional recognition/identification.      Objective #B  Patient will Identify negative self-talk and behaviors: challenge core beliefs, myths, and actions.  Status: Continued - Date(s): 6/5/2024     Intervention(s)  Therapist will  help client work on  cognitive restructuring .    Objective #C  Patient will  increase exercise .  Status: Continued - Date(s): 6/5/2024    Intervention(s)  Therapist will assign homework to increase level of activity especially aerobic .      Patient has reviewed and agreed to the above plan.      Shanelle Caruso, [facility]  June 5, 2024

## 2024-06-06 ENCOUNTER — PATIENT OUTREACH (OUTPATIENT)
Dept: CARE COORDINATION | Facility: CLINIC | Age: 44
End: 2024-06-06
Payer: COMMERCIAL

## 2024-06-10 ENCOUNTER — THERAPY VISIT (OUTPATIENT)
Dept: PHYSICAL THERAPY | Facility: CLINIC | Age: 44
End: 2024-06-10
Payer: COMMERCIAL

## 2024-06-10 DIAGNOSIS — M54.41 ACUTE BILATERAL LOW BACK PAIN WITH RIGHT-SIDED SCIATICA: Primary | ICD-10-CM

## 2024-06-10 PROCEDURE — 97112 NEUROMUSCULAR REEDUCATION: CPT | Mod: GP | Performed by: PHYSICAL THERAPIST

## 2024-06-10 PROCEDURE — 97110 THERAPEUTIC EXERCISES: CPT | Mod: GP | Performed by: PHYSICAL THERAPIST

## 2024-06-18 ENCOUNTER — VIRTUAL VISIT (OUTPATIENT)
Dept: PSYCHOLOGY | Facility: CLINIC | Age: 44
End: 2024-06-18
Payer: COMMERCIAL

## 2024-06-18 DIAGNOSIS — F31.9 BIPOLAR 1 DISORDER (H): Primary | ICD-10-CM

## 2024-06-18 PROCEDURE — 90837 PSYTX W PT 60 MINUTES: CPT | Mod: 95 | Performed by: MARRIAGE & FAMILY THERAPIST
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
## 2024-06-27 ENCOUNTER — VIRTUAL VISIT (OUTPATIENT)
Dept: PSYCHOLOGY | Facility: CLINIC | Age: 44
End: 2024-06-27
Payer: COMMERCIAL

## 2024-06-27 DIAGNOSIS — F31.9 BIPOLAR 1 DISORDER (H): Primary | ICD-10-CM

## 2024-06-27 PROCEDURE — 90837 PSYTX W PT 60 MINUTES: CPT | Mod: 95 | Performed by: MARRIAGE & FAMILY THERAPIST

## 2024-06-27 NOTE — PROGRESS NOTES
[facility]                                     Progress Note    Patient Name: Tavo Key  Date: 6/27/2024         Service Type: Individual      Session Start Time: 10:00AM  Session End Time: 10:55AM     Session Length: 55 minutes    Session #: 17    Attendees: Client attended alone    Service Modality:  Video Visit:      Provider verified identity through the following two step process.  Patient provided:  Patient is known previously to provider    Telemedicine Visit: The patient's condition can be safely assessed and treated via synchronous audio and visual telemedicine encounter.      Reason for Telemedicine Visit: Patient has requested telehealth visit    Originating Site (Patient Location): Patient's home    Distant Site (Provider Location): Provider Remote Setting- Home Office    Consent:  The patient/guardian has verbally consented to: the potential risks and benefits of telemedicine (video visit) versus in person care; bill my insurance or make self-payment for services provided; and responsibility for payment of non-covered services.     Patient would like the video invitation sent by:  My Chart    Mode of Communication:  Video Conference via [facility]    Distant Location (Provider):  Off-site    As the provider I attest to compliance with applicable laws and regulations related to telemedicine.    DATA  Extended Session (53+ minutes): PROLONGED SERVICE IN THE OUTPATIENT SETTING REQUIRING DIRECT (FACE-TO-FACE) PATIENT CONTACT BEYOND THE USUAL SERVICE:    - Longer session due to limited access to mental health appointments and necessity to address patient's distress / complexity  Interactive Complexity: No  Crisis: No        Progress Since Last Session (Related to Symptoms / Goals / Homework):   Symptoms: Improving less anxiety    Homework: Partially completed      Episode of Care Goals: Satisfactory progress - ACTION (Actively working towards change); Intervened by reinforcing change  plan / affirming steps taken       Current / Ongoing Stressors and Concerns:   Client is doing better and reports less anxiety in the past week. Stated that work is going better and has been more manageable. He has been working on catching himself before he gets angry which has been working more often as of late. His diabetes has been inconsistent and he finds himself confused by his numbers at times. Talked again about speaking with his doctor or diabetes  to better understand what steps to take. Also talked about things with Mindy which continue to go slowly. She has been affirming of him which feels good but he is concerned about the pace of progress. Talked about the 5 love languages and the possibility of doing this with Mindy.     Treatment Objective(s) Addressed in This Session:   identify coping strategies for more effectively managing Bipolar Disorder  Work on managing his diabetes     Intervention:   CBT: Cognitive restructuring  Solution Focused: prep for move      The following assessments were completed by patient for this visit:    PROMIS 10-Global Health (all questions and answers displayed):       10/31/2023    10:24 PM 11/14/2023     5:56 AM 2/29/2024     5:00 PM 6/5/2024     8:00 PM   PROMIS 10   In general, would you say your health is: Good Fair     In general, would you say your quality of life is: Good Good     In general, how would you rate your physical health? Good Fair     In general, how would you rate your mental health, including your mood and your ability to think? Fair Good     In general, how would you rate your satisfaction with your social activities and relationships? Poor Fair     In general, please rate how well you carry out your usual social activities and roles Fair Good     To what extent are you able to carry out your everyday physical activities such as walking, climbing stairs, carrying groceries, or moving a chair? Completely Completely     In the past 7 days, how  often have you been bothered by emotional problems such as feeling anxious, depressed, or irritable? Always Often     In the past 7 days, how would you rate your fatigue on average? Mild Severe     In the past 7 days, how would you rate your pain on average, where 0 means no pain, and 10 means worst imaginable pain? 0 2     In general, would you say your health is: 3 2 3 2   In general, would you say your quality of life is: 3 3 3 3   In general, how would you rate your physical health? 3 2 3 2   In general, how would you rate your mental health, including your mood and your ability to think? 2 3 2 2   In general, how would you rate your satisfaction with your social activities and relationships? 1 2 3 3   In general, please rate how well you carry out your usual social activities and roles. (This includes activities at home, at work and in your community, and responsibilities as a parent, child, spouse, employee, friend, etc.) 2 3 4 3   To what extent are you able to carry out your everyday physical activities such as walking, climbing stairs, carrying groceries, or moving a chair? 5 5 4 4   In the past 7 days, how often have you been bothered by emotional problems such as feeling anxious, depressed, or irritable? 5 4 4 3   In the past 7 days, how would you rate your fatigue on average? 2 4 2 3   In the past 7 days, how would you rate your pain on average, where 0 means no pain, and 10 means worst imaginable pain? 0 2 0 2   Global Mental Health Score 7 10 10 11   Global Physical Health Score 17 13 16 13   PROMIS TOTAL - SUBSCORES 24 23 26 24     PROMIS 10-Global Health (only subscores and total score):       10/31/2023    10:24 PM 11/14/2023     5:56 AM 2/29/2024     5:00 PM 6/5/2024     8:00 PM   PROMIS-10 Scores Only   Global Mental Health Score 7 10 10 11   Global Physical Health Score 17 13 16 13   PROMIS TOTAL - SUBSCORES 24 23 26 24         ASSESSMENT: Current Emotional / Mental Status (status of significant  symptoms):   Risk status (Self / Other harm or suicidal ideation)   Patient denies current fears or concerns for personal safety.   Patient denies current or recent suicidal ideation or behaviors.   Patient denies current or recent homicidal ideation or behaviors.   Patient denies current or recent self injurious behavior or ideation.   Patient denies other safety concerns.   Patient reports there has been no change in risk factors since their last session.     Patient reports there has been no change in protective factors since their last session.     Recommended that patient call 911 or go to the local ED should there be a change in any of these risk factors.     Appearance:   Appropriate    Eye Contact:   Good    Psychomotor Behavior: Normal    Attitude:   Cooperative  Friendly   Orientation:   All   Speech    Rate / Production: Normal     Volume:  Normal    Mood:    Anxious    Affect:    Appropriate    Thought Content:  Clear    Thought Form:  Coherent  Logical    Insight:    Good      Medication Review:   No changes to current psychiatric medication(s)     Medication Compliance:   Yes     Changes in Health Issues:   None reported     Chemical Use Review:   Substance Use: Chemical use reviewed, no active concerns identified      Tobacco Use: No current tobacco use.      Diagnosis:  1. Bipolar 1 disorder (H)      Collateral Reports Completed:   Not Applicable    PLAN: (Patient Tasks / Therapist Tasks / Other)  Homework:  Continue working through stress and consider working with diabetes  or nutritionist. Also consider looking into the 5 Love Languages.      REYNOLD Vee                                                         _________________________________________________________________________________________________________________________                                            Individual Treatment Plan    Patient's Name: Tavo Key  YOB: 1980    Date of Creation:  1/2/2024  Date Treatment Plan Last Reviewed/Revised: 6/5/2024    DSM5 Diagnoses: 296.52 Bipolar I Disorder Current or Most Recent Episode Depressed, Moderate  Psychosocial / Contextual Factors: Trauma hx, health, work  PROMIS (reviewed every 90 days): 6/5/2024 Score: 24    Referral / Collaboration:  Referral to another professional/service is not indicated at this time. EMDR has been presented and client will research further.    Anticipated number of session for this episode of care: 9-12 sessions  Anticipation frequency of session: Biweekly  Anticipated Duration of each session: 53 or more minutes  Treatment plan will be reviewed in 90 days or when goals have been changed.       MeasurableTreatment Goal(s) related to diagnosis / functional impairment(s)  Goal 1: Patient will lower PHQ9 score to 3 or below.    I will know I've met my goal when I'm less irritable.      Objective #A (Patient Action)    Patient will identify stress management strategies for more effectively managing Bipolar Disorder.  Status: Continued - Date(s): 6/5/2024     Intervention(s)  Therapist will teach emotional recognition/identification.      Objective #B  Patient will Identify negative self-talk and behaviors: challenge core beliefs, myths, and actions.  Status: Continued - Date(s): 6/5/2024     Intervention(s)  Therapist will  help client work on cognitive restructuring .    Objective #C  Patient will  increase exercise .  Status: Continued - Date(s): 6/5/2024    Intervention(s)  Therapist will assign homework to increase level of activity especially aerobic .      Patient has reviewed and agreed to the above plan.      Shanelle Caruso, REYNOLD  June 5, 2024

## 2024-07-02 DIAGNOSIS — E11.9 TYPE 2 DIABETES MELLITUS WITHOUT COMPLICATION, WITHOUT LONG-TERM CURRENT USE OF INSULIN (H): ICD-10-CM

## 2024-07-02 RX ORDER — DULAGLUTIDE 3 MG/.5ML
INJECTION, SOLUTION SUBCUTANEOUS
Qty: 2 ML | Refills: 3 | Status: SHIPPED | OUTPATIENT
Start: 2024-07-02

## 2024-07-05 ENCOUNTER — TELEPHONE (OUTPATIENT)
Dept: FAMILY MEDICINE | Facility: CLINIC | Age: 44
End: 2024-07-05
Payer: COMMERCIAL

## 2024-07-08 ENCOUNTER — MYC REFILL (OUTPATIENT)
Dept: FAMILY MEDICINE | Facility: CLINIC | Age: 44
End: 2024-07-08
Payer: COMMERCIAL

## 2024-07-08 DIAGNOSIS — G43.809 OTHER MIGRAINE WITHOUT STATUS MIGRAINOSUS, NOT INTRACTABLE: ICD-10-CM

## 2024-07-08 RX ORDER — NARATRIPTAN 2.5 MG/1
TABLET ORAL
Qty: 12 TABLET | Refills: 5 | Status: SHIPPED | OUTPATIENT
Start: 2024-07-08

## 2024-07-10 NOTE — TELEPHONE ENCOUNTER
PA Initiation    Medication: TRULICITY 3 MG/0.5ML SC SOPN  Insurance Company: [employer] ([facility]) - Phone [phone] Fax [phone]  Pharmacy Filling the Rx: [facility] - [address]  Filling Pharmacy Phone: [phone]  Filling Pharmacy Fax: [phone]  Start Date: 7/10/2024

## 2024-07-11 ENCOUNTER — TELEPHONE (OUTPATIENT)
Dept: FAMILY MEDICINE | Facility: CLINIC | Age: 44
End: 2024-07-11

## 2024-07-11 NOTE — TELEPHONE ENCOUNTER
Prior Authorization Approval    Medication: TRULICITY 3 MG/0.5ML SC SOPN  Authorization Effective Date: 7/10/2024  Authorization Expiration Date: 7/10/2025  Approved Dose/Quantity:   Reference #:     Insurance Company: [employer] ([facility]) - Phone [phone] Fax [phone]  Expected CoPay: $    CoPay Card Available:      Financial Assistance Needed:   Which Pharmacy is filling the prescription: [facility] - [address]  Pharmacy Notified: YES  Patient Notified: **Instructed pharmacy to notify patient when script is ready to /ship.**

## 2024-07-14 ENCOUNTER — HEALTH MAINTENANCE LETTER (OUTPATIENT)
Age: 44
End: 2024-07-14

## 2024-07-15 NOTE — TELEPHONE ENCOUNTER
Retail Pharmacy Prior Authorization Team   Phone: [phone]    PA Initiation    Medication: FREESTYLE ALDEN 3 SENSOR [facility]  Insurance Company: [employer] ([facility]) - Phone [phone] Fax [phone]  Pharmacy Filling the Rx: [facility]  [address]  Filling Pharmacy Phone: [phone]  Filling Pharmacy Fax:    Start Date: 7/15/2024

## 2024-07-16 ENCOUNTER — VIRTUAL VISIT (OUTPATIENT)
Dept: PSYCHOLOGY | Facility: CLINIC | Age: 44
End: 2024-07-16
Payer: COMMERCIAL

## 2024-07-16 DIAGNOSIS — F31.9 BIPOLAR 1 DISORDER (H): Primary | ICD-10-CM

## 2024-07-16 DIAGNOSIS — E11.9 TYPE 2 DIABETES MELLITUS WITHOUT COMPLICATION, WITHOUT LONG-TERM CURRENT USE OF INSULIN (H): ICD-10-CM

## 2024-07-16 PROCEDURE — 90837 PSYTX W PT 60 MINUTES: CPT | Mod: 95 | Performed by: MARRIAGE & FAMILY THERAPIST

## 2024-07-16 RX ORDER — INSULIN ASPART 100 [IU]/ML
INJECTION, SOLUTION INTRAVENOUS; SUBCUTANEOUS
Qty: 30 ML | Refills: 1 | Status: SHIPPED | OUTPATIENT
Start: 2024-07-16

## 2024-07-16 NOTE — TELEPHONE ENCOUNTER
Prior Authorization Approval    Authorization Effective Date: 7/15/2024  Authorization Expiration Date: 7/15/2025  Medication: Freestyle Nasrin 3-APPROVED  Reference #:     Insurance Company: [employer] ([facility]) - Phone [phone] Fax [phone]  Which Pharmacy is filling the prescription (Not needed for infusion/clinic administered): [facility] - [address]  Pharmacy Notified: Yes  Patient Notified: Instructed pharmacy to notify patient when script is ready to /ship.
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
## 2024-07-22 ENCOUNTER — TELEPHONE (OUTPATIENT)
Dept: FAMILY MEDICINE | Facility: CLINIC | Age: 44
End: 2024-07-22
Payer: COMMERCIAL

## 2024-07-22 ENCOUNTER — OFFICE VISIT (OUTPATIENT)
Dept: PHARMACY | Facility: CLINIC | Age: 44
End: 2024-07-22
Payer: COMMERCIAL

## 2024-07-22 VITALS
HEART RATE: 87 BPM | WEIGHT: 277.4 LBS | BODY MASS INDEX: 42.18 KG/M2 | DIASTOLIC BLOOD PRESSURE: 79 MMHG | SYSTOLIC BLOOD PRESSURE: 112 MMHG

## 2024-07-22 DIAGNOSIS — E11.29 TYPE 2 DIABETES MELLITUS WITH OTHER DIABETIC KIDNEY COMPLICATION (H): Primary | ICD-10-CM

## 2024-07-22 DIAGNOSIS — K21.00 GASTROESOPHAGEAL REFLUX DISEASE WITH ESOPHAGITIS, UNSPECIFIED WHETHER HEMORRHAGE: ICD-10-CM

## 2024-07-22 DIAGNOSIS — E11.9 TYPE 2 DIABETES MELLITUS WITHOUT COMPLICATION, WITHOUT LONG-TERM CURRENT USE OF INSULIN (H): Primary | ICD-10-CM

## 2024-07-22 DIAGNOSIS — G43.809 OTHER MIGRAINE WITHOUT STATUS MIGRAINOSUS, NOT INTRACTABLE: ICD-10-CM

## 2024-07-22 PROCEDURE — 99207 PR NO CHARGE LOS: CPT | Performed by: PHARMACIST

## 2024-07-22 RX ORDER — TIRZEPATIDE 2.5 MG/.5ML
2.5 INJECTION, SOLUTION SUBCUTANEOUS
Qty: 2 ML | Refills: 1 | Status: SHIPPED | OUTPATIENT
Start: 2024-07-22 | End: 2024-09-10

## 2024-07-22 RX ORDER — INSULIN DEGLUDEC 100 U/ML
46 INJECTION, SOLUTION SUBCUTANEOUS DAILY
Qty: 60 ML | Refills: 1 | Status: SHIPPED | OUTPATIENT
Start: 2024-07-22 | End: 2024-07-29

## 2024-07-22 NOTE — TELEPHONE ENCOUNTER
Please initiate a prior authorization    Thank you,  Ashlyn Low, Pharmacy Technician  [facility] [place]

## 2024-07-22 NOTE — TELEPHONE ENCOUNTER
Please initiate a prior authorization    Thank you,  Ashlyn Low, Pharmacy Technician  [facility] [place]

## 2024-07-22 NOTE — Clinical Note
MARIAH MOTA note, thanks!  Madison Wheeler, PharmD Medication Therapy Management Pharmacist [phone]

## 2024-07-22 NOTE — PROGRESS NOTES
Medication Therapy Management (MTM) Encounter    ASSESSMENT:                            Medication Adherence/Access: No issues identified    Diabetes Type 2 Diabetes/Obesity: Type 2 Diabetes/Obesity: Patient is not meeting A1c goal of < 7%, due for update. Time in target is not at goal of > 70% in target range of  mg/dL.  May benefit from increasing basal insulin, as well as changing Trulicity to Mounjaro if covered.    Migraine prevention:  Stable. Has reduced ibuprofen use which is best due to concomitant lithium, he will continue to very minimally use ibuprofen.     GERD:   Stable.    PLAN:                            Check A1C, fasting cholesterol, protein urine test, and you can also get your lithium level done at the same time.  Increase Tresiba to 46 units once daily, increase by 2 units every three days until fasting blood sugar is  mg/dL, maximum of 54 units once daily.  If covered, change Trulicity 3 mg weekly to Mounjaro 2.5 mg weekly.     Follow-up: Return in about 4 weeks (around 8/19/2024).    SUBJECTIVE/OBJECTIVE:                          Tavo Key is a 44 year old male seen for a follow-up visit.       Reason for visit: med review, diabetes follow-up.    Allergies/ADRs: Reviewed in chart  Past Medical History: Reviewed in chart  Tobacco: He reports that he quit smoking about 24 years ago. His smoking use included cigarettes. He has never been exposed to tobacco smoke. He has never used smokeless tobacco.  Alcohol: none/very rarely    Medication Adherence/Access:   Patient uses pill box(es).  Patient takes medications 3 time(s) per day.   Per patient, misses medication 0 times per week.   The patient fills medications at [place]: YES.    Plan from last time:  Stop metformin ER x 2 weeks, see if stomach symptoms improve, if not improved restart, increase up from 500 mg once daily every ~4 days until back at 1000 mg twice daily.    Diabetes   Type 2 Diabetes/Obesity:   Trulicity 3 mg  weekly   Tresiba 44 units once daily x 2 weeks (he had titrated up to this)  Novolog 18 units before breakfast, 18 units before lunch and 18 units before supper  Aspirin 81 mg daily primary prevention    Started walking more last week. Blood sugar has been higher overall though, the walking has helped some. Little bit of nausea in the last week and a half or so - could be attributed to stress/anxiety he thinks, has been on same dose of Trulicity for a long time now. He stopped metformin due to stomach pain, diarrhea, gas, digestive issues, this is much better now. His wife changed to Mounjaro and it's going well for her, he is interested in this - his insurance recenlty changed and will change again, will eventually be going to his wife's plan in the next couple of months.  Trulicity dose reduced from 4.5 mg to 3 mg in the past due to nausea, does have stomach symptoms at baseline.  Reports no side effects.   SMBG:  Nasrin 3      Current diabetes symptoms: none  No symptoms of hypoglycemia in the past, does have alarm on Nasrin 3  Medication history  -Phentermine 18.75 mg daily-tried > 10 years ago, dry mouth  -Victoza worked really well in the past but stopped due to cost   -Copay for Januvia is around $510.   -Patient tried Ozempic previously without benefit but thinks he may not have been on this medication long enough to notice weight loss/glycemic control.   -Metformin -  stomach pain, diarrhea, gas, digestive issues     Eye exam is up to date  Foot exam is up to date  Lab Results   Component Value Date    A1C 7.7 03/06/2024    A1C 6.8 11/10/2023    A1C 6.4 08/21/2023    A1C 9.6 05/17/2023    A1C 6.9 01/25/2023     Migraine prevention:    Propranolol 80 mg twice daily   Naratriptan 2.5 mg as needed  Acetaminophen 1000 mg daily as needed  Ibuprofen 400 mg daily as needed, very sporadically, minimally now    He was unable to change propranolol frequency or to ER formulation due to cost. Is still getting headaches,  tend to be worse at night, which is typical for him.  He has a history of rebound headaches due to acetaminophen and ibuprofen use - he wants to avoid this, needs to use these during the day because Naratriptan causes drowsiness. Trying to limit ibuprofen due to lithium as well.     GERD:   Esomeprazole 40 mg once daily   Patient feels that current regimen is effective.  The patient does notice symptoms if they miss a dose.  Patient has tried a trial off of therapy and is not interested in doing so.    Today's Vitals: /79   Pulse 87   Wt 277 lb 6.4 oz (125.8 kg)   BMI 42.18 kg/m    ----------------      I spent 30 minutes with this patient today. All changes were made via collaborative practice agreement with Cirselda Walsh PA-C. A copy of the visit note was provided to the patient's provider(s).    A summary of these recommendations was given to the patient.    Madison Wheeler, PharmD  Medication Therapy Management Pharmacist           Medication Therapy Recommendations  Diabetes mellitus, type 2 (H)    Current Medication: TRULICITY 3 MG/0.5ML SOPN   Rationale: More effective medication available - Ineffective medication - Effectiveness   Recommendation: Change Medication - Mounjaro 2.5 MG/0.5ML Sopn   Status: Accepted per CPA          Current Medication: insulin degludec (TRESIBA FLEXTOUCH) 100 UNIT/ML pen (Discontinued)   Rationale: Dose too low - Dosage too low - Effectiveness   Recommendation: Increase Dose   Status: Accepted per CPA

## 2024-07-22 NOTE — PATIENT INSTRUCTIONS
"Recommendations from today's MTM visit:                                                         Check A1C, fasting cholesterol, protein urine test, and you can also get your lithium level done at the same time.  Increase Tresiba to 46 units once daily, increase by 2 units every three days until fasting blood sugar is  mg/dL, maximum of 54 units once daily.  If covered, change Trulicity 3 mg weekly to Mounjaro 2.5 mg weekly.     Follow-up: Return in about 4 weeks (around 8/19/2024).    It was great speaking with you today.  I value your experience and would be very thankful for your time in providing feedback in our clinic survey. In the next few days, you may receive an email or text message from [employer] with a link to a survey related to your  clinical pharmacist.\"     To schedule another MTM appointment, please call the clinic directly or you may call the MTM scheduling line at [phone] or toll-free at [phone].     My Clinical Pharmacist's contact information:                                                      Please feel free to contact me with any questions or concerns you have.      Madison Wheeler, PharmD  Medication Therapy Management Pharmacist     "

## 2024-07-24 NOTE — TELEPHONE ENCOUNTER
Prior Authorization Approval    Authorization Effective Date: 7/24/2025  Authorization Expiration Date: 7/24/2025  Medication: Mounjaro 2.5MG  Approved Dose/Quantity:    Reference #:     Insurance Company: [employer] ([facility]) - Phone [phone] Fax [phone]  Expected CoPay:       CoPay Card Available:      Foundation Assistance Needed:    Which Pharmacy is filling the prescription (Not needed for infusion/clinic administered): [facility] - [address] [facility] [facility]  Pharmacy Notified:  Yes  Patient Notified:  **Instructed pharmacy to notify patient when script is ready to /ship.**

## 2024-07-24 NOTE — TELEPHONE ENCOUNTER
Central Prior Authorization Team   Phone: [phone]    PA Initiation    Medication: Mounjaro 2.5MG  Insurance Company: [employer] ([facility]) - Phone [phone] Fax [phone]  Pharmacy Filling the Rx: [facility] - [address] [facility]  Filling Pharmacy Phone: [phone]  Filling Pharmacy Fax:    Start Date: 7/24/2024

## 2024-07-24 NOTE — TELEPHONE ENCOUNTER
Retail Pharmacy Prior Authorization Team   Phone: [phone]    PA Initiation    Medication: TRESIBA FLEXTOUCH 100 UNIT/ML SC SOPN  Insurance Company: [employer] ([facility]) - Phone [phone] Fax [phone]  Pharmacy Filling the Rx: [facility] [place] - [address] [facility] [address] [facility]  Filling Pharmacy Phone: [phone]  Filling Pharmacy Fax:    Start Date: 7/24/2024      Note: Due to record-high volumes, our turn-around time is taking longer than usual . We are currently 6 days behind in the pools.   We are working diligently to submit all requests in a timely manner and in the order they are received. Please only flag TRUE URGENT requests as high priority to the pool at this time.   If you have questions on status of PA's,  please send a note/message in the active PA encounter and send back to the [facility] PA pool [[phone]].    If you have questions about the turn-around time or about our process, please reach out to our supervisor Malgorzata Joyner.   Thank you!   RPPA (Retail Pharmacy Prior Authorization) team

## 2024-07-26 ENCOUNTER — LAB (OUTPATIENT)
Dept: LAB | Facility: CLINIC | Age: 44
End: 2024-07-26
Payer: COMMERCIAL

## 2024-07-26 ENCOUNTER — VIRTUAL VISIT (OUTPATIENT)
Dept: PSYCHIATRY | Facility: CLINIC | Age: 44
End: 2024-07-26
Payer: COMMERCIAL

## 2024-07-26 DIAGNOSIS — F31.30 BIPOLAR I DISORDER, MOST RECENT EPISODE DEPRESSED (H): ICD-10-CM

## 2024-07-26 DIAGNOSIS — F31.62 BIPOLAR MIXED AFFECTIVE DISORDER, MODERATE (H): ICD-10-CM

## 2024-07-26 DIAGNOSIS — F41.9 ANXIETY: Primary | ICD-10-CM

## 2024-07-26 DIAGNOSIS — F90.0 ADHD (ATTENTION DEFICIT HYPERACTIVITY DISORDER), INATTENTIVE TYPE: ICD-10-CM

## 2024-07-26 DIAGNOSIS — E11.9 TYPE 2 DIABETES MELLITUS WITHOUT COMPLICATION, WITHOUT LONG-TERM CURRENT USE OF INSULIN (H): ICD-10-CM

## 2024-07-26 DIAGNOSIS — F31.9 BIPOLAR 1 DISORDER (H): ICD-10-CM

## 2024-07-26 LAB
ANION GAP SERPL CALCULATED.3IONS-SCNC: 16 MMOL/L (ref 7–15)
BUN SERPL-MCNC: 18 MG/DL (ref 6–20)
CALCIUM SERPL-MCNC: 9.2 MG/DL (ref 8.8–10.4)
CHLORIDE SERPL-SCNC: 103 MMOL/L (ref 98–107)
CHOLEST SERPL-MCNC: 203 MG/DL
CREAT SERPL-MCNC: 0.95 MG/DL (ref 0.67–1.17)
CREAT UR-MCNC: 81.2 MG/DL
EGFRCR SERPLBLD CKD-EPI 2021: >90 ML/MIN/1.73M2
FASTING STATUS PATIENT QL REPORTED: YES
GLUCOSE SERPL-MCNC: 218 MG/DL (ref 70–99)
HBA1C MFR BLD: 8.3 % (ref 0–5.6)
HCO3 SERPL-SCNC: 20 MMOL/L (ref 22–29)
HDLC SERPL-MCNC: 40 MG/DL
LDLC SERPL CALC-MCNC: 98 MG/DL
LITHIUM SERPL-SCNC: 0.74 MMOL/L (ref 0.6–1.2)
MICROALBUMIN UR-MCNC: <12 MG/L
MICROALBUMIN/CREAT UR: NORMAL MG/G{CREAT}
NONHDLC SERPL-MCNC: 163 MG/DL
POTASSIUM SERPL-SCNC: 4.6 MMOL/L (ref 3.4–5.3)
SODIUM SERPL-SCNC: 139 MMOL/L (ref 135–145)
TRIGL SERPL-MCNC: 324 MG/DL
TSH SERPL DL<=0.005 MIU/L-ACNC: 2.01 UIU/ML (ref 0.3–4.2)

## 2024-07-26 PROCEDURE — 83036 HEMOGLOBIN GLYCOSYLATED A1C: CPT

## 2024-07-26 PROCEDURE — 80061 LIPID PANEL: CPT

## 2024-07-26 PROCEDURE — 82570 ASSAY OF URINE CREATININE: CPT

## 2024-07-26 PROCEDURE — 99214 OFFICE O/P EST MOD 30 MIN: CPT | Mod: 95 | Performed by: PSYCHIATRY & NEUROLOGY

## 2024-07-26 PROCEDURE — 82043 UR ALBUMIN QUANTITATIVE: CPT

## 2024-07-26 PROCEDURE — 80048 BASIC METABOLIC PNL TOTAL CA: CPT

## 2024-07-26 PROCEDURE — 36415 COLL VENOUS BLD VENIPUNCTURE: CPT

## 2024-07-26 PROCEDURE — 80178 ASSAY OF LITHIUM: CPT

## 2024-07-26 PROCEDURE — 84443 ASSAY THYROID STIM HORMONE: CPT

## 2024-07-26 RX ORDER — DEXMETHYLPHENIDATE HYDROCHLORIDE 25 MG/1
25 CAPSULE, EXTENDED RELEASE ORAL DAILY
Qty: 30 CAPSULE | Refills: 0 | Status: SHIPPED | OUTPATIENT
Start: 2024-08-25 | End: 2024-08-28 | Stop reason: DRUGHIGH

## 2024-07-26 RX ORDER — CLONAZEPAM 0.5 MG/1
0.5 TABLET ORAL DAILY PRN
Qty: 15 TABLET | Refills: 3 | Status: SHIPPED | OUTPATIENT
Start: 2024-07-26

## 2024-07-26 RX ORDER — DEXMETHYLPHENIDATE HYDROCHLORIDE 25 MG/1
25 CAPSULE, EXTENDED RELEASE ORAL DAILY
Qty: 30 CAPSULE | Refills: 0 | Status: SHIPPED | OUTPATIENT
Start: 2024-07-26 | End: 2024-08-25

## 2024-07-26 RX ORDER — DEXMETHYLPHENIDATE HYDROCHLORIDE 25 MG/1
25 CAPSULE, EXTENDED RELEASE ORAL DAILY
Qty: 30 CAPSULE | Refills: 0 | Status: SHIPPED | OUTPATIENT
Start: 2024-09-24 | End: 2024-08-28 | Stop reason: DRUGHIGH

## 2024-07-26 RX ORDER — LITHIUM CARBONATE 450 MG
900 TABLET, EXTENDED RELEASE ORAL AT BEDTIME
Qty: 60 TABLET | Refills: 3 | Status: SHIPPED | OUTPATIENT
Start: 2024-07-26

## 2024-07-26 RX ORDER — ESCITALOPRAM OXALATE 10 MG/1
10 TABLET ORAL DAILY
Qty: 30 TABLET | Refills: 3 | Status: SHIPPED | OUTPATIENT
Start: 2024-07-26

## 2024-07-26 RX ORDER — LITHIUM CARBONATE 150 MG/1
150 CAPSULE ORAL AT BEDTIME
Qty: 30 CAPSULE | Refills: 3 | Status: SHIPPED | OUTPATIENT
Start: 2024-07-26

## 2024-07-26 RX ORDER — LAMOTRIGINE 100 MG/1
250 TABLET ORAL DAILY
Qty: 75 TABLET | Refills: 2 | Status: SHIPPED | OUTPATIENT
Start: 2024-07-26 | End: 2024-08-28

## 2024-07-26 ASSESSMENT — ANXIETY QUESTIONNAIRES
GAD7 TOTAL SCORE: 12
5. BEING SO RESTLESS THAT IT IS HARD TO SIT STILL: SEVERAL DAYS
GAD7 TOTAL SCORE: 12
IF YOU CHECKED OFF ANY PROBLEMS ON THIS QUESTIONNAIRE, HOW DIFFICULT HAVE THESE PROBLEMS MADE IT FOR YOU TO DO YOUR WORK, TAKE CARE OF THINGS AT HOME, OR GET ALONG WITH OTHER PEOPLE: VERY DIFFICULT
1. FEELING NERVOUS, ANXIOUS, OR ON EDGE: MORE THAN HALF THE DAYS
3. WORRYING TOO MUCH ABOUT DIFFERENT THINGS: MORE THAN HALF THE DAYS
7. FEELING AFRAID AS IF SOMETHING AWFUL MIGHT HAPPEN: MORE THAN HALF THE DAYS
6. BECOMING EASILY ANNOYED OR IRRITABLE: MORE THAN HALF THE DAYS
GAD7 TOTAL SCORE: 12
4. TROUBLE RELAXING: MORE THAN HALF THE DAYS
8. IF YOU CHECKED OFF ANY PROBLEMS, HOW DIFFICULT HAVE THESE MADE IT FOR YOU TO DO YOUR WORK, TAKE CARE OF THINGS AT HOME, OR GET ALONG WITH OTHER PEOPLE?: VERY DIFFICULT
2. NOT BEING ABLE TO STOP OR CONTROL WORRYING: SEVERAL DAYS
7. FEELING AFRAID AS IF SOMETHING AWFUL MIGHT HAPPEN: MORE THAN HALF THE DAYS

## 2024-07-26 ASSESSMENT — PAIN SCALES - GENERAL: PAINLEVEL: NO PAIN (0)

## 2024-07-26 ASSESSMENT — PATIENT HEALTH QUESTIONNAIRE - PHQ9: SUM OF ALL RESPONSES TO PHQ QUESTIONS 1-9: 13

## 2024-07-26 NOTE — TELEPHONE ENCOUNTER
Retail Pharmacy Prior Authorization Team   Phone: [phone]    PRIOR AUTHORIZATION DENIED    Medication: TRESIBA FLEXTOUCH 100 UNIT/ML SC SOPN  Insurance Company: [employer] ([facility]) - Phone [phone] Fax [phone]  Denial Date: 7/26/2024  Denial Reason(s): MUST TRY AND FAIL LANTUS AND TOUJEO FOR 3 MONTH TRIALS OR PROVIDE CLINICAL REASONING WHY THEY ARE NOT APPROPRIATE          Appeal Information:        Billing Type: Third-Party Bill

## 2024-07-26 NOTE — TELEPHONE ENCOUNTER
Retail Pharmacy Prior Authorization Team   Phone: [phone]    PA DENIED  If the provider would like to appeal we will need a detailed   letter of medical necessity with clinical reasoning to start the process.   Please close the encounter when finished.   Thank you,  Siobhan Fregoso CPhT    PA Team

## 2024-07-26 NOTE — NURSING NOTE
Is the patient currently in the state of [place]? YES    Current patient location: [address] [place] [id number]    Visit mode:VIDEO    If the visit is dropped, the patient can be reconnected by: VIDEO VISIT:  Send e-mail to at [email or web address]    Will anyone else be joining the visit? No  (If patient encounters technical issues they should call [phone])    How would you like to obtain your AVS? MyChart    Are changes needed to the allergy or medication list? No    Are refills needed on medications prescribed by this physician? NO    Rooming Documentation: Assigned questionnaire(s) completed .    Reason for visit: JYOTSNA Avalos

## 2024-07-26 NOTE — PROGRESS NOTES
Virtual Visit Details    Type of service:  Video Visit     Originating Location (pt. Location): Home    Distant Location (provider location):  Off-site  Platform used for Video Visit: [facility] Psychiatry Consult Note    IDENTIFICATION   Name: Tavo Key   : 1980/44 year old      Sex:    @ male          Telemedicine Visit: The patient's condition can be safely assessed and treated via synchronous audio and visual telemedicine encounter.        Face to Face/patient Contact total time: 25 minutes  Pre Charting time: 1 minutes; Post charting time, communication and other activities: 1 minutes; Total time 27 minutes  1:06 PM - 1:31PM          SUBJECTIVE     History of Present Illness    Tavo Key, a 44-year-old male, has been experiencing symptoms of irritability, anxiety, and depression, which have been particularly challenging for him. These symptoms seem to have been triggered by recent news related to his job and have been further exacerbated by relationship problems. He has been taking lithium for his condition, but his blood sugar levels have been higher than usual, which may be related to his medication. He has also been taking ibuprofen for tooth pain for a few days.    Tavo has been on lamotrigine, which has limited his medication options due to potential interactions. He has previously tried Dabacos, but it was not effective for him. He has also been on Focalin, which he has been using to manage his ability to function at work and at home. However, he has been experiencing stress and it is unclear how much of his current state is due to anxiety versus mood or depression or manic symptoms.    In the past, Tavo has been on Lexapro, which seemed to work for him, but he experienced sexual dysfunction as a side effect. He has also been on Supro, which was effective for him. He has been seeing a therapist for his condition.    Tavo has been considering changing his medication  due to the severity of his symptoms. He has been considering either increasing his lithium dosage or starting on Lexapro again. He has also been considering increasing his Focalin dosage to help him through his current situation.    In addition to his mental health concerns, Tavo has been dealing with job-related issues. He has been let go from his position at work, which has been a source of stress for him. He has been taking clonazepam, but it is unclear how often he has been taking it.    In conclusion, Tavo's current situation is characterized by significant mental health challenges, exacerbated by job-related stress and relationship problems. His medication regimen has been a source of concern, with potential side effects and interactions complicating his treatment. His symptoms of irritability, anxiety, and depression have been particularly challenging for him, and he has been considering changing his medication to better manage his condition.             The following assessments were completed by patient for this visit:  PROMIS 10-Global Health (only subscores and total score):       10/31/2023    10:24 PM 11/14/2023     5:56 AM 2/29/2024     5:00 PM 6/5/2024     8:00 PM 7/26/2024    12:52 PM   PROMIS-10 Scores Only   Global Mental Health Score 7 10 10 11 6   Global Physical Health Score 17 13 16 13 13   PROMIS TOTAL - SUBSCORES 24 23 26 24 19             4/15/2024     8:28 AM 5/22/2024     1:15 PM 7/26/2024    12:49 PM   PHQ   PHQ-9 Total Score 13 11 13   Q9: Thoughts of better off dead/self-harm past 2 weeks Not at all Not at all Several days          2/6/2024    10:45 AM 5/22/2024     1:15 PM 7/26/2024    12:52 PM   MATTI-7 SCORE   Total Score 10 (moderate anxiety) 14 (moderate anxiety) 12 (moderate anxiety)   Total Score 10    10 14    14 12        OBJECTIVE     Vital Signs:   There were no vitals taken for this visit.    Labs:    Results    - Lithium level: 0.54  - Blood sugar levels: higher than usual                Current Medications:  Current Outpatient Medications   Medication Sig Dispense Refill    acetaminophen (TYLENOL) 500 MG tablet Take 1,000 mg by mouth 3 times daily as needed for mild pain      albuterol (PROAIR HFA/PROVENTIL HFA/VENTOLIN HFA) 108 (90 Base) MCG/ACT inhaler Inhale 2 puffs into the lungs every 4 hours as needed for shortness of breath or wheezing 18 g 5    Alcohol Swabs (B-D SINGLE USE SWABS REGULAR) PADS USE TO SWAB AREA OF INJECTION/FREIDA AS DIRECTED. 100 each 3    amLODIPine (NORVASC) 10 MG tablet TAKE 1 TABLET (10 MG) BY MOUTH DAILY 90 tablet 1    aspirin (ASA) 81 MG EC tablet Take 1 tablet (81 mg) by mouth daily      atorvastatin (LIPITOR) 20 MG tablet +++NEED APPOINTMENT+++TAKE ONE TABLET BY MOUTH EVERY NIGHT AT BEDTIME 90 tablet 0    clonazePAM (KLONOPIN) 0.5 MG tablet Take 1 tablet (0.5 mg) by mouth daily as needed for anxiety 15 tablet 3    clotrimazole-betamethasone (LOTRISONE) 1-0.05 % external cream Apply topically 2 times daily Angles of mouth 45 g 3    Continuous Blood Gluc Sensor (RutanetYLE ALDEN 3 SENSOR) [facility] USE AND CHANGE 1 SENSOR EVERY 14 DAYS 2 each 5    dexmethylphenidate (FOCALIN XR) 25 MG 24 hr capsule Take 1 capsule (25 mg) by mouth daily for 30 days 30 capsule 0    [START ON 8/25/2024] dexmethylphenidate (FOCALIN XR) 25 MG 24 hr capsule Take 1 capsule (25 mg) by mouth daily for 30 days 30 capsule 0    [START ON 9/24/2024] dexmethylphenidate (FOCALIN XR) 25 MG 24 hr capsule Take 1 capsule (25 mg) by mouth daily for 30 days 30 capsule 0    escitalopram (LEXAPRO) 10 MG tablet Take 1 tablet (10 mg) by mouth daily 30 tablet 3    esomeprazole (NEXIUM) 40 MG DR capsule Take 1 capsule (40 mg) by mouth every morning (before breakfast) Take 30-60 minutes before eating.      fluticasone (FLONASE) 50 MCG/ACT nasal spray Spray 1 spray into both nostrils daily (Patient taking differently: Spray 1 spray into both nostrils daily as needed for rhinitis or allergies) 16 g 11     gabapentin (NEURONTIN) 100 MG capsule Take 1 capsule (100 mg) by mouth 3 times daily 270 capsule 1    hydrOXYzine (VISTARIL) 50 MG capsule Take 1 capsule (50 mg) by mouth 3 times daily Take an additional tablet a day as needed for anxiety 120 capsule 5    insulin degludec (TRESIBA FLEXTOUCH) 100 UNIT/ML pen Inject 46 Units subcutaneously daily Increase dose by 2 units every three days until fasting blood sugar is between  mg/dL. Max 54 units/day. 60 mL 1    insulin pen needle (PENTIPS) 31G X 5 MM miscellaneous USE 4 PEN NEEDLES DAILY OR AS DIRECTED. 400 each 0    lamoTRIgine (LAMICTAL) 100 MG tablet Take 2.5 tablets (250 mg) by mouth daily 75 tablet 2    lithium (ESKALITH CR/LITHOBID) 450 MG CR tablet Take 2 tablets (900 mg) by mouth at bedtime Take along with a 150 mg lithium capsule for a total of 1050 mg nightly 60 tablet 3    lithium (ESKALITH) 150 MG capsule Take 1 capsule (150 mg) by mouth at bedtime Take along with two 450 mg extended release pills for a total of 1050 mg nightly 30 capsule 3    losartan (COZAAR) 50 MG tablet TAKE 1 TABLET (50 MG) BY MOUTH DAILY 90 tablet 2    naratriptan (AMERGE) 2.5 MG tablet TAKE 1 TABLET (2.5 MG) BY MOUTH AT ONSET OF HEADACHE FOR MIGRAINE MAY REPEAT IN 4 HOURS. MAX 2 TABLETS/24 HOURS. 12 tablet 5    NOVOLOG FLEXPEN 100 UNIT/ML soln INJECT SUBCUTANEOUSLY 10 UNITS BEFORE BREAKFAST, 10 UNITS BEFORE LUNCH, AND 30 UNITS BEFORE SUPPER (Patient taking differently: Inject subcutaneously 18 units before breakfast, 18 units before lunch and 18 units before supper.) 30 mL 1    propranolol (INDERAL) 80 MG tablet Take 1 tablet (80 mg) by mouth 2 times daily 180 tablet 1    sildenafil (REVATIO) 20 MG tablet Take 1-5 tablets ( mg) by mouth daily as needed (erectile dysfunction.) 30 tablet 11    testosterone (ANDROGEL 1.62 % PUMP) 20.25 MG/ACT gel Place 1 Pump onto the skin daily Apply from dispenser to clean, dry, intact skin of the upper arms and shoulders. 75 g 5     tirzepatide (MOUNJARO) 2.5 MG/0.5ML pen Inject 2.5 mg subcutaneously every 7 days 2 mL 1    TRULICITY 3 MG/0.5ML SOPN INJECT 3 MG SUBCUTANEOUSLY ONCE A WEEK 2 mL 3     No current facility-administered medications for this visit.            ADDED HISTORY   The patient is currently facing job loss and relationship problems. He works at [employer] in [place].    Physical exam    Physical Exam    - Anxiety, depression, affect:  - Speech and language:  - Insight and judgment:  - Alert and orientation to person, place and situation:  - SI: None  - HI: None  - AH: None  - VH: None             MENTAL STATUS EXAMINATION:   Appearance: Intact attention to grooming and hygiene, casual garb  Attitude:  cooperative   Eye Contact:  Good  Gait and Station: sitting  Psychomotor Behavior: Within normal limits  Oriented to: Grossly person place and time  Attention Span and Concentration: Grossly intact   Speech:  depressed tone  Language: English  Mood:  anxious   Affect: Constricted  Associations:  no loose associations  Thought Process:  logical, linear and goal oriented  Thought Content: No evidence of delusions or suicidal or homicidal ideation plan or intent  Memory: Grossly intact  Fund of Knowledge: Good  Insight:  good  Judgment:  intact, adequate for safety  Impulse Control:  intact        DIAGNOSES:   By history Bipolar disorder type I  By history, ADHD inattentive type  By history Social Anxiety Disorder  By history Generalized Anxiety Disorder      ASSESSMENT:   Patient currently undergoing depression with history of depression, rush, ADHD, anxiety and adverse childhood events.   Sx improving but challenges remain with depression, ADHD medications.     Today Tavo Key reports no suicidal ideations. In addition, he has notable risk factors for self-harm, including anxiety, 1 suicide attempt. However, risk is mitigated by Orthodox beliefs, history of seeking help when needed, and support. Therefore, based on all  available evidence including the factors cited above, he does not appear to be at imminent risk for self-harm, does not meet criteria for a 72-hr hold, and therefore remains appropriate for ongoing outpatient level of care.       PLAN:     Patient advised of consultative model. Patient will continue to be seen for ongoing consultation and stabilization.  Does not meet criteria for involuntary treatment or hospitalization  Start Dexedrine Spansule 11/1/2023 15 mg daily efficacy with no significant side effects, fogginess => 2/6/24 20 mg daily efficacious 5 mg to make 20 mg not affordable => 5/22/24 focalin XR 20 mg qam sufficient efficacy however with stressors less effective => 7/26/24 XR 25 mg qam-Risks, benefits and alternatives discussed.  Patient provides verbal consent to treatment.  Restart escitalopram 7/26/24 10 mg daily -Risks, benefits and alternatives discussed.  Patient provides verbal consent to treatment.  Continue lamotrigine 200 mg daily bouts of depression with irritability => 5/22/14 250 mg daily-Risks, benefits and alternatives discussed.  Patient provides verbal consent to treatment.  Continue lithium extended release 450 mg daily level low => 12/26/23 900 mg at bedtime lithium level 0.54 efficacy with no side effects =>- 2/6/2024 1050 mg nightly-risks, benefits and alternatives discussed.  Patient provides verbal consent  Restart clonazepam 12/26/2023 0.5 mg p.o. daily as needed anxiety/anger-Risks, benefits and alternatives discussed.  Patient provides verbal consent to treatment.  Advised of dependence, addiction, car driving and memory loss risks. 15/month initially  Hydroxyzine 50 mg p.o. 3 times daily with additional 50 mg as needed anxiety-provides well consent to treatment  Propranolol for migraine/headache prevention  Dc'd cariprazine (elevated blood sugars)  Labs - reviewed, follow-up lithium level  Therapy with JERARDO Caruso       Assessment & Plan     Assessment  The patient is  experiencing increased anxiety, irritability, and depression following the news of job loss. He also reports relationship problems and higher blood sugar levels. He has been on lithium and is experiencing tooth pain for which he has been taking ibuprofen for a few days. The lithium level is at 0.54 and his blood sugar levels are higher than usual. The patient has been on Lamotrigine and Focalin. The patient has been recommended to increase the dose of Focalin and start Lexapro.    Plan  - Increase Focalin dose to 25 mg  - Start Lexapro at 10 mg  - Check kidney levels  - Get a basic metabolic panel  - Check thyroid levels    Prescription  - Increase Focalin to 25 mg  - Start Lexapro at 10 mg  - Refill of clonazepam    Appointments  - Next appointment: August 28th, 2024  - Check-in in two weeks with [facility]           Administrative Billing:   Time spent with patient was greater than 50% of time and/or significant time was spent in counseling and coordination of care regarding above diagnoses and treatment plan. Pre charting time and post charting time/documentation/coordination are done on date of service.      Signed:   Barney Colon M.D.  [facility] Psychiatry Service    Disclaimer: This note consists of symbols derived from keyboarding, dictation and/or voice recognition software. As a result, there may be errors in the script that have gone undetected. Please consider this when interpreting information found in this chart.    Consent was obtained from the patient to use an AI documentation tool in the creation of this note.     eoc

## 2024-07-29 NOTE — RESULT ENCOUNTER NOTE
Tavo,     Here is a copy of your labs. Your cholesterol increased some, so continue to monitor your diet. Keep working with [facility] as planned on your diabetes.   Criselda Walsh PA-C

## 2024-08-02 DIAGNOSIS — E11.9 TYPE 2 DIABETES MELLITUS WITHOUT COMPLICATION, WITH LONG-TERM CURRENT USE OF INSULIN (H): ICD-10-CM

## 2024-08-02 DIAGNOSIS — Z79.4 TYPE 2 DIABETES MELLITUS WITHOUT COMPLICATION, WITH LONG-TERM CURRENT USE OF INSULIN (H): ICD-10-CM

## 2024-08-02 RX ORDER — ATORVASTATIN CALCIUM 20 MG/1
TABLET, FILM COATED ORAL
Qty: 90 TABLET | Refills: 1 | Status: SHIPPED | OUTPATIENT
Start: 2024-08-02

## 2024-08-03 DIAGNOSIS — E11.9 TYPE 2 DIABETES MELLITUS WITHOUT COMPLICATION, WITHOUT LONG-TERM CURRENT USE OF INSULIN (H): ICD-10-CM

## 2024-08-05 RX ORDER — PEN NEEDLE, DIABETIC 31 GX3/16"
NEEDLE, DISPOSABLE MISCELLANEOUS
Qty: 400 EACH | Refills: 1 | Status: SHIPPED | OUTPATIENT
Start: 2024-08-05

## 2024-08-06 ENCOUNTER — VIRTUAL VISIT (OUTPATIENT)
Dept: PSYCHOLOGY | Facility: CLINIC | Age: 44
End: 2024-08-06
Payer: COMMERCIAL

## 2024-08-06 DIAGNOSIS — F31.9 BIPOLAR 1 DISORDER (H): Primary | ICD-10-CM

## 2024-08-06 DIAGNOSIS — G43.809 OTHER MIGRAINE WITHOUT STATUS MIGRAINOSUS, NOT INTRACTABLE: ICD-10-CM

## 2024-08-06 PROCEDURE — 90834 PSYTX W PT 45 MINUTES: CPT | Mod: 95 | Performed by: MARRIAGE & FAMILY THERAPIST

## 2024-08-06 RX ORDER — PROPRANOLOL HYDROCHLORIDE 80 MG/1
80 TABLET ORAL 2 TIMES DAILY
Qty: 180 TABLET | Refills: 1 | Status: SHIPPED | OUTPATIENT
Start: 2024-08-06

## 2024-08-06 NOTE — PROGRESS NOTES
[facility]                                     Progress Note    Patient Name: Tavo Key  Date: 7/16/2024         Service Type: Individual      Session Start Time: 2:00PM  Session End Time: 2:45PM     Session Length: 45 minutes    Session #: 18    Attendees: Client attended alone    Service Modality:  Video Visit:      Provider verified identity through the following two step process.  Patient provided:  Patient is known previously to provider    Telemedicine Visit: The patient's condition can be safely assessed and treated via synchronous audio and visual telemedicine encounter.      Reason for Telemedicine Visit: Patient has requested telehealth visit    Originating Site (Patient Location): Patient's home    Distant Site (Provider Location): Provider Remote Setting- Home Office    Consent:  The patient/guardian has verbally consented to: the potential risks and benefits of telemedicine (video visit) versus in person care; bill my insurance or make self-payment for services provided; and responsibility for payment of non-covered services.     Patient would like the video invitation sent by:  My Chart    Mode of Communication:  Video Conference via [facility]    Distant Location (Provider):  Off-site    As the provider I attest to compliance with applicable laws and regulations related to telemedicine.    DATA  Extended Session (53+ minutes): No  Interactive Complexity: No  Crisis: No        Progress Since Last Session (Related to Symptoms / Goals / Homework):   Symptoms: Worsening increased anxiety due to impending loss of job    Homework: Partially completed      Episode of Care Goals: Satisfactory progress - ACTION (Actively working towards change); Intervened by reinforcing change plan / affirming steps taken       Current / Ongoing Stressors and Concerns:   Client reports feeling improved mood and less anxiety. His doctor started him back on lexapro (he was on it years ago) and he has  noticed a difference. Feeling less worried about the job situation and is more activated to get things done. He also stated his blood sugars have been more stable and he is taking mounjaro to bump up the benefit of the semiglutide. Client will be at current job through the end of August and was able to get on his wife's medical plan. He is working with one  and getting his resume updated. Feeling better about his qualifications overall. He and Mindy continue to spend time together and he feels more optimistic about their relationship and not as worried about where it's going. Client would like to start walking more and is doing PT at home for now.      Treatment Objective(s) Addressed in This Session:   identify coping strategies for more effectively managing Bipolar Disorder  Work on managing his diabetes     Intervention:   CBT: Cognitive restructuring  Solution Focused: prep for move      The following assessments were completed by patient for this visit:    PROMIS 10-Global Health (all questions and answers displayed):       10/31/2023    10:24 PM 11/14/2023     5:56 AM 2/29/2024     5:00 PM 6/5/2024     8:00 PM 7/26/2024    12:52 PM   PROMIS 10   In general, would you say your health is: Good Fair      In general, would you say your quality of life is: Good Good      In general, how would you rate your physical health? Good Fair      In general, how would you rate your mental health, including your mood and your ability to think? Fair Good      In general, how would you rate your satisfaction with your social activities and relationships? Poor Fair      In general, please rate how well you carry out your usual social activities and roles Fair Good      To what extent are you able to carry out your everyday physical activities such as walking, climbing stairs, carrying groceries, or moving a chair? Completely Completely      In the past 7 days, how often have you been bothered by emotional problems  such as feeling anxious, depressed, or irritable? Always Often      In the past 7 days, how would you rate your fatigue on average? Mild Severe      In the past 7 days, how would you rate your pain on average, where 0 means no pain, and 10 means worst imaginable pain? 0 2      In general, would you say your health is: 3 2 3 2 3   In general, would you say your quality of life is: 3 3 3 3 3   In general, how would you rate your physical health? 3 2 3 2 3   In general, how would you rate your mental health, including your mood and your ability to think? 2 3 2 2 1   In general, how would you rate your satisfaction with your social activities and relationships? 1 2 3 3 1   In general, please rate how well you carry out your usual social activities and roles. (This includes activities at home, at work and in your community, and responsibilities as a parent, child, spouse, employee, friend, etc.) 2 3 4 3 1   To what extent are you able to carry out your everyday physical activities such as walking, climbing stairs, carrying groceries, or moving a chair? 5 5 4 4 4   In the past 7 days, how often have you been bothered by emotional problems such as feeling anxious, depressed, or irritable? 5 4 4 3 5   In the past 7 days, how would you rate your fatigue on average? 2 4 2 3 3   In the past 7 days, how would you rate your pain on average, where 0 means no pain, and 10 means worst imaginable pain? 0 2 0 2 6   Global Mental Health Score 7 10 10 11 6   Global Physical Health Score 17 13 16 13 13   PROMIS TOTAL - SUBSCORES 24 23 26 24 19     PROMIS 10-Global Health (only subscores and total score):       10/31/2023    10:24 PM 11/14/2023     5:56 AM 2/29/2024     5:00 PM 6/5/2024     8:00 PM 7/26/2024    12:52 PM   PROMIS-10 Scores Only   Global Mental Health Score 7 10 10 11 6   Global Physical Health Score 17 13 16 13 13   PROMIS TOTAL - SUBSCORES 24 23 26 24 19         ASSESSMENT: Current Emotional / Mental Status (status of  significant symptoms):   Risk status (Self / Other harm or suicidal ideation)   Patient denies current fears or concerns for personal safety.   Patient denies current or recent suicidal ideation or behaviors.   Patient denies current or recent homicidal ideation or behaviors.   Patient denies current or recent self injurious behavior or ideation.   Patient denies other safety concerns.   Patient reports there has been no change in risk factors since their last session.     Patient reports there has been no change in protective factors since their last session.     Recommended that patient call 911 or go to the local ED should there be a change in any of these risk factors.     Appearance:   Appropriate    Eye Contact:   Good    Psychomotor Behavior: Normal    Attitude:   Cooperative  Friendly   Orientation:   All   Speech    Rate / Production: Normal     Volume:  Normal    Mood:    Anxious    Affect:    Appropriate    Thought Content:  Clear    Thought Form:  Coherent  Logical    Insight:    Good      Medication Review:   Changes to psychiatric medications, see updated Medication List in EPIC.  Added Lexapro     Medication Compliance:   Yes     Changes in Health Issues:   None reported     Chemical Use Review:   Substance Use: Chemical use reviewed, no active concerns identified      Tobacco Use: No current tobacco use.      Diagnosis:  1. Bipolar 1 disorder (H)      Collateral Reports Completed:   Not Applicable    PLAN: (Patient Tasks / Therapist Tasks / Other)  Homework:  Client to continue working on job search, try new ways of responding at work and increase exercise/moving his body.      Shanelle Caruso, SUHAILFT                                                         _________________________________________________________________________________________________________________________                                            Individual Treatment Plan    Patient's Name: Tavo Key  Date Of  Birth: 1980    Date of Creation: 1/2/2024  Date Treatment Plan Last Reviewed/Revised: 6/5/2024    DSM5 Diagnoses: 296.52 Bipolar I Disorder Current or Most Recent Episode Depressed, Moderate  Psychosocial / Contextual Factors: Trauma hx, health, work  PROMIS (reviewed every 90 days): 6/5/2024 Score: 24    Referral / Collaboration:  Referral to another professional/service is not indicated at this time. EMDR has been presented and client will research further.    Anticipated number of session for this episode of care: 9-12 sessions  Anticipation frequency of session: Biweekly  Anticipated Duration of each session: 53 or more minutes  Treatment plan will be reviewed in 90 days or when goals have been changed.       MeasurableTreatment Goal(s) related to diagnosis / functional impairment(s)  Goal 1: Patient will lower PHQ9 score to 3 or below.    I will know I've met my goal when I'm less irritable.      Objective #A (Patient Action)    Patient will identify stress management strategies for more effectively managing Bipolar Disorder.  Status: Continued - Date(s): 6/5/2024     Intervention(s)  Therapist will teach emotional recognition/identification.      Objective #B  Patient will Identify negative self-talk and behaviors: challenge core beliefs, myths, and actions.  Status: Continued - Date(s): 6/5/2024     Intervention(s)  Therapist will  help client work on cognitive restructuring .    Objective #C  Patient will  increase exercise .  Status: Continued - Date(s): 6/5/2024    Intervention(s)  Therapist will assign homework to increase level of activity especially aerobic .      Patient has reviewed and agreed to the above plan.      Shanelle Caruso, REYNOLD  June 5, 2024

## 2024-08-10 ENCOUNTER — MYC REFILL (OUTPATIENT)
Dept: FAMILY MEDICINE | Facility: CLINIC | Age: 44
End: 2024-08-10
Payer: COMMERCIAL

## 2024-08-10 DIAGNOSIS — E29.1 HYPOGONADISM MALE: ICD-10-CM

## 2024-08-12 ENCOUNTER — TELEPHONE (OUTPATIENT)
Dept: FAMILY MEDICINE | Facility: CLINIC | Age: 44
End: 2024-08-12
Payer: COMMERCIAL

## 2024-08-12 DIAGNOSIS — E29.1 HYPOGONADISM MALE: ICD-10-CM

## 2024-08-12 RX ORDER — TESTOSTERONE 1.62 MG/G
1 GEL TRANSDERMAL DAILY
Qty: 75 G | Refills: 5 | Status: SHIPPED | OUTPATIENT
Start: 2024-08-12

## 2024-08-12 NOTE — TELEPHONE ENCOUNTER
Please initiate a prior authorization    Thank you,  Ashlyn Low, Pharmacy Technician  [facility] [place]

## 2024-08-12 NOTE — LETTER
2024    INSURER: Payor: [employer] / Plan: [employer] / Product Type: HMO /   Re: Prior Authorization Request  Patient: Tavo Key  Policy ID#:  [id number]  : 1980      To Whom it May Concern:    I am writing to formally request a prior authorization of coverage for my patient,  Tavo Key, for treatment using testosterone 1.62% gel  I am requesting authorization for applicable provider professional and facility services associated with this therapy.    The therapy involves treatment of his hypogonadism, erectile dysfunction and decreased libido with testosterone therapy.      The benefits of the therapy include improvement in his erectile dysfunction and improved libido.       I have treated Tavo Key since  and I have determined that it is medically appropriate for  this patient to receive be treated with testosterone gel  for the reason(s) stated below:    Hypogonadism as determined by 2 separate low testosterone readings prior to initiating treatment. Patient has been been well controlled on transdermal testosterone gel since starting 2023.       I have included medical records pertaining to the patient s medical history, current condition and treatment plan.    I firmly believe that this therapy is clinically appropriate and that Tavo Key would benefit from improved quality of life if allowed the opportunity to receive this treatment.  Please contact me at Dept: [phone] if you require additional information to ensure the prompt approval for coverage.    Please send your written decision to me at this address:  [facility]  [address] [place] [address]  [facility] [id number] [phone]  Dept: [phone]      Sincerely,      Criselda Walsh PA-C

## 2024-08-13 NOTE — TELEPHONE ENCOUNTER
PA Initiation    Medication: TESTOSTERONE 20.25 MG/ACT (1.62%) TD GEL  Insurance Company: [employer] ([facility]) - Phone [phone] Fax [phone]  Pharmacy Filling the Rx: [facility] - [id number] [facility]  Filling Pharmacy Phone: [phone]  Filling Pharmacy Fax: [phone]  Start Date: 8/13/2024

## 2024-08-14 ENCOUNTER — VIRTUAL VISIT (OUTPATIENT)
Dept: PSYCHOLOGY | Facility: CLINIC | Age: 44
End: 2024-08-14
Payer: COMMERCIAL

## 2024-08-14 DIAGNOSIS — F31.9 BIPOLAR 1 DISORDER (H): Primary | ICD-10-CM

## 2024-08-14 PROCEDURE — 90837 PSYTX W PT 60 MINUTES: CPT | Mod: 95 | Performed by: MARRIAGE & FAMILY THERAPIST

## 2024-08-15 NOTE — PROGRESS NOTES
[facility]                                     Progress Note    Patient Name: Tavo Key  Date: 8/14/2024         Service Type: Individual      Session Start Time: 3:00PM  Session End Time: 3:50PM     Session Length: 50 minutes    Session #: 19    Attendees: Client attended alone    Service Modality:  Video Visit:      Provider verified identity through the following two step process.  Patient provided:  Patient is known previously to provider    Telemedicine Visit: The patient's condition can be safely assessed and treated via synchronous audio and visual telemedicine encounter.      Reason for Telemedicine Visit: Patient has requested telehealth visit    Originating Site (Patient Location): Patient's home    Distant Site (Provider Location): Provider Remote Setting- Home Office    Consent:  The patient/guardian has verbally consented to: the potential risks and benefits of telemedicine (video visit) versus in person care; bill my insurance or make self-payment for services provided; and responsibility for payment of non-covered services.     Patient would like the video invitation sent by:  My Chart    Mode of Communication:  Video Conference via [facility]    Distant Location (Provider):  Off-site    As the provider I attest to compliance with applicable laws and regulations related to telemedicine.    DATA  Extended Session (53+ minutes):   - Longer session due to limited access to mental health appointments and necessity to address patient's distress / complexity  Interactive Complexity: No  Crisis: No        Progress Since Last Session (Related to Symptoms / Goals / Homework):   Symptoms: Improving medication is helping    Homework: Partially completed      Episode of Care Goals: Satisfactory progress - ACTION (Actively working towards change); Intervened by reinforcing change plan / affirming steps taken     Current / Ongoing Stressors and Concerns:   Client continues to feel better  and more optimistic about the job search. He thinks that both the anti-depressant and the semiglutide medications are helping. Having his blood sugars better managed and the selective serotonin reuptake inhibitor seems to be making a difference and he reports even feeling hyper the other day which he hasn't felt for a while. He is balancing his current job responsibilities with his job search and is glad to be able to be working from home to do this. Tavo will have an exit interview and may share some of his thoughts about the lack of professional development at the current job. He is also working on resisting worrisome thinking which he has been in the habit of doing. Tavo is also working on reducing overhead at home and considering employment options moving forward.      Treatment Objective(s) Addressed in This Session:   identify coping strategies for more effectively managing Bipolar Disorder  Work on managing his diabetes     Intervention:   CBT: Cognitive restructuring  Solution Focused: prep for move      The following assessments were completed by patient for this visit:    PROMIS 10-Global Health (all questions and answers displayed):       10/31/2023    10:24 PM 11/14/2023     5:56 AM 2/29/2024     5:00 PM 6/5/2024     8:00 PM 7/26/2024    12:52 PM   PROMIS 10   In general, would you say your health is: Good Fair      In general, would you say your quality of life is: Good Good      In general, how would you rate your physical health? Good Fair      In general, how would you rate your mental health, including your mood and your ability to think? Fair Good      In general, how would you rate your satisfaction with your social activities and relationships? Poor Fair      In general, please rate how well you carry out your usual social activities and roles Fair Good      To what extent are you able to carry out your everyday physical activities such as walking, climbing stairs, carrying groceries, or moving  a chair? Completely Completely      In the past 7 days, how often have you been bothered by emotional problems such as feeling anxious, depressed, or irritable? Always Often      In the past 7 days, how would you rate your fatigue on average? Mild Severe      In the past 7 days, how would you rate your pain on average, where 0 means no pain, and 10 means worst imaginable pain? 0 2      In general, would you say your health is: 3 2 3 2 3   In general, would you say your quality of life is: 3 3 3 3 3   In general, how would you rate your physical health? 3 2 3 2 3   In general, how would you rate your mental health, including your mood and your ability to think? 2 3 2 2 1   In general, how would you rate your satisfaction with your social activities and relationships? 1 2 3 3 1   In general, please rate how well you carry out your usual social activities and roles. (This includes activities at home, at work and in your community, and responsibilities as a parent, child, spouse, employee, friend, etc.) 2 3 4 3 1   To what extent are you able to carry out your everyday physical activities such as walking, climbing stairs, carrying groceries, or moving a chair? 5 5 4 4 4   In the past 7 days, how often have you been bothered by emotional problems such as feeling anxious, depressed, or irritable? 5 4 4 3 5   In the past 7 days, how would you rate your fatigue on average? 2 4 2 3 3   In the past 7 days, how would you rate your pain on average, where 0 means no pain, and 10 means worst imaginable pain? 0 2 0 2 6   Global Mental Health Score 7 10 10 11 6   Global Physical Health Score 17 13 16 13 13   PROMIS TOTAL - SUBSCORES 24 23 26 24 19     PROMIS 10-Global Health (only subscores and total score):       10/31/2023    10:24 PM 11/14/2023     5:56 AM 2/29/2024     5:00 PM 6/5/2024     8:00 PM 7/26/2024    12:52 PM   PROMIS-10 Scores Only   Global Mental Health Score 7 10 10 11 6   Global Physical Health Score 17 13 16 13  13   PROMIS TOTAL - SUBSCORES 24 23 26 24 19         ASSESSMENT: Current Emotional / Mental Status (status of significant symptoms):   Risk status (Self / Other harm or suicidal ideation)   Patient denies current fears or concerns for personal safety.   Patient denies current or recent suicidal ideation or behaviors.   Patient denies current or recent homicidal ideation or behaviors.   Patient denies current or recent self injurious behavior or ideation.   Patient denies other safety concerns.   Patient reports there has been no change in risk factors since their last session.     Patient reports there has been no change in protective factors since their last session.     Recommended that patient call 911 or go to the local ED should there be a change in any of these risk factors.     Appearance:   Appropriate    Eye Contact:   Good    Psychomotor Behavior: Normal    Attitude:   Cooperative  Friendly   Orientation:   All   Speech    Rate / Production: Normal     Volume:  Normal    Mood:    Anxious    Affect:    Appropriate    Thought Content:  Clear    Thought Form:  Coherent  Logical    Insight:    Good      Medication Review:   No changes to current psychiatric medication(s)      Medication Compliance:   Yes     Changes in Health Issues:   None reported     Chemical Use Review:   Substance Use: Chemical use reviewed, no active concerns identified      Tobacco Use: No current tobacco use.      Diagnosis:  1. Bipolar 1 disorder (H)      Collateral Reports Completed:   Not Applicable    PLAN: (Patient Tasks / Therapist Tasks / Other)  Homework:  Client to continue working on job search. Focus on positive thinking.       REYNOLD Vee                                                         _________________________________________________________________________________________________________________________                                            Individual Treatment Plan    Patient's Name: Tavo CAT  Yesi  YOB: 1980    Date of Creation: 1/2/2024  Date Treatment Plan Last Reviewed/Revised: 6/5/2024    DSM5 Diagnoses: 296.52 Bipolar I Disorder Current or Most Recent Episode Depressed, Moderate  Psychosocial / Contextual Factors: Trauma hx, health, work  PROMIS (reviewed every 90 days): 6/5/2024 Score: 24    Referral / Collaboration:  Referral to another professional/service is not indicated at this time. EMDR has been presented and client will research further.    Anticipated number of session for this episode of care: 9-12 sessions  Anticipation frequency of session: Biweekly  Anticipated Duration of each session: 53 or more minutes  Treatment plan will be reviewed in 90 days or when goals have been changed.       MeasurableTreatment Goal(s) related to diagnosis / functional impairment(s)  Goal 1: Patient will lower PHQ9 score to 3 or below.    I will know I've met my goal when I'm less irritable.      Objective #A (Patient Action)    Patient will identify stress management strategies for more effectively managing Bipolar Disorder.  Status: Continued - Date(s): 6/5/2024     Intervention(s)  Therapist will teach emotional recognition/identification.      Objective #B  Patient will Identify negative self-talk and behaviors: challenge core beliefs, myths, and actions.  Status: Continued - Date(s): 6/5/2024     Intervention(s)  Therapist will  help client work on cognitive restructuring .    Objective #C  Patient will  increase exercise .  Status: Continued - Date(s): 6/5/2024    Intervention(s)  Therapist will assign homework to increase level of activity especially aerobic .      Patient has reviewed and agreed to the above plan.      Shanelle Caruso, REYNOLD  June 5, 2024

## 2024-08-15 NOTE — TELEPHONE ENCOUNTER
Called plan, confirmed receipt of request. Case is still under review. Determination expected by tomorrow

## 2024-08-20 ENCOUNTER — TRANSFERRED RECORDS (OUTPATIENT)
Dept: HEALTH INFORMATION MANAGEMENT | Facility: CLINIC | Age: 44
End: 2024-08-20
Payer: COMMERCIAL

## 2024-08-20 NOTE — TELEPHONE ENCOUNTER
PRIOR AUTHORIZATION DENIED    Medication: TESTOSTERONE 20.25 MG/ACT (1.62%) TD GEL  Insurance Company: [employer] ([facility]) - Phone [phone] Fax [phone]  Denial Date: 8/16/2024  Denial Reason(s):     Appeal Information:     Patient Notified: No, care team must notify

## 2024-08-21 NOTE — TELEPHONE ENCOUNTER
Medication Appeal Initiation    Medication: TESTOSTERONE 20.25 MG/ACT (1.62%) TD GEL  Appeal Start Date:  8/21/2024  Insurance Company: [employer]     Comments:       Initiated appeal process via fax. Requested expedited review. If patient doesn't meet criteria for high priority review, plan has 30 days to make determination.

## 2024-08-23 ENCOUNTER — MYC REFILL (OUTPATIENT)
Dept: PSYCHIATRY | Facility: CLINIC | Age: 44
End: 2024-08-23
Payer: COMMERCIAL

## 2024-08-23 DIAGNOSIS — F31.30 BIPOLAR I DISORDER, MOST RECENT EPISODE DEPRESSED (H): ICD-10-CM

## 2024-08-23 RX ORDER — GABAPENTIN 100 MG/1
100 CAPSULE ORAL 3 TIMES DAILY
Qty: 270 CAPSULE | Refills: 1 | Status: SHIPPED | OUTPATIENT
Start: 2024-08-23

## 2024-08-23 NOTE — TELEPHONE ENCOUNTER
Gabapentin is not listed on provider's plan       Date of Last Office Visit: 7/26/24  Date of Next Office Visit:  8/28/24  No shows since last visit: No  More than one patient-initiated cancellation (with reschedule) since last seen in clinic? No    []Medication refilled per  Medication Refill in Ambulatory Care  policy.  [x]Medication unable to be refilled by RN due to criteria not met as indicated below:    []Eligibility: has not had a provider visit within last 6 months   []Supervision: no future appointment; < 7 days before next appointment   [x]Compliance: no shows; cancellations; lapse in therapy   []Verification: order discrepancy; may need modification...   [] > 30-day supply request   []Advanced refill request: > 7 days before refill date   [x]Controlled medication   []Medication not included in policy   []Review: new med; med adjusted ? 30 days; safety alert; requires lab monitoring...   []Scope of Practice: refill request processed by LPN/MA   []Other:      Medication(s) requested:     -  gabapentin (NEURONTIN) 100 MG capsule   Date last ordered: 11/1/243  Qty: 270  Refills: 1      Any Controlled Substance(s)? Yes   [place]  checked? [place]   gabapentin (NEURONTIN) 100 MG capsule  was last sold on 6/26/24 for quantity of 90  .  Other controlled substance on [place] ?: Yes   If yes, are any new medications? No      Requested medication(s) verified as identical to current order? Yes    Any lapse in adherence to medication(s) greater than 5 days? Yes     Additional action taken? routed encounter to provider for review.      Last visit treatment plan:       PLAN:      Patient advised of consultative model. Patient will continue to be seen for ongoing consultation and stabilization.  Does not meet criteria for involuntary treatment or hospitalization  Start Dexedrine Spansule 11/1/2023 15 mg daily efficacy with no significant side effects, fogginess => 2/6/24 20 mg daily efficacious 5 mg to make 20 mg not affordable  => 5/22/24 focalin XR 20 mg qam sufficient efficacy however with stressors less effective => 7/26/24 XR 25 mg qam-Risks, benefits and alternatives discussed.  Patient provides verbal consent to treatment.  Restart escitalopram 7/26/24 10 mg daily -Risks, benefits and alternatives discussed.  Patient provides verbal consent to treatment.  Continue lamotrigine 200 mg daily bouts of depression with irritability => 5/22/14 250 mg daily-Risks, benefits and alternatives discussed.  Patient provides verbal consent to treatment.  Continue lithium extended release 450 mg daily level low => 12/26/23 900 mg at bedtime lithium level 0.54 efficacy with no side effects =>- 2/6/2024 1050 mg nightly-risks, benefits and alternatives discussed.  Patient provides verbal consent  Restart clonazepam 12/26/2023 0.5 mg p.o. daily as needed anxiety/anger-Risks, benefits and alternatives discussed.  Patient provides verbal consent to treatment.  Advised of dependence, addiction, car driving and memory loss risks. 15/month initially  Hydroxyzine 50 mg p.o. 3 times daily with additional 50 mg as needed anxiety-provides well consent to treatment  Propranolol for migraine/headache prevention  Dc'd cariprazine (elevated blood sugars)  Labs - reviewed, follow-up lithium level  Therapy with B Topalof     Any medication(s) require lab monitoring? No

## 2024-08-28 ENCOUNTER — VIRTUAL VISIT (OUTPATIENT)
Dept: PSYCHIATRY | Facility: CLINIC | Age: 44
End: 2024-08-28
Payer: COMMERCIAL

## 2024-08-28 DIAGNOSIS — F90.0 ADHD (ATTENTION DEFICIT HYPERACTIVITY DISORDER), INATTENTIVE TYPE: Primary | ICD-10-CM

## 2024-08-28 DIAGNOSIS — F31.9 BIPOLAR 1 DISORDER (H): ICD-10-CM

## 2024-08-28 PROCEDURE — G2211 COMPLEX E/M VISIT ADD ON: HCPCS | Mod: 95 | Performed by: PSYCHIATRY & NEUROLOGY

## 2024-08-28 PROCEDURE — 99214 OFFICE O/P EST MOD 30 MIN: CPT | Mod: 95 | Performed by: PSYCHIATRY & NEUROLOGY

## 2024-08-28 RX ORDER — LAMOTRIGINE 100 MG/1
250 TABLET ORAL DAILY
Qty: 75 TABLET | Refills: 2 | Status: SHIPPED | OUTPATIENT
Start: 2024-08-28

## 2024-08-28 RX ORDER — DEXMETHYLPHENIDATE HYDROCHLORIDE 30 MG/1
30 CAPSULE, EXTENDED RELEASE ORAL DAILY
Qty: 30 CAPSULE | Refills: 0 | Status: SHIPPED | OUTPATIENT
Start: 2024-08-28 | End: 2024-09-27

## 2024-08-28 RX ORDER — DEXMETHYLPHENIDATE HYDROCHLORIDE 30 MG/1
30 CAPSULE, EXTENDED RELEASE ORAL DAILY
Qty: 30 CAPSULE | Refills: 0 | Status: SHIPPED | OUTPATIENT
Start: 2024-10-27 | End: 2024-11-26

## 2024-08-28 RX ORDER — DEXMETHYLPHENIDATE HYDROCHLORIDE 25 MG/1
25 CAPSULE, EXTENDED RELEASE ORAL DAILY
Qty: 30 CAPSULE | Refills: 0 | Status: CANCELLED | OUTPATIENT
Start: 2024-08-28

## 2024-08-28 RX ORDER — DEXMETHYLPHENIDATE HYDROCHLORIDE 30 MG/1
30 CAPSULE, EXTENDED RELEASE ORAL DAILY
Qty: 30 CAPSULE | Refills: 0 | Status: SHIPPED | OUTPATIENT
Start: 2024-09-27 | End: 2024-10-27

## 2024-08-28 ASSESSMENT — ANXIETY QUESTIONNAIRES
GAD7 TOTAL SCORE: 14
7. FEELING AFRAID AS IF SOMETHING AWFUL MIGHT HAPPEN: NEARLY EVERY DAY
GAD7 TOTAL SCORE: 14
7. FEELING AFRAID AS IF SOMETHING AWFUL MIGHT HAPPEN: NEARLY EVERY DAY
GAD7 TOTAL SCORE: 14
5. BEING SO RESTLESS THAT IT IS HARD TO SIT STILL: MORE THAN HALF THE DAYS
4. TROUBLE RELAXING: SEVERAL DAYS
1. FEELING NERVOUS, ANXIOUS, OR ON EDGE: NEARLY EVERY DAY
3. WORRYING TOO MUCH ABOUT DIFFERENT THINGS: MORE THAN HALF THE DAYS
6. BECOMING EASILY ANNOYED OR IRRITABLE: SEVERAL DAYS
2. NOT BEING ABLE TO STOP OR CONTROL WORRYING: MORE THAN HALF THE DAYS

## 2024-08-28 NOTE — NURSING NOTE
Current patient location: [id number] [place] [id number]    Is the patient currently in the state of [place]? YES    Visit mode:VIDEO    If the visit is dropped, the patient can be reconnected by: VIDEO VISIT: Text to cell phone:   Telephone Information:   Mobile [phone]       Will anyone else be joining the visit? NO  (If patient encounters technical issues they should call [phone])    How would you like to obtain your AVS? MyChart    Are changes needed to the allergy or medication list? Pt stated no changes to allergies and Pt stated no med changes    Are refills needed on medications prescribed by this physician? YES    Rooming Documentation:  Questionnaire(s) completed      Reason for visit: RECHECK    Deepti GOYAL

## 2024-08-28 NOTE — PROGRESS NOTES
Virtual Visit Details    Type of service:  Video Visit     Originating Location (pt. Location): Home    Distant Location (provider location):  On-site  Platform used for Video Visit: [facility] Consult Note    IDENTIFICATION   Name: Tavo Key   : 1980/44 year old      Sex:    @ male          Telemedicine Visit: The patient's condition can be safely assessed and treated via synchronous audio and visual telemedicine encounter.        Face to Face/patient Contact total time: 18 minutes  Pre Charting time: 1 minutes; Post charting time, communication and other activities: 0 minutes; Total time 19 minutes  11:39 AM - 11:57AM          SUBJECTIVE     History of Present Illness    Tavo Key, a 44-year-old male, presented with concerns related to his Attention Deficit Hyperactivity Disorder (ADHD), inattentive type, and Bipolar 1 disorder. He reported an increase in anxiety levels, which he attributed to his recent job loss. His anxiety symptoms included fidgeting, stress, and difficulty going outside due to nervousness. He also reported trouble falling asleep, which he believed was related to his anxiety. However, he noted that his depression had improved since starting Lexapro, with a noticeable improvement in his overall mood and energy levels within a few days of starting the medication.    Tavo also reported some concentration issues over the past three to four days, which he thought could be related to his job situation. He had been taking Focalin to manage his ADHD symptoms, but he felt that the medication had not been as effective in the last few days. He also mentioned experiencing some dissociation type symptoms. Despite these recent challenges, he reported that his sleep was not affected, and he did not have racing thoughts.    Tavo also mentioned that he had been on the same dose of Focalin since the end of July, and the change in his symptoms had occurred in the  last three to four days. He reported a slight increase in hyperactivity while working, which made it difficult for him to concentrate. However, he did not believe that these symptoms were indicative of hypomania.    In terms of his medication regimen, Tavo reported that the Focalin seemed to be less effective than Adderall, which he had taken previously. He also mentioned that he had some digestive issues when his lithium dosage was too high. Despite these issues, he was open to increasing his Focalin dosage to manage his symptoms better. He also expressed a willingness to monitor his symptoms for any signs of hypomania.             The following assessments were completed by patient for this visit:  PROMIS 10-Global Health (only subscores and total score):       10/31/2023    10:24 PM 11/14/2023     5:56 AM 2/29/2024     5:00 PM 6/5/2024     8:00 PM 7/26/2024    12:52 PM   PROMIS-10 Scores Only   Global Mental Health Score 7 10 10 11 6   Global Physical Health Score 17 13 16 13 13   PROMIS TOTAL - SUBSCORES 24 23 26 24 19             5/22/2024     1:15 PM 7/26/2024    12:49 PM 8/28/2024    11:21 AM   PHQ   PHQ-9 Total Score 11 13 12   Q9: Thoughts of better off dead/self-harm past 2 weeks Not at all Several days Not at all          5/22/2024     1:15 PM 7/26/2024    12:52 PM 8/28/2024    11:22 AM   MATTI-7 SCORE   Total Score 14 (moderate anxiety) 12 (moderate anxiety) 14 (moderate anxiety)   Total Score 14    14 12 14        OBJECTIVE     Vital Signs:   There were no vitals taken for this visit.    Labs:    Results                    Current Medications:  Current Outpatient Medications   Medication Sig Dispense Refill    clonazePAM (KLONOPIN) 0.5 MG tablet Take 1 tablet (0.5 mg) by mouth daily as needed for anxiety 15 tablet 3    dexmethylphenidate (FOCALIN XR) 30 MG 24 hr capsule Take 1 capsule (30 mg) by mouth daily. 30 capsule 0    [START ON 9/27/2024] dexmethylphenidate (FOCALIN XR) 30 MG 24 hr capsule Take 1  capsule (30 mg) by mouth daily. Do not start before September 27, 2024. 30 capsule 0    [START ON 10/27/2024] dexmethylphenidate (FOCALIN XR) 30 MG 24 hr capsule Take 1 capsule (30 mg) by mouth daily. Do not start before October 27, 2024. 30 capsule 0    escitalopram (LEXAPRO) 10 MG tablet Take 1 tablet (10 mg) by mouth daily 30 tablet 3    gabapentin (NEURONTIN) 100 MG capsule Take 1 capsule (100 mg) by mouth 3 times daily. 270 capsule 1    lamoTRIgine (LAMICTAL) 100 MG tablet Take 2.5 tablets (250 mg) by mouth daily. 75 tablet 2    lithium (ESKALITH CR/LITHOBID) 450 MG CR tablet Take 2 tablets (900 mg) by mouth at bedtime Take along with a 150 mg lithium capsule for a total of 1050 mg nightly 60 tablet 3    lithium (ESKALITH) 150 MG capsule Take 1 capsule (150 mg) by mouth at bedtime Take along with two 450 mg extended release pills for a total of 1050 mg nightly 30 capsule 3    acetaminophen (TYLENOL) 500 MG tablet Take 1,000 mg by mouth 3 times daily as needed for mild pain      albuterol (PROAIR HFA/PROVENTIL HFA/VENTOLIN HFA) 108 (90 Base) MCG/ACT inhaler Inhale 2 puffs into the lungs every 4 hours as needed for shortness of breath or wheezing 18 g 5    Alcohol Swabs (B-D SINGLE USE SWABS REGULAR) PADS USE TO SWAB AREA OF INJECTION/FREIDA AS DIRECTED. 100 each 3    amLODIPine (NORVASC) 10 MG tablet TAKE 1 TABLET (10 MG) BY MOUTH DAILY 90 tablet 1    aspirin (ASA) 81 MG EC tablet Take 1 tablet (81 mg) by mouth daily      atorvastatin (LIPITOR) 20 MG tablet TAKE ONE TABLET BY MOUTH EVERY NIGHT AT BEDTIME. NEED APPOINTMENT FOR FURTHER REFILLS. 90 tablet 1    clotrimazole-betamethasone (LOTRISONE) 1-0.05 % external cream Apply topically 2 times daily Angles of mouth 45 g 3    Continuous Blood Gluc Sensor (FREESTYLE ALDEN 3 SENSOR) [facility] USE AND CHANGE 1 SENSOR EVERY 14 DAYS 2 each 5    esomeprazole (NEXIUM) 40 MG DR capsule Take 1 capsule (40 mg) by mouth every morning (before breakfast) Take 30-60 minutes before  eating.      fluticasone (FLONASE) 50 MCG/ACT nasal spray Spray 1 spray into both nostrils daily (Patient taking differently: Spray 1 spray into both nostrils daily as needed for rhinitis or allergies) 16 g 11    hydrOXYzine (VISTARIL) 50 MG capsule Take 1 capsule (50 mg) by mouth 3 times daily Take an additional tablet a day as needed for anxiety 120 capsule 5    insulin glargine (LANTUS PEN) 100 UNIT/ML pen Inject 46 Units subcutaneously daily Increase dose by 2 units every three days until fasting blood sugar is between  mg/dL. Max 54 units/day. 60 mL 1    insulin pen needle (PENTIPS) 31G X 5 MM miscellaneous USE 4 PEN NEEDLES DAILY OR AS DIRECTED. 400 each 1    losartan (COZAAR) 50 MG tablet TAKE 1 TABLET (50 MG) BY MOUTH DAILY 90 tablet 2    naratriptan (AMERGE) 2.5 MG tablet TAKE 1 TABLET (2.5 MG) BY MOUTH AT ONSET OF HEADACHE FOR MIGRAINE MAY REPEAT IN 4 HOURS. MAX 2 TABLETS/24 HOURS. 12 tablet 5    NOVOLOG FLEXPEN 100 UNIT/ML soln INJECT SUBCUTANEOUSLY 10 UNITS BEFORE BREAKFAST, 10 UNITS BEFORE LUNCH, AND 30 UNITS BEFORE SUPPER (Patient taking differently: Inject subcutaneously 18 units before breakfast, 18 units before lunch and 18 units before supper.) 30 mL 1    propranolol (INDERAL) 80 MG tablet TAKE 1 TABLET (80 MG) BY MOUTH 2 TIMES DAILY 180 tablet 1    sildenafil (REVATIO) 20 MG tablet Take 1-5 tablets ( mg) by mouth daily as needed (erectile dysfunction.) 30 tablet 2    testosterone (ANDROGEL 1.62 % PUMP) 20.25 MG/ACT gel Place 1 Pump (20.25 mg) onto the skin daily Apply from dispenser to clean, dry, intact skin of the upper arms and shoulders. 75 g 5    tirzepatide (MOUNJARO) 2.5 MG/0.5ML pen Inject 2.5 mg subcutaneously every 7 days 2 mL 1    TRULICITY 3 MG/0.5ML SOPN INJECT 3 MG SUBCUTANEOUSLY ONCE A WEEK 2 mL 3     No current facility-administered medications for this visit.            ADDED HISTORY   Patient recently lost his job, which has significantly increased his anxiety levels.  He reports difficulty sleeping and increased fidgeting due to stress. Despite these challenges, he reports an improvement in his overall mood since starting Lexapro.    Physical exam    Physical Exam    - Anxiety, depression, affect:  - Speech and language:  - Insight and judgment:  - Alert and orientation to person, place and situation:  - SI: None  - HI: None  - AH: None  - VH: None             MENTAL STATUS EXAMINATION:   Appearance: Intact attention to grooming and hygiene, casual garb  Attitude:  cooperative   Eye Contact:  Good  Gait and Station: sitting  Psychomotor Behavior: Within normal limits  Oriented to: Grossly person place and time  Attention Span and Concentration: Grossly intact   Speech:  slightly faster, non pressured rate  Language: English  Mood:  fairly anxious   Affect: mildly constricted  Associations:  no loose associations  Thought Process:  logical, linear and goal oriented  Thought Content: No evidence of delusions or suicidal or homicidal ideation plan or intent  Memory: Grossly intact  Fund of Knowledge: Good  Insight:  good  Judgment:  intact, adequate for safety  Impulse Control:  intact        DIAGNOSES:   By history Bipolar disorder type I  By history, ADHD inattentive type  By history Social Anxiety Disorder  By history Generalized Anxiety Disorder      ASSESSMENT:   Patient currently undergoing depression with history of depression, rush, ADHD, anxiety and adverse childhood events.   Sx improving but challenges remain with depression, ADHD medications.     Today Tavo Key reports no suicidal ideations. In addition, he has notable risk factors for self-harm, including anxiety, 1 suicide attempt. However, risk is mitigated by Orthodox beliefs, history of seeking help when needed, and support. Therefore, based on all available evidence including the factors cited above, he does not appear to be at imminent risk for self-harm, does not meet criteria for a 72-hr hold, and  therefore remains appropriate for ongoing outpatient level of care.       PLAN:     Patient advised of consultative model. Patient will continue to be seen for ongoing consultation and stabilization.  Does not meet criteria for involuntary treatment or hospitalization  Start Dexedrine Spansule 11/1/2023 15 mg daily efficacy with no significant side effects, fogginess => 2/6/24 20 mg daily efficacious 5 mg to make 20 mg not affordable => 5/22/24 focalin XR 20 mg qam sufficient efficacy however with stressors less effective => 7/26/24 XR 25 mg qam efficacy, not as effective as adderall was => 8/28/24 30 mg qam-Risks, benefits and alternatives discussed.  Patient provides verbal consent to treatment.  Restart escitalopram 7/26/24 10 mg daily depression and mood significantly better -Risks, benefits and alternatives discussed.  Patient provides verbal consent to treatment.  Continue lamotrigine 200 mg daily bouts of depression with irritability => 5/22/14 250 mg daily-Risks, benefits and alternatives discussed.  Patient provides verbal consent to treatment.  Continue lithium extended release 450 mg daily level low => 12/26/23 900 mg at bedtime lithium level 0.54 efficacy with no side effects =>- 2/6/2024 1050 mg nightly-risks, benefits and alternatives discussed.  Patient provides verbal consent digestive/GI side effects at higher doses  Restart clonazepam 12/26/2023 0.5 mg p.o. daily as needed anxiety/anger-Risks, benefits and alternatives discussed.  Patient provides verbal consent to treatment.  Advised of dependence, addiction, car driving and memory loss risks. 15/month initially  Hydroxyzine 50 mg p.o. 3 times daily with additional 50 mg as needed anxiety-provides well consent to treatment  Propranolol for migraine/headache prevention  Dc'd cariprazine (elevated blood sugars), adderall (due to availability)  Labs - reviewed, follow-up lithium level  Therapy with JERARDO Caruso       Assessment & Plan      Assessment  Patient's anxiety has increased due to recent job loss, but his mood has improved since starting Lexapro. He reports some difficulty with concentration and fidgeting, which he attributes to anxiety. He also reports that the Focalin has been less effective in the last few days, but it is unclear why this is the case. There is a possibility of underlying manic symptoms, but these should pass with time. The patient has agreed to increase the dose of Focalin to 30mg and monitor for any manic symptoms.    Plan  - Increase Focalin to 30mg  - Monitor for any manic symptoms  - Patient to reach out to nursing and backup providers if needed in the next 2-2.5 weeks    Prescription  Focalin 30mg    Appointments  Next appointment in 2-2.5 weeks if needed         The longitudinal plan of care for the diagnosis(es)/condition(s) as documented were addressed during this visit. Due to the added complexity in care, I will continue to support Tavo in the subsequent management and with ongoing continuity of care.    Administrative Billing:   Time spent with patient was greater than 50% of time and/or significant time was spent in counseling and coordination of care regarding above diagnoses and treatment plan. Pre charting time and post charting time/documentation/coordination are done on date of service.      Signed:   Barney Colon M.D.  [facility]    Disclaimer: This note consists of symbols derived from keyboarding, dictation and/or voice recognition software. As a result, there may be errors in the script that have gone undetected. Please consider this when interpreting information found in this chart.    Consent was obtained from the patient to use an AI documentation tool in the creation of this note.     eoc

## 2024-09-03 NOTE — TELEPHONE ENCOUNTER
Called plan. They advised that the case was received on 08/21. [id number]. Case was overturned. Plan is resending determination letter.

## 2024-09-03 NOTE — TELEPHONE ENCOUNTER
MEDICATION APPEAL APPROVED    Medication: TESTOSTERONE 20.25 MG/ACT (1.62%) TD GEL  Authorization Effective Date: 8/21/2024  Authorization Expiration Date: 8/21/2025  Approved Dose/Quantity: as written  Reference #: [id number]   [employer] Insurance Company: [facility] - Phone [phone]  Filling Pharmacy: [facility] [place] - [address] [place] [address]  Patient Notified: Instructed pharmacy to notify patient once order is ready.   Comments:

## 2024-09-07 DIAGNOSIS — E11.9 TYPE 2 DIABETES MELLITUS WITHOUT COMPLICATION, WITHOUT LONG-TERM CURRENT USE OF INSULIN (H): ICD-10-CM

## 2024-09-10 RX ORDER — TIRZEPATIDE 2.5 MG/.5ML
2.5 INJECTION, SOLUTION SUBCUTANEOUS
Qty: 2 ML | Refills: 0 | Status: SHIPPED | OUTPATIENT
Start: 2024-09-10 | End: 2024-10-07

## 2024-09-10 RX ORDER — BLOOD-GLUCOSE SENSOR
EACH MISCELLANEOUS
Qty: 2 EACH | Refills: 5 | Status: SHIPPED | OUTPATIENT
Start: 2024-09-10

## 2024-09-17 ENCOUNTER — TELEPHONE (OUTPATIENT)
Dept: FAMILY MEDICINE | Facility: CLINIC | Age: 44
End: 2024-09-17

## 2024-09-19 NOTE — TELEPHONE ENCOUNTER
PA Initiation    Medication: FREESTYLE ALDEN 3 SENSOR [facility]  Insurance Company: [employer] ([facility]) - Phone [phone] Fax [phone]  Pharmacy Filling the Rx: [facility] - [address] [facility]  Filling Pharmacy Phone: [phone]  Filling Pharmacy Fax: [phone]  Start Date: 9/19/2024

## 2024-09-19 NOTE — TELEPHONE ENCOUNTER
Prior Authorization Approval    Medication: FREESTYLE ALDEN 3 SENSOR [facility]  Authorization Effective Date: 9/19/2024  Authorization Expiration Date: 9/19/2025  Approved Dose/Quantity:   Reference #:     Insurance Company: [employer] ([facility]) - Phone [phone] Fax [phone]  Expected CoPay: $    CoPay Card Available:      Financial Assistance Needed:   Which Pharmacy is filling the prescription: [facility] [place] - [address] [facility]  Pharmacy Notified: YES  Patient Notified: **Instructed pharmacy to notify patient when script is ready to /ship.**

## 2024-09-19 NOTE — TELEPHONE ENCOUNTER
Prior Authorization Approval    Medication: MOUNJARO 2.5 MG/0.5ML SC SOPN  Authorization Effective Date: 9/19/2024  Authorization Expiration Date: 9/19/2025  Approved Dose/Quantity:   Reference #:     Insurance Company: [facility] ([facility]) - Phone [phone] Fax [phone]  Expected CoPay: $    CoPay Card Available:      Financial Assistance Needed:   Which Pharmacy is filling the prescription: [facility] - [address] [facility]  Pharmacy Notified: YES  Patient Notified: **Instructed pharmacy to notify patient when script is ready to /ship.**

## 2024-09-19 NOTE — TELEPHONE ENCOUNTER
PA Initiation    Medication: MOUNJARO 2.5 MG/0.5ML SC SOPN  Insurance Company: [employer] ([facility]) - Phone [phone] Fax [phone]  Pharmacy Filling the Rx: [facility] - [address] [facility]  Filling Pharmacy Phone: [phone]  Filling Pharmacy Fax: [phone]  Start Date: 9/19/2024

## 2024-09-22 ENCOUNTER — HEALTH MAINTENANCE LETTER (OUTPATIENT)
Age: 44
End: 2024-09-22

## 2024-10-07 DIAGNOSIS — E11.9 TYPE 2 DIABETES MELLITUS WITHOUT COMPLICATION, WITHOUT LONG-TERM CURRENT USE OF INSULIN (H): ICD-10-CM

## 2024-10-09 ENCOUNTER — IMMUNIZATION (OUTPATIENT)
Dept: FAMILY MEDICINE | Facility: CLINIC | Age: 44
End: 2024-10-09
Payer: COMMERCIAL

## 2024-10-09 DIAGNOSIS — Z23 HIGH PRIORITY FOR 2019-NCOV VACCINE: ICD-10-CM

## 2024-10-09 DIAGNOSIS — Z23 NEED FOR PROPHYLACTIC VACCINATION AND INOCULATION AGAINST INFLUENZA: Primary | ICD-10-CM

## 2024-10-09 PROCEDURE — 90480 ADMN SARSCOV2 VAC 1/ONLY CMP: CPT

## 2024-10-09 PROCEDURE — 90471 IMMUNIZATION ADMIN: CPT

## 2024-10-09 PROCEDURE — 91320 SARSCV2 VAC 30MCG TRS-SUC IM: CPT

## 2024-10-09 PROCEDURE — 90656 IIV3 VACC NO PRSV 0.5 ML IM: CPT

## 2024-10-11 DIAGNOSIS — R80.9 PROTEINURIA, UNSPECIFIED TYPE: ICD-10-CM

## 2024-10-11 DIAGNOSIS — I10 HYPERTENSION GOAL BP (BLOOD PRESSURE) < 140/90: ICD-10-CM

## 2024-10-11 RX ORDER — LOSARTAN POTASSIUM 50 MG/1
50 TABLET ORAL DAILY
Qty: 90 TABLET | Refills: 0 | Status: SHIPPED | OUTPATIENT
Start: 2024-10-11

## 2024-10-16 ENCOUNTER — LAB (OUTPATIENT)
Dept: LAB | Facility: CLINIC | Age: 44
End: 2024-10-16
Payer: COMMERCIAL

## 2024-10-16 ENCOUNTER — VIRTUAL VISIT (OUTPATIENT)
Dept: PSYCHOLOGY | Facility: CLINIC | Age: 44
End: 2024-10-16
Payer: COMMERCIAL

## 2024-10-16 DIAGNOSIS — E11.9 TYPE 2 DIABETES MELLITUS WITHOUT COMPLICATION, WITHOUT LONG-TERM CURRENT USE OF INSULIN (H): ICD-10-CM

## 2024-10-16 DIAGNOSIS — F31.9 BIPOLAR 1 DISORDER (H): Primary | ICD-10-CM

## 2024-10-16 DIAGNOSIS — E11.9 TYPE 2 DIABETES MELLITUS WITHOUT COMPLICATION, WITHOUT LONG-TERM CURRENT USE OF INSULIN (H): Primary | ICD-10-CM

## 2024-10-16 LAB
EST. AVERAGE GLUCOSE BLD GHB EST-MCNC: 177 MG/DL
HBA1C MFR BLD: 7.8 % (ref 0–5.6)

## 2024-10-16 PROCEDURE — 90837 PSYTX W PT 60 MINUTES: CPT | Mod: 95 | Performed by: MARRIAGE & FAMILY THERAPIST

## 2024-10-16 PROCEDURE — 36415 COLL VENOUS BLD VENIPUNCTURE: CPT

## 2024-10-16 PROCEDURE — 83036 HEMOGLOBIN GLYCOSYLATED A1C: CPT

## 2024-10-16 NOTE — PROGRESS NOTES
[facility]                                     Progress Note    Patient Name: Tavo Key  Date: 10/16/2024         Service Type: Individual      Session Start Time: 11:00AM  Session End Time: 11:50AM     Session Length: 50 minutes    Session #: 20    Attendees: Client attended alone    Service Modality:  Video Visit:      Provider verified identity through the following two step process.  Patient provided:  Patient is known previously to provider    Telemedicine Visit: The patient's condition can be safely assessed and treated via synchronous audio and visual telemedicine encounter.      Reason for Telemedicine Visit: Patient has requested telehealth visit    Originating Site (Patient Location): Patient's home    Distant Site (Provider Location): Provider Remote Setting- Home Office    Consent:  The patient/guardian has verbally consented to: the potential risks and benefits of telemedicine (video visit) versus in person care; bill my insurance or make self-payment for services provided; and responsibility for payment of non-covered services.     Patient would like the video invitation sent by:  My Chart    Mode of Communication:  Video Conference via [facility]    Distant Location (Provider):  Off-site    As the provider I attest to compliance with applicable laws and regulations related to telemedicine.    DATA  Extended Session (53+ minutes):   - Longer session due to limited access to mental health appointments and necessity to address patient's distress / complexity  Interactive Complexity: No  Crisis: No        Progress Since Last Session (Related to Symptoms / Goals / Homework):   Symptoms: Improving medication is helping    Homework: Partially completed      Episode of Care Goals: Satisfactory progress - ACTION (Actively working towards change); Intervened by reinforcing change plan / affirming steps taken     Current / Ongoing Stressors and Concerns:   Client updates this writer on  how things have been going since we met last. He continues to work on his blood sugars which feel very confusing. He will meet with his doctor on Friday to do 2 cancer screenings and will inquire with his questions. He is discouraged that he can't seem to manage this well. He has had 2 days of overwhelming emotions related to the job search. His negative self talk has been pervasive. Talked about using post-it affirmations, singing and music. Also talked about getting out walking or other activities outside the house. Tavo also would like to work on his PT exercises for his back because this remains an ongoing problem.     Treatment Objective(s) Addressed in This Session:   identify coping strategies for more effectively managing Bipolar Disorder  Work on managing his diabetes     Intervention:   CBT: Cognitive restructuring  Solution Focused: prep for move      The following assessments were completed by patient for this visit:    PROMIS 10-Global Health (all questions and answers displayed):       10/31/2023    10:24 PM 11/14/2023     5:56 AM 2/29/2024     5:00 PM 6/5/2024     8:00 PM 7/26/2024    12:52 PM   PROMIS 10   In general, would you say your health is: Good Fair      In general, would you say your quality of life is: Good Good      In general, how would you rate your physical health? Good Fair      In general, how would you rate your mental health, including your mood and your ability to think? Fair Good      In general, how would you rate your satisfaction with your social activities and relationships? Poor Fair      In general, please rate how well you carry out your usual social activities and roles Fair Good      To what extent are you able to carry out your everyday physical activities such as walking, climbing stairs, carrying groceries, or moving a chair? Completely Completely      In the past 7 days, how often have you been bothered by emotional problems such as feeling anxious, depressed, or  irritable? Always Often      In the past 7 days, how would you rate your fatigue on average? Mild Severe      In the past 7 days, how would you rate your pain on average, where 0 means no pain, and 10 means worst imaginable pain? 0 2      In general, would you say your health is: 3 2 3 2 3   In general, would you say your quality of life is: 3 3 3 3 3   In general, how would you rate your physical health? 3 2 3 2 3   In general, how would you rate your mental health, including your mood and your ability to think? 2 3 2 2 1   In general, how would you rate your satisfaction with your social activities and relationships? 1 2 3 3 1   In general, please rate how well you carry out your usual social activities and roles. (This includes activities at home, at work and in your community, and responsibilities as a parent, child, spouse, employee, friend, etc.) 2 3 4 3 1   To what extent are you able to carry out your everyday physical activities such as walking, climbing stairs, carrying groceries, or moving a chair? 5 5 4 4 4   In the past 7 days, how often have you been bothered by emotional problems such as feeling anxious, depressed, or irritable? 5 4 4 3 5   In the past 7 days, how would you rate your fatigue on average? 2 4 2 3 3   In the past 7 days, how would you rate your pain on average, where 0 means no pain, and 10 means worst imaginable pain? 0 2 0 2 6   Global Mental Health Score 7 10 10 11 6   Global Physical Health Score 17 13 16 13 13   PROMIS TOTAL - SUBSCORES 24 23 26 24 19     PROMIS 10-Global Health (only subscores and total score):       10/31/2023    10:24 PM 11/14/2023     5:56 AM 2/29/2024     5:00 PM 6/5/2024     8:00 PM 7/26/2024    12:52 PM   PROMIS-10 Scores Only   Global Mental Health Score 7 10 10 11 6   Global Physical Health Score 17 13 16 13 13   PROMIS TOTAL - SUBSCORES 24 23 26 24 19         ASSESSMENT: Current Emotional / Mental Status (status of significant symptoms):   Risk status  (Self / Other harm or suicidal ideation)   Patient denies current fears or concerns for personal safety.   Patient denies current or recent suicidal ideation or behaviors.   Patient denies current or recent homicidal ideation or behaviors.   Patient denies current or recent self injurious behavior or ideation.   Patient denies other safety concerns.   Patient reports there has been no change in risk factors since their last session.     Patient reports there has been no change in protective factors since their last session.     Recommended that patient call 911 or go to the local ED should there be a change in any of these risk factors.     Appearance:   Appropriate    Eye Contact:   Good    Psychomotor Behavior: Normal    Attitude:   Cooperative  Friendly   Orientation:   All   Speech    Rate / Production: Normal     Volume:  Normal    Mood:    Anxious    Affect:    Appropriate    Thought Content:  Clear    Thought Form:  Coherent  Logical    Insight:    Good      Medication Review:   No changes to current psychiatric medication(s)      Medication Compliance:   Yes     Changes in Health Issues:   None reported     Chemical Use Review:   Substance Use: Chemical use reviewed, no active concerns identified      Tobacco Use: No current tobacco use.      Diagnosis:  1. Bipolar 1 disorder (H)      Collateral Reports Completed:   Not Applicable    PLAN: (Patient Tasks / Therapist Tasks / Other)  Homework:  Client to continue working on job search. Focus on positive thinking. Dedicate to PT.      REYNOLD Vee                                                         _________________________________________________________________________________________________________________________                                            Individual Treatment Plan    Patient's Name: Tavo Key  YOB: 1980    Date of Creation: 1/2/2024  Date Treatment Plan Last Reviewed/Revised: 7/26/2024    DSM5 Diagnoses:  296.52 Bipolar I Disorder Current or Most Recent Episode Depressed, Moderate  Psychosocial / Contextual Factors: Trauma hx, health, work  PROMIS (reviewed every 90 days): 7/26/2024 Score: 19    Referral / Collaboration:  Referral to another professional/service is not indicated at this time. EMDR has been presented and client will research further.    Anticipated number of session for this episode of care: 9-12 sessions  Anticipation frequency of session: Biweekly  Anticipated Duration of each session: 53 or more minutes  Treatment plan will be reviewed in 90 days or when goals have been changed.       MeasurableTreatment Goal(s) related to diagnosis / functional impairment(s)  Goal 1: Patient will lower PHQ9 score to 3 or below.    I will know I've met my goal when I'm less irritable.      Objective #A (Patient Action)    Patient will identify stress management strategies for more effectively managing Bipolar Disorder.  Status: Continued - Date(s): 7/26/2024     Intervention(s)  Therapist will teach emotional recognition/identification.      Objective #B  Patient will Identify negative self-talk and behaviors: challenge core beliefs, myths, and actions.  Status: Continued - Date(s): 7/26/2024     Intervention(s)  Therapist will  help client work on cognitive restructuring .    Objective #C  Patient will  increase exercise .  Status: Continued - Date(s): 7/26/2024    Intervention(s)  Therapist will assign homework to increase level of activity especially aerobic .      Patient has reviewed and agreed to the above plan.      Shanelle Caruso, REYNOLD  July 26, 2024

## 2024-10-17 NOTE — RESULT ENCOUNTER NOTE
Tavo,     Here is your A1C. Good job it is now below 8. We will keep working at this.   Criselda Walsh PA-C

## 2024-10-22 ASSESSMENT — ANXIETY QUESTIONNAIRES
3. WORRYING TOO MUCH ABOUT DIFFERENT THINGS: MORE THAN HALF THE DAYS
6. BECOMING EASILY ANNOYED OR IRRITABLE: SEVERAL DAYS
8. IF YOU CHECKED OFF ANY PROBLEMS, HOW DIFFICULT HAVE THESE MADE IT FOR YOU TO DO YOUR WORK, TAKE CARE OF THINGS AT HOME, OR GET ALONG WITH OTHER PEOPLE?: VERY DIFFICULT
1. FEELING NERVOUS, ANXIOUS, OR ON EDGE: MORE THAN HALF THE DAYS
2. NOT BEING ABLE TO STOP OR CONTROL WORRYING: MORE THAN HALF THE DAYS
4. TROUBLE RELAXING: MORE THAN HALF THE DAYS
GAD7 TOTAL SCORE: 10
IF YOU CHECKED OFF ANY PROBLEMS ON THIS QUESTIONNAIRE, HOW DIFFICULT HAVE THESE PROBLEMS MADE IT FOR YOU TO DO YOUR WORK, TAKE CARE OF THINGS AT HOME, OR GET ALONG WITH OTHER PEOPLE: VERY DIFFICULT
5. BEING SO RESTLESS THAT IT IS HARD TO SIT STILL: NOT AT ALL
6. BECOMING EASILY ANNOYED OR IRRITABLE: SEVERAL DAYS
GAD7 TOTAL SCORE: 10
IF YOU CHECKED OFF ANY PROBLEMS ON THIS QUESTIONNAIRE, HOW DIFFICULT HAVE THESE PROBLEMS MADE IT FOR YOU TO DO YOUR WORK, TAKE CARE OF THINGS AT HOME, OR GET ALONG WITH OTHER PEOPLE: VERY DIFFICULT
8. IF YOU CHECKED OFF ANY PROBLEMS, HOW DIFFICULT HAVE THESE MADE IT FOR YOU TO DO YOUR WORK, TAKE CARE OF THINGS AT HOME, OR GET ALONG WITH OTHER PEOPLE?: VERY DIFFICULT
2. NOT BEING ABLE TO STOP OR CONTROL WORRYING: MORE THAN HALF THE DAYS
5. BEING SO RESTLESS THAT IT IS HARD TO SIT STILL: NOT AT ALL
GAD7 TOTAL SCORE: 10
7. FEELING AFRAID AS IF SOMETHING AWFUL MIGHT HAPPEN: SEVERAL DAYS
GAD7 TOTAL SCORE: 10
7. FEELING AFRAID AS IF SOMETHING AWFUL MIGHT HAPPEN: SEVERAL DAYS
4. TROUBLE RELAXING: MORE THAN HALF THE DAYS
7. FEELING AFRAID AS IF SOMETHING AWFUL MIGHT HAPPEN: SEVERAL DAYS
1. FEELING NERVOUS, ANXIOUS, OR ON EDGE: MORE THAN HALF THE DAYS
GAD7 TOTAL SCORE: 10
3. WORRYING TOO MUCH ABOUT DIFFERENT THINGS: MORE THAN HALF THE DAYS

## 2024-10-22 ASSESSMENT — PATIENT HEALTH QUESTIONNAIRE - PHQ9
SUM OF ALL RESPONSES TO PHQ QUESTIONS 1-9: 12
SUM OF ALL RESPONSES TO PHQ QUESTIONS 1-9: 12
10. IF YOU CHECKED OFF ANY PROBLEMS, HOW DIFFICULT HAVE THESE PROBLEMS MADE IT FOR YOU TO DO YOUR WORK, TAKE CARE OF THINGS AT HOME, OR GET ALONG WITH OTHER PEOPLE: SOMEWHAT DIFFICULT

## 2024-10-23 ENCOUNTER — VIRTUAL VISIT (OUTPATIENT)
Dept: PSYCHIATRY | Facility: CLINIC | Age: 44
End: 2024-10-23
Payer: COMMERCIAL

## 2024-10-23 ENCOUNTER — VIRTUAL VISIT (OUTPATIENT)
Dept: BEHAVIORAL HEALTH | Facility: CLINIC | Age: 44
End: 2024-10-23
Payer: COMMERCIAL

## 2024-10-23 VITALS — DIASTOLIC BLOOD PRESSURE: 84 MMHG | SYSTOLIC BLOOD PRESSURE: 122 MMHG

## 2024-10-23 DIAGNOSIS — F31.62 BIPOLAR MIXED AFFECTIVE DISORDER, MODERATE (H): ICD-10-CM

## 2024-10-23 DIAGNOSIS — F31.9 BIPOLAR 1 DISORDER (H): ICD-10-CM

## 2024-10-23 DIAGNOSIS — F31.30 BIPOLAR I DISORDER, MOST RECENT EPISODE DEPRESSED (H): ICD-10-CM

## 2024-10-23 DIAGNOSIS — F90.0 ADHD (ATTENTION DEFICIT HYPERACTIVITY DISORDER), INATTENTIVE TYPE: ICD-10-CM

## 2024-10-23 DIAGNOSIS — F41.9 ANXIETY: Primary | ICD-10-CM

## 2024-10-23 DIAGNOSIS — F41.1 GAD (GENERALIZED ANXIETY DISORDER): Primary | ICD-10-CM

## 2024-10-23 PROCEDURE — 99214 OFFICE O/P EST MOD 30 MIN: CPT | Mod: 95 | Performed by: PSYCHIATRY & NEUROLOGY

## 2024-10-23 PROCEDURE — 90832 PSYTX W PT 30 MINUTES: CPT | Mod: 95 | Performed by: COUNSELOR

## 2024-10-23 RX ORDER — LITHIUM CARBONATE 150 MG/1
150 CAPSULE ORAL AT BEDTIME
Qty: 30 CAPSULE | Refills: 3 | Status: SHIPPED | OUTPATIENT
Start: 2024-10-23

## 2024-10-23 RX ORDER — ESCITALOPRAM OXALATE 10 MG/1
10 TABLET ORAL DAILY
Qty: 30 TABLET | Refills: 3 | Status: SHIPPED | OUTPATIENT
Start: 2024-10-23

## 2024-10-23 RX ORDER — CLONAZEPAM 0.5 MG/1
0.5 TABLET ORAL DAILY PRN
Qty: 15 TABLET | Refills: 3 | Status: SHIPPED | OUTPATIENT
Start: 2024-10-23

## 2024-10-23 RX ORDER — DEXMETHYLPHENIDATE HYDROCHLORIDE 30 MG/1
30 CAPSULE, EXTENDED RELEASE ORAL DAILY
Qty: 30 CAPSULE | Refills: 0 | Status: SHIPPED | OUTPATIENT
Start: 2024-12-26

## 2024-10-23 RX ORDER — LAMOTRIGINE 100 MG/1
250 TABLET ORAL DAILY
Qty: 75 TABLET | Refills: 2 | Status: SHIPPED | OUTPATIENT
Start: 2024-10-23 | End: 2024-11-05

## 2024-10-23 RX ORDER — DEXMETHYLPHENIDATE HYDROCHLORIDE 30 MG/1
30 CAPSULE, EXTENDED RELEASE ORAL DAILY
Qty: 30 CAPSULE | Refills: 0 | Status: SHIPPED | OUTPATIENT
Start: 2024-11-26

## 2024-10-23 RX ORDER — LITHIUM CARBONATE 450 MG
900 TABLET, EXTENDED RELEASE ORAL AT BEDTIME
Qty: 60 TABLET | Refills: 3 | Status: SHIPPED | OUTPATIENT
Start: 2024-10-23

## 2024-10-23 RX ORDER — BUSPIRONE HYDROCHLORIDE 15 MG/1
TABLET ORAL
Qty: 60 TABLET | Refills: 2 | Status: SHIPPED | OUTPATIENT
Start: 2024-10-23

## 2024-10-23 ASSESSMENT — PAIN SCALES - GENERAL: PAINLEVEL_OUTOF10: MILD PAIN (3)

## 2024-10-23 NOTE — NURSING NOTE
Is the patient currently in the state of [place]? YES    Current patient location: [address] [place] [id number]    Visit mode:VIDEO    If the visit is dropped, the patient can be reconnected by: VIDEO VISIT: Text to cell phone:   Telephone Information:   Mobile [phone]       Will anyone else be joining the visit? No  (If patient encounters technical issues they should call [phone])    Are changes needed to the allergy or medication list? No    Are refills needed on medications prescribed by this physician? Discuss with Provider    Rooming Documentation: Questionnaire(s) completed.    Reason for visit: RECHECK     JYOTSNA Garcia

## 2024-10-23 NOTE — PROGRESS NOTES
Virtual Visit Details    Type of service:  Video Visit     Originating Location (pt. Location): Home    Distant Location (provider location):  On-site  Platform used for Video Visit: [facility] Psychiatry Consult Note    IDENTIFICATION   Name: Tavo Key   : 1980/44 year old      Sex:    @ male          Telemedicine Visit: The patient's condition can be safely assessed and treated via synchronous audio and visual telemedicine encounter.        Face to Face/patient Contact total time: 17 minutes  Pre Charting time: 2 minutes; Post charting time, communication and other activities: 1 minutes; Total time 20 minutes  11:41 AM -11:58 AM          SUBJECTIVE     History of Present Illness    Tavo, a 44-year-old male, reported that his behavioral health condition has been relatively better. He noted an improvement in his depression and anxiety symptoms. He has been practicing mindfulness and grounding exercises, which have been beneficial for his sleep. He is currently on Lexapro, which he reported as helpful in managing his symptoms. He has been experiencing better energy levels without any manic symptoms, although he still experiences some anxiety, which is gradually improving. He has been able to engage in daily activities more effectively.    Tavo mentioned that he has been finding it difficult to distinguish between the effects of Adderall and Focalin. He has been getting sunlight and, despite financial constraints that led to the cessation of his physical therapy, he has been continuing with the exercises.    In terms of his anxiety, Tavo reported that it is more physical than worry-based. He expressed a desire not to be constantly reliant on medication, but acknowledged that he does have something for anxiety. He reported that Focalin, at a dose of 30 mg per day, has been helpful. He noted that the effects of the medication last for about 10 to 12 hours, which he found  satisfactory.    Tavo also mentioned that he takes clonazepam, but not when he is driving, as he is aware that the medication and driving do not mix well. He expressed a desire for further improvement in his condition, but also showed hesitance about changing his medication. He is currently on propranolol, which he noted as being effective for his driving anxiety.    Tavo reported that he had previously been prescribed Buspar for anxiety, but he did not recall the dosage. He expressed a willingness to try Buspar again and maximize its use, as he did not experience any side effects from it previously.    In terms of his other medications, Tavo's refills for Focalin will be adequate until [place].             The following assessments were completed by patient for this visit:  PROMIS 10-Global Health (only subscores and total score):       10/31/2023    10:24 PM 11/14/2023     5:56 AM 2/29/2024     5:00 PM 6/5/2024     8:00 PM 7/26/2024    12:52 PM 10/23/2024    10:37 AM   PROMIS-10 Scores Only   Global Mental Health Score 7 10 10 11 6 9    Global Physical Health Score 17 13 16 13 13 14    PROMIS TOTAL - SUBSCORES 24 23 26 24 19 23        Patient-reported             7/26/2024    12:49 PM 8/28/2024    11:21 AM 10/22/2024     1:21 PM   PHQ   PHQ-9 Total Score 13 12 12   Q9: Thoughts of better off dead/self-harm past 2 weeks Several days Not at all  Not at all        Patient-reported          7/26/2024    12:52 PM 8/28/2024    11:22 AM 10/22/2024     1:22 PM   MATTI-7 SCORE   Total Score 12 (moderate anxiety) 14 (moderate anxiety) 10 (moderate anxiety)   Total Score 12 14 10    10        OBJECTIVE     Vital Signs:   /84     Labs:    Results    Hemoglobin A1C: 7.1               Current Medications:  Current Outpatient Medications   Medication Sig Dispense Refill    acetaminophen (TYLENOL) 500 MG tablet Take 1,000 mg by mouth 3 times daily as needed for mild pain      albuterol (PROAIR HFA/PROVENTIL HFA/VENTOLIN  HFA) 108 (90 Base) MCG/ACT inhaler Inhale 2 puffs into the lungs every 4 hours as needed for shortness of breath or wheezing 18 g 5    Alcohol Swabs (B-D SINGLE USE SWABS REGULAR) PADS USE TO SWAB AREA OF INJECTION/FREIDA AS DIRECTED. 100 each 3    amLODIPine (NORVASC) 10 MG tablet TAKE 1 TABLET (10 MG) BY MOUTH DAILY 90 tablet 1    aspirin (ASA) 81 MG EC tablet Take 1 tablet (81 mg) by mouth daily      atorvastatin (LIPITOR) 20 MG tablet TAKE ONE TABLET BY MOUTH EVERY NIGHT AT BEDTIME. NEED APPOINTMENT FOR FURTHER REFILLS. 90 tablet 1    busPIRone (BUSPAR) 15 MG tablet Take 1/2 tablet twice a day for 5 days then one tablet twice a day for 7 days then 1.5 tablet twice a days for a for 7 days then go to 2 tablets twice a day 60 tablet 2    clonazePAM (KLONOPIN) 0.5 MG tablet Take 1 tablet (0.5 mg) by mouth daily as needed for anxiety. 15 tablet 3    clotrimazole-betamethasone (LOTRISONE) 1-0.05 % external cream Apply topically 2 times daily Angles of mouth 45 g 3    Continuous Glucose Sensor (FREESTYLE ALDEN 3 SENSOR) [facility] [place] 1 SENSOR AND CHANGE EVERY 14 DAYS AS DIRECTED 2 each 5    [START ON 11/26/2024] dexmethylphenidate (FOCALIN XR) 30 MG 24 hr capsule Take 1 capsule (30 mg) by mouth daily. 30 capsule 0    [START ON 12/26/2024] dexmethylphenidate (FOCALIN XR) 30 MG 24 hr capsule Take 1 capsule (30 mg) by mouth daily. 30 capsule 0    [START ON 10/27/2024] dexmethylphenidate (FOCALIN XR) 30 MG 24 hr capsule Take 1 capsule (30 mg) by mouth daily. Do not start before October 27, 2024. 30 capsule 0    escitalopram (LEXAPRO) 10 MG tablet Take 1 tablet (10 mg) by mouth daily. 30 tablet 3    esomeprazole (NEXIUM) 40 MG DR capsule Take 1 capsule (40 mg) by mouth every morning (before breakfast) Take 30-60 minutes before eating.      fluticasone (FLONASE) 50 MCG/ACT nasal spray Spray 1 spray into both nostrils daily (Patient taking differently: Spray 1 spray into both nostrils daily as needed for rhinitis or allergies.)  16 g 11    gabapentin (NEURONTIN) 100 MG capsule Take 1 capsule (100 mg) by mouth 3 times daily. 270 capsule 1    hydrOXYzine (VISTARIL) 50 MG capsule Take 1 capsule (50 mg) by mouth 3 times daily Take an additional tablet a day as needed for anxiety 120 capsule 5    insulin glargine (LANTUS PEN) 100 UNIT/ML pen Inject 46 Units subcutaneously daily Increase dose by 2 units every three days until fasting blood sugar is between  mg/dL. Max 54 units/day. 60 mL 1    insulin pen needle (PENTIPS) 31G X 5 MM miscellaneous USE 4 PEN NEEDLES DAILY OR AS DIRECTED. 400 each 1    lamoTRIgine (LAMICTAL) 100 MG tablet Take 2.5 tablets (250 mg) by mouth daily. 75 tablet 2    lithium (ESKALITH CR/LITHOBID) 450 MG CR tablet Take 2 tablets (900 mg) by mouth at bedtime. Take along with a 150 mg lithium capsule for a total of 1050 mg nightly 60 tablet 3    lithium (ESKALITH) 150 MG capsule Take 1 capsule (150 mg) by mouth at bedtime. Take along with two 450 mg extended release pills for a total of 1050 mg nightly 30 capsule 3    losartan (COZAAR) 50 MG tablet TAKE ONE TABLET BY MOUTH ONCE DAILY 90 tablet 0    MOUNJARO 2.5 MG/0.5ML pen INJECT 2.5 MG SUBCUTANEOUSLY EVERY 7 DAYS 2 mL 0    naratriptan (AMERGE) 2.5 MG tablet TAKE 1 TABLET (2.5 MG) BY MOUTH AT ONSET OF HEADACHE FOR MIGRAINE MAY REPEAT IN 4 HOURS. MAX 2 TABLETS/24 HOURS. 12 tablet 5    NOVOLOG FLEXPEN 100 UNIT/ML soln INJECT SUBCUTANEOUSLY 10 UNITS BEFORE BREAKFAST, 10 UNITS BEFORE LUNCH, AND 30 UNITS BEFORE SUPPER (Patient taking differently: Inject subcutaneously 18 units before breakfast, 18 units before lunch and 18 units before supper.) 30 mL 1    propranolol (INDERAL) 80 MG tablet TAKE 1 TABLET (80 MG) BY MOUTH 2 TIMES DAILY 180 tablet 1    sildenafil (REVATIO) 20 MG tablet Take 1-5 tablets ( mg) by mouth daily as needed (erectile dysfunction.) 30 tablet 2    testosterone (ANDROGEL 1.62 % PUMP) 20.25 MG/ACT gel Place 1 Pump (20.25 mg) onto the skin daily  Apply from dispenser to clean, dry, intact skin of the upper arms and shoulders. 75 g 5    TRULICITY 3 MG/0.5ML SOPN INJECT 3 MG SUBCUTANEOUSLY ONCE A WEEK 2 mL 3     No current facility-administered medications for this visit.            ADDED HISTORY   Tried buspar 10 years ago.     The patient has been practicing mindfulness and grounding exercises, which have been beneficial for his sleep. He has stopped physical therapy due to financial constraints but continues to do the exercises. He is also getting sunlight. No details on substance use, history, or treatment were provided.    Physical exam    Physical Exam    - Anxiety, depression, affect:  - Speech and language:  - Insight and judgment:  - Alert and orientation to person, place and situation:  - SI: None  - HI: None  - AH: None  - VH: None             MENTAL STATUS EXAMINATION:   Appearance: Intact attention to grooming and hygiene, casual garb  Attitude:  cooperative   Eye Contact:  Good  Gait and Station: sitting  Psychomotor Behavior: Within normal limits  Oriented to: Grossly person place and time  Attention Span and Concentration: Grossly intact   Speech: Normal rate and tone  Language: English  Mood:  fair  Affect: mildly constricted  Associations:  no loose associations  Thought Process:  logical, linear and goal oriented  Thought Content: No evidence of delusions or suicidal or homicidal ideation plan or intent  Memory: Grossly intact  Fund of Knowledge: Good  Insight:  good  Judgment:  intact, adequate for safety  Impulse Control:  intact        DIAGNOSES:   By history Bipolar disorder type I  By history, ADHD inattentive type  By history Social Anxiety Disorder  By history Generalized Anxiety Disorder      ASSESSMENT:   Patient currently undergoing depression with history of depression, rush, ADHD, anxiety and adverse childhood events.   Sx improving but challenges remain with depression, ADHD medications.     Today Tavo Key reports no  suicidal ideations. In addition, he has notable risk factors for self-harm, including anxiety, 1 suicide attempt. However, risk is mitigated by Taoist beliefs, history of seeking help when needed, and support. Therefore, based on all available evidence including the factors cited above, he does not appear to be at imminent risk for self-harm, does not meet criteria for a 72-hr hold, and therefore remains appropriate for ongoing outpatient level of care.       PLAN:     Patient advised of consultative model. Patient will continue to be seen for ongoing consultation and stabilization.  Does not meet criteria for involuntary treatment or hospitalization  Start Dexedrine Spansule 11/1/2023 15 mg daily efficacy with no significant side effects, fogginess => 2/6/24 20 mg daily efficacious 5 mg to make 20 mg not affordable => 5/22/24 focalin XR 20 mg qam sufficient efficacy however with stressors less effective => 7/26/24 XR 25 mg qam efficacy, not as effective as adderall was => 8/28/24 30 mg qam working fairly well; focused on afternoon in applying for jobs-Risks, benefits and alternatives discussed.  Patient provides verbal consent to treatment.  Restart escitalopram 7/26/24 10 mg daily depression and mood significantly better -Risks, benefits and alternatives discussed.  Patient provides verbal consent to treatment.  Retrial of BuSpar 10/23/2024 15 mg tablets 1/2 tablet twice a day for five days, then 1 tablet twice a day for seven days, then 1.5 tablets twice a day for seven days, and finally 2 tablets twice a day-Risks, benefits and alternatives discussed.  Patient provides verbal consent to treatment.  Continue lamotrigine 200 mg daily bouts of depression with irritability => 5/22/14 250 mg daily-Risks, benefits and alternatives discussed.  Patient provides verbal consent to treatment.  Continue lithium extended release 450 mg daily level low => 12/26/23 900 mg at bedtime lithium level 0.54 efficacy with no side  effects =>- 2/6/2024 1050 mg nightly-risks, benefits and alternatives discussed.  Patient provides verbal consent digestive/GI side effects at higher doses  Restart clonazepam 12/26/2023 0.5 mg p.o. daily as needed anxiety/anger-Risks, benefits and alternatives discussed.  Patient provides verbal consent to treatment.  Advised of dependence, addiction, car driving and memory loss risks. 15/month initially  Hydroxyzine 50 mg p.o. 3 times daily with additional 50 mg as needed anxiety-provides well consent to treatment  Propranolol for migraine/headache prevention  Dc'd cariprazine (elevated blood sugars), adderall (due to availability)  Labs - reviewed, follow-up lithium level  Therapy with JERARDO Caruso       Assessment & Plan     Assessment  The patient's depressive and anxiety symptoms have improved, but he continues to experience residual anxiety. He is unsure about the difference between Adderall and Focalin. The patient's hemoglobin A1C is 7.1. The patient is currently on Lexapro, Adderall, Focalin, clonazepam, Lithium, lamotrigine, and propranolol. The patient has been engaging in mindfulness and grounding exercises, which have been beneficial for his sleep hygiene. He has discontinued formal physical therapy due to financial constraints but continues to perform the prescribed exercises. He is also ensuring adequate sunlight exposure.    Plan  - The patient agreed to try Buspar for anxiety. The plan is to titrate the dosage rapidly to 30 mg twice daily.  - The patient will continue with his current medications.  - The patient will continue with his mindfulness and grounding exercises.  - The patient will follow up after 10 weeks to evaluate the efficacy of the new medication.    Prescription  - buspar: 15 mg, 4 pills a day. The patient will start with 1/2 tablet twice a day for five days, then 1 tablet twice a day for seven days, then 1.5 tablets twice a day for seven days, and finally 2 tablets twice a day. This  will be on the prescription and in the notes.  - focalin: The patient will have adequate refills until [place].    Appointments  - The patient will have a follow-up appointment in 10 weeks to assess the effectiveness of the new medication  - The  will contact the patient to arrange this           Administrative Billing:   Time spent with patient was greater than 50% of time and/or significant time was spent in counseling and coordination of care regarding above diagnoses and treatment plan. Pre charting time and post charting time/documentation/coordination are done on date of service.      Signed:   Barney Colon M.D.  [facility] Psychiatry Service    Disclaimer: This note consists of symbols derived from keyboarding, dictation and/or voice recognition software. As a result, there may be errors in the script that have gone undetected. Please consider this when interpreting information found in this chart.    Consent was obtained from the patient to use an AI documentation tool in the creation of this note.     eoc

## 2024-10-23 NOTE — PROGRESS NOTES
[facility]  10/23/2024      Behavioral Health Clinician Progress Note      Patient Name: Tavo Key           Service Type:  Individual      Service Location:   MyChart / Email (patient reached)     Session Start Time: 1107am  Session End Time: 1130am      Session Length: 16 - 37      Attendees: Patient     Service Modality:  Video Visit:      Provider verified identity through the following two step process.  Patient provided:  Patient photo, Patient is known previously to provider, and Patient was verified at admission/transfer    Telemedicine Visit: The patient's condition can be safely assessed and treated via synchronous audio and visual telemedicine encounter.      Reason for Telemedicine Visit: Services only offered telehealth    Originating Site (Patient Location): Patient's home    Distant Site (Provider Location): [facility] [place] [facility]    Consent:  The patient/guardian has verbally consented to: the potential risks and benefits of telemedicine (video visit) versus in person care; bill my insurance or make self-payment for services provided; and responsibility for payment of non-covered services.     Patient would like the video invitation sent by:  My Chart    Mode of Communication:  Video Conference via [facility]    Distant Location (Provider):  On-site    As the provider I attest to compliance with applicable laws and regulations related to telemedicine.    Visit Activities (Refresh list every visit): [facility] Only    Diagnostic Assessment Date: 11/16/2023  Shanelle Caruso   Treatment Plan Review Date: 1/21/25  See Flowsheets for today's PHQ-9 and MATTI-7 results  Previous PHQ-9:       7/26/2024    12:49 PM 8/28/2024    11:21 AM 10/22/2024     1:21 PM   PHQ-9 SCORE   PHQ-9 Total Score MyChart  12 (Moderate depression) 12 (Moderate depression)   PHQ-9 Total Score 13 12 12     Previous MATTI-7:       7/26/2024     12:52 PM 8/28/2024    11:22 AM 10/22/2024     1:22 PM   MATTI-7 SCORE   Total Score 12 (moderate anxiety) 14 (moderate anxiety) 10 (moderate anxiety)   Total Score 12 14 10    10           DATA  Extended Session (60+ minutes): No  Interactive Complexity: No  Crisis: No  [facility] Patient: No    Treatment Objective(s) Addressed in This Session:  Target Behavior(s): disease management/lifestyle changes improve anxiety, depression and ADHD sx    Depressed Mood: Decrease frequency and intensity of feeling down, depressed, hopeless  Improve diet, appetite, mindful eating, and / or meal planning  Anxiety: will experience a reduction in anxiety, will develop more effective coping skills to manage anxiety symptoms, and will increase ability to function adaptively  Attention Problems: will develop coping skills to effectively manage attention issues    Current Stressors / Issues:  Questions/Thoughts for Dr. Colon:    Better/worse: irritability is less, depression improved, anxiety slightly better, able to do things more, doing grounding and mindfulness   ADLs: sleep- very good, appetite- pretty good and stable, trying to eat healthy   Current Symptoms: Depression overall is getting better. When starting Lexapro it improved things. His energy improved and he didn't notice any rush. Anxiety is still there, but is slightly improving.  Something that could help him get out more and not avoiding things he could do to improve himself and job hunting and social activities. Pt was laid off and he is working to find another job. He is able to get up and do things.   It has been hard to tell the difference between Adderall and Focalin. He has been active and doing things around the house. Pt has been getting sunlight. He had to stop PT with finances. He is still doing the exercises. Brain has been practicing mindfulness. He is looking into better meditation training. He has tried using the Calm Regi in the past. Pt has tried grounding  techniques.   Last A1C was 7.1.     Suicidality: pt denies, have gotten a lot better    Substance Use: no concerns  Therapist: seeing therapist, getting support from them, paused EMDR   [facility] recommendations: Apps: [facility] will send information about Mindfulness , Simple Habit, Insight Timer, Structured fidel through [employer].     Progress towards goals: completd goal to improve anxiety and irritability and new goal to increase confidence to reduce anxiety with job and social activities         Progress on Treatment Objective(s) / Homework:  Satisfactory progress - ACTION (Actively working towards change); Intervened by reinforcing change plan / affirming steps taken       CBT: Pt reports that irritability has improved and isn't a concern. Brain has achieved this goal. He asks to have a new goal to improve his confidence though improving anxiety with his job and social activities.   [facility] celebrates pt's progress to improve his irritability. To cope with anxiety, [facility] recommends continuing with mindfulness and meditations to help reduce racing thoughts and physical sx of anxiety. [facility] will provide pt with apps to use through [employer].       Motivational Interviewing    MI Intervention: Co-Developed Goal: reduce anxiety and improve focus, Expressed Empathy/Understanding, Supported Autonomy, Collaboration, Evocation, Permission to raise concern or advise, Open-ended questions, Reflections: simple and complex, Change talk (evoked), and Reframe     Change Talk Expressed by the Patient: Committment to change Activation Taking steps    Provider Response to Change Talk: E - Evoked more info from patient about behavior change, A - Affirmed patient's thoughts, decisions, or attempts at behavior change, R - Reflected patient's change talk, and S - Summarized patient's change talk statements    Also provided psychoeducation about behavioral health condition, symptoms, and treatment options    Assessments completed prior to visit:  The  following assessments were completed by patient for this visit:  PHQ9:       12/26/2023    10:38 AM 2/6/2024    10:47 AM 4/15/2024     8:28 AM 5/22/2024     1:15 PM 7/26/2024    12:49 PM 8/28/2024    11:21 AM 10/22/2024     1:21 PM   PHQ-9 SCORE   PHQ-9 Total Score MyChart 14 (Moderate depression) 11 (Moderate depression) 13 (Moderate depression) 11 (Moderate depression)  12 (Moderate depression) 12 (Moderate depression)   PHQ-9 Total Score 14 11 13 11 13 12 12     GAD2:       11/14/2023     5:54 AM 12/26/2023    10:37 AM 2/6/2024    10:45 AM 5/22/2024     1:15 PM 7/26/2024    12:52 PM 8/28/2024    11:22 AM 10/22/2024     1:22 PM   MATTI-2   Feeling nervous, anxious, or on edge 2  3  2  3  2  3  2    Not being able to stop or control worrying 1  2  2  3  2  2  3    MATTI-2 Total Score 3 5    5 4    4 6    6 4 5 5    5       Patient-reported    Multiple values from one day are sorted in reverse-chronological order     PROMIS 10-Global Health (only subscores and total score):       10/31/2023    10:24 PM 11/14/2023     5:56 AM 2/29/2024     5:00 PM 6/5/2024     8:00 PM 7/26/2024    12:52 PM 10/23/2024    10:37 AM   PROMIS-10 Scores Only   Global Mental Health Score 7 10 10 11 6 9    Global Physical Health Score 17 13 16 13 13 14    PROMIS TOTAL - SUBSCORES 24 23 26 24 19 23        Patient-reported       Care Plan review completed: Yes    Medication Review:  No changes to current psychiatric medication(s)    Medication Compliance:  Yes    Changes in Health Issues:   None reported    Chemical Use Review:   Substance Use: Chemical use reviewed, no active concerns identified      Tobacco Use: No current tobacco use.      Assessment: Current Emotional / Mental Status (status of significant symptoms):  Risk status (Self / Other harm or suicidal ideation)  Patient has had a history of suicide attempts: age 15 and self-injurious behavior: as a teen  Patient denies current fears or concerns for personal safety.  Patient denies  current or recent suicidal ideation or behaviors.  Patient denies current or recent homicidal ideation or behaviors.  Patient denies current or recent self injurious behavior or ideation.  Patient denies other safety concerns.  A safety and risk management plan has not been developed at this time, however patient was encouraged to call [place] [phone] should there be a change in any of these risk factors.    Appearance:   Appropriate    Eye Contact:   Good   Psychomotor Behavior: Normal   Attitude:   Cooperative  Interested Friendly  Orientation:   All  Speech   Rate / Production: Normal    Volume:  Normal   Mood:    Anxious   Affect:    Appropriate   Thought Content:  Clear   Thought Form:  Coherent  Logical   Insight:    Good     Diagnoses:  1. MATTI (generalized anxiety disorder)        Collateral Reports Completed:  Communicated with: Bareny Colon M.D.     Plan: (Homework, other):  Patient was given information about behavioral services and encouraged to schedule a follow up appointment with the clinic [facility] in 1 month.  He was also given information about mental health symptoms and treatment options .  CD Recommendations: No indications of CD issues.     Paulette Meneses, [facility]    ______________________________________________________________________    Integrated Primary Care Behavioral Health Treatment Plan    Patient's Name: Tavo Key  YOB: 1980    Date of Creation: 12/26/2023  Date Treatment Plan Last Reviewed/Revised: 10/23/2024    DSM5 Diagnoses:   Bipolar 1 disorder (H)    MATTI (generalized anxiety disorder)      Psychosocial / Contextual Factors: ADHD, health concerns, improving overall wellbeing, laid off from job  PROMIS (reviewed every 90 days):   PROMIS 10-Global Health (only subscores and total score):       10/31/2023    10:24 PM 11/14/2023     5:56 AM 2/29/2024     5:00 PM 6/5/2024     8:00 PM 7/26/2024    12:52 PM 10/23/2024    10:37 AM   PROMIS-10 Scores Only   Global Mental  "Health Score 7 10 10 11 6 9    Global Physical Health Score 17 13 16 13 13 14    PROMIS TOTAL - SUBSCORES 24 23 26 24 19 23        Patient-reported       Referral / Collaboration:  Referral to another professional/service is not indicated at this time.    Anticipated number of session for this episode of care: 4-5  Anticipation frequency of session: Monthly  Anticipated Duration of each session: 16-37 minutes  Treatment plan will be reviewed in 90 days or when goals have been changed.       MeasurableTreatment Goal(s) related to diagnosis / functional impairment(s)  Goal 1: Patient will reduce anxiety, irritability and feeling down     I will know I've met my goal when Irritability has dropped. I could like to increase confidence.\"     Objective #A (Patient Action)                          Patient will calm down anxiety and irritability-  frequency will be less and won't last all day and will report progress each visit.   Status:  Completed 10/23/2024     Intervention(s)  Therapist will provide support through MI, Acceptance and Commitment Therapy and problem solving model to explore and overcome barriers.    Objective #B (Patient Action)                          Patient will increase confidence to reduce anxiety with job and social activities.  Status:  New 10/23/2024     Intervention(s)  Therapist will  use CBT.     Patient has reviewed and agreed to the above plan.      MITA Guzman      "

## 2024-11-03 DIAGNOSIS — E11.9 TYPE 2 DIABETES MELLITUS WITHOUT COMPLICATION, WITHOUT LONG-TERM CURRENT USE OF INSULIN (H): Primary | ICD-10-CM

## 2024-11-04 ENCOUNTER — MYC REFILL (OUTPATIENT)
Dept: PSYCHIATRY | Facility: CLINIC | Age: 44
End: 2024-11-04

## 2024-11-04 DIAGNOSIS — F31.9 BIPOLAR 1 DISORDER (H): Primary | ICD-10-CM

## 2024-11-04 RX ORDER — INSULIN LISPRO 100 [IU]/ML
INJECTION, SOLUTION INTRAVENOUS; SUBCUTANEOUS
Qty: 30 ML | Refills: 1 | Status: SHIPPED | OUTPATIENT
Start: 2024-11-04 | End: 2024-11-08

## 2024-11-04 NOTE — TELEPHONE ENCOUNTER
Refill request for Lamotrigine 200 mg received from  pharmacy in [place].  As per pharmacy info, last refilled on 10/11/24.    As per chart review, this dosage has been discontinued as of 5/22/24. Pt was provided refills for Lamotrigine 250 mg/day - see last Rx/treatment plan below     Disp Refills Start End MAURO   lamoTRIgine (LAMICTAL) 100 MG tablet 75 tablet 2 10/23/2024 -- No   Sig - Route: Take 2.5 tablets (250 mg) by mouth daily. - Oral   Sent to pharmacy as: lamoTRIgine 100 MG Oral Tablet (LaMICtal)   Class: E-Prescribe       PLAN:      Patient advised of consultative model. Patient will continue to be seen for ongoing consultation and stabilization.  Does not meet criteria for involuntary treatment or hospitalization  Start Dexedrine Spansule 11/1/2023 15 mg daily efficacy with no significant side effects, fogginess => 2/6/24 20 mg daily efficacious 5 mg to make 20 mg not affordable => 5/22/24 focalin XR 20 mg qam sufficient efficacy however with stressors less effective => 7/26/24 XR 25 mg qam efficacy, not as effective as adderall was => 8/28/24 30 mg qam working fairly well; focused on afternoon in applying for jobs-Risks, benefits and alternatives discussed.  Patient provides verbal consent to treatment.  Restart escitalopram 7/26/24 10 mg daily depression and mood significantly better -Risks, benefits and alternatives discussed.  Patient provides verbal consent to treatment.  Retrial of BuSpar 10/23/2024 15 mg tablets 1/2 tablet twice a day for five days, then 1 tablet twice a day for seven days, then 1.5 tablets twice a day for seven days, and finally 2 tablets twice a day-Risks, benefits and alternatives discussed.  Patient provides verbal consent to treatment.  Continue lamotrigine 200 mg daily bouts of depression with irritability => 5/22/14 250 mg daily-Risks, benefits and alternatives discussed.  Patient provides verbal consent to treatment.    Continue lithium extended release 450 mg daily level  low => 12/26/23 900 mg at bedtime lithium level 0.54 efficacy with no side effects =>- 2/6/2024 1050 mg nightly-risks, benefits and alternatives discussed.  Patient provides verbal consent digestive/GI side effects at higher doses  Restart clonazepam 12/26/2023 0.5 mg p.o. daily as needed anxiety/anger-Risks, benefits and alternatives discussed.  Patient provides verbal consent to treatment.  Advised of dependence, addiction, car driving and memory loss risks. 15/month initially  Hydroxyzine 50 mg p.o. 3 times daily with additional 50 mg as needed anxiety-provides well consent to treatment  Propranolol for migraine/headache prevention  Dc'd cariprazine (elevated blood sugars), adderall (due to availability)  Labs - reviewed, follow-up lithium level  Therapy with JERARDO Caruso         Made a phone call to pharmacy and confirmed that:   - Last refill for the 200 mg/day was on 10/11/24 &  -  Last refill for 250 mg/day was on 10/8/24.  Verbal order was given to Alexandra ( pharmacy staff) to close the Rx for the Lamotrigine 200 mg .  Confirmed with a pharmacy staff that they still have fills on file for the Lamotrigine 250 mg/day    Dialed the pt but no answer. Sending [employer] message to the pt to verify his current Lamotrigine dose

## 2024-11-05 RX ORDER — LAMOTRIGINE 100 MG/1
50 TABLET ORAL DAILY
Qty: 15 TABLET | Refills: 2 | Status: SHIPPED | OUTPATIENT
Start: 2024-11-05

## 2024-11-05 RX ORDER — LAMOTRIGINE 200 MG/1
200 TABLET ORAL DAILY
Qty: 30 TABLET | Refills: 2 | Status: SHIPPED | OUTPATIENT
Start: 2024-11-05

## 2024-11-05 NOTE — TELEPHONE ENCOUNTER
Prescriptions updated to reflect dosage patient is taking-one 200 mg lamotrigine tablet along with a one half of a 100 mg tablet for a total of 250 mg daily.

## 2024-11-07 DIAGNOSIS — R80.9 PROTEINURIA, UNSPECIFIED TYPE: ICD-10-CM

## 2024-11-07 DIAGNOSIS — E11.9 TYPE 2 DIABETES MELLITUS WITHOUT COMPLICATION, WITHOUT LONG-TERM CURRENT USE OF INSULIN (H): ICD-10-CM

## 2024-11-07 DIAGNOSIS — I10 HYPERTENSION GOAL BP (BLOOD PRESSURE) < 140/90: ICD-10-CM

## 2024-11-07 RX ORDER — TIRZEPATIDE 2.5 MG/.5ML
INJECTION, SOLUTION SUBCUTANEOUS
Qty: 2 ML | Refills: 0 | Status: SHIPPED | OUTPATIENT
Start: 2024-11-07 | End: 2024-11-08 | Stop reason: DRUGHIGH

## 2024-11-08 ENCOUNTER — VIRTUAL VISIT (OUTPATIENT)
Dept: PHARMACY | Facility: CLINIC | Age: 44
End: 2024-11-08

## 2024-11-08 DIAGNOSIS — E11.9 TYPE 2 DIABETES MELLITUS WITHOUT COMPLICATION, WITHOUT LONG-TERM CURRENT USE OF INSULIN (H): Primary | ICD-10-CM

## 2024-11-08 PROCEDURE — 99207 PR NO CHARGE LOS: CPT | Mod: 95 | Performed by: PHARMACIST

## 2024-11-08 RX ORDER — AMLODIPINE BESYLATE 10 MG/1
10 TABLET ORAL DAILY
Qty: 90 TABLET | Refills: 0 | Status: SHIPPED | OUTPATIENT
Start: 2024-11-08

## 2024-11-08 RX ORDER — INSULIN LISPRO 100 [IU]/ML
20 INJECTION, SOLUTION INTRAVENOUS; SUBCUTANEOUS
Qty: 60 ML | Refills: 1 | Status: SHIPPED | OUTPATIENT
Start: 2024-11-08

## 2024-11-08 NOTE — PATIENT INSTRUCTIONS
"Recommendations from today's MTM visit:                                                         Increase Mounjaro to 5 mg weekly.        Follow-up: Return in about 4 weeks (around 12/6/2024) for Medication Therapy Management.    It was great speaking with you today.  I value your experience and would be very thankful for your time in providing feedback in our clinic survey. In the next few days, you may receive an email or text message from [employer] with a link to a survey related to your  clinical pharmacist.\"     To schedule another MTM appointment, please call the clinic directly or you may call the MTM scheduling line at [phone] or toll-free at [phone].     My Clinical Pharmacist's contact information:                                                      Please feel free to contact me with any questions or concerns you have.      Madison Wheeler, PharmD  Medication Therapy Management Pharmacist     "

## 2024-11-08 NOTE — Clinical Note
MARIAH MOTA note, thanks!  Madison Wheeler, PharmD Medication Therapy Management Pharmacist [phone]

## 2024-11-08 NOTE — PROGRESS NOTES
Medication Therapy Management (MTM) Encounter    ASSESSMENT:                            Medication Adherence/Access: No issues identified.    Diabetes Type 2 Diabetes/Obesity: Type 2 Diabetes/Obesity: Patient is not meeting A1c goal of < 7%.  Time in target is not at goal of > 70% in target range of  mg/dL.  May benefit from increasing Mounjaro. Education on hypoglycemia provided, if this occurs again he will let me know and we can work on decreasing bolus insulin.     PLAN:                            Increase Mounjaro to 5 mg weekly.     Follow-up: Return in about 4 weeks (around 12/6/2024) for Medication Therapy Management.    SUBJECTIVE/OBJECTIVE:                          Tavo Key is a 44 year old male seen for a follow-up visit.       Reason for visit: med review.    Allergies/ADRs: Reviewed in chart  Past Medical History: Reviewed in chart  Tobacco: He reports that he quit smoking about 24 years ago. His smoking use included cigarettes. He has never been exposed to tobacco smoke. He has never used smokeless tobacco.  Alcohol: none/very rarely    Medication Adherence/Access:   Patient uses pill box(es).  Patient takes medications 3 time(s) per day.   Per patient, misses medication 0 times per week.   The patient fills medications at [place]: YES.    Diabetes   Type 2 Diabetes/Obesity:   Mounjaro 2.5 mg weekly (replaced Trulicity 3 mg weekly)  Insulin lispro 48 units once daily  Novolog 20 units before breakfast, 20 units before lunch and 20 units before supper, injecting this into the thigh (seems to help)  Aspirin 81 mg daily primary prevention    Did have some nausea after the first couple of doses of Mounjaro but that's gone away now.  Last month he had a couple episodes of hypoglycemia with significant symptoms: hot, cold sweats, confusion, lightheadedness, none this past month.   His wife is also on Mounjaro.  Trulicity dose reduced from 4.5 mg to 3 mg in the past due to nausea.  Reports no  side effects.   SMBG:  Nasrin 3      Current diabetes symptoms: none  No symptoms of hypoglycemia in the past, does have alarm on Nasrin 3  Medication history  -Phentermine 18.75 mg daily-tried > 10 years ago, dry mouth  -Victoza worked really well in the past but stopped due to cost   -Copay for Cirilouvia is around $510.   -Patient tried Ozempic previously without benefit but thinks he may not have been on this medication long enough to notice weight loss/glycemic control.   -Metformin -  stomach pain, diarrhea, gas, digestive issues  Lab Results   Component Value Date    A1C 7.8 10/16/2024    A1C 8.3 07/26/2024    A1C 7.7 03/06/2024    A1C 6.8 11/10/2023    A1C 6.4 08/21/2023     Today's Vitals: There were no vitals taken for this visit.  ----------------      I spent 13 minutes with this patient today. All changes were made via collaborative practice agreement with Criselda Walsh PA-C. A copy of the visit note was provided to the patient's provider(s).    A summary of these recommendations was sent via [employer].    Madison Wheeler, SarahD  Medication Therapy Management Pharmacist      Start time: 3:32 PM  End time: 3:45 PM  Mode: Video, parvin  Patient location: home  Provider location: off site       Medication Therapy Recommendations  Diabetes mellitus, type 2 (H)   1 Current Medication: MOUNJARO 2.5 MG/0.5ML pen (Discontinued)   Current Medication Sig: INJECT 2.5 MG SUBCUTANEOUSLY EVERY 7 DAYS   Rationale: Dose too low - Dosage too low - Effectiveness   Recommendation: Increase Dose   Status: Accepted per CPA   Identified Date: 11/8/2024 Completed Date: 11/8/2024

## 2024-11-15 PROBLEM — M54.41 ACUTE BILATERAL LOW BACK PAIN WITH RIGHT-SIDED SCIATICA: Status: RESOLVED | Noted: 2024-05-21 | Resolved: 2024-11-15

## 2024-12-02 ENCOUNTER — VIRTUAL VISIT (OUTPATIENT)
Dept: PHARMACY | Facility: CLINIC | Age: 44
End: 2024-12-02

## 2024-12-02 DIAGNOSIS — E11.9 TYPE 2 DIABETES MELLITUS WITHOUT COMPLICATION, WITHOUT LONG-TERM CURRENT USE OF INSULIN (H): ICD-10-CM

## 2024-12-02 DIAGNOSIS — E66.01 MORBID OBESITY (H): ICD-10-CM

## 2024-12-02 DIAGNOSIS — E11.9 TYPE 2 DIABETES MELLITUS WITHOUT COMPLICATION, WITHOUT LONG-TERM CURRENT USE OF INSULIN (H): Primary | ICD-10-CM

## 2024-12-02 PROCEDURE — 99207 PR NO CHARGE LOS: CPT | Mod: 95 | Performed by: PHARMACIST

## 2024-12-02 RX ORDER — TIRZEPATIDE 5 MG/.5ML
INJECTION, SOLUTION SUBCUTANEOUS
Qty: 2 ML | Refills: 0 | Status: SHIPPED | OUTPATIENT
Start: 2024-12-02 | End: 2024-12-02 | Stop reason: DRUGHIGH

## 2024-12-02 NOTE — PROGRESS NOTES
Medication Therapy Management (MTM) Encounter    ASSESSMENT:                            Medication Adherence/Access: No issues identified.    Diabetes Type 2 Diabetes/Obesity: Patient is not meeting A1c goal of < 7%.  Time in target is not at goal of > 70% in target range of  mg/dL.  May benefit from increasing Mounjaro. Education on hypoglycemia provided, if this occurs again he will let me know and we can work on decreasing bolus insulin.     PLAN:                            Continue one more week of Mounjaro 5 mg weekly, then increase to 7.5 mg weekly.     Follow-up: Return in about 2 months (around 2/2/2025) for Medication Therapy Management.    SUBJECTIVE/OBJECTIVE:                          Tavo Key is a 44 year old male seen for a follow-up visit.       Reason for visit: med review.    Allergies/ADRs: Reviewed in chart  Past Medical History: Reviewed in chart  Tobacco: He reports that he quit smoking about 24 years ago. His smoking use included cigarettes. He has never been exposed to tobacco smoke. He has never used smokeless tobacco.  Alcohol: none/very rarely    Medication Adherence/Access:   Patient uses pill box(es).  Patient takes medications 3 time(s) per day.   Per patient, misses medication 0 times per week.   The patient fills medications at [place]: YES.    Plan from last time:  Increase Mounjaro to 5 mg weekly.     Diabetes   Type 2 Diabetes/Obesity:   Mounjaro 5 mg weekly x 3 weeks (replaced Trulicity 3 mg weekly)  Insulin lispro 48 units once daily  Novolog 20 units before breakfast, 20 units before lunch and 20 units before supper, injecting this into the thigh (seems to help)  Aspirin 81 mg daily primary prevention    Notices more nausea two days after dose, but better again in a couple of days.   Last month he had a couple episodes of hypoglycemia with significant symptoms: hot, cold sweats, confusion, lightheadedness,  - one time this past month.  His wife is also on  Mounjaro.  Trulicity dose reduced from 4.5 mg to 3 mg in the past due to nausea.  Reports no side effects.   SMBG:  Nasrin 3      Current diabetes symptoms: none  No symptoms of hypoglycemia in the past, does have alarm on Nasrin 3  Medication history  -Phentermine 18.75 mg daily-tried > 10 years ago, dry mouth  -Victoza worked really well in the past but stopped due to cost   -Copay for Januvia is around $510.   -Patient tried Ozempic previously without benefit but thinks he may not have been on this medication long enough to notice weight loss/glycemic control.   -Metformin -  stomach pain, diarrhea, gas, digestive issues    Today's Vitals: There were no vitals taken for this visit.  ----------------      I spent 12 minutes with this patient today. All changes were made via collaborative practice agreement with Criselda Walsh PA-C. A copy of the visit note was provided to the patient's provider(s).    A summary of these recommendations was sent via [employer].    Madison Wheeler, SarahD  Medication Therapy Management Pharmacist      Telemedicine Visit Details  The patient's medications can be safely assessed via a telemedicine encounter.  Type of service:  Video Conference via [employer]  Originating Location (pt. Location): Home    Distant Location (provider location):  On-site  Start Time:  4:02 PM  End Time: 4:14 PM     Medication Therapy Recommendations  Diabetes mellitus, type 2 (H)   1 Current Medication: MOUNJARO 5 MG/0.5ML SOAJ (Discontinued)   Current Medication Sig: INJECT 0.5 MLS (5 MG) SUBCUTANEOUSLY EVERY 7 DAYS.   Rationale: Dose too low - Dosage too low - Effectiveness   Recommendation: Increase Dose   Status: Accepted per CPA   Identified Date: 12/2/2024 Completed Date: 12/2/2024

## 2024-12-02 NOTE — Clinical Note
MARIAH MOTA note, thanks!  Madison Wheeler, PharmD Medication Therapy Management Pharmacist [phone]

## 2024-12-02 NOTE — PATIENT INSTRUCTIONS
"Recommendations from today's MTM visit:                                                         Continue one more week of Mounjaro 5 mg weekly, then increase to 7.5 mg weekly.     Follow-up: Return in about 2 months (around 2/2/2025) for Medication Therapy Management.    It was great speaking with you today.  I value your experience and would be very thankful for your time in providing feedback in our clinic survey. In the next few days, you may receive an email or text message from [employer] with a link to a survey related to your  clinical pharmacist.\"     To schedule another MTM appointment, please call the clinic directly or you may call the MTM scheduling line at [phone] or toll-free at [phone].     My Clinical Pharmacist's contact information:                                                      Please feel free to contact me with any questions or concerns you have.      Madison Wheeler, PharmD  Medication Therapy Management Pharmacist   "

## 2024-12-04 ENCOUNTER — VIRTUAL VISIT (OUTPATIENT)
Dept: PSYCHOLOGY | Facility: CLINIC | Age: 44
End: 2024-12-04
Payer: COMMERCIAL

## 2024-12-04 DIAGNOSIS — F31.9 BIPOLAR 1 DISORDER (H): Primary | ICD-10-CM

## 2024-12-04 PROCEDURE — 90837 PSYTX W PT 60 MINUTES: CPT | Mod: 95 | Performed by: MARRIAGE & FAMILY THERAPIST

## 2024-12-04 NOTE — PROGRESS NOTES
[facility]                                     Progress Note    Patient Name: Tavo Key  Date: 12/4/2024         Service Type: Individual      Session Start Time: 12:00PM  Session End Time: 12:52PM     Session Length: 52 minutes    Session #: 21    Attendees: Client attended alone    Service Modality:  Video Visit:      Provider verified identity through the following two step process.  Patient provided:  Patient is known previously to provider    Telemedicine Visit: The patient's condition can be safely assessed and treated via synchronous audio and visual telemedicine encounter.      Reason for Telemedicine Visit: Patient has requested telehealth visit    Originating Site (Patient Location): Patient's home    Distant Site (Provider Location): Provider Remote Setting- Home Office    Consent:  The patient/guardian has verbally consented to: the potential risks and benefits of telemedicine (video visit) versus in person care; bill my insurance or make self-payment for services provided; and responsibility for payment of non-covered services.     Patient would like the video invitation sent by:  My Chart    Mode of Communication:  Video Conference via [facility]    Distant Location (Provider):  Off-site    As the provider I attest to compliance with applicable laws and regulations related to telemedicine.    DATA  Extended Session (53+ minutes):   - Longer session due to limited access to mental health appointments and necessity to address patient's distress / complexity  Interactive Complexity: No  Crisis: No        Progress Since Last Session (Related to Symptoms / Goals / Homework):   Symptoms: Improving medication is helping    Homework: Partially completed      Episode of Care Goals: Satisfactory progress - ACTION (Actively working towards change); Intervened by reinforcing change plan / affirming steps taken     Current / Ongoing Stressors and Concerns:   Tavo updates this writer on how  things have been since he reached out last week. He described the reason he was not doing well which was related to his relationship with Mindy. They have since decided to stop seeing each other and although it was difficult initially, Tavo is beginning to feel a weight lift. He was always questioning what was going on and spending too much time and energy focusing on it. It had become unhealthy and he is relieved to be moving forward. He has no desire currently to pursue anyone new. Wants to focus more intentional time at home, on his wife and pets and house projects. He continues to interview and is trying to remain positive. He has had a few interviews but no offers. He was able to defer student loans which means they are better able to manage the change in finances. He has unemployment for another 2 months. He was switched to wegovy which he thinks is helping  manage his blood sugars. He continues to struggle with back pain and has to rest after being on his feet for a time. He has done some organizing in the kitchen and created a safe space in their lower level for both he and his wife to enjoy.     Treatment Objective(s) Addressed in This Session:   identify coping strategies for more effectively managing Bipolar Disorder  Work on managing his diabetes     Intervention:   CBT: Cognitive restructuring  Solution Focused: prep for move      The following assessments were completed by patient for this visit:    PROMIS 10-Global Health (all questions and answers displayed):       10/31/2023    10:24 PM 11/14/2023     5:56 AM 2/29/2024     5:00 PM 6/5/2024     8:00 PM 7/26/2024    12:52 PM 10/23/2024    10:37 AM   PROMIS 10   In general, would you say your health is: Good Fair    Good   In general, would you say your quality of life is: Good Good    Good   In general, how would you rate your physical health? Good Fair    Good   In general, how would you rate your mental health, including your mood and your ability  to think? Fair Good    Fair   In general, how would you rate your satisfaction with your social activities and relationships? Poor Fair    Fair   In general, please rate how well you carry out your usual social activities and roles Fair Good    Good   To what extent are you able to carry out your everyday physical activities such as walking, climbing stairs, carrying groceries, or moving a chair? Completely Completely    Mostly   In the past 7 days, how often have you been bothered by emotional problems such as feeling anxious, depressed, or irritable? Always Often    Often   In the past 7 days, how would you rate your fatigue on average? Mild Severe    Moderate   In the past 7 days, how would you rate your pain on average, where 0 means no pain, and 10 means worst imaginable pain? 0 2    3   In general, would you say your health is: 3 2  3 2 3 3    In general, would you say your quality of life is: 3 3  3 3 3 3    In general, how would you rate your physical health? 3 2  3 2 3 3    In general, how would you rate your mental health, including your mood and your ability to think? 2 3  2 2 1 2    In general, how would you rate your satisfaction with your social activities and relationships? 1 2  3 3 1 2    In general, please rate how well you carry out your usual social activities and roles. (This includes activities at home, at work and in your community, and responsibilities as a parent, child, spouse, employee, friend, etc.) 2 3  4 3 1 3    To what extent are you able to carry out your everyday physical activities such as walking, climbing stairs, carrying groceries, or moving a chair? 5 5  4 4 4 4    In the past 7 days, how often have you been bothered by emotional problems such as feeling anxious, depressed, or irritable? 5 4  4 3 5 4    In the past 7 days, how would you rate your fatigue on average? 2 4  2 3 3 3    In the past 7 days, how would you rate your pain on average, where 0 means no pain, and 10 means  worst imaginable pain? 0 2  0 2 6 3    Global Mental Health Score 7 10 10 11 6 9    Global Physical Health Score 17 13 16 13 13 14    PROMIS TOTAL - SUBSCORES 24 23 26 24 19 23        Patient-reported     PROMIS 10-Global Health (only subscores and total score):       10/31/2023    10:24 PM 11/14/2023     5:56 AM 2/29/2024     5:00 PM 6/5/2024     8:00 PM 7/26/2024    12:52 PM 10/23/2024    10:37 AM   PROMIS-10 Scores Only   Global Mental Health Score 7 10 10 11 6 9    Global Physical Health Score 17 13 16 13 13 14    PROMIS TOTAL - SUBSCORES 24 23 26 24 19 23        Patient-reported         ASSESSMENT: Current Emotional / Mental Status (status of significant symptoms):   Risk status (Self / Other harm or suicidal ideation)   Patient denies current fears or concerns for personal safety.   Patient denies current or recent suicidal ideation or behaviors.   Patient denies current or recent homicidal ideation or behaviors.   Patient denies current or recent self injurious behavior or ideation.   Patient denies other safety concerns.   Patient reports there has been no change in risk factors since their last session.     Patient reports there has been no change in protective factors since their last session.     Recommended that patient call 911 or go to the local ED should there be a change in any of these risk factors.     Appearance:   Appropriate    Eye Contact:   Good    Psychomotor Behavior: Normal    Attitude:   Cooperative  Friendly   Orientation:   All   Speech    Rate / Production: Normal     Volume:  Normal    Mood:    Anxious    Affect:    Appropriate    Thought Content:  Clear    Thought Form:  Coherent  Logical    Insight:    Good      Medication Review:   No changes to current psychiatric medication(s)      Medication Compliance:   Yes     Changes in Health Issues:   None reported     Chemical Use Review:   Substance Use: Chemical use reviewed, no active concerns identified      Tobacco Use: No current  tobacco use.      Diagnosis:  1. Bipolar 1 disorder (H)      Collateral Reports Completed:   Not Applicable    PLAN: (Patient Tasks / Therapist Tasks / Other)  Homework:  Continue working on job search.       REYNOLD Vee                                                         _________________________________________________________________________________________________________________________                                            Individual Treatment Plan    Patient's Name: Tavo Key  YOB: 1980    Date of Creation: 1/2/2024  Date Treatment Plan Last Reviewed/Revised: 7/26/2024    DSM5 Diagnoses: 296.52 Bipolar I Disorder Current or Most Recent Episode Depressed, Moderate  Psychosocial / Contextual Factors: Trauma hx, health, work  PROMIS (reviewed every 90 days): 7/26/2024 Score: 19    Referral / Collaboration:  Referral to another professional/service is not indicated at this time. EMDR has been presented and client will research further.    Anticipated number of session for this episode of care: 9-12 sessions  Anticipation frequency of session: Biweekly  Anticipated Duration of each session: 53 or more minutes  Treatment plan will be reviewed in 90 days or when goals have been changed.       MeasurableTreatment Goal(s) related to diagnosis / functional impairment(s)  Goal 1: Patient will lower PHQ9 score to 3 or below.    I will know I've met my goal when I'm less irritable.      Objective #A (Patient Action)    Patient will identify stress management strategies for more effectively managing Bipolar Disorder.  Status: Continued - Date(s): 12/4/2024     Intervention(s)  Therapist will teach emotional recognition/identification.      Objective #B  Patient will Identify negative self-talk and behaviors: challenge core beliefs, myths, and actions.  Status: Continued - Date(s): 12/4/2024     Intervention(s)  Therapist will  help client work on cognitive restructuring  .    Objective #C  Patient will  increase exercise .  Status: Continued - Date(s): 12/4/2024    Intervention(s)  Therapist will assign homework to increase level of activity especially aerobic .      Patient has reviewed and agreed to the above plan.      Shanelle Caruso, SUHAILFT

## 2024-12-17 ENCOUNTER — VIRTUAL VISIT (OUTPATIENT)
Dept: PSYCHOLOGY | Facility: CLINIC | Age: 44
End: 2024-12-17
Payer: COMMERCIAL

## 2024-12-17 DIAGNOSIS — F31.9 BIPOLAR 1 DISORDER (H): Primary | ICD-10-CM

## 2024-12-17 PROCEDURE — 90837 PSYTX W PT 60 MINUTES: CPT | Mod: 95 | Performed by: MARRIAGE & FAMILY THERAPIST

## 2024-12-17 NOTE — PROGRESS NOTES
[facility]                                     Progress Note    Patient Name: Tavo Key  Date: 12/17/2024         Service Type: Individual      Session Start Time: 11:00AM  Session End Time: 11:52AM     Session Length: 52 minutes    Session #: 22    Attendees: Client attended alone    Service Modality:  Video Visit:      Provider verified identity through the following two step process.  Patient provided:  Patient is known previously to provider    Telemedicine Visit: The patient's condition can be safely assessed and treated via synchronous audio and visual telemedicine encounter.      Reason for Telemedicine Visit: Patient has requested telehealth visit    Originating Site (Patient Location): Patient's home    Distant Site (Provider Location): Provider Remote Setting- Home Office    Consent:  The patient/guardian has verbally consented to: the potential risks and benefits of telemedicine (video visit) versus in person care; bill my insurance or make self-payment for services provided; and responsibility for payment of non-covered services.     Patient would like the video invitation sent by:  My Chart    Mode of Communication:  Video Conference via [facility]    Distant Location (Provider):  Off-site    As the provider I attest to compliance with applicable laws and regulations related to telemedicine.    DATA  Extended Session (53+ minutes):   - Longer session due to limited access to mental health appointments and necessity to address patient's distress / complexity  Interactive Complexity: No  Crisis: No        Progress Since Last Session (Related to Symptoms / Goals / Homework):   Symptoms: Improving medication is helping    Homework: Partially completed      Episode of Care Goals: Satisfactory progress - ACTION (Actively working towards change); Intervened by reinforcing change plan / affirming steps taken     Current / Ongoing Stressors and Concerns:   Tavo is preparing for a final  in person interview he has today. Talked through ways to manage his anxiety including medication, self talk, breathing, driving there early and preparing in the car, dressing comfortably, smiling, etc. Processed his thoughts and feelings about the job and reframed his nervousness as excitement giving himself permission to share that he's nervous to set his own mind at ease. Tavo also shared that he is still not in a relationship with Mindy and they likely will not even remain in contact based on how things are going.      Treatment Objective(s) Addressed in This Session:   identify coping strategies for more effectively managing Bipolar Disorder  Work on managing his diabetes     Intervention:   CBT: Cognitive restructuring  Solution Focused: prep for move      The following assessments were completed by patient for this visit:    PROMIS 10-Global Health (all questions and answers displayed):       10/31/2023    10:24 PM 11/14/2023     5:56 AM 2/29/2024     5:00 PM 6/5/2024     8:00 PM 7/26/2024    12:52 PM 10/23/2024    10:37 AM   PROMIS 10   In general, would you say your health is: Good Fair    Good   In general, would you say your quality of life is: Good Good    Good   In general, how would you rate your physical health? Good Fair    Good   In general, how would you rate your mental health, including your mood and your ability to think? Fair Good    Fair   In general, how would you rate your satisfaction with your social activities and relationships? Poor Fair    Fair   In general, please rate how well you carry out your usual social activities and roles Fair Good    Good   To what extent are you able to carry out your everyday physical activities such as walking, climbing stairs, carrying groceries, or moving a chair? Completely Completely    Mostly   In the past 7 days, how often have you been bothered by emotional problems such as feeling anxious, depressed, or irritable? Always Often    Often   In the  past 7 days, how would you rate your fatigue on average? Mild Severe    Moderate   In the past 7 days, how would you rate your pain on average, where 0 means no pain, and 10 means worst imaginable pain? 0 2    3   In general, would you say your health is: 3 2  3 2 3 3    In general, would you say your quality of life is: 3 3  3 3 3 3    In general, how would you rate your physical health? 3 2  3 2 3 3    In general, how would you rate your mental health, including your mood and your ability to think? 2 3  2 2 1 2    In general, how would you rate your satisfaction with your social activities and relationships? 1 2  3 3 1 2    In general, please rate how well you carry out your usual social activities and roles. (This includes activities at home, at work and in your community, and responsibilities as a parent, child, spouse, employee, friend, etc.) 2 3  4 3 1 3    To what extent are you able to carry out your everyday physical activities such as walking, climbing stairs, carrying groceries, or moving a chair? 5 5  4 4 4 4    In the past 7 days, how often have you been bothered by emotional problems such as feeling anxious, depressed, or irritable? 5 4  4 3 5 4    In the past 7 days, how would you rate your fatigue on average? 2 4  2 3 3 3    In the past 7 days, how would you rate your pain on average, where 0 means no pain, and 10 means worst imaginable pain? 0 2  0 2 6 3    Global Mental Health Score 7 10 10 11 6 9    Global Physical Health Score 17 13 16 13 13 14    PROMIS TOTAL - SUBSCORES 24 23 26 24 19 23        Patient-reported     PROMIS 10-Global Health (only subscores and total score):       10/31/2023    10:24 PM 11/14/2023     5:56 AM 2/29/2024     5:00 PM 6/5/2024     8:00 PM 7/26/2024    12:52 PM 10/23/2024    10:37 AM   PROMIS-10 Scores Only   Global Mental Health Score 7 10 10 11 6 9    Global Physical Health Score 17 13 16 13 13 14    PROMIS TOTAL - SUBSCORES 24 23 26 24 19 23        Patient-reported          ASSESSMENT: Current Emotional / Mental Status (status of significant symptoms):   Risk status (Self / Other harm or suicidal ideation)   Patient denies current fears or concerns for personal safety.   Patient denies current or recent suicidal ideation or behaviors.   Patient denies current or recent homicidal ideation or behaviors.   Patient denies current or recent self injurious behavior or ideation.   Patient denies other safety concerns.   Patient reports there has been no change in risk factors since their last session.     Patient reports there has been no change in protective factors since their last session.     Recommended that patient call 911 or go to the local ED should there be a change in any of these risk factors.     Appearance:   Appropriate    Eye Contact:   Good    Psychomotor Behavior: Normal    Attitude:   Cooperative  Friendly   Orientation:   All   Speech    Rate / Production: Normal     Volume:  Normal    Mood:    Anxious    Affect:    Appropriate    Thought Content:  Clear    Thought Form:  Coherent  Logical    Insight:    Good      Medication Review:   No changes to current psychiatric medication(s)      Medication Compliance:   Yes     Changes in Health Issues:   None reported     Chemical Use Review:   Substance Use: Chemical use reviewed, no active concerns identified      Tobacco Use: No current tobacco use.      Diagnosis:  1. Bipolar 1 disorder (H)        Collateral Reports Completed:   Not Applicable    PLAN: (Patient Tasks / Therapist Tasks / Other)  Homework:  Have final interview. Give himself a break from job search.      Shanelle Caruso, REYNOLD                                                         _________________________________________________________________________________________________________________________                                            Individual Treatment Plan    Patient's Name: Tavo Paigeemilee  YOB: 1980    Date of Creation:  1/2/2024  Date Treatment Plan Last Reviewed/Revised: 7/26/2024    DSM5 Diagnoses: 296.52 Bipolar I Disorder Current or Most Recent Episode Depressed, Moderate  Psychosocial / Contextual Factors: Trauma hx, health, work  PROMIS (reviewed every 90 days): 7/26/2024 Score: 19    Referral / Collaboration:  Referral to another professional/service is not indicated at this time. EMDR has been presented and client will research further.    Anticipated number of session for this episode of care: 9-12 sessions  Anticipation frequency of session: Biweekly  Anticipated Duration of each session: 53 or more minutes  Treatment plan will be reviewed in 90 days or when goals have been changed.       MeasurableTreatment Goal(s) related to diagnosis / functional impairment(s)  Goal 1: Patient will lower PHQ9 score to 3 or below.    I will know I've met my goal when I'm less irritable.      Objective #A (Patient Action)    Patient will identify stress management strategies for more effectively managing Bipolar Disorder.  Status: Continued - Date(s): 12/4/2024     Intervention(s)  Therapist will teach emotional recognition/identification.      Objective #B  Patient will Identify negative self-talk and behaviors: challenge core beliefs, myths, and actions.  Status: Continued - Date(s): 12/4/2024     Intervention(s)  Therapist will  help client work on cognitive restructuring .    Objective #C  Patient will  increase exercise .  Status: Continued - Date(s): 12/4/2024    Intervention(s)  Therapist will assign homework to increase level of activity especially aerobic .      Patient has reviewed and agreed to the above plan.      Shanelle Caruso, SUHAILFT

## 2024-12-19 ENCOUNTER — PATIENT OUTREACH (OUTPATIENT)
Dept: CARE COORDINATION | Facility: CLINIC | Age: 44
End: 2024-12-19
Payer: COMMERCIAL

## 2024-12-26 DIAGNOSIS — F41.9 ANXIETY: ICD-10-CM

## 2024-12-26 RX ORDER — BUSPIRONE HYDROCHLORIDE 15 MG/1
TABLET ORAL
Qty: 60 TABLET | Refills: 0 | Status: SHIPPED | OUTPATIENT
Start: 2024-12-26

## 2024-12-30 NOTE — PROGRESS NOTES
[facility] [place]    Behavioral Health Clinician Progress Note   Mental Health & Addiction Services      2024    Patient Name: Tavo Key       Service Type:  Individual   Service Location:  [facility] / Email (patient reached)   Visit Start Time: 9am  Visit End Time:  922am  Session Length: 16 - 37    Attendees: Client   Service Modality: Video Visit:      Provider verified identity through the following two step process.  Patient provided:  Patient  and Patient address    Telemedicine Visit: The patient's condition can be safely assessed and treated via synchronous audio and visual telemedicine encounter.      Reason for Telemedicine Visit: Services only offered telehealth    Originating Site (Patient Location): Patient's home    Distant Site (Provider Location): Provider Remote Setting- Home Office    Consent:  The patient/guardian has verbally consented to: the potential risks and benefits of telemedicine (video visit) versus in person care; bill my insurance or make self-payment for services provided; and responsibility for payment of non-covered services.     Patient would like the video invitation sent by:  My Chart    Mode of Communication:  Video Conference via [facility]    Distant Location (Provider):  Off-site    As the provider I attest to compliance with applicable laws and regulations related to telemedicine.   Visit number: 1    [facility] Visit Activities (Refresh list every visit): [facility]     [place] Diagnostic Assessment: 23 Shanelle Caruso LM  Treatment Plan Review Date: 24      DATA:    Extended Session (60+ minutes): No   Interactive Complexity: No   Crisis: No   [facility] Patient: No     Assessments completed prior to visit:   PHQ9:       2024    10:47 AM 4/15/2024     8:28 AM 2024     1:15 PM 2024    12:49 PM 2024    11:21 AM 10/22/2024     1:21 PM 2024     8:53 AM   PHQ-9 SCORE   PHQ-9  Total Score MyChart 11 (Moderate depression) 13 (Moderate depression) 11 (Moderate depression)  12 (Moderate depression) 12 (Moderate depression) 13 (Moderate depression)   PHQ-9 Total Score 11 13 11 13 12 12 13        Patient-reported     GAD7:       12/26/2023    10:37 AM 2/6/2024    10:45 AM 5/22/2024     1:15 PM 7/26/2024    12:52 PM 8/28/2024    11:22 AM 10/22/2024     1:22 PM 12/31/2024     8:54 AM   MATTI-7 SCORE   Total Score 15 (severe anxiety) 10 (moderate anxiety) 14 (moderate anxiety) 12 (moderate anxiety) 14 (moderate anxiety) 10 (moderate anxiety) 12 (moderate anxiety)   Total Score 15    15 10    10 14    14 12 14 10    10 12        Patient-reported         Reason for Visit/Presenting Concern:  ADHD, Anxiety, and Bipolar Disorder    Current Stressors / Issues:   update:  Anxiety high. Mild panic panic.  Mood fairly level.  Recently depressed.  Hypomania last 3-4 weeks.  Typically more on depressive side.  Years since full blown rush.    Stresses: laid off in Sept, looking for work ([employer]), kiddo is 19 living in [place] comes down wkds,  T2D diabetic insulin  Appetite: on injection, stress eating  Sleep: RAVI CPAP, insomnia  Outpatient Provider updates: Shanelle FLAHERTY  SI/SIB/HI:  previous attempt age 15 overdose.  Current passive ideation, no plan intent.  Updated safety plan  AASHISH:  Social ETOH  Side effects/compliance:  Interventions:  [facility] [place] engaged in discussion about anxiety mgmt tools  Most important:  recent lexapro was really helpful. Agoraphobia/social anxiety has been worse.  Just started melatonin last night.  Using migraine med to also sleep/calm    Therapeutic Interventions:  Motivational Interviewing (MI): Validated patient's thoughts, feelings and experience. Expressed respect for patient's autonomy in decision making.  Asked open-ended questions to invite patient's self-reflection and self-direction around change and what is important for them in working towards their  goals.     Response to treatment interventions:   Patient was receptive to interventions utilized.  Patient was engaged in the therapy process.       Progress on Treatment Objective(s) / Homework:   New Objective established this session - CONTEMPLATION (Considering change and yet undecided); Intervened by assessing the negative and positive thinking (ambivalence) about behavior change     Medication Review:   Changes to psychiatric medications, see updated Medication List in EPIC.      Medication Compliance:   Yes     Chemical Use Review:  Substance Use: Chemical use reviewed, no active concerns identified      Tobacco Use: No current tobacco use.       Assessment: Current Emotional / Mental Status (status of significant symptoms):    Risk status (Self / Other harm or suicidal ideation)   Patient has had a history of suicidal ideation: previous attempt via overdose at 15.  Current passive suicidal ideation without intent or planning    Patient denies current fears or concerns for personal safety.   Patient denies current or recent suicidal ideation or behaviors.   Patient denies current or recent homicidal ideation or behaviors.   Patient denies current or recent self injurious behavior or ideation.   Patient denies other safety concerns.   A safety and risk management plan has been developed including: Patient consented to co-developed safety plan.  Safety and risk management plan was completed - see below.  Patient agreed to use safety plan should any safety concerns arise.  A copy was given to the patient.      ASSESSMENT:   Mental Status:     Appearance:   Appropriate    Eye Contact:   Good    Psychomotor Behavior: Normal    Attitude:   Cooperative    Orientation:   All   Speech Rate / Production: Normal    Volume:   Normal    Mood:    Normal   Affect:    Appropriate    Thought Content:  Clear    Thought Form:  Coherent  Logical    Insight:    Good          Diagnoses:      Bipolar 1 disorder (H)  MATTI  (generalized anxiety disorder)  Social anxiety disorder  ADHD (attention deficit hyperactivity disorder), inattentive type    Collateral Reports Completed:   Communicated with: Dr Ag        Plan: (Homework, other):   Patient was provided No indications of CD issues  Patient was given information about behavioral services and encouraged to schedule a follow up appointment with the clinic [facility] [place] as needed.         Fide Montalvo [facility], [facility] [place]     _____________________________________________________________________________________________________________________________________                                              Individual Treatment Plan    Patient's Name: Tavo Key   YOB: 1980  Date of Creation: 12/31/24  Date Treatment Plan Last Reviewed/Revised: 12/31/24    DSM5 Diagnoses:    Bipolar 1 disorder (H)  MATTI (generalized anxiety disorder)  Social anxiety disorder  ADHD (attention deficit hyperactivity disorder), inattentive type    Psychosocial / Contextual Factors: Occupational Issues, Interpersonal Concerns, and Limited Social Support  PROMIS (reviewed every 90 days):   The following assessments were completed by patient for this visit:  PROMIS 10-Global Health (only subscores and total score):       10/31/2023    10:24 PM 11/14/2023     5:56 AM 2/29/2024     5:00 PM 6/5/2024     8:00 PM 7/26/2024    12:52 PM 10/23/2024    10:37 AM   PROMIS-10 Scores Only   Global Mental Health Score 7 10 10 11 6 9    Global Physical Health Score 17 13 16 13 13 14    PROMIS TOTAL - SUBSCORES 24 23 26 24 19 23        Patient-reported        Referral / Collaboration:  Referral to another professional/service is not indicated at this time..    Anticipated number of session for this episode of care:  6-9 sessions  Anticipation frequency of session: Monthly  Anticipated Duration of each session: 16-37 minutes  Treatment plan will be reviewed in 90 days or when goals have been changed.       MeasurableTreatment  Goal(s) related to diagnosis / functional impairment(s)  Goal 1: Patient will improve daily functioning.    I will know I've met my goal when I can be more comfortable going out/in social situations.      Status: New - Date: 12/31/24      Intervention(s)  [facility] will Motivational Interviewing (MI): Validate patient's thoughts, feelings and experience. Express respect for patient's autonomy in decision making.  Ask open-ended questions to invite patient's self-reflection and self-direction around change and what is important for them in working towards their goals.      MeasurableTreatment Goal(s) related to diagnosis / functional impairment(s)  Goal 2: Patient will learn and regularly practice emotions regulations skills.    I will know I've met my goal when reduced suicidal ideation.      Status: New - Date: 12/31/24      Intervention(s)  [facility] will Safety: Co-develop safety plan for patient to follow. Review and update safety plan with patient.    Patient has reviewed and agreed to the above plan.     Written by  Fide Montalvo [facility], [facility]

## 2024-12-31 ENCOUNTER — VIRTUAL VISIT (OUTPATIENT)
Dept: PSYCHIATRY | Facility: CLINIC | Age: 44
End: 2024-12-31
Payer: COMMERCIAL

## 2024-12-31 ENCOUNTER — VIRTUAL VISIT (OUTPATIENT)
Dept: BEHAVIORAL HEALTH | Facility: CLINIC | Age: 44
End: 2024-12-31
Payer: COMMERCIAL

## 2024-12-31 DIAGNOSIS — F90.0 ADHD (ATTENTION DEFICIT HYPERACTIVITY DISORDER), INATTENTIVE TYPE: ICD-10-CM

## 2024-12-31 DIAGNOSIS — F41.1 GAD (GENERALIZED ANXIETY DISORDER): ICD-10-CM

## 2024-12-31 DIAGNOSIS — F31.9 BIPOLAR 1 DISORDER (H): Primary | ICD-10-CM

## 2024-12-31 DIAGNOSIS — F40.10 SOCIAL ANXIETY DISORDER: ICD-10-CM

## 2024-12-31 PROCEDURE — G2211 COMPLEX E/M VISIT ADD ON: HCPCS | Mod: 95 | Performed by: PSYCHIATRY & NEUROLOGY

## 2024-12-31 PROCEDURE — 90832 PSYTX W PT 30 MINUTES: CPT | Mod: 95 | Performed by: COUNSELOR

## 2024-12-31 PROCEDURE — 99214 OFFICE O/P EST MOD 30 MIN: CPT | Mod: 95 | Performed by: PSYCHIATRY & NEUROLOGY

## 2024-12-31 RX ORDER — ESCITALOPRAM OXALATE 20 MG/1
20 TABLET ORAL DAILY
Qty: 90 TABLET | Refills: 0 | Status: SHIPPED | OUTPATIENT
Start: 2024-12-31

## 2024-12-31 ASSESSMENT — ANXIETY QUESTIONNAIRES
4. TROUBLE RELAXING: MORE THAN HALF THE DAYS
7. FEELING AFRAID AS IF SOMETHING AWFUL MIGHT HAPPEN: SEVERAL DAYS
2. NOT BEING ABLE TO STOP OR CONTROL WORRYING: NEARLY EVERY DAY
7. FEELING AFRAID AS IF SOMETHING AWFUL MIGHT HAPPEN: SEVERAL DAYS
GAD7 TOTAL SCORE: 12
IF YOU CHECKED OFF ANY PROBLEMS ON THIS QUESTIONNAIRE, HOW DIFFICULT HAVE THESE PROBLEMS MADE IT FOR YOU TO DO YOUR WORK, TAKE CARE OF THINGS AT HOME, OR GET ALONG WITH OTHER PEOPLE: VERY DIFFICULT
8. IF YOU CHECKED OFF ANY PROBLEMS, HOW DIFFICULT HAVE THESE MADE IT FOR YOU TO DO YOUR WORK, TAKE CARE OF THINGS AT HOME, OR GET ALONG WITH OTHER PEOPLE?: VERY DIFFICULT
1. FEELING NERVOUS, ANXIOUS, OR ON EDGE: MORE THAN HALF THE DAYS
4. TROUBLE RELAXING: MORE THAN HALF THE DAYS
GAD7 TOTAL SCORE: 12
GAD7 TOTAL SCORE: 12
8. IF YOU CHECKED OFF ANY PROBLEMS, HOW DIFFICULT HAVE THESE MADE IT FOR YOU TO DO YOUR WORK, TAKE CARE OF THINGS AT HOME, OR GET ALONG WITH OTHER PEOPLE?: VERY DIFFICULT
3. WORRYING TOO MUCH ABOUT DIFFERENT THINGS: MORE THAN HALF THE DAYS
7. FEELING AFRAID AS IF SOMETHING AWFUL MIGHT HAPPEN: SEVERAL DAYS
GAD7 TOTAL SCORE: 12
1. FEELING NERVOUS, ANXIOUS, OR ON EDGE: MORE THAN HALF THE DAYS
2. NOT BEING ABLE TO STOP OR CONTROL WORRYING: NEARLY EVERY DAY
5. BEING SO RESTLESS THAT IT IS HARD TO SIT STILL: SEVERAL DAYS
GAD7 TOTAL SCORE: 12
6. BECOMING EASILY ANNOYED OR IRRITABLE: SEVERAL DAYS
5. BEING SO RESTLESS THAT IT IS HARD TO SIT STILL: SEVERAL DAYS
IF YOU CHECKED OFF ANY PROBLEMS ON THIS QUESTIONNAIRE, HOW DIFFICULT HAVE THESE PROBLEMS MADE IT FOR YOU TO DO YOUR WORK, TAKE CARE OF THINGS AT HOME, OR GET ALONG WITH OTHER PEOPLE: VERY DIFFICULT
6. BECOMING EASILY ANNOYED OR IRRITABLE: SEVERAL DAYS
3. WORRYING TOO MUCH ABOUT DIFFERENT THINGS: MORE THAN HALF THE DAYS

## 2024-12-31 ASSESSMENT — PATIENT HEALTH QUESTIONNAIRE - PHQ9
SUM OF ALL RESPONSES TO PHQ QUESTIONS 1-9: 13
10. IF YOU CHECKED OFF ANY PROBLEMS, HOW DIFFICULT HAVE THESE PROBLEMS MADE IT FOR YOU TO DO YOUR WORK, TAKE CARE OF THINGS AT HOME, OR GET ALONG WITH OTHER PEOPLE: SOMEWHAT DIFFICULT
SUM OF ALL RESPONSES TO PHQ QUESTIONS 1-9: 13

## 2024-12-31 NOTE — PROGRESS NOTES
"Telemedicine Visit: The patient's condition can be safely assessed and treated via synchronous audio and visual telemedicine encounter.      Reason for Telemedicine Visit: Patient has requested telehealth visit    Originating Site (Patient Location): Patient's home    Distant Location (provider location):  Off-site    Consent:  The patient/guardian has verbally consented to: the potential risks and benefits of telemedicine (video visit) versus in person care; bill my insurance or make self-payment for services provided; and responsibility for payment of non-covered services.     Mode of Communication:  Video Conference via Kuddle    As the provider I attest to compliance with applicable laws and regulations related to telemedicine.         Outpatient Psychiatric Progress Note    Name: Tavo Key   : 1980                    Primary Care Provider: Criselda Walsh PA-C   Therapist: Shanelle FLAHERTY      PHQ-9 scores:      2024    11:21 AM 10/22/2024     1:21 PM 2024     8:53 AM   PHQ-9 SCORE   PHQ-9 Total Score MyChart 12 (Moderate depression) 12 (Moderate depression) 13 (Moderate depression)   PHQ-9 Total Score 12 12 13        Patient-reported       MATTI-7 scores:      2024    11:22 AM 10/22/2024     1:22 PM 2024     8:54 AM   MATTI-7 SCORE   Total Score 14 (moderate anxiety) 10 (moderate anxiety) 12 (moderate anxiety)   Total Score 14 10    10 12        Patient-reported       Patient Identification:  Patient is a 44 year old,   White Not  or  male  who presents for return visit with me.  Patient is currently unemployed. Patient attended the phone/video session alone. Patient prefers to be called: \"Tavo\".    Interim History:  Pt is a transfer of care from Dr. Colon. Dr. Colon last saw Tavo Key for outpatient psychiatry Return Visit on 10/23/2024. During that appointment, they:    Plan  - The patient agreed to try Buspar for anxiety. The plan is to " titrate the dosage rapidly to 30 mg twice daily.  - The patient will continue with his current medications.  - The patient will continue with his mindfulness and grounding exercises.  - The patient will follow up after 10 weeks to evaluate the efficacy of the new medication.     Prescription  - buspar: 15 mg, 4 pills a day. The patient will start with 1/2 tablet twice a day for five days, then 1 tablet twice a day for seven days, then 1.5 tablets twice a day for seven days, and finally 2 tablets twice a day. This will be on the prescription and in the notes.  - focalin: The patient will have adequate refills until Alyssia.     Appointments  - The patient will have a follow-up appointment in 10 weeks to assess the effectiveness of the new medication  - The  will contact the patient to arrange this     12/31: Patient is a transfer of care.  Since seeing colleague of mine, patient started and titrated up BuSpar.  Does not feel like it is helping.  Anxiety remains high.  Ongoing mild panic symptoms.  Mood/depression leveling out.  No major negative side effects from medications.  See below for list of current medications and how often patient is using them.  No acute safety concerns.  No acute suicidality.  No problematic drug or alcohol use.  See [facility] note below for additional details.    Currently taking:  Buspar 30 mg BID-tolerating well but has not found it helpful at all  Klonopin 0.5 mg daily prn - as needed, works really well, takes for interviews and angst-provoking situations   Focalin XR 30 mg daily - uses daily, has been working pretty well, racing heart a little   Lexapro 10 mg daily -tolerating well and has found it the most helpful for mood on many many years ago and tolerated well   Gabapentin - has been taking twice daily 100 mg, no side effects, does not notice any positive or negative effects  Hydroxyzine - takes 100 mg every AM, it may be slightly helpful, well-tolerated  Lamotrigine 250 mg  daily - maybe a higher dose in the past  Lithobid 1050 daily at bedtime  Propranolol - twice daily every day-taking for migraines/headaches    Per [facility], [facility], during today's team-based visit:  Current Stressors / Issues:   update:  Anxiety high. Mild panic panic.  Mood fairly level.  Recently depressed.  Hypomania last 3-4 weeks.  Typically more on depressive side.  Years since full blown rush.    Stresses: laid off in Sept, looking for work ([employer]), casie is 19 living in [place] comes down wkds,  T2D diabetic insulin  Appetite: on injection, stress eating  Sleep: RAVI CPAP, insomnia  Outpatient Provider updates: Shanelle Caruso LMFT  SI/SIB/HI:  previous attempt age 15 overdose.  Current passive ideation, no plan intent.  Updated safety plan  AASHISH:  Social ETOH  Side effects/compliance:  Interventions:  [facility] engaged in discussion about anxiety mgmt tools  Most important:  recent lexapro was really helpful. Agoraphobia/social anxiety has been worse.  Just started melatonin last night.  Using migraine med to also sleep/calm    Past Psychiatric Med Trials:  At time of today's transfer of care 12/31/2024:  Currently taking:  Buspar 30 mg BID  Klonopin 0.5 mg daily prn - as needed, works really well, takes for interviews and angst-provoking situations   Focalin XR 30 mg daily - uses daily, has been working pretty well, racing heart a little   Lexapro 10 mg daily - on many many years ago and tolerated well   Gabapentin - has been taking twice daily 100 mg, no side effects   Hydroxyzine - takes 100 mg every AM  Lamotrigine 250 mg daily - maybe a higher dose   Lithobid 1050 daily at bedtime  Propranolol - twice daily every day     Per Dr. Colon note at intake 11/01/2023:  Med Trials:  Hydroxyzine - anxiety (replaced clonazepam)  Gabapentin - anxiety (replaced clonazepam)  Lamotrigine - on for 2-3 years; highest dose 200 mg also current dose  Lithium - on/off x 10 years, up to 1200 or 1500  mg  Caraprazine  Olanzapine - significant weight gain - tried in 2008  Aripiprazole - helped with depression, does not recall discontinuation reason  Adderall  Methylphenidate   atomoxetine    Psychiatric ROS:  See HPI above for all pertinent positives and negatives. Rest of systems negative.     PHQ9 and GAD7 scores were reviewed today if completed.   Medication side effects: Denies  Current stressors include: Symptoms and see HPI above  Coping mechanisms and supports include: Therapy, Family, Hobbies, and Friends    Current medications include:   Current Outpatient Medications   Medication Sig Dispense Refill    acetaminophen (TYLENOL) 500 MG tablet Take 1,000 mg by mouth 3 times daily as needed for mild pain      albuterol (PROAIR HFA/PROVENTIL HFA/VENTOLIN HFA) 108 (90 Base) MCG/ACT inhaler Inhale 2 puffs into the lungs every 4 hours as needed for shortness of breath or wheezing 18 g 5    Alcohol Swabs (B-D SINGLE USE SWABS REGULAR) PADS USE TO SWAB AREA OF INJECTION/FREIDA AS DIRECTED. 100 each 3    amLODIPine (NORVASC) 10 MG tablet TAKE ONE TABLET BY MOUTH ONCE DAILY 90 tablet 0    aspirin (ASA) 81 MG EC tablet Take 1 tablet (81 mg) by mouth daily      atorvastatin (LIPITOR) 20 MG tablet TAKE ONE TABLET BY MOUTH EVERY NIGHT AT BEDTIME. NEED APPOINTMENT FOR FURTHER REFILLS. 90 tablet 1    busPIRone (BUSPAR) 15 MG tablet Take 1/2 tablet twice a day for 5 days then one tablet twice a day for 7 days then 1.5 tablet twice a days for a for 7 days then go to 2 tablets twice a day 60 tablet 0    clonazePAM (KLONOPIN) 0.5 MG tablet Take 1 tablet (0.5 mg) by mouth daily as needed for anxiety. 15 tablet 3    clotrimazole-betamethasone (LOTRISONE) 1-0.05 % external cream Apply topically 2 times daily Angles of mouth 45 g 3    Continuous Glucose Sensor (FREESTYLE ALDEN 3 SENSOR) [facility] APPLY 1 SENSOR AND CHANGE EVERY 14 DAYS AS DIRECTED 2 each 5    dexmethylphenidate (FOCALIN XR) 30 MG 24 hr capsule Take 1 capsule (30 mg) by  mouth daily. 30 capsule 0    dexmethylphenidate (FOCALIN XR) 30 MG 24 hr capsule Take 1 capsule (30 mg) by mouth daily. 30 capsule 0    escitalopram (LEXAPRO) 10 MG tablet Take 1 tablet (10 mg) by mouth daily. 30 tablet 3    esomeprazole (NEXIUM) 40 MG DR capsule Take 1 capsule (40 mg) by mouth every morning (before breakfast) Take 30-60 minutes before eating.      fluticasone (FLONASE) 50 MCG/ACT nasal spray Spray 1 spray into both nostrils daily (Patient taking differently: Spray 1 spray into both nostrils daily as needed for rhinitis or allergies.) 16 g 11    gabapentin (NEURONTIN) 100 MG capsule Take 1 capsule (100 mg) by mouth 3 times daily. 270 capsule 1    hydrOXYzine (VISTARIL) 50 MG capsule Take 1 capsule (50 mg) by mouth 3 times daily Take an additional tablet a day as needed for anxiety 120 capsule 5    insulin glargine (LANTUS PEN) 100 UNIT/ML pen Inject 46 Units subcutaneously daily Increase dose by 2 units every three days until fasting blood sugar is between  mg/dL. Max 54 units/day. 60 mL 1    insulin lispro (HUMALOG KWIKPEN) 100 UNIT/ML (1 unit dial) KWIKPEN Inject 20 Units subcutaneously 3 times daily (before meals). Place on file 60 mL 1    insulin pen needle (PENTIPS) 31G X 5 MM miscellaneous USE 4 PEN NEEDLES DAILY OR AS DIRECTED. 400 each 1    lamoTRIgine (LAMICTAL) 100 MG tablet Take 0.5 tablets (50 mg) by mouth daily. Take along with a 200 mg tablet for a total of 250 mg daily 15 tablet 2    lamoTRIgine (LAMICTAL) 200 MG tablet Take 1 tablet (200 mg) by mouth daily. Take along with one half of a 100 mg tablet for a total of 250 mg daily 30 tablet 2    lithium (ESKALITH CR/LITHOBID) 450 MG CR tablet Take 2 tablets (900 mg) by mouth at bedtime. Take along with a 150 mg lithium capsule for a total of 1050 mg nightly 60 tablet 3    lithium (ESKALITH) 150 MG capsule Take 1 capsule (150 mg) by mouth at bedtime. Take along with two 450 mg extended release pills for a total of 1050 mg nightly  30 capsule 3    losartan (COZAAR) 50 MG tablet TAKE ONE TABLET BY MOUTH ONCE DAILY 90 tablet 0    naratriptan (AMERGE) 2.5 MG tablet TAKE 1 TABLET (2.5 MG) BY MOUTH AT ONSET OF HEADACHE FOR MIGRAINE. MAY REPEAT IN 4 HOURS. MAX 2 TABLETS/24 HOURS. 12 tablet 0    propranolol (INDERAL) 80 MG tablet TAKE 1 TABLET (80 MG) BY MOUTH 2 TIMES DAILY 180 tablet 1    sildenafil (REVATIO) 20 MG tablet Take 1-5 tablets ( mg) by mouth daily as needed (erectile dysfunction.) 30 tablet 2    testosterone (ANDROGEL 1.62 % PUMP) 20.25 MG/ACT gel Place 1 Pump (20.25 mg) onto the skin daily Apply from dispenser to clean, dry, intact skin of the upper arms and shoulders. 75 g 5    Tirzepatide 7.5 MG/0.5ML SOAJ Inject 0.5 mLs (7.5 mg) subcutaneously every 7 days. 2 mL 1     No current facility-administered medications for this visit.       The [place] Prescription Monitoring Program has been reviewed and there are no concerns about diversionary activity for controlled substances at this time.   12/13/2024 08/23/2024 2 Gabapentin 100 Mg Capsule 90.00 30 Mi Perri [id number] Joel ([id number]) 3/1  [place]   12/06/2024 10/23/2024 2 Clonazepam 0.5 Mg Tablet 15.00 15 Mi Perri [id number] Joel ([id number]) 0/3 1.00 L[place]   11/08/2024 08/28/2024 2 Dexmethylphenidate Er 30 Mg Cp 30.00 30 Mi Perri [id number] Joel ([id number][place] 0/0  [place]   11/08/2024 07/26/2024 2 Clonazepam 0.5 Mg Tablet 15.00 15 Mi Perri [id number] Joel ([id number]) 3/3 1.00 [place]       Past Medical/Surgical History:  Past Medical History:   Diagnosis Date    Depressive disorder     Diabetes (H) 10/01/2020    Hypertension     Uncomplicated asthma       has a past medical history of Depressive disorder, Diabetes (H) (10/01/2020), Hypertension, and Uncomplicated asthma.    He has no past medical history of Arthritis, Cancer (H), Cerebral infarction (H), Congestive heart failure (H), COPD (chronic obstructive pulmonary disease) (H), Heart disease, History of blood transfusion, or Thyroid  disease.    Social History:  Reviewed. No changes to social history except as noted above in HPI.    Vital Signs:   None. This is phone/video visit.     Labs:  Most recent laboratory results reviewed and the pertinent results include:   Lithium 0.74 on 7/26/24    TSH   Date Value Ref Range Status   07/26/2024 2.01 0.30 - 4.20 uIU/mL Final     Lab Results   Component Value Date    A1C 7.8 10/16/2024    A1C 8.3 07/26/2024    A1C 7.7 03/06/2024    A1C 6.8 11/10/2023    A1C 6.4 08/21/2023     Last Comprehensive Metabolic Panel:  Lab Results   Component Value Date     07/26/2024    POTASSIUM 4.6 07/26/2024    CHLORIDE 103 07/26/2024    CO2 20 (L) 07/26/2024    ANIONGAP 16 (H) 07/26/2024     (H) 07/26/2024    BUN 18.0 07/26/2024    CR 0.95 07/26/2024    GFRESTIMATED >90 07/26/2024    FRANCO 9.2 07/26/2024     Last Comprehensive Metabolic Panel:  Lab Results   Component Value Date     07/26/2024    POTASSIUM 4.6 07/26/2024    CHLORIDE 103 07/26/2024    CO2 20 (L) 07/26/2024    ANIONGAP 16 (H) 07/26/2024     (H) 07/26/2024    BUN 18.0 07/26/2024    CR 0.95 07/26/2024    GFRESTIMATED >90 07/26/2024    FRANCO 9.2 07/26/2024     Recent Labs   Lab Test 07/26/24  0824 05/17/23  1030   CHOL 203* 134   HDL 40 38*   LDL 98 54   TRIG 324* 212*     Review of Systems:  10 systems (general, cardiovascular, respiratory, eyes, ENT, endocrine, GI, , M/S, neurological) were reviewed. Most pertinent finding(s) is/are:  denies fever, cough, persistent headaches, shortness of breath, chest pain, severe GI symptoms, trouble urinating, severe pain. The remaining systems are all unremarkable.    Mental Status Examination (limited as this is by phone/video):  Appearance: Awake, alert, appears stated age, no acute distress, well-groomed   Attitude:  cooperative, pleasant   Motor: No gross abnormalities observed via video, not formally tested   Oriented to:  person, place, time, and situation  Attention Span and Concentration:   normal  Speech:  clear, coherent, regular rate, rhythm, and volume  Language: intact  Mood: Mood a Little better, anxiety a little high still  Affect:  appropriate and in normal range and mood congruent  Associations:  no loose associations  Thought Process:  logical, linear and goal oriented  Thought Content:  no evidence of suicidal ideation or homicidal ideation, no evidence of psychotic thought, no auditory hallucinations present and no visual hallucinations present  Recent and Remote Memory:  Intact to interview. Not formally assessed. No amnesia.  Fund of Knowledge: appropriate  Insight:  good  Judgment:  intact, adequate for safety  Impulse Control:  intact    Suicide Risk Assessment:  Today Tavo Key reports no acute suicidality. Based on all available evidence including the factors cited above, Tavo Key does not appear to be at imminent risk for self-harm, does not meet criteria for a 72-hr hold, and therefore remains appropriate for ongoing outpatient level of care.  A thorough assessment of risk factors related to suicide and self-harm have been reviewed and are noted above. The patient convincingly denies suicidality on several occasions. Local community safety resources reviewed for patient to use if needed. There was no deceit detected, and the patient presented in a manner that was believable.     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       Mild persistent asthma without  complication 03/05/2021     Priority: Medium    MATTI (generalized anxiety disorder) 03/05/2021     Priority: Medium    Other migraine without status migrainosus, not intractable 03/05/2021     Priority: Medium    ADHD (attention deficit hyperactivity disorder), inattentive type 03/05/2021     Priority: Medium    Erectile dysfunction, unspecified erectile dysfunction type 03/05/2021     Priority: Medium    Gastroesophageal reflux disease with esophagitis, unspecified whether hemorrhage 03/05/2021     Priority: Medium    Sleep apnea with use of continuous positive airway pressure (CPAP) 03/05/2021     Priority: Medium    Obesity, unspecified 03/05/2021     Priority: Medium       Psychosocial & Contextual Factors: see HPI above    Assessment:  Most recent follow-up with Dr. Colon, 10/23/2024:  Patient currently undergoing depression with history of depression, rush, ADHD, anxiety and adverse childhood events. Sx improving but challenges remain with depression, ADHD medications.     12/31/2024:  Patient is transfer of care today.  Patient has been treated for ADHD, bipolar disorder, social anxiety, and generalized anxiety disorder.  Discussed working together to optimize patient's psychiatric medications and reduce polypharmacy.  Buspirone was added to patient's regimen prior to further optimizing Lexapro despite Lexapro being quite helpful for his symptoms.  No hypomania/rush.  We will taper off of buspirone since has been on max dose with little to no additional improvement in anxiety or other mental health symptoms.  We will increase Lexapro since tapering off buspirone.  Patient will also discontinue gabapentin since only taking 100 mg twice daily with little to no effect.  Before even trying to optimize gabapentin I would like to further optimize other medications at this time.  Would also be nice if we can discontinue hydroxyzine.  Could consider something like guanfacine in the future to target anxiety and ADHD  symptoms further.  Tolerating Focalin XR 30 mg daily well.  Feels it adequately treats ADHD symptoms.  No crashes in the evening.  Slightly reactive heart rate when feeling anxious while on the stimulant medication.  Propranolol has been helpful.  Taking propranolol for migraines.  Sparing use of clonazepam-particularly for job interviews and angst provoking situations.  Lithium level therapeutic in July.  No concerns with thyroid or creatinine with July labs.  Could consider further optimization of lamotrigine in the future.  No acute safety concerns.  No suicidality.  No problematic drug or alcohol use.    Medication side effects and alternatives were reviewed. Health promotion activities recommended and reviewed today. All questions addressed. Education and counseling completed regarding risks and benefits of medications and psychotherapy options. Recommend therapy for additional support.     Treatment Plan:  Discontinue gabapentin (do this FIRST before starting BusPar taper)  Discontinue BusPar/buspirone: take 20 mg twice daily for 5 days then 15 mg twice daily (can increase Lexapro to 20 mg daily at this time) for 3 days then 10 mg twice daily for 3 days then 5 mg twice daily for 3 days then discontinue.   Increase Lexapro/escitalopram to 20 mg daily for mood and anxiety (see above for when to increase dose). MONITOR FOR SIMONA SYMPTOMS.   Continue Focalin XR 30 mg daily for ADHD. Monitor for simona.   Continue Klonopin 0.5 mg daily as needed for severe anxiety/panic.   Continue hydroxyzine 100 mg every AM for anxiety.   Continue lamotrigine 250 mg daily for bipolar depression.   Continue lithobid 1050 mg at bedtime for bipolar disorder. Labs up to date.   Continue propranolol per other prescriber for headaches (and anxiety)  Continue all other cares per primary care provider.   Continue all other medications as reviewed per electronic medical record today.   Safety plan reviewed. To the Emergency Department as  needed or call after hours crisis line at [phone] or [phone]. [place] Crisis Text Line. Text [place] to 680588 or Suicide LifeLine Chat: suicidepreventionlifeline.org/chat  Continue therapy as planned.   Schedule an appointment with me in 4-6 weeks or sooner as needed. Call [facility] at [phone] to schedule.  Follow up with primary care provider as planned or for acute medical concerns.  Call the psychiatric nurse line with medication questions or concerns at [phone].  [employer] may be used to communicate with your provider, but this is not intended to be used for emergencies.    Risks of stimulant medication including, but not limited to, decreased appetite, risk of tics (and that they may be lasting), trouble sleeping, cardiac risks such as increased heart rate and blood pressure, and rare risk of sudden cardiac death.  Also risk of addiction/tolerance/dependence.    Risks of benzodiazepine (Ativan, Xanax, Klonopin, Valium, etc) use including, but not limited to, sedation, tolerance, risk for addiction/dependence. Do not drink alcohol while taking benzodiazepines due to risk of trouble breathing and potential death. Do not drive or operate heavy machinery until it is known how the drug affects you. Discuss with physician or pharmacist before ever taking a benzodiazepine with a narcotic/opioid pain medication.     Lamictal (lamotrigine) can cause serious rashes including Hicks-Ion syndrome which may requiring hospitalization and discontinuation of treatment. If any signs of a rash occur, please see your Primary Care Provider or a dermatologist immediately.     Administrative Billing:   Phone Call/Video Duration: 19 Minutes  Start: 9:37a  Stop: 9:56a    Episode of Care #: 8 (First 7 visits with Dr. Colon)    Patient Status:  Patient will continue to be seen for ongoing consultation and stabilization.    Signed:   Kait Ag DO  [facility]    Disclaimer: This note consists  of symbols derived from keyboarding, dictation and/or voice recognition software. As a result, there may be errors in the script that have gone undetected. Please consider this when interpreting information found in this chart.

## 2024-12-31 NOTE — PATIENT INSTRUCTIONS
Treatment Plan:  Discontinue gabapentin (do this FIRST before starting BusPar taper)  Discontinue BusPar/buspirone: take 20 mg twice daily for 5 days then 15 mg twice daily (can increase Lexapro to 20 mg daily at this time) for 3 days then 10 mg twice daily for 3 days then 5 mg twice daily for 3 days then discontinue.   Increase Lexapro/escitalopram to 20 mg daily for mood and anxiety (see above for when to increase dose). MONITOR FOR SIMONA SYMPTOMS.   Continue Focalin XR 30 mg daily for ADHD. Monitor for simona.   Continue Klonopin 0.5 mg daily as needed for severe anxiety/panic.   Continue hydroxyzine 100 mg every AM for anxiety.   Continue lamotrigine 250 mg daily for bipolar depression.   Continue lithobid 1050 mg at bedtime for bipolar disorder. Labs up to date.   Continue propranolol per other prescriber for headaches (and anxiety)  Continue all other cares per primary care provider.   Continue all other medications as reviewed per electronic medical record today.   Safety plan reviewed. To the Emergency Department as needed or call after hours crisis line at [phone] or [phone]. [place] Crisis Text Line. Text [place] to 983007 or Suicide LifeLine Chat: suicidepreventionAxium Nanofibersline.org/chat  Continue therapy as planned.   Schedule an appointment with me in 4-6 weeks or sooner as needed. Call [facility] at [phone] to schedule.  Follow up with primary care provider as planned or for acute medical concerns.  Call the psychiatric nurse line with medication questions or concerns at [phone].  [facility] may be used to communicate with your provider, but this is not intended to be used for emergencies.    Risks of stimulant medication including, but not limited to, decreased appetite, risk of tics (and that they may be lasting), trouble sleeping, cardiac risks such as increased heart rate and blood pressure, and rare risk of sudden cardiac death.  Also risk of  addiction/tolerance/dependence.    Risks of benzodiazepine (Ativan, Xanax, Klonopin, Valium, etc) use including, but not limited to, sedation, tolerance, risk for addiction/dependence. Do not drink alcohol while taking benzodiazepines due to risk of trouble breathing and potential death. Do not drive or operate heavy machinery until it is known how the drug affects you. Discuss with physician or pharmacist before ever taking a benzodiazepine with a narcotic/opioid pain medication.     Lamictal (lamotrigine) can cause serious rashes including Hicks-Ion syndrome which may requiring hospitalization and discontinuation of treatment. If any signs of a rash occur, please see your Primary Care Provider or a dermatologist immediately.

## 2024-12-31 NOTE — PROGRESS NOTES
"Virtual Visit Details    Type of service:  Video Visit     Originating Location (pt. Location): {video visit patient location:243919::\"Home\"}  {PROVIDER LOCATION On-site should be selected for visits conducted from your clinic location or adjoining [facility] hospital, academic office, or other nearby [facility] building. Off-site should be selected for all other provider locations, including home:[address]}  Distant Location (provider location):  {virtual location provider:385800}  Platform used for Video Visit: {Virtual Visit Platforms:633001::\"[employer]\"}  "

## 2024-12-31 NOTE — NURSING NOTE
Current patient location: [address] [place] [id number]    Is the patient currently in the state of [place]? YES    Visit mode:VIDEO    If the visit is dropped, the patient can be reconnected by:VIDEO VISIT: Text to cell phone:   Telephone Information:   Mobile [phone]       Will anyone else be joining the visit? NO  (If patient encounters technical issues they should call [phone])    Are changes needed to the allergy or medication list? Pt stated no changes to allergies and Pt stated no med changes    Are refills needed on medications prescribed by this physician? Discuss with provider    Rooming Documentation:  Questionnaire(s) completed    Reason for visit: LAMBERTO GOYAL

## 2025-01-20 DIAGNOSIS — F41.9 ANXIETY: ICD-10-CM

## 2025-01-20 RX ORDER — BUSPIRONE HYDROCHLORIDE 15 MG/1
TABLET ORAL
Qty: 60 TABLET | Refills: 0 | Status: CANCELLED | OUTPATIENT
Start: 2025-01-20

## 2025-01-20 NOTE — TELEPHONE ENCOUNTER
Clinic RN: Please investigate patient's chart or contact patient if the information cannot be found because  looks like this is prescribed by psychiatry. Please verify.    Berkley PARK, RN, PHN

## 2025-01-22 NOTE — TELEPHONE ENCOUNTER
Triage called pharmacy and clarified that the ordering provider is working out of an office in [place], per chart review.    Pharmacy staff said they will send the refill request to RUDDY Amaro, per chart review, and triage is cancelling the refill request sent to [facility].    Closing encounter.    Coty Wan RN

## 2025-01-23 DIAGNOSIS — N52.9 ERECTILE DYSFUNCTION, UNSPECIFIED ERECTILE DYSFUNCTION TYPE: ICD-10-CM

## 2025-01-23 DIAGNOSIS — R80.9 PROTEINURIA, UNSPECIFIED TYPE: ICD-10-CM

## 2025-01-23 DIAGNOSIS — I10 HYPERTENSION GOAL BP (BLOOD PRESSURE) < 140/90: ICD-10-CM

## 2025-01-23 DIAGNOSIS — F41.9 ANXIETY: ICD-10-CM

## 2025-01-23 RX ORDER — SILDENAFIL CITRATE 20 MG/1
TABLET ORAL
Qty: 30 TABLET | Refills: 0 | Status: SHIPPED | OUTPATIENT
Start: 2025-01-23

## 2025-01-23 RX ORDER — LOSARTAN POTASSIUM 50 MG/1
50 TABLET ORAL DAILY
Qty: 30 TABLET | Refills: 0 | Status: SHIPPED | OUTPATIENT
Start: 2025-01-23

## 2025-01-27 ENCOUNTER — OFFICE VISIT (OUTPATIENT)
Dept: FAMILY MEDICINE | Facility: CLINIC | Age: 45
End: 2025-01-27
Payer: COMMERCIAL

## 2025-01-27 VITALS
RESPIRATION RATE: 18 BRPM | HEART RATE: 85 BPM | BODY MASS INDEX: 42.8 KG/M2 | OXYGEN SATURATION: 94 % | HEIGHT: 68 IN | WEIGHT: 282.4 LBS | SYSTOLIC BLOOD PRESSURE: 123 MMHG | TEMPERATURE: 97.4 F | DIASTOLIC BLOOD PRESSURE: 84 MMHG

## 2025-01-27 DIAGNOSIS — N50.89 SCROTAL MASS: ICD-10-CM

## 2025-01-27 DIAGNOSIS — E11.9 TYPE 2 DIABETES MELLITUS WITHOUT COMPLICATION, WITH LONG-TERM CURRENT USE OF INSULIN (H): ICD-10-CM

## 2025-01-27 DIAGNOSIS — Z12.11 SCREENING FOR COLON CANCER: ICD-10-CM

## 2025-01-27 DIAGNOSIS — Z79.4 TYPE 2 DIABETES MELLITUS WITHOUT COMPLICATION, WITH LONG-TERM CURRENT USE OF INSULIN (H): ICD-10-CM

## 2025-01-27 DIAGNOSIS — Z79.4 LONG TERM (CURRENT) USE OF INSULIN (H): ICD-10-CM

## 2025-01-27 DIAGNOSIS — J45.30 MILD PERSISTENT ASTHMA WITHOUT COMPLICATION: ICD-10-CM

## 2025-01-27 DIAGNOSIS — Z00.00 ROUTINE GENERAL MEDICAL EXAMINATION AT A HEALTH CARE FACILITY: Primary | ICD-10-CM

## 2025-01-27 DIAGNOSIS — M54.41 ACUTE BILATERAL LOW BACK PAIN WITH RIGHT-SIDED SCIATICA: ICD-10-CM

## 2025-01-27 DIAGNOSIS — E29.1 HYPOGONADISM MALE: ICD-10-CM

## 2025-01-27 DIAGNOSIS — F31.9 BIPOLAR 1 DISORDER (H): ICD-10-CM

## 2025-01-27 LAB
EST. AVERAGE GLUCOSE BLD GHB EST-MCNC: 151 MG/DL
HBA1C MFR BLD: 6.9 % (ref 0–5.6)

## 2025-01-27 PROCEDURE — 36415 COLL VENOUS BLD VENIPUNCTURE: CPT | Performed by: PHYSICIAN ASSISTANT

## 2025-01-27 PROCEDURE — 99396 PREV VISIT EST AGE 40-64: CPT | Performed by: PHYSICIAN ASSISTANT

## 2025-01-27 PROCEDURE — 83036 HEMOGLOBIN GLYCOSYLATED A1C: CPT | Performed by: PHYSICIAN ASSISTANT

## 2025-01-27 PROCEDURE — 84403 ASSAY OF TOTAL TESTOSTERONE: CPT | Performed by: PHYSICIAN ASSISTANT

## 2025-01-27 PROCEDURE — 99214 OFFICE O/P EST MOD 30 MIN: CPT | Mod: 25 | Performed by: PHYSICIAN ASSISTANT

## 2025-01-27 RX ORDER — MELOXICAM 15 MG/1
15 TABLET ORAL DAILY
Qty: 90 TABLET | Refills: 0 | Status: SHIPPED | OUTPATIENT
Start: 2025-01-27

## 2025-01-27 RX ORDER — ALBUTEROL SULFATE 90 UG/1
2 INHALANT RESPIRATORY (INHALATION) EVERY 4 HOURS PRN
Qty: 18 G | Refills: 5 | Status: SHIPPED | OUTPATIENT
Start: 2025-01-27

## 2025-01-27 SDOH — HEALTH STABILITY: PHYSICAL HEALTH: ON AVERAGE, HOW MANY MINUTES DO YOU ENGAGE IN EXERCISE AT THIS LEVEL?: 20 MIN

## 2025-01-27 SDOH — HEALTH STABILITY: PHYSICAL HEALTH: ON AVERAGE, HOW MANY DAYS PER WEEK DO YOU ENGAGE IN MODERATE TO STRENUOUS EXERCISE (LIKE A BRISK WALK)?: 2 DAYS

## 2025-01-27 ASSESSMENT — ASTHMA QUESTIONNAIRES
QUESTION_1 LAST FOUR WEEKS HOW MUCH OF THE TIME DID YOUR ASTHMA KEEP YOU FROM GETTING AS MUCH DONE AT WORK, SCHOOL OR AT HOME: A LITTLE OF THE TIME
QUESTION_3 LAST FOUR WEEKS HOW OFTEN DID YOUR ASTHMA SYMPTOMS (WHEEZING, COUGHING, SHORTNESS OF BREATH, CHEST TIGHTNESS OR PAIN) WAKE YOU UP AT NIGHT OR EARLIER THAN USUAL IN THE MORNING: NOT AT ALL
QUESTION_5 LAST FOUR WEEKS HOW WOULD YOU RATE YOUR ASTHMA CONTROL: WELL CONTROLLED
QUESTION_2 LAST FOUR WEEKS HOW OFTEN HAVE YOU HAD SHORTNESS OF BREATH: ONCE OR TWICE A WEEK
ACT_TOTALSCORE: 22
QUESTION_4 LAST FOUR WEEKS HOW OFTEN HAVE YOU USED YOUR RESCUE INHALER OR NEBULIZER MEDICATION (SUCH AS ALBUTEROL): NOT AT ALL
ACT_TOTALSCORE: 22

## 2025-01-27 ASSESSMENT — PATIENT HEALTH QUESTIONNAIRE - PHQ9
SUM OF ALL RESPONSES TO PHQ QUESTIONS 1-9: 12
SUM OF ALL RESPONSES TO PHQ QUESTIONS 1-9: 12
10. IF YOU CHECKED OFF ANY PROBLEMS, HOW DIFFICULT HAVE THESE PROBLEMS MADE IT FOR YOU TO DO YOUR WORK, TAKE CARE OF THINGS AT HOME, OR GET ALONG WITH OTHER PEOPLE: VERY DIFFICULT

## 2025-01-27 ASSESSMENT — SOCIAL DETERMINANTS OF HEALTH (SDOH): HOW OFTEN DO YOU GET TOGETHER WITH FRIENDS OR RELATIVES?: ONCE A WEEK

## 2025-01-27 NOTE — PROGRESS NOTES
Preventive Care Visit  [facility] DAVID Walsh PA-C, Family Medicine  Jan 27, 2025      Assessment & Plan     Routine general medical examination at a health care facility  - Primary Care - Care Coordination Referral; Future    Bipolar 1 disorder (H)  Managed through psychiatry, continue with them  - Primary Care - Care Coordination Referral; Future    Body mass index (BMI) 40.0-44.9, adult (H)  Working on weight loss. This will help back pain and DM control.   - Primary Care - Care Coordination Referral; Future    Long term (current) use of insulin (H)  Continue on insulin to control DM  - Primary Care - Care Coordination Referral; Future    Mild persistent asthma without complication  Stable, rare albuterol use.   - albuterol (PROAIR HFA/PROVENTIL HFA/VENTOLIN HFA) 108 (90 Base) MCG/ACT inhaler; Inhale 2 puffs into the lungs every 4 hours as needed for shortness of breath or wheezing.  - Primary Care - Care Coordination Referral; Future    Acute bilateral low back pain with right-sided sciatica  Trial of mobic instead of ibuprofen. Increase home back exercises.   - meloxicam (MOBIC) 15 MG tablet; Take 1 tablet (15 mg) by mouth daily.  - Primary Care - Care Coordination Referral; Future    Screening for colon cancer  - COLOGGARRY([employer] SCI[employer]ES)  - Primary Care - Care Coordination Referral; Future    Type 2 diabetes mellitus without complication, with long-term current use of insulin (H)  MTM help with mgmt. A1C today. Sugars had been improving.   - Hemoglobin A1c; Future  - Primary Care - Care Coordination Referral; Future  - Hemoglobin A1c    Scrotal mass  Needs investigation.   - US Testicular & Scrotum w Doppler Ltd; Future  - Primary Care - Care Coordination Referral; Future    Hypogonadism male  Patient to restart after ultrasound.   - Testosterone, total; Future  - Primary Care - Care Coordination Referral; Future  - Testosterone, total        The longitudinal plan of care for the  "diagnosis(es)/condition(s) as documented were addressed during this visit. Due to the added complexity in care, I will continue to support Tavo in the subsequent management and with ongoing continuity of care.    BMI  Estimated body mass index is 42.8 kg/m  as calculated from the following:    Height as of this encounter: 1.73 m (5' 8.11\").    Weight as of this encounter: 128.1 kg (282 lb 6.4 oz).       Depression Screening Follow Up        1/27/2025     8:08 AM   PHQ   PHQ-9 Total Score 12    Q9: Thoughts of better off dead/self-harm past 2 weeks Not at all       Patient-reported           Follow Up Actions Taken  Crisis resource information provided in After Visit Summary  Referred patient back to current mental health provider.     Counseling  Appropriate preventive services were addressed with this patient via screening, questionnaire, or discussion as appropriate for fall prevention, nutrition, physical activity, Tobacco-use cessation, social engagement, weight loss and cognition.  Checklist reviewing preventive services available has been given to the patient.  Reviewed patient's diet, addressing concerns and/or questions.   He is at risk for lack of exercise and has been provided with information to increase physical activity for the benefit of his well-being.   The patient's PHQ-9 score is consistent with moderate depression. He was provided with information regarding depression.           Subjective   Tavo is a 44 year old, presenting for the following:  Physical           HPI  Patient had to stop physical therapy due to being unemployed. Trying to do exercises at home. Sitting upright can be helpful for pain. When standing if pain is severe, when he sits the pain improve. Takes ibuprofen 400mg 2-3 times per day.     Swollen part on the right testicle a few months ago.   Soft firmness, not painful.   No risk for STD.     Has been off the testosterone for about 3 months. Cost was an issue. Would like to " restart.             Health Care Directive  Patient does not have a Health Care Directive: Discussed advance care planning with patient; however, patient declined at this time.      2025   General Health   How would you rate your overall physical health? (!) FAIR         2025   Nutrition   Three or more servings of calcium each day? Yes   Diet: Diabetic   How many servings of fruit and vegetables per day? (!) 2-3   How many sweetened beverages each day? 0-1         2025   Exercise   Days per week of moderate/strenous exercise 2 days   Average minutes spent exercising at this level 20 min   (!) EXERCISE CONCERN      2025   Social Factors   Frequency of gathering with friends or relatives Once a week   Worry food won't last until get money to buy more No   Food not last or not have enough money for food? No   Do you have housing? (Housing is defined as stable permanent housing and does not include staying ouside in a car, in a tent, in an abandoned building, in an overnight shelter, or couch-surfing.) Yes   Are you worried about losing your housing? No   Lack of transportation? No   Unable to get utilities (heat,electricity)? No         2025   Dental   Dentist two times every year? Yes         2025   TB Screening   Were you born outside of the [place]? No       Today's PHQ-9 Score:       2025     8:08 AM   PHQ-9 SCORE   PHQ-9 Total Score MyChart 12 (Moderate depression)   PHQ-9 Total Score 12        Patient-reported         2025   Substance Use   Alcohol more than 3/day or more than 7/wk No   Do you use any other substances recreationally? No     Social History     Tobacco Use    Smoking status: Former     Current packs/day: 0.00     Types: Cigarettes     Quit date: 2000     Years since quittin.0     Passive exposure: Never    Smokeless tobacco: Never   Vaping Use    Vaping status: Never Used   Substance Use Topics    Alcohol use: Not Currently    Drug use: Never            "1/27/2025   STI Screening   New sexual partner(s) since last STI/HIV test? No   ASCVD Risk   The 10-year ASCVD risk score (Rima PIERCE, et al., 2019) is: 5.7%    Values used to calculate the score:      Age: 44 years      Sex: Male      Is Non- : No      Diabetic: Yes      Tobacco smoker: No      Systolic Blood Pressure: 123 mmHg      Is BP treated: Yes      HDL Cholesterol: 40 mg/dL      Total Cholesterol: 203 mg/dL        1/27/2025   Contraception/Family Planning   Questions about contraception or family planning No        Reviewed and updated as needed this visit by Provider                             Objective    Exam  /84   Pulse 85   Temp 97.4  F (36.3  C) (Temporal)   Resp 18   Ht 1.73 m (5' 8.11\")   Wt 128.1 kg (282 lb 6.4 oz)   SpO2 94%   BMI 42.80 kg/m     Estimated body mass index is 42.8 kg/m  as calculated from the following:    Height as of this encounter: 1.73 m (5' 8.11\").    Weight as of this encounter: 128.1 kg (282 lb 6.4 oz).    Physical Exam  GENERAL: alert and no distress  EYES: Eyes grossly normal to inspection, PERRL and conjunctivae and sclerae normal  HENT: ear canals and TM's normal, nose and mouth without ulcers or lesions  NECK: no adenopathy, no asymmetry, masses, or scars  RESP: lungs clear to auscultation - no rales, rhonchi or wheezes  CV: regular rate and rhythm, normal S1 S2, no S3 or S4, no murmur, click or rub, no peripheral edema  ABDOMEN: soft, nontender, no hepatosplenomegaly, no masses    (male): normal male genitalia without lesions or urethral discharge, no hernia- right testicle with small mobile non tender mass.   MS: no gross musculoskeletal defects noted, no edema- negative straight leg raise.   SKIN: no suspicious lesions or rashes  NEURO: Normal strength and tone, mentation intact and speech normal  PSYCH: mentation appears normal, affect normal/bright  Diabetic foot exam: normal DP and PT pulses, no trophic changes or " ulcerative lesions, normal sensory exam, and normal monofilament exam        Signed Electronically by: Criselda Walsh PA-C    Answers submitted by the patient for this visit:  Patient Health Questionnaire (Submitted on 1/27/2025)  If you checked off any problems, how difficult have these problems made it for you to do your work, take care of things at home, or get along with other people?: Very difficult  PHQ9 TOTAL SCORE: 12     Post-Care Instructions: I reviewed with the patient in detail post-care instructions. Patient is to keep the biopsy site dry overnight, and then apply bacitracin twice daily until healed. Patient may apply hydrogen peroxide soaks to remove any crusting.

## 2025-01-27 NOTE — PATIENT INSTRUCTIONS
Patient Education   Preventive Care Advice   This is general advice given by our system to help you stay healthy. However, your care team may have specific advice just for you. Please talk to your care team about your preventive care needs.  Nutrition  Eat 5 or more servings of fruits and vegetables each day.  Try wheat bread, brown rice and whole grain pasta (instead of white bread, rice, and pasta).  Get enough calcium and vitamin D. Check the label on foods and aim for 100% of the RDA (recommended daily allowance).  Lifestyle  Exercise at least 150 minutes each week  (30 minutes a day, 5 days a week).  Do muscle strengthening activities 2 days a week. These help control your weight and prevent disease.  No smoking.  Wear sunscreen to prevent skin cancer.  Have a dental exam and cleaning every 6 months.  Yearly exams  See your health care team every year to talk about:  Any changes in your health.  Any medicines your care team has prescribed.  Preventive care, family planning, and ways to prevent chronic diseases.  Shots (vaccines)   HPV shots (up to age 26), if you've never had them before.  Hepatitis B shots (up to age 59), if you've never had them before.  COVID-19 shot: Get this shot when it's due.  Flu shot: Get a flu shot every year.  Tetanus shot: Get a tetanus shot every 10 years.  Pneumococcal, hepatitis A, and RSV shots: Ask your care team if you need these based on your risk.  Shingles shot (for age 50 and up)  General health tests  Diabetes screening:  Starting at age 35, Get screened for diabetes at least every 3 years.  If you are younger than age 35, ask your care team if you should be screened for diabetes.  Cholesterol test: At age 39, start having a cholesterol test every 5 years, or more often if advised.  Bone density scan (DEXA): At age 50, ask your care team if you should have this scan for osteoporosis (brittle bones).  Hepatitis C: Get tested at least once in your life.  STIs (sexually  transmitted infections)  Before age 24: Ask your care team if you should be screened for STIs.  After age 24: Get screened for STIs if you're at risk. You are at risk for STIs (including HIV) if:  You are sexually active with more than one person.  You don't use condoms every time.  You or a partner was diagnosed with a sexually transmitted infection.  If you are at risk for HIV, ask about PrEP medicine to prevent HIV.  Get tested for HIV at least once in your life, whether you are at risk for HIV or not.  Cancer screening tests  Cervical cancer screening: If you have a cervix, begin getting regular cervical cancer screening tests starting at age 21.  Breast cancer scan (mammogram): If you've ever had breasts, begin having regular mammograms starting at age 40. This is a scan to check for breast cancer.  Colon cancer screening: It is important to start screening for colon cancer at age 45.  Have a colonoscopy test every 10 years (or more often if you're at risk) Or, ask your provider about stool tests like a FIT test every year or Cologuard test every 3 years.  To learn more about your testing options, visit:   .  For help making a decision, visit:   https://bit.ly/fg43922.  Prostate cancer screening test: If you have a prostate, ask your care team if a prostate cancer screening test (PSA) at age 55 is right for you.  Lung cancer screening: If you are a current or former smoker ages 50 to 80, ask your care team if ongoing lung cancer screenings are right for you.  For informational purposes only. Not to replace the advice of your health care provider. Copyright   2023 [facility]. All rights reserved. Clinically reviewed by the [facility] Transitions Program. [employer] 884703 - REV 01/24.  Learning About Depression Screening  What is depression screening?  Depression screening is a way to see if you have depression symptoms. It may be done by a doctor or counselor. It's often part of a routine  "checkup. That's because your mental health is just as important as your physical health.  Depression is a mental health condition that affects how you feel, think, and act. You may:  Have less energy.  Lose interest in your daily activities.  Feel sad and grouchy for a long time.  Depression is very common. It affects people of all ages.  Many things can lead to depression. Some people become depressed after they have a stroke or find out they have a major illness like cancer or heart disease. The death of a loved one or a breakup may lead to depression. It can run in families. Most experts believe that a combination of inherited genes and stressful life events can cause it.  What happens during screening?  You may be asked to fill out a form about your depression symptoms. You and the doctor will discuss your answers. The doctor may ask you more questions to learn more about how you think, act, and feel.  What happens after screening?  If you have symptoms of depression, your doctor will talk to you about your options.  Doctors usually treat depression with medicines or counseling. Often, combining the two works best. Many people don't get help because they think that they'll get over the depression on their own. But people with depression may not get better unless they get treatment.  The cause of depression is not well understood. There may be many factors involved. But if you have depression, it's not your fault.  A serious symptom of depression is thinking about death or suicide. If you or someone you care about talks about this or about feeling hopeless, get help right away.  It's important to know that depression can be treated. Medicine, counseling, and self-care may help.  Where can you learn more?  Go to https://www.[employer].net/patiented  Enter T185 in the search box to learn more about \"Learning About Depression Screening.\"  Current as of: July 31, 2024  Content Version: 14.3    2024 [employer] " [employer].   Care instructions adapted under license by your healthcare professional. If you have questions about a medical condition or this instruction, always ask your healthcare professional. [employer] disclaims any warranty or liability for your use of this information.

## 2025-01-28 ENCOUNTER — PATIENT OUTREACH (OUTPATIENT)
Dept: CARE COORDINATION | Facility: CLINIC | Age: 45
End: 2025-01-28
Payer: COMMERCIAL

## 2025-01-28 RX ORDER — BUSPIRONE HYDROCHLORIDE 15 MG/1
15 TABLET ORAL 2 TIMES DAILY
Qty: 60 TABLET | Refills: 0 | Status: SHIPPED | OUTPATIENT
Start: 2025-01-28

## 2025-01-28 NOTE — PROGRESS NOTES
Clinic Care Coordination Contact  [facility]/Voicemail      Clinical Data: CHW Outreach    Outreach attempted x 1 regarding CCC enrollment.  CHW was unable to leave a message on patient's voicemail with call back information and requested return call. Due to no option provided to leave a message, call just ended.     Plan: CHW will attempt another outreach to the patient via phone regarding enrollment into CCC in 1-2 business days.    REBEKAH Cloud  Clinic Care Coordination   [facility]   Phone: [phone]  [email or web address]

## 2025-01-28 NOTE — LETTER
[facility] CARE COORDINATION  [address]  [place] [address]    January 29, 2025    Tavo Key  [address] [place] [address]      Dear Tavo,    I am a clinic community health worker who works with Criselda Walsh PA-C with the [facility]. I have been trying to reach you recently to introduce Clinic Care Coordination. Below is a description of clinic care coordination and how I can further assist you.       The clinic care coordination team is made up of a registered nurse, , financial resource worker and community health worker who understand the health care system. The goal of clinic care coordination is to help you manage your health and improve access to the health care system. Our team works alongside your provider to assist you in determining your health and social needs. We can help you obtain health care and community resources, providing you with necessary information and education. We can work with you through any barriers and develop a care plan that helps coordinate and strengthen the communication between you and your care team.  Our services are voluntary and are offered without charge to you personally.    Please feel free to contact Community Health WorkerRowena at [phone] with any questions or concerns regarding care coordination and what we can offer.      We are focused on providing you with the highest-quality healthcare experience possible.    Sincerely,     Kiki MADRID  Community Health Worker    Connected Care Resources   [facility]

## 2025-01-28 NOTE — TELEPHONE ENCOUNTER
Last med was a tapered dose, but prescribed by psych. Routing to prescribing provider.    Thank you,  Billy, Triage RN Marya Sunfield    12:21 PM 1/28/2025

## 2025-01-29 ENCOUNTER — ANCILLARY PROCEDURE (OUTPATIENT)
Dept: ULTRASOUND IMAGING | Facility: CLINIC | Age: 45
End: 2025-01-29
Attending: PHYSICIAN ASSISTANT
Payer: COMMERCIAL

## 2025-01-29 DIAGNOSIS — N50.89 SCROTAL MASS: ICD-10-CM

## 2025-01-29 DIAGNOSIS — G43.809 OTHER MIGRAINE WITHOUT STATUS MIGRAINOSUS, NOT INTRACTABLE: ICD-10-CM

## 2025-01-29 DIAGNOSIS — F31.9 BIPOLAR 1 DISORDER (H): ICD-10-CM

## 2025-01-29 LAB — TESTOST SERPL-MCNC: 174 NG/DL (ref 240–950)

## 2025-01-29 PROCEDURE — 76870 US EXAM SCROTUM: CPT | Mod: TC | Performed by: RADIOLOGY

## 2025-01-29 PROCEDURE — 93976 VASCULAR STUDY: CPT | Mod: TC | Performed by: RADIOLOGY

## 2025-01-29 RX ORDER — PROPRANOLOL HYDROCHLORIDE 80 MG/1
80 TABLET ORAL 2 TIMES DAILY
Qty: 180 TABLET | Refills: 2 | Status: SHIPPED | OUTPATIENT
Start: 2025-01-29

## 2025-01-29 RX ORDER — LAMOTRIGINE 200 MG/1
200 TABLET ORAL DAILY
Qty: 90 TABLET | Refills: 0 | Status: SHIPPED | OUTPATIENT
Start: 2025-01-29

## 2025-01-29 RX ORDER — LAMOTRIGINE 100 MG/1
50 TABLET ORAL DAILY
Qty: 45 TABLET | Refills: 0 | Status: SHIPPED | OUTPATIENT
Start: 2025-01-29

## 2025-01-29 NOTE — TELEPHONE ENCOUNTER
Date of Last Office Visit: 12/31/24  Date of Next Office Visit:  01/31/25  No shows since last visit: No  More than one patient-initiated cancellation (with reschedule) since last seen in clinic? No    []Medication refilled per  Medication Refill in Ambulatory Care  policy.  []Medication unable to be refilled by RN due to criteria not met as indicated below:    []Eligibility: has not had a provider visit within last 6 months   []Supervision: no future appointment; < 7 days before next appointment   []Compliance: no shows; cancellations; lapse in therapy   []Verification: order discrepancy; may need modification...   [] > 30-day supply request   []Advanced refill request: > 7 days before refill date   []Controlled medication   [x]Medication not included in policy   []Review: new med; med adjusted <= 30 days; safety alert; requires lab monitoring...   [x]Scope of Practice: refill request processed by LPN/MA   [x]Other:      Medication(s) requested:     -  lamoTRIgine (LAMICTAL) 100 MG tablet   Date last ordered: 11/05/24  Qty: 15  Refills: 2  Appropriate for refill? Yes    -  lamoTRIgine (LAMICTAL) 200 MG tablet   Date last ordered: 11/05/24  Qty: 30  Refills: 2  Appropriate for refill? Yes          Any Controlled Substance(s)? No      Requested medication(s) verified as identical to current order? Yes    Any lapse in adherence to medication(s) greater than 5 days? No      Additional action taken? cued up medication/order(s) and routed encounter to provider for review.      Last visit treatment plan:   Return in about 1 month (around 1/31/2025).  Treatment Plan:  Discontinue gabapentin (do this FIRST before starting BusPar taper)  Discontinue BusPar/buspirone: take 20 mg twice daily for 5 days then 15 mg twice daily (can increase Lexapro to 20 mg daily at this time) for 3 days then 10 mg twice daily for 3 days then 5 mg twice daily for 3 days then discontinue.   Increase Lexapro/escitalopram to 20 mg daily for mood  and anxiety (see above for when to increase dose). MONITOR FOR SIMONA SYMPTOMS.   Continue Focalin XR 30 mg daily for ADHD. Monitor for simona.   Continue Klonopin 0.5 mg daily as needed for severe anxiety/panic.   Continue hydroxyzine 100 mg every AM for anxiety.   Continue lamotrigine 250 mg daily for bipolar depression.   Continue lithobid 1050 mg at bedtime for bipolar disorder. Labs up to date.   Continue propranolol per other prescriber for headaches (and anxiety)          Any medication(s) require lab monitoring? No

## 2025-01-29 NOTE — PROGRESS NOTES
Clinic Care Coordination Contact  [facility]/Voicemail      Clinical Data: [facility] Outreach    Outreach attempted x 2 regarding CCC enrollment.  Left message on patient's voicemail with call back information and requested return call.    Plan: Patient has been sent a unreachable letter via [employer]  and was provided with [facility] contact information if they are interested in accessing Clinic Care Coordination.         Order for Care Management has been closed, no further outreach will be done at this time and patient can be re-referred.    Kiki Velasco, KENW  Clinic Care Coordination   [facility]   Phone: [phone]  [email or web address]

## 2025-01-30 ENCOUNTER — VIRTUAL VISIT (OUTPATIENT)
Dept: PSYCHOLOGY | Facility: CLINIC | Age: 45
End: 2025-01-30
Payer: COMMERCIAL

## 2025-01-30 DIAGNOSIS — F31.9 BIPOLAR 1 DISORDER (H): Primary | ICD-10-CM

## 2025-01-30 NOTE — RESULT ENCOUNTER NOTE
As expected the testosterone was low without treatment. It looks like lab pulled an older test for this that had been in the system.   Criselda Walsh PA-C

## 2025-01-30 NOTE — RESULT ENCOUNTER NOTE
Naga Vo,     You were right, that lump you are feeling is an epidudymal cyst. Nothing we need to do with this. If you feel like it is getting bigger or painful we can surgically remove it but it is not necessary.   Criselda Walsh PA-C

## 2025-01-30 NOTE — PROGRESS NOTES
[facility]                                     Progress Note    Patient Name: Tavo Key  Date: 1/30/2025         Service Type: Individual      Session Start Time: 1:00PM  Session End Time: 1:52PM     Session Length: 52 minutes    Session #: 24    Attendees: Client attended alone    Service Modality:  Video Visit:      Provider verified identity through the following two step process.  Patient provided:  Patient is known previously to provider    Telemedicine Visit: The patient's condition can be safely assessed and treated via synchronous audio and visual telemedicine encounter.      Reason for Telemedicine Visit: Patient has requested telehealth visit    Originating Site (Patient Location): Patient's home    Distant Site (Provider Location): Provider Remote Setting- Home Office    Consent:  The patient/guardian has verbally consented to: the potential risks and benefits of telemedicine (video visit) versus in person care; bill my insurance or make self-payment for services provided; and responsibility for payment of non-covered services.     Patient would like the video invitation sent by:  My Chart    Mode of Communication:  Video Conference via [facility]    Distant Location (Provider):  Off-site    As the provider I attest to compliance with applicable laws and regulations related to telemedicine.    DATA  Extended Session (53+ minutes):   - Longer session due to limited access to mental health appointments and necessity to address patient's distress / complexity  Interactive Complexity: No  Crisis: No        Progress Since Last Session (Related to Symptoms / Goals / Homework):   Symptoms: Improving medication is helping    Homework: Partially completed      Episode of Care Goals: Satisfactory progress - ACTION (Actively working towards change); Intervened by reinforcing change plan / affirming steps taken     Current / Ongoing Stressors and Concerns:   Tavo continues to job search.  He describes having more panic attacks recently when he goes out which he attributes to feeling judged. Unpacked his thoughts and feelings about social anxiety to better understand his perspective. Encouraged him to have ativan on hand and also consider whether he can work to not care so much what others think. Or to assume more positive intent of others. Tavo said he is one month from the end of his unemployment so he's trying to not panic and make a lot of efforts to apply places. Talked about asking more empowering questions such as What else could this mean? When he's tempted to think about What if he ends up working at [employer]. Discussed the possibility of practicing more thoughts that good things are coming when he puts out good thoughts versus worrisome thoughts.     Treatment Objective(s) Addressed in This Session:   identify coping strategies for more effectively managing Bipolar Disorder  Work on managing his diabetes     Intervention:   CBT: Cognitive restructuring  Solution Focused: prep for move      The following assessments were completed by patient for this visit: N/A    PROMIS 10-Global Health (all questions and answers displayed):       10/31/2023    10:24 PM 11/14/2023     5:56 AM 2/29/2024     5:00 PM 6/5/2024     8:00 PM 7/26/2024    12:52 PM 10/23/2024    10:37 AM   PROMIS 10   In general, would you say your health is: Good Fair    Good   In general, would you say your quality of life is: Good Good    Good   In general, how would you rate your physical health? Good Fair    Good   In general, how would you rate your mental health, including your mood and your ability to think? Fair Good    Fair   In general, how would you rate your satisfaction with your social activities and relationships? Poor Fair    Fair   In general, please rate how well you carry out your usual social activities and roles Fair Good    Good   To what extent are you able to carry out your everyday physical activities such  as walking, climbing stairs, carrying groceries, or moving a chair? Completely Completely    Mostly   In the past 7 days, how often have you been bothered by emotional problems such as feeling anxious, depressed, or irritable? Always Often    Often   In the past 7 days, how would you rate your fatigue on average? Mild Severe    Moderate   In the past 7 days, how would you rate your pain on average, where 0 means no pain, and 10 means worst imaginable pain? 0 2    3   In general, would you say your health is: 3 2 3 2 3 3   In general, would you say your quality of life is: 3 3 3 3 3 3   In general, how would you rate your physical health? 3 2 3 2 3 3   In general, how would you rate your mental health, including your mood and your ability to think? 2 3 2 2 1 2   In general, how would you rate your satisfaction with your social activities and relationships? 1 2 3 3 1 2   In general, please rate how well you carry out your usual social activities and roles. (This includes activities at home, at work and in your community, and responsibilities as a parent, child, spouse, employee, friend, etc.) 2 3 4 3 1 3   To what extent are you able to carry out your everyday physical activities such as walking, climbing stairs, carrying groceries, or moving a chair? 5 5 4 4 4 4   In the past 7 days, how often have you been bothered by emotional problems such as feeling anxious, depressed, or irritable? 5 4 4 3 5 4   In the past 7 days, how would you rate your fatigue on average? 2 4 2 3 3 3   In the past 7 days, how would you rate your pain on average, where 0 means no pain, and 10 means worst imaginable pain? 0 2 0 2 6 3   Global Mental Health Score 7 10 10 11 6 9    Global Physical Health Score 17 13 16 13 13 14    PROMIS TOTAL - SUBSCORES 24 23 26 24 19 23        Patient-reported     PROMIS 10-Global Health (only subscores and total score):       10/31/2023    10:24 PM 11/14/2023     5:56 AM 2/29/2024     5:00 PM 6/5/2024      8:00 PM 7/26/2024    12:52 PM 10/23/2024    10:37 AM   PROMIS-10 Scores Only   Global Mental Health Score 7 10 10 11 6 9    Global Physical Health Score 17 13 16 13 13 14    PROMIS TOTAL - SUBSCORES 24 23 26 24 19 23        Patient-reported         ASSESSMENT: Current Emotional / Mental Status (status of significant symptoms):   Risk status (Self / Other harm or suicidal ideation)   Patient denies current fears or concerns for personal safety.   Patient denies current or recent suicidal ideation or behaviors.   Patient denies current or recent homicidal ideation or behaviors.   Patient denies current or recent self injurious behavior or ideation.   Patient denies other safety concerns.   Patient reports there has been no change in risk factors since their last session.     Patient reports there has been no change in protective factors since their last session.     Recommended that patient call 911 or go to the local ED should there be a change in any of these risk factors.     Appearance:   Appropriate    Eye Contact:   Good    Psychomotor Behavior: Normal    Attitude:   Cooperative  Friendly   Orientation:   All   Speech    Rate / Production: Normal     Volume:  Normal    Mood:    Anxious    Affect:    Appropriate    Thought Content:  Clear    Thought Form:  Coherent  Logical    Insight:    Good      Medication Review:   No changes to current psychiatric medication(s)      Medication Compliance:   Yes     Changes in Health Issues:   None reported     Chemical Use Review:   Substance Use: Chemical use reviewed, no active concerns identified      Tobacco Use: No current tobacco use.      Diagnosis:  1. Bipolar 1 disorder (H)        Collateral Reports Completed:   Not Applicable    PLAN: (Patient Tasks / Therapist Tasks / Other)  Homework:  Continue to job search.       Shanelle Caruso, REYNOLD                                                          _________________________________________________________________________________________________________________________                                            Individual Treatment Plan    Patient's Name: Tavo Key  YOB: 1980    Date of Creation: 1/2/2024  Date Treatment Plan Last Reviewed/Revised: 7/26/2024    DSM5 Diagnoses: 296.52 Bipolar I Disorder Current or Most Recent Episode Depressed, Moderate  Psychosocial / Contextual Factors: Trauma hx, health, work  PROMIS (reviewed every 90 days): 7/26/2024 Score: 19    Referral / Collaboration:  Referral to another professional/service is not indicated at this time. EMDR has been presented and client will research further.    Anticipated number of session for this episode of care: 9-12 sessions  Anticipation frequency of session: Biweekly  Anticipated Duration of each session: 53 or more minutes  Treatment plan will be reviewed in 90 days or when goals have been changed.       MeasurableTreatment Goal(s) related to diagnosis / functional impairment(s)  Goal 1: Patient will lower PHQ9 score to 3 or below.    I will know I've met my goal when I'm less irritable.      Objective #A (Patient Action)    Patient will identify stress management strategies for more effectively managing Bipolar Disorder.  Status: Continued - Date(s): 12/4/2024     Intervention(s)  Therapist will teach emotional recognition/identification.      Objective #B  Patient will Identify negative self-talk and behaviors: challenge core beliefs, myths, and actions.  Status: Continued - Date(s): 12/4/2024     Intervention(s)  Therapist will  help client work on cognitive restructuring .    Objective #C  Patient will  increase exercise .  Status: Continued - Date(s): 12/4/2024    Intervention(s)  Therapist will assign homework to increase level of activity especially aerobic .      Patient has reviewed and agreed to the above plan.      Shanelle Caruso, REYNOLD  December 4,  2024

## 2025-02-18 DIAGNOSIS — G43.809 OTHER MIGRAINE WITHOUT STATUS MIGRAINOSUS, NOT INTRACTABLE: ICD-10-CM

## 2025-02-18 RX ORDER — NARATRIPTAN 2.5 MG/1
TABLET ORAL
Qty: 12 TABLET | Refills: 0 | Status: SHIPPED | OUTPATIENT
Start: 2025-02-18

## 2025-02-19 ENCOUNTER — VIRTUAL VISIT (OUTPATIENT)
Facility: CLINIC | Age: 45
End: 2025-02-19
Payer: COMMERCIAL

## 2025-02-19 DIAGNOSIS — F31.9 BIPOLAR 1 DISORDER (H): Primary | ICD-10-CM

## 2025-02-19 PROCEDURE — 90837 PSYTX W PT 60 MINUTES: CPT | Mod: 95 | Performed by: MARRIAGE & FAMILY THERAPIST

## 2025-02-19 NOTE — PROGRESS NOTES
[facility]                                     Progress Note    Patient Name: Tavo Key  Date: 2/19/2025         Service Type: Individual      Session Start Time: 10:00AM  Session End Time: 10:52AM     Session Length: 52 minutes    Session #: 25    Attendees: Client attended alone    Service Modality:  Video Visit:      Provider verified identity through the following two step process.  Patient provided:  Patient is known previously to provider    Telemedicine Visit: The patient's condition can be safely assessed and treated via synchronous audio and visual telemedicine encounter.      Reason for Telemedicine Visit: Patient has requested telehealth visit    Originating Site (Patient Location): Patient's home    Distant Site (Provider Location): Provider Remote Setting- Home Office    Consent:  The patient/guardian has verbally consented to: the potential risks and benefits of telemedicine (video visit) versus in person care; bill my insurance or make self-payment for services provided; and responsibility for payment of non-covered services.     Patient would like the video invitation sent by:  My Chart    Mode of Communication:  Video Conference via [facility]    Distant Location (Provider):  Off-site    As the provider I attest to compliance with applicable laws and regulations related to telemedicine.    DATA  Extended Session (53+ minutes):   - Patient's presenting concerns require more intensive intervention than could be completed within the usual service  Interactive Complexity: No  Crisis: No        Progress Since Last Session (Related to Symptoms / Goals / Homework):   Symptoms: Improving medication is helping    Homework: Partially completed      Episode of Care Goals: Satisfactory progress - ACTION (Actively working towards change); Intervened by reinforcing change plan / affirming steps taken     Current / Ongoing Stressors and Concerns:   Tavo continues to job search. His  attitude has been ambitious as the date of unemployment ending draws closer. He has felt positive despite the sense of urgency. His anxiety has been well managed and he also reports blood sugar levels being stable since being on the [employer]. Tavo is no longer talking to Jose even casually and finds himself wanting to block her. He wants to push himself to get out of the house more even if it's working in the garage.      Treatment Objective(s) Addressed in This Session:   identify coping strategies for more effectively managing Bipolar Disorder  Work on managing his diabetes     Intervention:   CBT: Cognitive restructuring  Solution Focused: prep for move      The following assessments were completed by patient for this visit: N/A    PROMIS 10-Global Health (all questions and answers displayed):       10/31/2023    10:24 PM 11/14/2023     5:56 AM 2/29/2024     5:00 PM 6/5/2024     8:00 PM 7/26/2024    12:52 PM 10/23/2024    10:37 AM 1/31/2025     9:43 AM   PROMIS 10   In general, would you say your health is: Good Fair    Good Fair   In general, would you say your quality of life is: Good Good    Good Very good   In general, how would you rate your physical health? Good Fair    Good Fair   In general, how would you rate your mental health, including your mood and your ability to think? Fair Good    Fair Good   In general, how would you rate your satisfaction with your social activities and relationships? Poor Fair    Fair Fair   In general, please rate how well you carry out your usual social activities and roles Fair Good    Good Poor   To what extent are you able to carry out your everyday physical activities such as walking, climbing stairs, carrying groceries, or moving a chair? Completely Completely    Mostly Mostly   In the past 7 days, how often have you been bothered by emotional problems such as feeling anxious, depressed, or irritable? Always Often    Often Often   In the past 7 days, how would you  rate your fatigue on average? Mild Severe    Moderate Moderate   In the past 7 days, how would you rate your pain on average, where 0 means no pain, and 10 means worst imaginable pain? 0 2    3 0   In general, would you say your health is: 3 2 3 2 3 3 2    2   In general, would you say your quality of life is: 3 3 3 3 3 3 4    4   In general, how would you rate your physical health? 3 2 3 2 3 3 2    2   In general, how would you rate your mental health, including your mood and your ability to think? 2 3 2 2 1 2 3    3   In general, how would you rate your satisfaction with your social activities and relationships? 1 2 3 3 1 2 2    2   In general, please rate how well you carry out your usual social activities and roles. (This includes activities at home, at work and in your community, and responsibilities as a parent, child, spouse, employee, friend, etc.) 2 3 4 3 1 3 1    1   To what extent are you able to carry out your everyday physical activities such as walking, climbing stairs, carrying groceries, or moving a chair? 5 5 4 4 4 4 4    4   In the past 7 days, how often have you been bothered by emotional problems such as feeling anxious, depressed, or irritable? 5 4 4 3 5 4 4    4   In the past 7 days, how would you rate your fatigue on average? 2 4 2 3 3 3 3    3   In the past 7 days, how would you rate your pain on average, where 0 means no pain, and 10 means worst imaginable pain? 0 2 0 2 6 3 0    0   Global Mental Health Score 7 10 10 11 6 9  11    11   Global Physical Health Score 17 13 16 13 13 14  14    14   PROMIS TOTAL - SUBSCORES 24 23 26 24 19 23  25    25       Patient-reported     PROMIS 10-Global Health (only subscores and total score):       10/31/2023    10:24 PM 11/14/2023     5:56 AM 2/29/2024     5:00 PM 6/5/2024     8:00 PM 7/26/2024    12:52 PM 10/23/2024    10:37 AM 1/31/2025     9:43 AM   PROMIS-10 Scores Only   Global Mental Health Score 7 10 10 11 6 9  11    11   Global Physical Health  Score 17 13 16 13 13 14  14    14   PROMIS TOTAL - SUBSCORES 24 23 26 24 19 23  25    25       Patient-reported         ASSESSMENT: Current Emotional / Mental Status (status of significant symptoms):   Risk status (Self / Other harm or suicidal ideation)   Patient denies current fears or concerns for personal safety.   Patient denies current or recent suicidal ideation or behaviors.   Patient denies current or recent homicidal ideation or behaviors.   Patient denies current or recent self injurious behavior or ideation.   Patient denies other safety concerns.   Patient reports there has been no change in risk factors since their last session.     Patient reports there has been no change in protective factors since their last session.     Recommended that patient call 911 or go to the local ED should there be a change in any of these risk factors     Appearance:   Appropriate    Eye Contact:   Good    Psychomotor Behavior: Normal    Attitude:   Cooperative  Friendly   Orientation:   All   Speech    Rate / Production: Normal     Volume:  Normal    Mood:    Anxious    Affect:    Appropriate    Thought Content:  Clear    Thought Form:  Coherent  Logical    Insight:    Good      Medication Review:   No changes to current psychiatric medication(s)      Medication Compliance:   Yes     Changes in Health Issues:   None reported     Chemical Use Review:   Substance Use: Chemical use reviewed, no active concerns identified      Tobacco Use: No current tobacco use.      Diagnosis:  1. Bipolar 1 disorder (H)      Collateral Reports Completed:   Not Applicable    PLAN: (Patient Tasks / Therapist Tasks / Other)  Homework:  Continue to job search.       REYNOLD Vee                                                         _________________________________________________________________________________________________________________________                                            Individual Treatment Plan    Patient's  Name: Tavo Key  YOB: 1980    Date of Creation: 1/2/2024  Date Treatment Plan Last Reviewed/Revised: 7/26/2024    DSM5 Diagnoses: 296.52 Bipolar I Disorder Current or Most Recent Episode Depressed, Moderate  Psychosocial / Contextual Factors: Trauma hx, health, work  PROMIS (reviewed every 90 days): 7/26/2024 Score: 19    Referral / Collaboration:  Referral to another professional/service is not indicated at this time. EMDR has been presented and client will research further.    Anticipated number of session for this episode of care: 9-12 sessions  Anticipation frequency of session: Biweekly  Anticipated Duration of each session: 53 or more minutes  Treatment plan will be reviewed in 90 days or when goals have been changed.       MeasurableTreatment Goal(s) related to diagnosis / functional impairment(s)  Goal 1: Patient will lower PHQ9 score to 3 or below.    I will know I've met my goal when I'm less irritable.      Objective #A (Patient Action)    Patient will identify stress management strategies for more effectively managing Bipolar Disorder.  Status: Continued - Date(s): 12/4/2024     Intervention(s)  Therapist will teach emotional recognition/identification.      Objective #B  Patient will Identify negative self-talk and behaviors: challenge core beliefs, myths, and actions.  Status: Continued - Date(s): 12/4/2024     Intervention(s)  Therapist will  help client work on cognitive restructuring .    Objective #C  Patient will  increase exercise .  Status: Continued - Date(s): 12/4/2024    Intervention(s)  Therapist will assign homework to increase level of activity especially aerobic .      Patient has reviewed and agreed to the above plan.      REYNOLD Vee  December 4, 2024

## 2025-02-25 DIAGNOSIS — R80.9 PROTEINURIA, UNSPECIFIED TYPE: ICD-10-CM

## 2025-02-25 DIAGNOSIS — I10 HYPERTENSION GOAL BP (BLOOD PRESSURE) < 140/90: ICD-10-CM

## 2025-02-25 DIAGNOSIS — E11.9 TYPE 2 DIABETES MELLITUS WITHOUT COMPLICATION, WITHOUT LONG-TERM CURRENT USE OF INSULIN (H): ICD-10-CM

## 2025-02-25 RX ORDER — LOSARTAN POTASSIUM 50 MG/1
50 TABLET ORAL DAILY
Qty: 30 TABLET | Refills: 0 | Status: SHIPPED | OUTPATIENT
Start: 2025-02-25

## 2025-02-25 RX ORDER — ACYCLOVIR 800 MG/1
TABLET ORAL
Qty: 2 EACH | Refills: 5 | Status: SHIPPED | OUTPATIENT
Start: 2025-02-25

## 2025-03-05 ASSESSMENT — ANXIETY QUESTIONNAIRES
7. FEELING AFRAID AS IF SOMETHING AWFUL MIGHT HAPPEN: MORE THAN HALF THE DAYS
GAD7 TOTAL SCORE: 10
7. FEELING AFRAID AS IF SOMETHING AWFUL MIGHT HAPPEN: MORE THAN HALF THE DAYS
5. BEING SO RESTLESS THAT IT IS HARD TO SIT STILL: SEVERAL DAYS
8. IF YOU CHECKED OFF ANY PROBLEMS, HOW DIFFICULT HAVE THESE MADE IT FOR YOU TO DO YOUR WORK, TAKE CARE OF THINGS AT HOME, OR GET ALONG WITH OTHER PEOPLE?: VERY DIFFICULT
1. FEELING NERVOUS, ANXIOUS, OR ON EDGE: MORE THAN HALF THE DAYS
GAD7 TOTAL SCORE: 10
4. TROUBLE RELAXING: MORE THAN HALF THE DAYS
3. WORRYING TOO MUCH ABOUT DIFFERENT THINGS: SEVERAL DAYS
IF YOU CHECKED OFF ANY PROBLEMS ON THIS QUESTIONNAIRE, HOW DIFFICULT HAVE THESE PROBLEMS MADE IT FOR YOU TO DO YOUR WORK, TAKE CARE OF THINGS AT HOME, OR GET ALONG WITH OTHER PEOPLE: VERY DIFFICULT
GAD7 TOTAL SCORE: 10
2. NOT BEING ABLE TO STOP OR CONTROL WORRYING: SEVERAL DAYS
6. BECOMING EASILY ANNOYED OR IRRITABLE: SEVERAL DAYS

## 2025-03-05 ASSESSMENT — PATIENT HEALTH QUESTIONNAIRE - PHQ9
SUM OF ALL RESPONSES TO PHQ QUESTIONS 1-9: 11
10. IF YOU CHECKED OFF ANY PROBLEMS, HOW DIFFICULT HAVE THESE PROBLEMS MADE IT FOR YOU TO DO YOUR WORK, TAKE CARE OF THINGS AT HOME, OR GET ALONG WITH OTHER PEOPLE: VERY DIFFICULT
SUM OF ALL RESPONSES TO PHQ QUESTIONS 1-9: 11

## 2025-03-06 ENCOUNTER — VIRTUAL VISIT (OUTPATIENT)
Facility: CLINIC | Age: 45
End: 2025-03-06
Payer: COMMERCIAL

## 2025-03-06 DIAGNOSIS — F31.9 BIPOLAR 1 DISORDER (H): Primary | ICD-10-CM

## 2025-03-06 NOTE — PROGRESS NOTES
[facility]                                     Progress Note    Patient Name: Tavo Key  Date: 3/6/2025         Service Type: Individual      Session Start Time: 9:00AM  Session End Time: 9:45AM     Session Length: 45 minutes    Session #: 26    Attendees: Client attended alone    Service Modality:  Video Visit:      Provider verified identity through the following two step process.  Patient provided:  Patient is known previously to provider    Telemedicine Visit: The patient's condition can be safely assessed and treated via synchronous audio and visual telemedicine encounter.      Reason for Telemedicine Visit: Patient has requested telehealth visit    Originating Site (Patient Location): Patient's home    Distant Site (Provider Location): Provider Remote Setting- Home Office    Consent:  The patient/guardian has verbally consented to: the potential risks and benefits of telemedicine (video visit) versus in person care; bill my insurance or make self-payment for services provided; and responsibility for payment of non-covered services.     Patient would like the video invitation sent by:  My Chart    Mode of Communication:  Video Conference via [employer]    Distant Location (Provider):  Off-site    As the provider I attest to compliance with applicable laws and regulations related to telemedicine.    DATA  Extended Session (53+ minutes):   - Patient's presenting concerns require more intensive intervention than could be completed within the usual service  Interactive Complexity: No  Crisis: No        Progress Since Last Session (Related to Symptoms / Goals / Homework):   Symptoms: Worsening increased depression and anxiety    Homework: Partially completed      Episode of Care Goals: Minimal progress - ACTION (Actively working towards change); Intervened by reinforcing change plan / affirming steps taken     Current / Ongoing Stressors and Concerns:   Tavo continues to job search but  his unemployment has run out and he is increasingly anxious about finances. He reports feeling more depressed and thinks this is unrelated to his unemployment. Tavo will reach out to his doctor for a medication tweek. Processed Tavo's thoughts and feelings about the job search and how demoralized he feels not hearing back from recruiters or employers. Tavo's back has been acting up and he says he needs to restart PT. He would like to get outside more once the weather improves. Also thinks he is missing social interaction not having Jose in his life.     Treatment Objective(s) Addressed in This Session:   identify coping strategies for more effectively managing Bipolar Disorder  Work on managing his diabetes     Intervention:   CBT: Cognitive restructuring  Solution Focused: prep for move      The following assessments were completed by patient for this visit: N/A    PROMIS 10-Global Health (all questions and answers displayed):       10/31/2023    10:24 PM 11/14/2023     5:56 AM 2/29/2024     5:00 PM 6/5/2024     8:00 PM 7/26/2024    12:52 PM 10/23/2024    10:37 AM 1/31/2025     9:43 AM   PROMIS 10   In general, would you say your health is: Good Fair    Good Fair   In general, would you say your quality of life is: Good Good    Good Very good   In general, how would you rate your physical health? Good Fair    Good Fair   In general, how would you rate your mental health, including your mood and your ability to think? Fair Good    Fair Good   In general, how would you rate your satisfaction with your social activities and relationships? Poor Fair    Fair Fair   In general, please rate how well you carry out your usual social activities and roles Fair Good    Good Poor   To what extent are you able to carry out your everyday physical activities such as walking, climbing stairs, carrying groceries, or moving a chair? Completely Completely    Mostly Mostly   In the past 7 days, how often have you been bothered by  emotional problems such as feeling anxious, depressed, or irritable? Always Often    Often Often   In the past 7 days, how would you rate your fatigue on average? Mild Severe    Moderate Moderate   In the past 7 days, how would you rate your pain on average, where 0 means no pain, and 10 means worst imaginable pain? 0 2    3 0   In general, would you say your health is: 3 2 3 2 3 3 2    2   In general, would you say your quality of life is: 3 3 3 3 3 3 4    4   In general, how would you rate your physical health? 3 2 3 2 3 3 2    2   In general, how would you rate your mental health, including your mood and your ability to think? 2 3 2 2 1 2 3    3   In general, how would you rate your satisfaction with your social activities and relationships? 1 2 3 3 1 2 2    2   In general, please rate how well you carry out your usual social activities and roles. (This includes activities at home, at work and in your community, and responsibilities as a parent, child, spouse, employee, friend, etc.) 2 3 4 3 1 3 1    1   To what extent are you able to carry out your everyday physical activities such as walking, climbing stairs, carrying groceries, or moving a chair? 5 5 4 4 4 4 4    4   In the past 7 days, how often have you been bothered by emotional problems such as feeling anxious, depressed, or irritable? 5 4 4 3 5 4 4    4   In the past 7 days, how would you rate your fatigue on average? 2 4 2 3 3 3 3    3   In the past 7 days, how would you rate your pain on average, where 0 means no pain, and 10 means worst imaginable pain? 0 2 0 2 6 3 0    0   Global Mental Health Score 7 10 10 11 6 9  11    11   Global Physical Health Score 17 13 16 13 13 14  14    14   PROMIS TOTAL - SUBSCORES 24 23 26 24 19 23  25    25       Patient-reported     PROMIS 10-Global Health (only subscores and total score):       10/31/2023    10:24 PM 11/14/2023     5:56 AM 2/29/2024     5:00 PM 6/5/2024     8:00 PM 7/26/2024    12:52 PM 10/23/2024     10:37 AM 1/31/2025     9:43 AM   PROMIS-10 Scores Only   Global Mental Health Score 7 10 10 11 6 9  11    11   Global Physical Health Score 17 13 16 13 13 14  14    14   PROMIS TOTAL - SUBSCORES 24 23 26 24 19 23  25    25       Patient-reported         ASSESSMENT: Current Emotional / Mental Status (status of significant symptoms):   Risk status (Self / Other harm or suicidal ideation)   Patient denies current fears or concerns for personal safety.   Patient denies current or recent suicidal ideation or behaviors.   Patient denies current or recent homicidal ideation or behaviors.   Patient denies current or recent self injurious behavior or ideation.   Patient denies other safety concerns.   Patient reports there has been no change in risk factors since their last session.     Patient reports there has been no change in protective factors since their last session.     Recommended that patient call 911 or go to the local ED should there be a change in any of these risk factors     Appearance:   Appropriate    Eye Contact:   Good    Psychomotor Behavior: Normal    Attitude:   Cooperative  Friendly   Orientation:   All   Speech    Rate / Production: Normal     Volume:  Normal    Mood:    Anxious    Affect:    Worrisome    Thought Content:  Clear    Thought Form:  Coherent  Logical    Insight:    Good      Medication Review:   No changes to current psychiatric medication(s)      Medication Compliance:   Yes     Changes in Health Issues:   None reported     Chemical Use Review:   Substance Use: Chemical use reviewed, no active concerns identified      Tobacco Use: No current tobacco use.      Diagnosis:  1. Bipolar 1 disorder (H)        Collateral Reports Completed:   Communicated with: Psychiatry    PLAN: (Patient Tasks / Therapist Tasks / Other)  Homework:  Continue to job search. Reach out to his doctor about medication adjustment.      Shanelle Caruso, REYNOLD                                                          _________________________________________________________________________________________________________________________                                            Individual Treatment Plan    Patient's Name: Tavo Key  YOB: 1980    Date of Creation: 1/2/2024  Date Treatment Plan Last Reviewed/Revised: 3/6/2025    DSM5 Diagnoses: 296.52 Bipolar I Disorder Current or Most Recent Episode Depressed, Moderate  Psychosocial / Contextual Factors: Trauma hx, health, work  PROMIS (reviewed every 90 days): 1/31/2025 Score: 25    Referral / Collaboration:  Referral to another professional/service is not indicated at this time. EMDR has been presented and client will research further.    Anticipated number of session for this episode of care: 9-12 sessions  Anticipation frequency of session: Biweekly  Anticipated Duration of each session: 53 or more minutes  Treatment plan will be reviewed in 90 days or when goals have been changed.       MeasurableTreatment Goal(s) related to diagnosis / functional impairment(s)  Goal 1: Patient will lower PHQ9 score to 3 or below.    I will know I've met my goal when I'm less irritable.      Objective #A (Patient Action)    Patient will identify stress management strategies for more effectively managing Bipolar Disorder.  Status: Continued - Date(s): 3/6/2025    Intervention(s)  Therapist will teach emotional recognition/identification.      Objective #B  Patient will Identify negative self-talk and behaviors: challenge core beliefs, myths, and actions.  Status: Continued - Date(s): 3/6/2025    Intervention(s)  Therapist will  help client work on cognitive restructuring .    Objective #C  Patient will  increase exercise .  Status: Continued - Date(s): 3/6/2025    Intervention(s)  Therapist will assign homework to increase level of activity especially aerobic .      Patient has reviewed and agreed to the above plan.      REYNOLD Vee  March 6,  2025              Answers submitted by the patient for this visit:  Patient Health Questionnaire (Submitted on 3/5/2025)  If you checked off any problems, how difficult have these problems made it for you to do your work, take care of things at home, or get along with other people?: Very difficult  PHQ9 TOTAL SCORE: 11  Patient Health Questionnaire (G7) (Submitted on 3/5/2025)  MATTI 7 TOTAL SCORE: 10

## 2025-03-11 DIAGNOSIS — N52.9 ERECTILE DYSFUNCTION, UNSPECIFIED ERECTILE DYSFUNCTION TYPE: ICD-10-CM

## 2025-03-11 DIAGNOSIS — R80.9 PROTEINURIA, UNSPECIFIED TYPE: ICD-10-CM

## 2025-03-11 DIAGNOSIS — I10 HYPERTENSION GOAL BP (BLOOD PRESSURE) < 140/90: ICD-10-CM

## 2025-03-11 DIAGNOSIS — G43.809 OTHER MIGRAINE WITHOUT STATUS MIGRAINOSUS, NOT INTRACTABLE: ICD-10-CM

## 2025-03-12 DIAGNOSIS — F31.30 BIPOLAR I DISORDER, MOST RECENT EPISODE DEPRESSED (H): ICD-10-CM

## 2025-03-12 RX ORDER — AMLODIPINE BESYLATE 10 MG/1
10 TABLET ORAL DAILY
Qty: 90 TABLET | Refills: 2 | Status: SHIPPED | OUTPATIENT
Start: 2025-03-12

## 2025-03-13 RX ORDER — SILDENAFIL CITRATE 20 MG/1
TABLET ORAL
Qty: 30 TABLET | Refills: 0 | Status: SHIPPED | OUTPATIENT
Start: 2025-03-13

## 2025-03-13 RX ORDER — NARATRIPTAN 2.5 MG/1
TABLET ORAL
Qty: 12 TABLET | Refills: 0 | Status: SHIPPED | OUTPATIENT
Start: 2025-03-13

## 2025-03-13 RX ORDER — CLONAZEPAM 0.5 MG/1
0.5 TABLET ORAL DAILY PRN
Qty: 15 TABLET | Refills: 3 | OUTPATIENT
Start: 2025-03-13

## 2025-03-13 NOTE — TELEPHONE ENCOUNTER
Date of Last Office Visit: 01/31/2025 with Kait Ag, DO  Date of Next Office Visit: None; routing for [facility] to assist pt with scheduling.    No shows since last visit: Yes 03/11/2025 with Kait Ag, DO   More than one patient-initiated cancellation (with reschedule) since last seen in clinic? No    []Medication refilled per  Medication Refill in Ambulatory Care  policy.  [x]Scope of Practice: refill request processed by LPN/MA  []Medication unable to be refilled by RN due to criteria not met as indicated below:    []Eligibility: has not had a provider visit within last 6 months   []Supervision: no future appointment; < 7 days before next appointment   []Compliance: no shows; cancellations; lapse in therapy   []Verification: order discrepancy; may need modification...   [] > 30-day supply request   []Advanced refill request: > 7 days before refill date   [x]Controlled medication   []Medication not included in policy   []Review: new med; med adjusted <= 30 days; safety alert; requires lab monitoring...   []Other:      Medication(s) requested:     -  clonazePAM (KLONOPIN) 0.5 MG tablet  Date last ordered: 10/23/2024  Qty: 15  Refills: 3  Appropriate for refill? Yes      Any Controlled Substance(s)? Yes   [place]  checked? N/A      Requested medication(s) verified as identical to current order? Yes    Any lapse in adherence to medication(s) greater than 5 days? No      Additional action taken? requested that A notify Nursing when appointment is scheduled and routed encounter to provider for review.      Last visit treatment plan:   Treatment Plan:  Continue Lexapro/escitalopram 20 mg daily for mood and anxiety.  Continue to MONITOR FOR SIMONA SYMPTOMS.   Continue Focalin XR 30 mg daily for ADHD. Monitor for simona.   Start Focalin immediate release 5-10 mg daily as needed for ADHD.  Monitor for simona.  Continue Klonopin 0.5 mg daily as needed for severe anxiety/panic.   Continue hydroxyzine 100 mg every AM for  anxiety.   Continue lamotrigine 250 mg daily for bipolar depression.   Continue lithobid 1050 mg at bedtime for bipolar disorder. Labs up to date.   Continue propranolol per other prescriber for headaches (and anxiety)  Continue all other cares per primary care provider.   Continue all other medications as reviewed per electronic medical record today.   Safety plan reviewed. To the Emergency Department as needed or call after hours crisis line at [phone] or [phone]. [place] Crisis Text Line. Text [place] to 437863 or Suicide LifeLine Chat: suicidepreventionlifeline.org/chat  Continue therapy as planned.   Schedule an appointment with me in 4-6 weeks or sooner as needed. Call [facility] at [phone] to schedule.  Follow up with primary care provider as planned or for acute medical concerns.  Call the psychiatric nurse line with medication questions or concerns at [phone].  MyChart may be used to communicate with your provider, but this is not intended to be used for emergencies.    Any medication(s) require lab monitoring? No    Haylee Segundo MA on 3/13/2025 at 9:18 AM

## 2025-03-20 ENCOUNTER — VIRTUAL VISIT (OUTPATIENT)
Facility: CLINIC | Age: 45
End: 2025-03-20
Payer: COMMERCIAL

## 2025-03-20 DIAGNOSIS — F90.0 ADHD (ATTENTION DEFICIT HYPERACTIVITY DISORDER), INATTENTIVE TYPE: ICD-10-CM

## 2025-03-20 DIAGNOSIS — F31.9 BIPOLAR 1 DISORDER (H): Primary | ICD-10-CM

## 2025-03-20 DIAGNOSIS — N52.9 ERECTILE DYSFUNCTION, UNSPECIFIED ERECTILE DYSFUNCTION TYPE: ICD-10-CM

## 2025-03-20 RX ORDER — CLONAZEPAM 0.5 MG/1
0.5 TABLET ORAL DAILY PRN
Qty: 15 TABLET | Refills: 0 | Status: SHIPPED | OUTPATIENT
Start: 2025-03-20

## 2025-03-20 RX ORDER — DEXMETHYLPHENIDATE HYDROCHLORIDE 10 MG/1
10 TABLET ORAL DAILY PRN
Qty: 30 TABLET | Refills: 0 | OUTPATIENT
Start: 2025-03-20

## 2025-03-20 RX ORDER — SILDENAFIL CITRATE 20 MG/1
TABLET ORAL
Qty: 30 TABLET | Refills: 0 | Status: SHIPPED | OUTPATIENT
Start: 2025-03-20

## 2025-03-20 NOTE — PROGRESS NOTES
[facility]                                     Progress Note    Patient Name: Tavo Key  Date: 3/20/2025         Service Type: Individual      Session Start Time: 11:00AM  Session End Time: 11:45AM     Session Length: 45 minutes    Session #: 27    Attendees: Client attended alone    Service Modality:  Video Visit:      Provider verified identity through the following two step process.  Patient provided:  Patient is known previously to provider    Telemedicine Visit: The patient's condition can be safely assessed and treated via synchronous audio and visual telemedicine encounter.      Reason for Telemedicine Visit: Patient has requested telehealth visit    Originating Site (Patient Location): Patient's home    Distant Site (Provider Location): Provider Remote Setting- Home Office    Consent:  The patient/guardian has verbally consented to: the potential risks and benefits of telemedicine (video visit) versus in person care; bill my insurance or make self-payment for services provided; and responsibility for payment of non-covered services.     Patient would like the video invitation sent by:  My Chart    Mode of Communication:  Video Conference via [facility]    Distant Location (Provider):  Off-site    As the provider I attest to compliance with applicable laws and regulations related to telemedicine.    DATA  Extended Session (53+ minutes): No  Interactive Complexity: No  Crisis: No        Progress Since Last Session (Related to Symptoms / Goals / Homework):   Symptoms: Improving more motivation to job search    Homework: Partially completed      Episode of Care Goals: Minimal progress - ACTION (Actively working towards change); Intervened by reinforcing change plan / affirming steps taken     Current / Ongoing Stressors and Concerns:   Tavo continues to job search and his unemployment has run out. He has felt some renewed energy to job search. 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 He's nervous about the test as he is not as knowledgeable about the particular program they work with but he is taking practice tests that are refreshing his memory. Tavo continues to struggle with anxiety leaving the house and being places where he feels he's \"in the way\". He also associates going out with spending money which is an issue currently. He saw his wife's son last weekend which was positive. His blood sugars are better but he has not lost weight and is frustrated by this. His semiglutide dosage was recently increased. Also shared about his back pain being worse and he has started doing some stretches and will also do PT. He wants to be able to walk and be more active. After 20 minutes on his feet, he can feel sore.      Treatment Objective(s) Addressed in This Session:   identify coping strategies for more effectively managing Bipolar Disorder  Work on managing his diabetes     Intervention:   CBT: Cognitive restructuring  Solution Focused: prep for move      The following assessments were completed by patient for this visit: N/A    PROMIS 10-Global Health (all questions and answers displayed):       10/31/2023    10:24 PM 11/14/2023     5:56 AM 2/29/2024     5:00 PM 6/5/2024     8:00 PM 7/26/2024    12:52 PM 10/23/2024    10:37 AM 1/31/2025     9:43 AM   PROMIS 10   In general, would you say your health is: Good Fair    Good Fair   In general, would you say your quality of life is: Good Good    Good Very good   In general, how would you rate your physical health? Good Fair    Good Fair   In general, how would you rate your mental health, including your mood and your ability to think? Fair Good    Fair Good   In general, how would you rate your satisfaction with your social activities and relationships? Poor Fair    Fair Fair   In general, please rate how well you carry out your usual social activities and roles Fair Good    Good Poor   To what extent are you able to carry out your everyday " physical activities such as walking, climbing stairs, carrying groceries, or moving a chair? Completely Completely    Mostly Mostly   In the past 7 days, how often have you been bothered by emotional problems such as feeling anxious, depressed, or irritable? Always Often    Often Often   In the past 7 days, how would you rate your fatigue on average? Mild Severe    Moderate Moderate   In the past 7 days, how would you rate your pain on average, where 0 means no pain, and 10 means worst imaginable pain? 0 2    3 0   In general, would you say your health is: 3 2 3 2 3 3 2    2   In general, would you say your quality of life is: 3 3 3 3 3 3 4    4   In general, how would you rate your physical health? 3 2 3 2 3 3 2    2   In general, how would you rate your mental health, including your mood and your ability to think? 2 3 2 2 1 2 3    3   In general, how would you rate your satisfaction with your social activities and relationships? 1 2 3 3 1 2 2    2   In general, please rate how well you carry out your usual social activities and roles. (This includes activities at home, at work and in your community, and responsibilities as a parent, child, spouse, employee, friend, etc.) 2 3 4 3 1 3 1    1   To what extent are you able to carry out your everyday physical activities such as walking, climbing stairs, carrying groceries, or moving a chair? 5 5 4 4 4 4 4    4   In the past 7 days, how often have you been bothered by emotional problems such as feeling anxious, depressed, or irritable? 5 4 4 3 5 4 4    4   In the past 7 days, how would you rate your fatigue on average? 2 4 2 3 3 3 3    3   In the past 7 days, how would you rate your pain on average, where 0 means no pain, and 10 means worst imaginable pain? 0 2 0 2 6 3 0    0   Global Mental Health Score 7 10 10 11 6 9  11    11   Global Physical Health Score 17 13 16 13 13 14  14    14   PROMIS TOTAL - SUBSCORES 24 23 26 24 19 23  25    25       Patient-reported      PROMIS 10-Global Health (only subscores and total score):       10/31/2023    10:24 PM 11/14/2023     5:56 AM 2/29/2024     5:00 PM 6/5/2024     8:00 PM 7/26/2024    12:52 PM 10/23/2024    10:37 AM 1/31/2025     9:43 AM   PROMIS-10 Scores Only   Global Mental Health Score 7 10 10 11 6 9  11    11   Global Physical Health Score 17 13 16 13 13 14  14    14   PROMIS TOTAL - SUBSCORES 24 23 26 24 19 23  25    25       Patient-reported         ASSESSMENT: Current Emotional / Mental Status (status of significant symptoms):   Risk status (Self / Other harm or suicidal ideation)   Patient denies current fears or concerns for personal safety.   Patient denies current or recent suicidal ideation or behaviors.   Patient denies current or recent homicidal ideation or behaviors.   Patient denies current or recent self injurious behavior or ideation.   Patient denies other safety concerns.   Patient reports there has been no change in risk factors since their last session.     Patient reports there has been no change in protective factors since their last session.     Recommended that patient call 911 or go to the local ED should there be a change in any of these risk factors     Appearance:   Appropriate    Eye Contact:   Good    Psychomotor Behavior: Normal    Attitude:   Cooperative  Friendly   Orientation:   All   Speech    Rate / Production: Normal     Volume:  Normal    Mood:    Anxious    Affect:    Worrisome    Thought Content:  Clear    Thought Form:  Coherent  Logical    Insight:    Good      Medication Review:   No changes to current psychiatric medication(s)      Medication Compliance:   Yes     Changes in Health Issues:   None reported     Chemical Use Review:   Substance Use: Chemical use reviewed, no active concerns identified      Tobacco Use: No current tobacco use.      Diagnosis:  1. Bipolar 1 disorder (H)        Collateral Reports Completed:   Not Applicable    PLAN: (Patient Tasks / Therapist Tasks /  Other)  Homework:  Continue to job search. Work on back stretches and PT exercises.      Shanelle Caruso, LMFT                                                         _________________________________________________________________________________________________________________________                                            Individual Treatment Plan    Patient's Name: Tavo eKy  YOB: 1980    Date of Creation: 1/2/2024  Date Treatment Plan Last Reviewed/Revised: 3/6/2025    DSM5 Diagnoses: 296.52 Bipolar I Disorder Current or Most Recent Episode Depressed, Moderate  Psychosocial / Contextual Factors: Trauma hx, health, work  PROMIS (reviewed every 90 days): 1/31/2025 Score: 25    Referral / Collaboration:  Referral to another professional/service is not indicated at this time. EMDR has been presented and client will research further.    Anticipated number of session for this episode of care: 9-12 sessions  Anticipation frequency of session: Biweekly  Anticipated Duration of each session: 53 or more minutes  Treatment plan will be reviewed in 90 days or when goals have been changed.       MeasurableTreatment Goal(s) related to diagnosis / functional impairment(s)  Goal 1: Patient will lower PHQ9 score to 3 or below.    I will know I've met my goal when I'm less irritable.      Objective #A (Patient Action)    Patient will identify stress management strategies for more effectively managing Bipolar Disorder.  Status: Continued - Date(s): 3/6/2025    Intervention(s)  Therapist will teach emotional recognition/identification.      Objective #B  Patient will Identify negative self-talk and behaviors: challenge core beliefs, myths, and actions.  Status: Continued - Date(s): 3/6/2025    Intervention(s)  Therapist will  help client work on cognitive restructuring .    Objective #C  Patient will  increase exercise .  Status: Continued - Date(s): 3/6/2025    Intervention(s)  Therapist will assign  homework to increase level of activity especially aerobic .      Patient has reviewed and agreed to the above plan.      Shanelle Caruso, [facility]  March 6, 2025              Answers submitted by the patient for this visit:  Patient Health Questionnaire (Submitted on 3/5/2025)  If you checked off any problems, how difficult have these problems made it for you to do your work, take care of things at home, or get along with other people?: Very difficult  PHQ9 TOTAL SCORE: 11  Patient Health Questionnaire (G7) (Submitted on 3/5/2025)  MATTI 7 TOTAL SCORE: 10

## 2025-04-03 ENCOUNTER — VIRTUAL VISIT (OUTPATIENT)
Facility: CLINIC | Age: 45
End: 2025-04-03
Payer: COMMERCIAL

## 2025-04-03 DIAGNOSIS — F31.9 BIPOLAR 1 DISORDER (H): Primary | ICD-10-CM

## 2025-04-03 PROCEDURE — 90837 PSYTX W PT 60 MINUTES: CPT | Mod: 95 | Performed by: MARRIAGE & FAMILY THERAPIST

## 2025-04-03 NOTE — PROGRESS NOTES
[facility]                                     Progress Note    Patient Name: Tavo Key  Date: 4/3/2025         Service Type: Individual      Session Start Time: 10:00AM  Session End Time: 10:45AM     Session Length: 45 minutes    Session #: 28    Attendees: Client attended alone    Service Modality:  Video Visit:      Provider verified identity through the following two step process.  Patient provided:  Patient is known previously to provider    Telemedicine Visit: The patient's condition can be safely assessed and treated via synchronous audio and visual telemedicine encounter.      Reason for Telemedicine Visit: Patient has requested telehealth visit    Originating Site (Patient Location): Patient's home    Distant Site (Provider Location): Provider Remote Setting- Home Office    Consent:  The patient/guardian has verbally consented to: the potential risks and benefits of telemedicine (video visit) versus in person care; bill my insurance or make self-payment for services provided; and responsibility for payment of non-covered services.     Patient would like the video invitation sent by:  My Chart    Mode of Communication:  Video Conference via [facility]    Distant Location (Provider):  Off-site    As the provider I attest to compliance with applicable laws and regulations related to telemedicine.    DATA  Extended Session (53+ minutes): No  Interactive Complexity: No  Crisis: No        Progress Since Last Session (Related to Symptoms / Goals / Homework):   Symptoms: Worsening increased depressed mood due to job hunting    Homework: Partially completed      Episode of Care Goals: Minimal progress - ACTION (Actively working towards change); Intervened by reinforcing change plan / affirming steps taken     Current / Ongoing Stressors and Concerns:   Tavo talks about increased depression related to her job search that is taking a toll. He has had some activity in the past few days that  have been encouraging. He will have 2 interviews this week. They require testing that he feels more comfortable with than the test he had to take last week. He said he feels a job opportunity is around the corner for him.      Treatment Objective(s) Addressed in This Session:   identify coping strategies for more effectively managing Bipolar Disorder  Work on managing his diabetes     Intervention:   CBT: Cognitive restructuring  Solution Focused: prep for move      The following assessments were completed by patient for this visit: N/A    PROMIS 10-Global Health (all questions and answers displayed):       10/31/2023    10:24 PM 11/14/2023     5:56 AM 2/29/2024     5:00 PM 6/5/2024     8:00 PM 7/26/2024    12:52 PM 10/23/2024    10:37 AM 1/31/2025     9:43 AM   PROMIS 10   In general, would you say your health is: Good Fair    Good Fair   In general, would you say your quality of life is: Good Good    Good Very good   In general, how would you rate your physical health? Good Fair    Good Fair   In general, how would you rate your mental health, including your mood and your ability to think? Fair Good    Fair Good   In general, how would you rate your satisfaction with your social activities and relationships? Poor Fair    Fair Fair   In general, please rate how well you carry out your usual social activities and roles Fair Good    Good Poor   To what extent are you able to carry out your everyday physical activities such as walking, climbing stairs, carrying groceries, or moving a chair? Completely Completely    Mostly Mostly   In the past 7 days, how often have you been bothered by emotional problems such as feeling anxious, depressed, or irritable? Always Often    Often Often   In the past 7 days, how would you rate your fatigue on average? Mild Severe    Moderate Moderate   In the past 7 days, how would you rate your pain on average, where 0 means no pain, and 10 means worst imaginable pain? 0 2    3 0   In  general, would you say your health is: 3 2 3 2 3 3 2    2   In general, would you say your quality of life is: 3 3 3 3 3 3 4    4   In general, how would you rate your physical health? 3 2 3 2 3 3 2    2   In general, how would you rate your mental health, including your mood and your ability to think? 2 3 2 2 1 2 3    3   In general, how would you rate your satisfaction with your social activities and relationships? 1 2 3 3 1 2 2    2   In general, please rate how well you carry out your usual social activities and roles. (This includes activities at home, at work and in your community, and responsibilities as a parent, child, spouse, employee, friend, etc.) 2 3 4 3 1 3 1    1   To what extent are you able to carry out your everyday physical activities such as walking, climbing stairs, carrying groceries, or moving a chair? 5 5 4 4 4 4 4    4   In the past 7 days, how often have you been bothered by emotional problems such as feeling anxious, depressed, or irritable? 5 4 4 3 5 4 4    4   In the past 7 days, how would you rate your fatigue on average? 2 4 2 3 3 3 3    3   In the past 7 days, how would you rate your pain on average, where 0 means no pain, and 10 means worst imaginable pain? 0 2 0 2 6 3 0    0   Global Mental Health Score 7 10 10 11 6 9  11    11   Global Physical Health Score 17 13 16 13 13 14  14    14   PROMIS TOTAL - SUBSCORES 24 23 26 24 19 23  25    25       Patient-reported     PROMIS 10-Global Health (only subscores and total score):       10/31/2023    10:24 PM 11/14/2023     5:56 AM 2/29/2024     5:00 PM 6/5/2024     8:00 PM 7/26/2024    12:52 PM 10/23/2024    10:37 AM 1/31/2025     9:43 AM   PROMIS-10 Scores Only   Global Mental Health Score 7 10 10 11 6 9  11    11   Global Physical Health Score 17 13 16 13 13 14  14    14   PROMIS TOTAL - SUBSCORES 24 23 26 24 19 23  25    25       Patient-reported         ASSESSMENT: Current Emotional / Mental Status (status of significant  symptoms):   Risk status (Self / Other harm or suicidal ideation)   Patient denies current fears or concerns for personal safety.   Patient denies current or recent suicidal ideation or behaviors.   Patient denies current or recent homicidal ideation or behaviors.   Patient denies current or recent self injurious behavior or ideation.   Patient denies other safety concerns.   Patient reports there has been no change in risk factors since their last session.     Patient reports there has been no change in protective factors since their last session.     Recommended that patient call 911 or go to the local ED should there be a change in any of these risk factors     Appearance:   Appropriate    Eye Contact:   Good    Psychomotor Behavior: Normal    Attitude:   Cooperative  Friendly   Orientation:   All   Speech    Rate / Production: Normal     Volume:  Normal    Mood:    Anxious    Affect:    Worrisome    Thought Content:  Clear    Thought Form:  Coherent  Logical    Insight:    Good      Medication Review:   No changes to current psychiatric medication(s)      Medication Compliance:   Yes     Changes in Health Issues:   None reported     Chemical Use Review:   Substance Use: Chemical use reviewed, no active concerns identified      Tobacco Use: No current tobacco use.      Diagnosis:  1. Bipolar 1 disorder (H)      Collateral Reports Completed:   Not Applicable    PLAN: (Patient Tasks / Therapist Tasks / Other)  Homework:  Continue to job search. Work on back stretches and PT exercises.      Shanelle Caruso, SUHAILFT                                                         _________________________________________________________________________________________________________________________                                            Individual Treatment Plan    Patient's Name: Tavo Key  YOB: 1980    Date of Creation: 1/2/2024  Date Treatment Plan Last Reviewed/Revised: 3/6/2025    DSM5 Diagnoses:  296.52 Bipolar I Disorder Current or Most Recent Episode Depressed, Moderate  Psychosocial / Contextual Factors: Trauma hx, health, work  PROMIS (reviewed every 90 days): 1/31/2025 Score: 25    Referral / Collaboration:  Referral to another professional/service is not indicated at this time. EMDR has been presented and client will research further.    Anticipated number of session for this episode of care: 9-12 sessions  Anticipation frequency of session: Biweekly  Anticipated Duration of each session: 53 or more minutes  Treatment plan will be reviewed in 90 days or when goals have been changed.       MeasurableTreatment Goal(s) related to diagnosis / functional impairment(s)  Goal 1: Patient will lower PHQ9 score to 3 or below.    I will know I've met my goal when I'm less irritable.      Objective #A (Patient Action)    Patient will identify stress management strategies for more effectively managing Bipolar Disorder.  Status: Continued - Date(s): 3/6/2025    Intervention(s)  Therapist will teach emotional recognition/identification.      Objective #B  Patient will Identify negative self-talk and behaviors: challenge core beliefs, myths, and actions.  Status: Continued - Date(s): 3/6/2025    Intervention(s)  Therapist will  help client work on cognitive restructuring .    Objective #C  Patient will  increase exercise .  Status: Continued - Date(s): 3/6/2025    Intervention(s)  Therapist will assign homework to increase level of activity especially aerobic .      Patient has reviewed and agreed to the above plan.      REYNOLD Vee  March 6, 2025              Answers submitted by the patient for this visit:  Patient Health Questionnaire (Submitted on 3/5/2025)  If you checked off any problems, how difficult have these problems made it for you to do your work, take care of things at home, or get along with other people?: Very difficult  PHQ9 TOTAL SCORE: 11  Patient Health Questionnaire (G7) (Submitted on  3/5/2025)  MATTI 7 TOTAL SCORE: 10

## 2025-04-07 ENCOUNTER — OFFICE VISIT (OUTPATIENT)
Dept: FAMILY MEDICINE | Facility: CLINIC | Age: 45
End: 2025-04-07
Payer: COMMERCIAL

## 2025-04-07 VITALS
SYSTOLIC BLOOD PRESSURE: 102 MMHG | HEART RATE: 87 BPM | HEIGHT: 68 IN | BODY MASS INDEX: 43.35 KG/M2 | DIASTOLIC BLOOD PRESSURE: 70 MMHG | RESPIRATION RATE: 22 BRPM | OXYGEN SATURATION: 95 % | TEMPERATURE: 97.7 F | WEIGHT: 286 LBS

## 2025-04-07 DIAGNOSIS — M54.41 ACUTE BILATERAL LOW BACK PAIN WITH RIGHT-SIDED SCIATICA: ICD-10-CM

## 2025-04-07 DIAGNOSIS — I10 HYPERTENSION GOAL BP (BLOOD PRESSURE) < 140/90: Primary | ICD-10-CM

## 2025-04-07 DIAGNOSIS — D22.5 MELANOCYTIC NEVUS OF TRUNK: ICD-10-CM

## 2025-04-07 DIAGNOSIS — E29.1 HYPOGONADISM MALE: ICD-10-CM

## 2025-04-07 DIAGNOSIS — G43.809 OTHER MIGRAINE WITHOUT STATUS MIGRAINOSUS, NOT INTRACTABLE: ICD-10-CM

## 2025-04-07 PROCEDURE — 1125F AMNT PAIN NOTED PAIN PRSNT: CPT | Performed by: PHYSICIAN ASSISTANT

## 2025-04-07 PROCEDURE — 36415 COLL VENOUS BLD VENIPUNCTURE: CPT | Performed by: PHYSICIAN ASSISTANT

## 2025-04-07 PROCEDURE — G2211 COMPLEX E/M VISIT ADD ON: HCPCS | Performed by: PHYSICIAN ASSISTANT

## 2025-04-07 PROCEDURE — 80048 BASIC METABOLIC PNL TOTAL CA: CPT | Performed by: PHYSICIAN ASSISTANT

## 2025-04-07 PROCEDURE — 3078F DIAST BP <80 MM HG: CPT | Performed by: PHYSICIAN ASSISTANT

## 2025-04-07 PROCEDURE — 84403 ASSAY OF TOTAL TESTOSTERONE: CPT | Performed by: PHYSICIAN ASSISTANT

## 2025-04-07 PROCEDURE — 99214 OFFICE O/P EST MOD 30 MIN: CPT | Performed by: PHYSICIAN ASSISTANT

## 2025-04-07 PROCEDURE — 3074F SYST BP LT 130 MM HG: CPT | Performed by: PHYSICIAN ASSISTANT

## 2025-04-07 RX ORDER — MELOXICAM 15 MG/1
15 TABLET ORAL DAILY
Qty: 90 TABLET | Refills: 0 | Status: SHIPPED | OUTPATIENT
Start: 2025-04-07

## 2025-04-07 RX ORDER — NARATRIPTAN 2.5 MG/1
TABLET ORAL
Qty: 12 TABLET | Refills: 0 | Status: SHIPPED | OUTPATIENT
Start: 2025-04-07

## 2025-04-07 ASSESSMENT — PAIN SCALES - GENERAL: PAINLEVEL_OUTOF10: MILD PAIN (2)

## 2025-04-07 NOTE — PROGRESS NOTES
"  Assessment & Plan     Hypertension goal BP (blood pressure) < 140/90  Stable today. Check BMP per MTM recommendations with addition of mobic.   - Basic metabolic panel; Future  - Basic metabolic panel    Hypogonadism male  Recheck, may need adjustment.   - Testosterone total; Future  - Testosterone total    Acute bilateral low back pain with right-sided sciatica  Improving with mobic. Will continue.   - meloxicam (MOBIC) 15 MG tablet; Take 1 tablet (15 mg) by mouth daily.    Melanocytic nevus of trunk   Monitor for changes.       The longitudinal plan of care for the diagnosis(es)/condition(s) as documented were addressed during this visit. Due to the added complexity in care, I will continue to support Tavo in the subsequent management and with ongoing continuity of care.    BMI  Estimated body mass index is 43.35 kg/m  as calculated from the following:    Height as of this encounter: 1.73 m (5' 8.11\").    Weight as of this encounter: 129.7 kg (286 lb).             Subjective   Tavo is a 44 year old, presenting for the following health issues:  Foot Burn (Left foot/) and Mole      4/7/2025     4:07 PM   Additional Questions   Roomed by Gildardo   Accompanied by Wife, Natalie     History of Present Illness       Reason for visit:  A new mole to exam, some pain on the top of my left foot  Symptom onset:  3-7 days ago  Symptoms include:  Pain on top of foot that is similar to a burning, ache but skin looks normal  Symptom intensity:  Mild  Symptom progression:  Staying the same  Had these symptoms before:  No   He is taking medications regularly.      Burning pain, 1 week ago. Constant. Used bengay lidocaine which helped some, has been improving some.   Foot pain.                 Objective    /70 (BP Location: Left arm, Patient Position: Sitting, Cuff Size: Adult Large)   Pulse 87   Temp 97.7  F (36.5  C) (Temporal)   Resp 22   Ht 1.73 m (5' 8.11\")   Wt 129.7 kg (286 lb)   SpO2 95%   BMI 43.35 kg/m    Body " mass index is 43.35 kg/m .  Physical Exam   Bilateral flat feet. Minimal pain with dorsiflexion of the left foot and palpation of the anterior left ankle. No swelling or redness. Normal pulses.   On right lower flank there is a 3mm oval, pigmented macular nevus with uniform colors and good borders.             Signed Electronically by: Criselda Walsh PA-C

## 2025-04-08 LAB
ANION GAP SERPL CALCULATED.3IONS-SCNC: 11 MMOL/L (ref 7–15)
BUN SERPL-MCNC: 16.8 MG/DL (ref 6–20)
CALCIUM SERPL-MCNC: 9.6 MG/DL (ref 8.8–10.4)
CHLORIDE SERPL-SCNC: 107 MMOL/L (ref 98–107)
CREAT SERPL-MCNC: 0.97 MG/DL (ref 0.67–1.17)
EGFRCR SERPLBLD CKD-EPI 2021: >90 ML/MIN/1.73M2
GLUCOSE SERPL-MCNC: 134 MG/DL (ref 70–99)
HCO3 SERPL-SCNC: 20 MMOL/L (ref 22–29)
POTASSIUM SERPL-SCNC: 4.3 MMOL/L (ref 3.4–5.3)
SODIUM SERPL-SCNC: 138 MMOL/L (ref 135–145)

## 2025-04-10 LAB — TESTOST SERPL-MCNC: 244 NG/DL (ref 240–950)

## 2025-04-10 NOTE — RESULT ENCOUNTER NOTE
Tavo,     Your testosterone did come back normal so I don't think any adjustments are needed.   Criselda Walsh PA-C

## 2025-04-21 NOTE — PROGRESS NOTES
[facility] [place]    Behavioral Health Clinician Progress Note   Mental Health & Addiction Services      4/23/25    Patient Name: Tavo Key       Service Type:  Individual   Service Location:  [place] / Email (patient reached)   Visit Start Time: 1030am  Visit End Time:  1055am  Session Length: 16 - 37    Attendees: Client   Service Modality: Video Visit:      Provider verified identity through the following two step process.  Patient provided:  Patient is known previously to provider    Telemedicine Visit: The patient's condition can be safely assessed and treated via synchronous audio and visual telemedicine encounter.      Reason for Telemedicine Visit: Services only offered telehealth    Originating Site (Patient Location): Patient's home    Distant Site (Provider Location): Provider Remote Setting- Home Office    Consent:  The patient/guardian has verbally consented to: the potential risks and benefits of telemedicine (video visit) versus in person care; bill my insurance or make self-payment for services provided; and responsibility for payment of non-covered services.     Patient would like the video invitation sent by:  My Chart    Mode of Communication:  Video Conference via [facility]    Distant Location (Provider):  Off-site    As the provider I attest to compliance with applicable laws and regulations related to telemedicine.   Visit number: 3    [facility] Visit Activities (Refresh list every visit): [facility] [employer]     [place] [facility] Assessment: 11/16/23 Shanelle Caruso LM  Treatment Plan Review Date: 4/23/25      DATA:    Extended Session (60+ minutes): No   Interactive Complexity: No   Crisis: No   [facility] Patient: No     Assessments completed prior to visit:   PHQ9:       8/28/2024    11:21 AM 10/22/2024     1:21 PM 12/31/2024     8:53 AM 1/27/2025     8:08 AM 1/31/2025     9:40 AM 3/5/2025     9:24 PM 4/22/2025    11:01 AM   PHQ-9 SCORE   PHQ-9 Total  Score MyChart 12 (Moderate depression) 12 (Moderate depression) 13 (Moderate depression) 12 (Moderate depression) 12 (Moderate depression) 11 (Moderate depression) 12 (Moderate depression)   PHQ-9 Total Score 12 12 13  12  12  11  12        Patient-reported     GAD7:       7/26/2024    12:52 PM 8/28/2024    11:22 AM 10/22/2024     1:22 PM 12/31/2024     8:54 AM 1/31/2025     9:41 AM 3/5/2025     9:26 PM 4/22/2025    11:01 AM   MATTI-7 SCORE   Total Score 12 (moderate anxiety) 14 (moderate anxiety) 10 (moderate anxiety) 12 (moderate anxiety) 12 (moderate anxiety) 10 (moderate anxiety) 12 (moderate anxiety)   Total Score 12 14 10    10 12  12  10  12        Patient-reported         Reason for Visit/Presenting Concern:  ADHD, Anxiety, and Bipolar Disorder    Current Stressors / Issues:  MH update:  Doing okay.  Had a big interview yesterday, had a lot panic but managed.  Did affirmations and prep to help.  Using notion for tracking.  Discussion of rewarding self for interview and different reward for landing/follow through.  Discussion of narrative if job doesn't work.  Stresses:  more socially isolated- more agoraphobia.   Appetite: injection  Sleep: RAVI CPAP.  Hx of Melatonin.    Outpatient Provider updates:  Shanelle FLAHERTY.  EMDR in the future  SI/SIB/HI:  Very reduced.  Maybe 3 times in the last month.  Passive ideation.  No plan/intent.  Safety plan on file.  In response to social situations/hurt  AASHISH:  social ETOH  Side effects/compliance:  Interventions:  [facility] engaged in discussion about occupational stressors  Most important:  maybe more irritability- stress?  More anxiety with job stuff and socially.  Depression has improved.  Way more manageable.  No rush sx    4/23  MH update:  Doing mostly okay.  Day to day anxiety better.  Increased panic attacks- about job, new environments/feeling trapped.  Mood seems better.  Irritability with anxiety.  Maybe some hypomania?  Really small bursts of energy- no  consistent sx of rush.  Stresses:  unemployment, 5 weeks left  Appetite: injection  Sleep: RAVI CPAP.  Hx of Melatonin.    Outpatient Provider updates:  Shanelle Caruso LMFT  SI/SIB/HI:  Reduced.  Passive fleeting at best.  Minimal. No plan/intent.  Safety plan on file  AASHISH:  Social ETOH  Side effects/compliance:  Interventions:  C engaged in discussing financial fears and coping skills.  Discussed TIP skills, comfort items, rewarding behaviors, creating flexibility with budget/family meeting  Most important:  Better mood and day to day anxiety.  On going panic    12/31   update:  Anxiety high. Mild panic panic.  Mood fairly level.  Recently depressed.  Hypomania last 3-4 weeks.  Typically more on depressive side.  Years since full blown rush.    Stresses: laid off in Sept, looking for work ([employer]), casie is 19 living in [place] comes down wkds,  T2D diabetic insulin  Appetite: on injection, stress eating  Sleep: RAVI CPAP, insomnia  Outpatient Provider updates: Shanelle Caruso LMFT  SI/SIB/HI:  previous attempt age 15 overdose.  Current passive ideation, no plan intent.  Updated safety plan  AASHISH:  Social ETOH  Side effects/compliance:  Interventions:  [facility] engaged in discussion about anxiety mgmt tools  Most important:  recent lexapro was really helpful. Agoraphobia/social anxiety has been worse.  Just started melatonin last night.  Using migraine med to also sleep/calm    Therapeutic Interventions:  Motivational Interviewing (MI): Validated patient's thoughts, feelings and experience. Expressed respect for patient's autonomy in decision making.  Asked open-ended questions to invite patient's self-reflection and self-direction around change and what is important for them in working towards their goals.     Response to treatment interventions:   Patient was receptive to interventions utilized.  Patient was engaged in the therapy process.       Progress on Treatment Objective(s) / Homework:    Satisfactory progress - PREPARATION (Decided to change - considering how); Intervened by negotiating a change plan and determining options / strategies for behavior change, identifying triggers, exploring social supports, and working towards setting a date to begin behavior change     Medication Review:   Changes to psychiatric medications, see updated Medication List in EPIC.      Medication Compliance:   Yes     Chemical Use Review:  Substance Use: Chemical use reviewed, no active concerns identified      Tobacco Use: No current tobacco use.       Assessment: Current Emotional / Mental Status (status of significant symptoms):    Risk status (Self / Other harm or suicidal ideation)   Patient has had a history of suicidal ideation: previous attempt via overdose at 15.  Current passive suicidal ideation without intent or planning    Patient denies current fears or concerns for personal safety.   Patient denies current or recent suicidal ideation or behaviors.   Patient denies current or recent homicidal ideation or behaviors.   Patient denies current or recent self injurious behavior or ideation.   Patient denies other safety concerns.   A safety and risk management plan has been developed including: Patient consented to co-developed safety plan.  Safety and risk management plan was completed - see below.  Patient agreed to use safety plan should any safety concerns arise.  A copy was given to the patient.      ASSESSMENT:   Mental Status:     Appearance:   Appropriate    Eye Contact:   Good    Psychomotor Behavior: Normal    Attitude:   Cooperative    Orientation:   All   Speech Rate / Production: Normal    Volume:   Normal    Mood:    Normal   Affect:    Appropriate    Thought Content:  Clear    Thought Form:  Coherent  Logical    Insight:    Good          Diagnoses:      Bipolar 1 disorder (H)  ADHD (attention deficit hyperactivity disorder), inattentive type  Social anxiety disorder  MATTI (generalized anxiety  disorder)    Collateral Reports Completed:   Communicated with: Dr Ag        Plan: (Homework, other):   Patient was provided No indications of CD issues  Patient was given information about behavioral services and encouraged to schedule a follow up appointment with the clinic [facility] as needed.         Fide Montalvo [facility], [facility]     _____________________________________________________________________________________________________________________________________                                              Individual Treatment Plan    Patient's Name: Tavo Key   YOB: 1980  Date of Creation: 12/31/24  Date Treatment Plan Last Reviewed/Revised: 4/23/25    DSM5 Diagnoses:    Bipolar 1 disorder (H)  MATTI (generalized anxiety disorder)  Social anxiety disorder  ADHD (attention deficit hyperactivity disorder), inattentive type    Psychosocial / Contextual Factors: Occupational Issues, Interpersonal Concerns, and Limited Social Support  PROMIS (reviewed every 90 days):   The following assessments were completed by patient for this visit:  PROMIS 10-Global Health (only subscores and total score):       10/31/2023    10:24 PM 11/14/2023     5:56 AM 2/29/2024     5:00 PM 6/5/2024     8:00 PM 7/26/2024    12:52 PM 10/23/2024    10:37 AM 1/31/2025     9:43 AM   PROMIS-10 Scores Only   Global Mental Health Score 7 10 10 11 6 9  11    11   Global Physical Health Score 17 13 16 13 13 14  14    14   PROMIS TOTAL - SUBSCORES 24 23 26 24 19 23  25    25       Patient-reported        Referral / Collaboration:  Referral to another professional/service is not indicated at this time..    Anticipated number of session for this episode of care:  6-9 sessions  Anticipation frequency of session: Monthly  Anticipated Duration of each session: 16-37 minutes  Treatment plan will be reviewed in 90 days or when goals have been changed.       MeasurableTreatment Goal(s) related to diagnosis / functional impairment(s)  Goal 1:  Patient will improve daily functioning.    I will know I've met my goal when I can be more comfortable going out/in social situations.      Status: Continued - Date(s):  4/23/25      Intervention(s)  [facility] will Motivational Interviewing (MI): Validate patient's thoughts, feelings and experience. Express respect for patient's autonomy in decision making.  Ask open-ended questions to invite patient's self-reflection and self-direction around change and what is important for them in working towards their goals.      MeasurableTreatment Goal(s) related to diagnosis / functional impairment(s)  Goal 2: Patient will learn and regularly practice emotions regulations skills.    I will know I've met my goal when reduced suicidal ideation.      Status: Continued - Date(s):  4/23/25      Intervention(s)  [facility] will Safety: Co-develop safety plan for patient to follow. Review and update safety plan with patient.    Patient has reviewed and agreed to the above plan.     Written by  Fide Montalvo [facility], [facility]

## 2025-04-22 ENCOUNTER — VIRTUAL VISIT (OUTPATIENT)
Facility: CLINIC | Age: 45
End: 2025-04-22
Payer: COMMERCIAL

## 2025-04-22 DIAGNOSIS — F31.9 BIPOLAR 1 DISORDER (H): Primary | ICD-10-CM

## 2025-04-22 PROCEDURE — 90834 PSYTX W PT 45 MINUTES: CPT | Mod: 95 | Performed by: MARRIAGE & FAMILY THERAPIST

## 2025-04-22 ASSESSMENT — PATIENT HEALTH QUESTIONNAIRE - PHQ9
SUM OF ALL RESPONSES TO PHQ QUESTIONS 1-9: 12
10. IF YOU CHECKED OFF ANY PROBLEMS, HOW DIFFICULT HAVE THESE PROBLEMS MADE IT FOR YOU TO DO YOUR WORK, TAKE CARE OF THINGS AT HOME, OR GET ALONG WITH OTHER PEOPLE: SOMEWHAT DIFFICULT
10. IF YOU CHECKED OFF ANY PROBLEMS, HOW DIFFICULT HAVE THESE PROBLEMS MADE IT FOR YOU TO DO YOUR WORK, TAKE CARE OF THINGS AT HOME, OR GET ALONG WITH OTHER PEOPLE: SOMEWHAT DIFFICULT
SUM OF ALL RESPONSES TO PHQ QUESTIONS 1-9: 12

## 2025-04-22 ASSESSMENT — ANXIETY QUESTIONNAIRES
7. FEELING AFRAID AS IF SOMETHING AWFUL MIGHT HAPPEN: SEVERAL DAYS
6. BECOMING EASILY ANNOYED OR IRRITABLE: MORE THAN HALF THE DAYS
IF YOU CHECKED OFF ANY PROBLEMS ON THIS QUESTIONNAIRE, HOW DIFFICULT HAVE THESE PROBLEMS MADE IT FOR YOU TO DO YOUR WORK, TAKE CARE OF THINGS AT HOME, OR GET ALONG WITH OTHER PEOPLE: EXTREMELY DIFFICULT
7. FEELING AFRAID AS IF SOMETHING AWFUL MIGHT HAPPEN: SEVERAL DAYS
7. FEELING AFRAID AS IF SOMETHING AWFUL MIGHT HAPPEN: SEVERAL DAYS
IF YOU CHECKED OFF ANY PROBLEMS ON THIS QUESTIONNAIRE, HOW DIFFICULT HAVE THESE PROBLEMS MADE IT FOR YOU TO DO YOUR WORK, TAKE CARE OF THINGS AT HOME, OR GET ALONG WITH OTHER PEOPLE: EXTREMELY DIFFICULT
2. NOT BEING ABLE TO STOP OR CONTROL WORRYING: MORE THAN HALF THE DAYS
1. FEELING NERVOUS, ANXIOUS, OR ON EDGE: MORE THAN HALF THE DAYS
5. BEING SO RESTLESS THAT IT IS HARD TO SIT STILL: SEVERAL DAYS
8. IF YOU CHECKED OFF ANY PROBLEMS, HOW DIFFICULT HAVE THESE MADE IT FOR YOU TO DO YOUR WORK, TAKE CARE OF THINGS AT HOME, OR GET ALONG WITH OTHER PEOPLE?: EXTREMELY DIFFICULT
4. TROUBLE RELAXING: MORE THAN HALF THE DAYS
2. NOT BEING ABLE TO STOP OR CONTROL WORRYING: MORE THAN HALF THE DAYS
IF YOU CHECKED OFF ANY PROBLEMS ON THIS QUESTIONNAIRE, HOW DIFFICULT HAVE THESE PROBLEMS MADE IT FOR YOU TO DO YOUR WORK, TAKE CARE OF THINGS AT HOME, OR GET ALONG WITH OTHER PEOPLE: EXTREMELY DIFFICULT
GAD7 TOTAL SCORE: 12
GAD7 TOTAL SCORE: 12
1. FEELING NERVOUS, ANXIOUS, OR ON EDGE: MORE THAN HALF THE DAYS
GAD7 TOTAL SCORE: 12
8. IF YOU CHECKED OFF ANY PROBLEMS, HOW DIFFICULT HAVE THESE MADE IT FOR YOU TO DO YOUR WORK, TAKE CARE OF THINGS AT HOME, OR GET ALONG WITH OTHER PEOPLE?: EXTREMELY DIFFICULT
1. FEELING NERVOUS, ANXIOUS, OR ON EDGE: MORE THAN HALF THE DAYS
8. IF YOU CHECKED OFF ANY PROBLEMS, HOW DIFFICULT HAVE THESE MADE IT FOR YOU TO DO YOUR WORK, TAKE CARE OF THINGS AT HOME, OR GET ALONG WITH OTHER PEOPLE?: EXTREMELY DIFFICULT
3. WORRYING TOO MUCH ABOUT DIFFERENT THINGS: MORE THAN HALF THE DAYS
4. TROUBLE RELAXING: MORE THAN HALF THE DAYS
5. BEING SO RESTLESS THAT IT IS HARD TO SIT STILL: SEVERAL DAYS
GAD7 TOTAL SCORE: 12
6. BECOMING EASILY ANNOYED OR IRRITABLE: MORE THAN HALF THE DAYS
GAD7 TOTAL SCORE: 12
3. WORRYING TOO MUCH ABOUT DIFFERENT THINGS: MORE THAN HALF THE DAYS
3. WORRYING TOO MUCH ABOUT DIFFERENT THINGS: MORE THAN HALF THE DAYS
GAD7 TOTAL SCORE: 12
GAD7 TOTAL SCORE: 12
7. FEELING AFRAID AS IF SOMETHING AWFUL MIGHT HAPPEN: SEVERAL DAYS
2. NOT BEING ABLE TO STOP OR CONTROL WORRYING: MORE THAN HALF THE DAYS
6. BECOMING EASILY ANNOYED OR IRRITABLE: MORE THAN HALF THE DAYS
5. BEING SO RESTLESS THAT IT IS HARD TO SIT STILL: SEVERAL DAYS
GAD7 TOTAL SCORE: 12
7. FEELING AFRAID AS IF SOMETHING AWFUL MIGHT HAPPEN: SEVERAL DAYS
4. TROUBLE RELAXING: MORE THAN HALF THE DAYS

## 2025-04-22 NOTE — PROGRESS NOTES
[facility]                                     Progress Note    Patient Name: Tavo Key  Date: 4/22/2025         Service Type: Individual      Session Start Time: 11:00AM  Session End Time: 11:35AM     Session Length: 35 minutes    Session #: 29    Attendees: Client attended alone    Service Modality:  Video Visit:      Provider verified identity through the following two step process.  Patient provided:  Patient is known previously to provider    Telemedicine Visit: The patient's condition can be safely assessed and treated via synchronous audio and visual telemedicine encounter.      Reason for Telemedicine Visit: Patient has requested telehealth visit    Originating Site (Patient Location): Patient's home    Distant Site (Provider Location): Provider Remote Setting- Home Office    Consent:  The patient/guardian has verbally consented to: the potential risks and benefits of telemedicine (video visit) versus in person care; bill my insurance or make self-payment for services provided; and responsibility for payment of non-covered services.     Patient would like the video invitation sent by:  My Chart    Mode of Communication:  Video Conference via [facility]    Distant Location (Provider):  Off-site    As the provider I attest to compliance with applicable laws and regulations related to telemedicine.    DATA  Extended Session (53+ minutes): No  Interactive Complexity: No  Crisis: No        Progress Since Last Session (Related to Symptoms / Goals / Homework):   Symptoms: Worsening increased depressed mood due to job hunting    Homework: Partially completed      Episode of Care Goals: Minimal progress - ACTION (Actively working towards change); Intervened by reinforcing change plan / affirming steps taken     Current / Ongoing Stressors and Concerns:   Tavo is stressed today as he has an interview later today. He woke up with a head ache and started having a panic attack but took a  klonopin which helped. Also took half of a migraine medication which he reports is helping. Processed his thoughts and feelings about today's interview which includes testing. It is with the  of the company so his nerves are high. Encouraged him to make plans for after the interview to celebrate regardless of the outcome.     Treatment Objective(s) Addressed in This Session:   identify coping strategies for more effectively managing Bipolar Disorder  Work on managing his diabetes  Job search     Intervention:   CBT: Cognitive restructuring  Solution Focused: prep for move      The following assessments were completed by patient for this visit: N/A    PROMIS 10-Global Health (all questions and answers displayed):       10/31/2023    10:24 PM 11/14/2023     5:56 AM 2/29/2024     5:00 PM 6/5/2024     8:00 PM 7/26/2024    12:52 PM 10/23/2024    10:37 AM 1/31/2025     9:43 AM   PROMIS 10   In general, would you say your health is: Good Fair    Good Fair   In general, would you say your quality of life is: Good Good    Good Very good   In general, how would you rate your physical health? Good Fair    Good Fair   In general, how would you rate your mental health, including your mood and your ability to think? Fair Good    Fair Good   In general, how would you rate your satisfaction with your social activities and relationships? Poor Fair    Fair Fair   In general, please rate how well you carry out your usual social activities and roles Fair Good    Good Poor   To what extent are you able to carry out your everyday physical activities such as walking, climbing stairs, carrying groceries, or moving a chair? Completely Completely    Mostly Mostly   In the past 7 days, how often have you been bothered by emotional problems such as feeling anxious, depressed, or irritable? Always Often    Often Often   In the past 7 days, how would you rate your fatigue on average? Mild Severe    Moderate Moderate   In the past 7 days, how  would you rate your pain on average, where 0 means no pain, and 10 means worst imaginable pain? 0 2    3 0   In general, would you say your health is: 3 2 3 2 3 3 2    2   In general, would you say your quality of life is: 3 3 3 3 3 3 4    4   In general, how would you rate your physical health? 3 2 3 2 3 3 2    2   In general, how would you rate your mental health, including your mood and your ability to think? 2 3 2 2 1 2 3    3   In general, how would you rate your satisfaction with your social activities and relationships? 1 2 3 3 1 2 2    2   In general, please rate how well you carry out your usual social activities and roles. (This includes activities at home, at work and in your community, and responsibilities as a parent, child, spouse, employee, friend, etc.) 2 3 4 3 1 3 1    1   To what extent are you able to carry out your everyday physical activities such as walking, climbing stairs, carrying groceries, or moving a chair? 5 5 4 4 4 4 4    4   In the past 7 days, how often have you been bothered by emotional problems such as feeling anxious, depressed, or irritable? 5 4 4 3 5 4 4    4   In the past 7 days, how would you rate your fatigue on average? 2 4 2 3 3 3 3    3   In the past 7 days, how would you rate your pain on average, where 0 means no pain, and 10 means worst imaginable pain? 0 2 0 2 6 3 0    0   Global Mental Health Score 7 10 10 11 6 9  11    11   Global Physical Health Score 17 13 16 13 13 14  14    14   PROMIS TOTAL - SUBSCORES 24 23 26 24 19 23  25    25       Patient-reported     PROMIS 10-Global Health (only subscores and total score):       10/31/2023    10:24 PM 11/14/2023     5:56 AM 2/29/2024     5:00 PM 6/5/2024     8:00 PM 7/26/2024    12:52 PM 10/23/2024    10:37 AM 1/31/2025     9:43 AM   PROMIS-10 Scores Only   Global Mental Health Score 7 10 10 11 6 9  11    11   Global Physical Health Score 17 13 16 13 13 14  14    14   PROMIS TOTAL - SUBSCORES 24 23 26 24 19 23  25    25        Patient-reported         ASSESSMENT: Current Emotional / Mental Status (status of significant symptoms):   Risk status (Self / Other harm or suicidal ideation)   Patient denies current fears or concerns for personal safety.   Patient denies current or recent suicidal ideation or behaviors.   Patient denies current or recent homicidal ideation or behaviors.   Patient denies current or recent self injurious behavior or ideation.   Patient denies other safety concerns.   Patient reports there has been no change in risk factors since their last session.     Patient reports there has been no change in protective factors since their last session.     Recommended that patient call 911 or go to the local ED should there be a change in any of these risk factors     Appearance:   Appropriate    Eye Contact:   Good    Psychomotor Behavior: Normal    Attitude:   Cooperative  Friendly   Orientation:   All   Speech    Rate / Production: Normal     Volume:  Normal    Mood:    Anxious    Affect:    Worrisome    Thought Content:  Clear    Thought Form:  Coherent  Logical    Insight:    Good      Medication Review:   No changes to current psychiatric medication(s)      Medication Compliance:   Yes     Changes in Health Issues:   None reported     Chemical Use Review:   Substance Use: Chemical use reviewed, no active concerns identified      Tobacco Use: No current tobacco use.      Diagnosis:  1. Bipolar 1 disorder (H)        Collateral Reports Completed:   Not Applicable    PLAN: (Patient Tasks / Therapist Tasks / Other)  Homework:  Continue to job search. Celebrate after being done.      Shanelle Caruso, REYNOLD                                                         _________________________________________________________________________________________________________________________                                            Individual Treatment Plan    Patient's Name: Tavo CAT Yesi  YOB: 1980    Date of  Creation: 1/2/2024  Date Treatment Plan Last Reviewed/Revised: 3/6/2025    DSM5 Diagnoses: 296.52 Bipolar I Disorder Current or Most Recent Episode Depressed, Moderate  Psychosocial / Contextual Factors: Trauma hx, health, work  PROMIS (reviewed every 90 days): 1/31/2025 Score: 25    Referral / Collaboration:  Referral to another professional/service is not indicated at this time. EMDR has been presented and client will research further.    Anticipated number of session for this episode of care: 9-12 sessions  Anticipation frequency of session: Biweekly  Anticipated Duration of each session: 53 or more minutes  Treatment plan will be reviewed in 90 days or when goals have been changed.       MeasurableTreatment Goal(s) related to diagnosis / functional impairment(s)  Goal 1: Patient will lower PHQ9 score to 3 or below.    I will know I've met my goal when I'm less irritable.      Objective #A (Patient Action)    Patient will identify stress management strategies for more effectively managing Bipolar Disorder.  Status: Continued - Date(s): 3/6/2025    Intervention(s)  Therapist will teach emotional recognition/identification.      Objective #B  Patient will Identify negative self-talk and behaviors: challenge core beliefs, myths, and actions.  Status: Continued - Date(s): 3/6/2025    Intervention(s)  Therapist will  help client work on cognitive restructuring .    Objective #C  Patient will  increase exercise .  Status: Continued - Date(s): 3/6/2025    Intervention(s)  Therapist will assign homework to increase level of activity especially aerobic .      Patient has reviewed and agreed to the above plan.      REYNOLD Vee  March 6, 2025              Answers submitted by the patient for this visit:  Patient Health Questionnaire (Submitted on 3/5/2025)  If you checked off any problems, how difficult have these problems made it for you to do your work, take care of things at home, or get along with other  people?: Very difficult  PHQ9 TOTAL SCORE: 11  Patient Health Questionnaire (G7) (Submitted on 3/5/2025)  MATTI 7 TOTAL SCORE: 10        Answers submitted by the patient for this visit:  Patient Health Questionnaire (Submitted on 4/22/2025)  If you checked off any problems, how difficult have these problems made it for you to do your work, take care of things at home, or get along with other people?: Somewhat difficult  PHQ9 TOTAL SCORE: 12  Patient Health Questionnaire (G7) (Submitted on 4/22/2025)  MATTI 7 TOTAL SCORE: 12

## 2025-04-23 ENCOUNTER — VIRTUAL VISIT (OUTPATIENT)
Dept: PSYCHIATRY | Facility: CLINIC | Age: 45
End: 2025-04-23
Payer: COMMERCIAL

## 2025-04-23 ENCOUNTER — VIRTUAL VISIT (OUTPATIENT)
Dept: BEHAVIORAL HEALTH | Facility: CLINIC | Age: 45
End: 2025-04-23
Payer: COMMERCIAL

## 2025-04-23 DIAGNOSIS — F41.1 GAD (GENERALIZED ANXIETY DISORDER): ICD-10-CM

## 2025-04-23 DIAGNOSIS — F31.30 BIPOLAR I DISORDER, MOST RECENT EPISODE DEPRESSED (H): ICD-10-CM

## 2025-04-23 DIAGNOSIS — F90.0 ADHD (ATTENTION DEFICIT HYPERACTIVITY DISORDER), INATTENTIVE TYPE: Primary | ICD-10-CM

## 2025-04-23 DIAGNOSIS — F31.9 BIPOLAR 1 DISORDER (H): Primary | ICD-10-CM

## 2025-04-23 DIAGNOSIS — F40.10 SOCIAL ANXIETY DISORDER: ICD-10-CM

## 2025-04-23 DIAGNOSIS — F31.9 BIPOLAR 1 DISORDER (H): ICD-10-CM

## 2025-04-23 DIAGNOSIS — F90.0 ADHD (ATTENTION DEFICIT HYPERACTIVITY DISORDER), INATTENTIVE TYPE: ICD-10-CM

## 2025-04-23 PROCEDURE — 90832 PSYTX W PT 30 MINUTES: CPT | Mod: 95 | Performed by: COUNSELOR

## 2025-04-23 PROCEDURE — G2211 COMPLEX E/M VISIT ADD ON: HCPCS | Performed by: PSYCHIATRY & NEUROLOGY

## 2025-04-23 PROCEDURE — 98006 SYNCH AUDIO-VIDEO EST MOD 30: CPT | Performed by: PSYCHIATRY & NEUROLOGY

## 2025-04-23 RX ORDER — DEXMETHYLPHENIDATE HYDROCHLORIDE 30 MG/1
30 CAPSULE, EXTENDED RELEASE ORAL DAILY
Qty: 30 CAPSULE | Refills: 0 | Status: SHIPPED | OUTPATIENT
Start: 2025-07-09 | End: 2025-08-08

## 2025-04-23 RX ORDER — LAMOTRIGINE 100 MG/1
50 TABLET ORAL DAILY
Qty: 45 TABLET | Refills: 0 | Status: SHIPPED | OUTPATIENT
Start: 2025-04-23

## 2025-04-23 RX ORDER — DEXMETHYLPHENIDATE HYDROCHLORIDE 30 MG/1
30 CAPSULE, EXTENDED RELEASE ORAL DAILY
Qty: 30 CAPSULE | Refills: 0 | Status: SHIPPED | OUTPATIENT
Start: 2025-05-10 | End: 2025-06-09

## 2025-04-23 RX ORDER — LITHIUM CARBONATE 450 MG
900 TABLET, EXTENDED RELEASE ORAL AT BEDTIME
Qty: 60 TABLET | Refills: 3 | Status: SHIPPED | OUTPATIENT
Start: 2025-04-23

## 2025-04-23 RX ORDER — DEXMETHYLPHENIDATE HYDROCHLORIDE 10 MG/1
10 TABLET ORAL DAILY
Qty: 30 TABLET | Refills: 0 | Status: SHIPPED | OUTPATIENT
Start: 2025-04-23 | End: 2025-05-23

## 2025-04-23 RX ORDER — LAMOTRIGINE 200 MG/1
200 TABLET ORAL DAILY
Qty: 90 TABLET | Refills: 0 | Status: SHIPPED | OUTPATIENT
Start: 2025-04-23

## 2025-04-23 RX ORDER — DEXMETHYLPHENIDATE HYDROCHLORIDE 30 MG/1
30 CAPSULE, EXTENDED RELEASE ORAL DAILY
Qty: 30 CAPSULE | Refills: 0 | Status: SHIPPED | OUTPATIENT
Start: 2025-06-09 | End: 2025-07-09

## 2025-04-23 RX ORDER — ESCITALOPRAM OXALATE 20 MG/1
20 TABLET ORAL DAILY
Qty: 90 TABLET | Refills: 0 | Status: SHIPPED | OUTPATIENT
Start: 2025-04-23

## 2025-04-23 RX ORDER — DEXMETHYLPHENIDATE HYDROCHLORIDE 10 MG/1
10 TABLET ORAL DAILY
Qty: 30 TABLET | Refills: 0 | Status: SHIPPED | OUTPATIENT
Start: 2025-05-23 | End: 2025-06-22

## 2025-04-23 RX ORDER — DEXMETHYLPHENIDATE HYDROCHLORIDE 10 MG/1
10 TABLET ORAL DAILY PRN
Qty: 30 TABLET | Refills: 0 | Status: CANCELLED | OUTPATIENT
Start: 2025-04-23

## 2025-04-23 RX ORDER — DEXMETHYLPHENIDATE HYDROCHLORIDE 10 MG/1
10 TABLET ORAL DAILY
Qty: 30 TABLET | Refills: 0 | Status: SHIPPED | OUTPATIENT
Start: 2025-06-22 | End: 2025-07-22

## 2025-04-23 RX ORDER — LITHIUM CARBONATE 150 MG/1
150 CAPSULE ORAL AT BEDTIME
Qty: 30 CAPSULE | Refills: 3 | Status: SHIPPED | OUTPATIENT
Start: 2025-04-23

## 2025-04-23 ASSESSMENT — PAIN SCALES - GENERAL: PAINLEVEL_OUTOF10: NO PAIN (0)

## 2025-04-23 NOTE — PATIENT INSTRUCTIONS
Treatment Plan:  Continue Lexapro/escitalopram 20 mg daily for mood and anxiety.  Continue to MONITOR FOR SIMONA SYMPTOMS.   Continue Focalin XR 30 mg daily for ADHD. Monitor for simona.   Continue Focalin immediate release 5-10 mg daily as needed for ADHD.  Monitor for simona.  Continue Klonopin 0.5 mg daily as needed for severe anxiety/panic.   Continue hydroxyzine 100 mg every AM for anxiety.   Continue lamotrigine 250 mg daily for bipolar depression.   Continue lithobid 1050 mg at bedtime for bipolar disorder. Labs up to date.   Continue propranolol per other prescriber for headaches (and anxiety)  Continue all other cares per primary care provider.   Continue all other medications as reviewed per electronic medical record today.   Safety plan reviewed. To the Emergency Department as needed or call after hours crisis line at [phone] or [phone]. [place] Crisis Text Line. Text [place] to 191324 or Suicide LifeLine Chat: suicidepreWISETIVIline.org/chat  Continue therapy as planned.   Schedule an appointment with me in 4-6 weeks or sooner as needed. Call [facility] at [phone] to schedule.  Follow up with primary care provider as planned or for acute medical concerns.  Call the psychiatric nurse line with medication questions or concerns at [phone].  Novasentishart may be used to communicate with your provider, but this is not intended to be used for emergencies.    Risks of stimulant medication including, but not limited to, decreased appetite, risk of tics (and that they may be lasting), trouble sleeping, cardiac risks such as increased heart rate and blood pressure, and rare risk of sudden cardiac death.  Also risk of addiction/tolerance/dependence.    Risks of benzodiazepine (Ativan, Xanax, Klonopin, Valium, etc) use including, but not limited to, sedation, tolerance, risk for addiction/dependence. Do not drink alcohol while taking benzodiazepines due to risk of trouble breathing and  "potential death. Do not drive or operate heavy machinery until it is known how the drug affects you. Discuss with physician or pharmacist before ever taking a benzodiazepine with a narcotic/opioid pain medication.     Lamictal (lamotrigine) can cause serious rashes including Hicks-Ion syndrome which may requiring hospitalization and discontinuation of treatment. If any signs of a rash occur, please see your Primary Care Provider or a dermatologist immediately.     Patient Education   Collaborative Care Psychiatry Service  What to Expect  Here's what to expect from your Collaborative Care Psychiatry Service (CCPS).   About CCPS  CCPS means 2 people work together to help you get better. You'll meet with a behavioral health clinician and a psychiatric doctor. A behavioral health clinician helps people with mental health problems by talking with them. A psychiatric doctor helps people by giving them medicine.  How it works  At every visit, you'll see the behavioral health clinician (BHC) first. They'll talk with you about how you're doing and teach you how to feel better.   Then you'll see the psychiatric doctor. This doctor can help you deal with troubling thoughts and feelings by giving you medicine. They'll make sure you know the plan for your care.   CCPS usually takes 3 to 6 visits. If you need more visits, we may have you start seeing a different psychiatric doctor for ongoing care.  If you have any questions or concerns, we'll be glad to talk with you.  About visits  Be open  At your visits, please talk openly about your problems. It may feel hard, but it's the best way for us to help you.  Cancelling visits  If you can't come to your visit, please call us right away at [phone]. If you don't cancel at least 24 hours (1 full day) before your visit, that's \"late cancellation.\"  Being late to visits  Being very late is the same as not showing up. You will be a \"no show\" if:  Your appointment starts with " a [facility], and you're more than 15 minutes late for a 30-minute (half hour) visit. This will also cancel your appointment with the psychiatric doctor.  Your appointment is with a psychiatric doctor only, and you're more than 15 minutes late for a 30-minute (half hour) visit.  Your appointment is with a psychiatric doctor only, and you're more than 30 minutes late for a 60-minute (full hour) visit.  If you cancel late or don't show up 2 times within 6 months, we may end your care.   Getting help between visits  If you need help between visits, you can call us Monday to Friday from 8 a.m. to 4:30 p.m. at [phone].  Emergency care  Call 911 or go to the nearest emergency department if your life or someone else's life is in danger.  Call 348 anytime to reach the national Suicide and Crisis hotline.  Medicine refills  To refill your medicine, call your pharmacy. You can also call [facility]'s Behavioral Access at [phone], Monday to Friday, 8 a.m. to 4:30 p.m. It can take 1 to 3 business days to get a refill.   Forms, letters, and tests  You may have papers to fill out, like FMLA, short-term disability, and workability. We can help you with these forms at your visits, but you must have an appointment. You may need more than 1 visit for this, to be in an intensive therapy program, or both.  Before we can give you medicine for ADHD, we may refer you to get tested for it or confirm it another way.  We may not be able to give you an emotional support animal letter.  We don't do mental health checks ordered by the court.   We don't do mental health testing, but we can refer you to get tested.   Thank you for choosing us for your care.  For informational purposes only. Not to replace the advice of your health care provider. Copyright   2022 [facility]. All rights reserved. [employer] 570715 - Rev 11/24.

## 2025-04-23 NOTE — NURSING NOTE
Current patient location: [id number] [place] [id number]    Is the patient currently in the state of [place]? YES    Visit mode: VIDEO    If the visit is dropped, the patient can be reconnected by:VIDEO VISIT: Text to cell phone:   Telephone Information:   Mobile [phone]       Will anyone else be joining the visit? NO  (If patient encounters technical issues they should call [phone]    Are changes needed to the allergy or medication list? Pt stated no changes to allergies and Pt stated no med changes    Are refills needed on medications prescribed by this physician? YES    Rooming Documentation:  Questionnaire(s) completed    Reason for visit: LAMBERTO GOYAL

## 2025-04-23 NOTE — PROGRESS NOTES
"Telemedicine Visit: The patient's condition can be safely assessed and treated via synchronous audio and visual telemedicine encounter.      Reason for Telemedicine Visit: Patient has requested telehealth visit    Originating Site (Patient Location): Patient's home     Distant Location (provider location):  Off-Site    Consent:  The patient/guardian has verbally consented to: the potential risks and benefits of telemedicine (video visit) versus in person care; bill my insurance or make self-payment for services provided; and responsibility for payment of non-covered services.     Mode of Communication:  Video Conference via [employer]    As the provider I attest to compliance with applicable laws and regulations related to telemedicine.         Outpatient Psychiatric Progress Note    Name: Tavo Key   : 1980                    Primary Care Provider: Criselda Walsh PA-C   Therapist: Shanelle FLAHERTY      PHQ-9 scores:      2025     9:40 AM 3/5/2025     9:24 PM 2025    11:01 AM   PHQ-9 SCORE   PHQ-9 Total Score MyChart 12 (Moderate depression) 11 (Moderate depression) 12 (Moderate depression)   PHQ-9 Total Score 12  11  12        Patient-reported       MATTI-7 scores:      2025     9:41 AM 3/5/2025     9:26 PM 2025    11:01 AM   MATTI-7 SCORE   Total Score 12 (moderate anxiety) 10 (moderate anxiety) 12 (moderate anxiety)   Total Score 12  10  12        Patient-reported       Patient Identification:  Patient is a 44 year old,   White Not  or  male  who presents for return visit with me.  Patient is currently unemployed. Patient attended the phone/video session alone. Patient prefers to be called: \"Tavo\".    Interim History:  Pt is a transfer of care from Dr. Colon. Dr. Colon last saw Tavo Key for outpatient psychiatry Return Visit on 10/23/2024.      I last saw patient for return visit on 2025: During that appointment, we:    Continue " Lexapro/escitalopram 20 mg daily for mood and anxiety.  Continue to MONITOR FOR RUSH SYMPTOMS.   Continue Focalin XR 30 mg daily for ADHD. Monitor for rush.   Start Focalin immediate release 5-10 mg daily as needed for ADHD.  Monitor for rush.  Continue Klonopin 0.5 mg daily as needed for severe anxiety/panic.   Continue hydroxyzine 100 mg every AM for anxiety.   Continue lamotrigine 250 mg daily for bipolar depression.   Continue lithobid 1050 mg at bedtime for bipolar disorder. Labs up to date.   Continue propranolol per other prescriber for headaches (and anxiety)    4/23: Overall doing relatively well.  Ongoing stress related to finding new job.  Recent interview yesterday went well.  Patient was quite anxious and utilized multiple coping skills and tools to reduce anxiety and follow through with the interview.  Using booster focalin dose 2-3 times a week right now since not employed currently. Takes around 1 or 2p and tolerates well and finds it helpful.  Will likely increase use once working again.  No other major questions or concerns related to medications.  Continues to find Lexapro helpful for depression and some anxiety symptoms.  No acute safety concerns.  No acute suicidality.  No problematic drug or alcohol use.  See [facility] note below for additional details.    Per [facility], [facility], during today's team-based visit:  Current Stressors / Issues:  MH update:  Doing okay.  Had a big interview yesterday, had a lot panic but managed.  Did affirmations and prep to help.  Using notion for tracking.  Discussion of rewarding self for interview and different reward for landing/follow through.  Discussion of narrative if job doesn't work.  Stresses:  more socially isolated- more agoraphobia.   Appetite: injection  Sleep: RAVI CPAP.  Hx of Melatonin.    Outpatient Provider updates:  Shanelle FLAHERTY.  EMDR in the future  SI/SIB/HI:  Very reduced.  Maybe 3 times in the last month.  Passive ideation.  No  plan/intent.  Safety plan on file.  In response to social situations/hurt  AASHISH:  social ETOH  Side effects/compliance:  Interventions:  [facility] engaged in discussion about occupational stressors  Most important:  maybe more irritability- stress?  More anxiety with job stuff and socially.  Depression has improved.  Way more manageable.  No rush sx      Past Psychiatric Med Trials:  At time of transfer of care 12/31/2024:  Buspar 30 mg BID-tolerating well but has not found it helpful at all  Klonopin 0.5 mg daily prn - as needed, works really well, takes for interviews and angst-provoking situations   Focalin XR 30 mg daily - uses daily, has been working pretty well, racing heart a little   Lexapro 10 mg daily -tolerating well and has found it the most helpful for mood on many many years ago and tolerated well   Gabapentin - has been taking twice daily 100 mg, no side effects, does not notice any positive or negative effects  Hydroxyzine - takes 100 mg every AM, it may be slightly helpful, well-tolerated  Lamotrigine 250 mg daily - maybe a higher dose in the past  Lithobid 1050 daily at bedtime  Propranolol - twice daily every day-taking for migraines/headaches    Per Dr. Colon note at intake 11/01/2023:  Med Trials:  Hydroxyzine - anxiety (replaced clonazepam)  Gabapentin - anxiety (replaced clonazepam)  Lamotrigine - on for 2-3 years; highest dose 200 mg also current dose  Lithium - on/off x 10 years, up to 1200 or 1500 mg  Caraprazine  Olanzapine - significant weight gain - tried in 2008  Aripiprazole - helped with depression, does not recall discontinuation reason  Adderall  Methylphenidate   atomoxetine    Psychiatric ROS:  See HPI above for all pertinent positives and negatives. Rest of systems negative.     PHQ9 and GAD7 scores were reviewed today if completed.   Medication side effects: Denies  Current stressors include: Symptoms and see HPI above  Coping mechanisms and supports include: Therapy, Family, Hobbies,  and Friends    Current medications include:   Current Outpatient Medications   Medication Sig Dispense Refill    acetaminophen (TYLENOL) 500 MG tablet Take 1,000 mg by mouth 3 times daily as needed for mild pain      albuterol (PROAIR HFA/PROVENTIL HFA/VENTOLIN HFA) 108 (90 Base) MCG/ACT inhaler Inhale 2 puffs into the lungs every 4 hours as needed for shortness of breath or wheezing. 18 g 5    Alcohol Swabs (B-D SINGLE USE SWABS REGULAR) PADS USE TO SWAB AREA OF INJECTION/FREIDA AS DIRECTED. 100 each 3    amLODIPine (NORVASC) 10 MG tablet TAKE ONE TABLET BY MOUTH ONCE DAILY 90 tablet 2    aspirin (ASA) 81 MG EC tablet Take 1 tablet (81 mg) by mouth daily      atorvastatin (LIPITOR) 20 MG tablet TAKE ONE TABLET BY MOUTH EVERY NIGHT AT BEDTIME. NEED APPOINTMENT FOR FURTHER REFILLS. 90 tablet 1    B Complex Vitamins (VITAMIN-B COMPLEX PO) Take by mouth. 1 gummy daily      clonazePAM (KLONOPIN) 0.5 MG tablet Take 1 tablet (0.5 mg) by mouth daily as needed for anxiety. 15 tablet 0    clotrimazole-betamethasone (LOTRISONE) 1-0.05 % external cream Apply topically 2 times daily Angles of mouth 45 g 3    Continuous Glucose Sensor (FREESTYLE ALDEN 3 SENSOR) [facility] APPLY 1 SENSOR AND CHANGE EVERY 14 DAYS AS DIRECTED 2 each 5    dexmethylphenidate (FOCALIN XR) 30 MG 24 hr capsule Take 1 capsule (30 mg) by mouth daily. 30 capsule 0    dexmethylphenidate (FOCALIN XR) 30 MG 24 hr capsule Take 1 capsule (30 mg) by mouth daily. 30 capsule 0    dexmethylphenidate (FOCALIN) 10 MG tablet Take 1 tablet (10 mg) by mouth daily as needed (ADHD). 30 tablet 0    escitalopram (LEXAPRO) 20 MG tablet Take 1 tablet (20 mg) by mouth daily. 90 tablet 0    esomeprazole (NEXIUM) 40 MG DR capsule Take 1 capsule (40 mg) by mouth every morning (before breakfast) Take 30-60 minutes before eating.      fluticasone (FLONASE) 50 MCG/ACT nasal spray Spray 1 spray into both nostrils daily (Patient taking differently: Spray 1 spray into both nostrils daily as  needed for rhinitis or allergies.) 16 g 11    hydrOXYzine (VISTARIL) 50 MG capsule Take 1 capsule (50 mg) by mouth 3 times daily as needed for anxiety. Take an additional tablet a day as needed for anxiety 90 capsule 1    insulin glargine (LANTUS SOLOSTAR) 100 UNIT/ML pen Inject 46 Units subcutaneously at bedtime. 45 mL 1    insulin lispro (HUMALOG KWIKPEN) 100 UNIT/ML (1 unit dial) KWIKPEN Inject 14-16 Units subcutaneously 3 times daily (before meals). Place on file 45 mL 1    insulin pen needle (PENTIPS) 31G X 5 MM miscellaneous USE 4 PEN NEEDLES DAILY OR AS DIRECTED. 400 each 1    lamoTRIgine (LAMICTAL) 100 MG tablet Take 0.5 tablets (50 mg) by mouth daily. Take along with a 200 mg tablet for a total of 250 mg daily 45 tablet 0    lamoTRIgine (LAMICTAL) 200 MG tablet Take 1 tablet (200 mg) by mouth daily. Take along with one half of a 100 mg tablet for a total of 250 mg daily 90 tablet 0    lithium (ESKALITH CR/LITHOBID) 450 MG CR tablet Take 2 tablets (900 mg) by mouth at bedtime. Take along with a 150 mg lithium capsule for a total of 1050 mg nightly 60 tablet 3    lithium (ESKALITH) 150 MG capsule Take 1 capsule (150 mg) by mouth at bedtime. Take along with two 450 mg extended release pills for a total of 1050 mg nightly 30 capsule 3    losartan (COZAAR) 50 MG tablet Take 1 tablet (50 mg) by mouth daily. 90 tablet 0    meloxicam (MOBIC) 15 MG tablet Take 1 tablet (15 mg) by mouth daily. 90 tablet 0    naratriptan (AMERGE) 2.5 MG tablet TAKE 1 TABLET (2.5 MG) BY MOUTH AT ONSET OF HEADACHE FOR MIGRAINE. MAY REPEAT IN 4 HOURS. MAX 2 TABLETS/24 HOURS. 12 tablet 0    propranolol ER (INDERAL LA) 160 MG 24 hr capsule Take 1 capsule (160 mg) by mouth daily. 90 capsule 1    sildenafil (REVATIO) 20 MG tablet TAKE 1-5 TABLETS BY MOUTH DAILY AS NEEDED FOR ERECTILE DYSFUNCTION 30 tablet 0    testosterone (ANDROGEL 1.62 % PUMP) 20.25 MG/ACT gel Place 1 Pump (20.25 mg) onto the skin daily Apply from dispenser to clean, dry,  intact skin of the upper arms and shoulders. 75 g 5    tirzepatide (MOUNJARO) 10 MG/0.5ML SOAJ auto-injector pen Inject 0.5 mLs (10 mg) subcutaneously every 7 days. 2 mL 2     No current facility-administered medications for this visit.       The [place] Prescription Monitoring Program has been reviewed and there are no concerns about diversionary activity for controlled substances at this time.   04/10/2025 01/31/2025 1 Dexmethylphenidate Er 30 Mg Cp 30.00 30 Al Bau [id number] Joel ([id number]) 0/0  [place]   03/20/2025 03/20/2025 1 Clonazepam 0.5 Mg Tablet 15.00 15 Al Bau [id number] Joel ([id number]) 0/0 1.00 LME [place]   02/27/2025 10/23/2024 1 Clonazepam 0.5 Mg Tablet 15.00 15 Mi Perri [id number] Joel ([id number]) 3/3 1.00 [place]   02/05/2025 01/31/2025 1 Dexmethylphenidate Er 30 Mg Cp 30.00 30 Al Bau [id number] Joel ([id number]) 0/0  [place]       Past Medical/Surgical History:  Past Medical History:   Diagnosis Date    Depressive disorder     Diabetes (H) 10/01/2020    Hypertension     Uncomplicated asthma       has a past medical history of Depressive disorder, Diabetes (H) (10/01/2020), Hypertension, and Uncomplicated asthma.    He has no past medical history of Arthritis, Cancer (H), Cerebral infarction (H), Congestive heart failure (H), COPD (chronic obstructive pulmonary disease) (H), Heart disease, History of blood transfusion, or Thyroid disease.    Social History:  Reviewed. No changes to social history except as noted above in HPI.    Vital Signs:   None. This is phone/video visit.     Labs:  Most recent laboratory results reviewed and the pertinent results include:   Lithium 0.74 on 7/26/24    TSH   Date Value Ref Range Status   07/26/2024 2.01 0.30 - 4.20 uIU/mL Final     Lab Results   Component Value Date    A1C 7.8 10/16/2024    A1C 8.3 07/26/2024    A1C 7.7 03/06/2024    A1C 6.8 11/10/2023    A1C 6.4 08/21/2023     Last Comprehensive Metabolic Panel:  Lab Results   Component Value Date     04/07/2025     POTASSIUM 4.3 04/07/2025    CHLORIDE 107 04/07/2025    CO2 20 (L) 04/07/2025    ANIONGAP 11 04/07/2025     (H) 04/07/2025    BUN 16.8 04/07/2025    CR 0.97 04/07/2025    GFRESTIMATED >90 04/07/2025    FRANCO 9.6 04/07/2025     Last Comprehensive Metabolic Panel:  Lab Results   Component Value Date     04/07/2025    POTASSIUM 4.3 04/07/2025    CHLORIDE 107 04/07/2025    CO2 20 (L) 04/07/2025    ANIONGAP 11 04/07/2025     (H) 04/07/2025    BUN 16.8 04/07/2025    CR 0.97 04/07/2025    GFRESTIMATED >90 04/07/2025    FRANCO 9.6 04/07/2025     Recent Labs   Lab Test 07/26/24  0824 05/17/23  1030   CHOL 203* 134   HDL 40 38*   LDL 98 54   TRIG 324* 212*     Review of Systems:  10 systems (general, cardiovascular, respiratory, eyes, ENT, endocrine, GI, , M/S, neurological) were reviewed. Most pertinent finding(s) is/are:  denies fever, cough, persistent headaches, shortness of breath, chest pain, severe GI symptoms, trouble urinating, severe pain. The remaining systems are all unremarkable.    Mental Status Examination (limited as this is by phone/video):  Appearance: Awake, alert, appears stated age, no acute distress, well-groomed   Attitude:  cooperative, pleasant   Motor: No gross abnormalities observed via video, not formally tested   Oriented to:  person, place, time, and situation  Attention Span and Concentration:  normal  Speech:  clear, coherent, regular rate, rhythm, and volume  Language: intact  Mood: pretty good  Affect:  appropriate and in normal range and mood congruent  Associations:  no loose associations  Thought Process:  logical, linear and goal oriented  Thought Content:  no evidence of suicidal ideation or homicidal ideation, no evidence of psychotic thought, no auditory hallucinations present and no visual hallucinations present  Recent and Remote Memory:  Intact to interview. Not formally assessed. No amnesia.  Fund of Knowledge: appropriate  Insight:  good  Judgment:  intact,  adequate for safety  Impulse Control:  intact    Suicide Risk Assessment:  Today Tavo Key reports no acute suicidality. Based on all available evidence including the factors cited above, Tavo Key does not appear to be at imminent risk for self-harm, does not meet criteria for a 72-hr hold, and therefore remains appropriate for ongoing outpatient level of care.  A thorough assessment of risk factors related to suicide and self-harm have been reviewed and are noted above. The patient convincingly denies suicidality on several occasions. Local community safety resources reviewed for patient to use if needed. There was no deceit detected, and the patient presented in a manner that was believable.     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       Mild persistent asthma without complication 03/05/2021     Priority: Medium    MATTI (generalized anxiety disorder) 03/05/2021     Priority: Medium    Other migraine without status migrainosus, not intractable 03/05/2021     Priority: Medium    ADHD (attention deficit hyperactivity disorder), inattentive type 03/05/2021     Priority: Medium    Erectile dysfunction, unspecified erectile dysfunction type 03/05/2021     Priority: Medium    Gastroesophageal reflux disease with esophagitis, unspecified whether hemorrhage 03/05/2021     Priority: Medium    Sleep apnea with use of continuous positive airway pressure (CPAP) 03/05/2021     Priority: Medium    Obesity, unspecified 03/05/2021     Priority:  Medium       Psychosocial & Contextual Factors: see HPI above    Assessment:  Most recent follow-up with Dr. Colon, 10/23/2024:  Patient currently undergoing depression with history of depression, rush, ADHD, anxiety and adverse childhood events. Sx improving but challenges remain with depression, ADHD medications.     12/31/2024:  Patient is transfer of care today.  Patient has been treated for ADHD, bipolar disorder, social anxiety, and generalized anxiety disorder.  Discussed working together to optimize patient's psychiatric medications and reduce polypharmacy.  Buspirone was added to patient's regimen prior to further optimizing Lexapro despite Lexapro being quite helpful for his symptoms.  No hypomania/rush.  We will taper off of buspirone since has been on max dose with little to no additional improvement in anxiety or other mental health symptoms.  We will increase Lexapro since tapering off buspirone.  Patient will also discontinue gabapentin since only taking 100 mg twice daily with little to no effect.  Before even trying to optimize gabapentin I would like to further optimize other medications at this time.  Would also be nice if we can discontinue hydroxyzine.  Could consider something like guanfacine in the future to target anxiety and ADHD symptoms further.  Tolerating Focalin XR 30 mg daily well.  Feels it adequately treats ADHD symptoms.  No crashes in the evening.  Slightly reactive heart rate when feeling anxious while on the stimulant medication.  Propranolol has been helpful.  Taking propranolol for migraines.  Sparing use of clonazepam-particularly for job interviews and angst provoking situations.  Lithium level therapeutic in July.  No concerns with thyroid or creatinine with July labs.  Could consider further optimization of lamotrigine in the future.  No acute safety concerns.  No suicidality.  No problematic drug or alcohol use.    1/31/2025:  Able to discontinue gabapentin and buspirone  without any problems.  Mood and day-to-day anxiety slightly better since last visit.  Evenings tend to be the most difficult.  Patient agreeable to trial with Focalin immediate release for the evenings to see if that is helpful.  Patient will continue to watch for worsening anxiety and hypomanic symptoms.  Would consider guanfacine, but not the best option since patient on twice daily propranolol.  Could consider low-dose olanzapine to use at bedtime to help with sleep, ruminations, and anxiety if booster dose of Focalin not helpful or poorly tolerated.  No acute safety concerns.  No acute suicidality.  No problematic drug or alcohol use.    4/23/2025:  Overall doing okay.  Ongoing high psychosocial stressors related to job search and interviews.  Handling well and managing okay.  Feels like medications are in a good place for now.  No medication changes today.  No acute safety concerns.  No acute suicidality.  No problematic drug or alcohol use.  Patient will be seen back in 3 months for follow-up.  If remains stable, could consider return of psychiatric care back to primary care provider.    Medication side effects and alternatives were reviewed. Health promotion activities recommended and reviewed today. All questions addressed. Education and counseling completed regarding risks and benefits of medications and psychotherapy options. Recommend therapy for additional support.     Treatment Plan:  Continue Lexapro/escitalopram 20 mg daily for mood and anxiety.  Continue to MONITOR FOR SIMONA SYMPTOMS.   Continue Focalin XR 30 mg daily for ADHD. Monitor for simona.   Continue Focalin immediate release 5-10 mg daily as needed for ADHD.  Monitor for simona.  Continue Klonopin 0.5 mg daily as needed for severe anxiety/panic.   Continue hydroxyzine 100 mg every AM for anxiety.   Continue lamotrigine 250 mg daily for bipolar depression.   Continue lithobid 1050 mg at bedtime for bipolar disorder. Labs up to date.   Continue  propranolol per other prescriber for headaches (and anxiety)  Continue all other cares per primary care provider.   Continue all other medications as reviewed per electronic medical record today.   Safety plan reviewed. To the Emergency Department as needed or call after hours crisis line at [phone] or [phone]. [place] Crisis Text Line. Text [place] to 708989 or Suicide LifeLine Chat: suicidepreventionEzetapline.org/chat  Continue therapy as planned.   Schedule an appointment with me in 4-6 weeks or sooner as needed. Call [facility] at [phone] to schedule.  Follow up with primary care provider as planned or for acute medical concerns.  Call the psychiatric nurse line with medication questions or concerns at [phone].  [employer] may be used to communicate with your provider, but this is not intended to be used for emergencies.    Risks of stimulant medication including, but not limited to, decreased appetite, risk of tics (and that they may be lasting), trouble sleeping, cardiac risks such as increased heart rate and blood pressure, and rare risk of sudden cardiac death.  Also risk of addiction/tolerance/dependence.    Risks of benzodiazepine (Ativan, Xanax, Klonopin, Valium, etc) use including, but not limited to, sedation, tolerance, risk for addiction/dependence. Do not drink alcohol while taking benzodiazepines due to risk of trouble breathing and potential death. Do not drive or operate heavy machinery until it is known how the drug affects you. Discuss with physician or pharmacist before ever taking a benzodiazepine with a narcotic/opioid pain medication.     Lamictal (lamotrigine) can cause serious rashes including Hicks-Ion syndrome which may requiring hospitalization and discontinuation of treatment. If any signs of a rash occur, please see your Primary Care Provider or a dermatologist immediately.     Administrative Billing:   Phone Call/Video Duration: 11 Minutes  Start:  11:01a  Stop: 11:08a    Episode of Care #: 10 (First 7 visits with Dr. Colon)    Patient Status:  Patient will continue to be seen for ongoing consultation and stabilization.    Signed:   Kait Ag DO  [facility]    Disclaimer: This note consists of symbols derived from keyboarding, dictation and/or voice recognition software. As a result, there may be errors in the script that have gone undetected. Please consider this when interpreting information found in this chart.

## 2025-04-25 DIAGNOSIS — E29.1 HYPOGONADISM MALE: ICD-10-CM

## 2025-04-28 RX ORDER — TESTOSTERONE 1.62 MG/G
1 GEL TRANSDERMAL DAILY
Qty: 75 G | Refills: 5 | Status: SHIPPED | OUTPATIENT
Start: 2025-04-28

## 2025-05-13 ENCOUNTER — VIRTUAL VISIT (OUTPATIENT)
Facility: CLINIC | Age: 45
End: 2025-05-13
Payer: COMMERCIAL

## 2025-05-13 DIAGNOSIS — F31.9 BIPOLAR 1 DISORDER (H): Primary | ICD-10-CM

## 2025-05-13 PROCEDURE — 90837 PSYTX W PT 60 MINUTES: CPT | Mod: 95 | Performed by: MARRIAGE & FAMILY THERAPIST

## 2025-05-13 NOTE — PROGRESS NOTES
[facility]                                     Progress Note    Patient Name: Tavo Key  Date: 5/13/2025         Service Type: Individual      Session Start Time: 3:00PM  Session End Time: 3:55PM     Session Length: 55 minutes    Session #: 30    Attendees: Client attended alone    Service Modality:  Video Visit:      Provider verified identity through the following two step process.  Patient provided:  Patient is known previously to provider    Telemedicine Visit: The patient's condition can be safely assessed and treated via synchronous audio and visual telemedicine encounter.      Reason for Telemedicine Visit: Patient has requested telehealth visit    Originating Site (Patient Location): Patient's home    Distant Site (Provider Location): Provider Remote Setting- Home Office    Consent:  The patient/guardian has verbally consented to: the potential risks and benefits of telemedicine (video visit) versus in person care; bill my insurance or make self-payment for services provided; and responsibility for payment of non-covered services.     Patient would like the video invitation sent by:  My Chart    Mode of Communication:  Video Conference via [facility]    Distant Location (Provider):  Off-site    As the provider I attest to compliance with applicable laws and regulations related to telemedicine.    DATA  Extended Session (53+ minutes):   - Patient's presenting concerns require more intensive intervention than could be completed within the usual service  Interactive Complexity: No  Crisis: No        Progress Since Last Session (Related to Symptoms / Goals / Homework):   Symptoms: No change beginning to panic about job situation    Homework: Partially completed      Episode of Care Goals: Minimal progress - ACTION (Actively working towards change); Intervened by reinforcing change plan / affirming steps taken     Current / Ongoing Stressors and Concerns:   Tavo is stressed that he  continues to not have a job. He is trying to manage his anxiety that persists. He has applied for 900+ jobs and has still not been offered a job. He is starting to look at other opportunities that he may be over qualified for but would be able to pay his bills. His blood sugars have been better managed recently and his back feels better. He has considered landscaping as an option. Talked also about donating plasma.     Treatment Objective(s) Addressed in This Session:   identify coping strategies for more effectively managing Bipolar Disorder  Work on managing his diabetes  Job search     Intervention:   CBT: Cognitive restructuring  Solution Focused: prep for move      The following assessments were completed by patient for this visit: N/A    PROMIS 10-Global Health (all questions and answers displayed):       11/14/2023     5:56 AM 2/29/2024     5:00 PM 6/5/2024     8:00 PM 7/26/2024    12:52 PM 10/23/2024    10:37 AM 1/31/2025     9:43 AM 5/13/2025     1:50 PM   PROMIS 10   In general, would you say your health is: Fair    Good Fair Good   In general, would you say your quality of life is: Good    Good Very good Good   In general, how would you rate your physical health? Fair    Good Fair Good   In general, how would you rate your mental health, including your mood and your ability to think? Good    Fair Good Good   In general, how would you rate your satisfaction with your social activities and relationships? Fair    Fair Fair Fair   In general, please rate how well you carry out your usual social activities and roles Good    Good Poor Fair   To what extent are you able to carry out your everyday physical activities such as walking, climbing stairs, carrying groceries, or moving a chair? Completely    Mostly Mostly Mostly   In the past 7 days, how often have you been bothered by emotional problems such as feeling anxious, depressed, or irritable? Often    Often Often Often   In the past 7 days, how would you  rate your fatigue on average? Severe    Moderate Moderate Moderate   In the past 7 days, how would you rate your pain on average, where 0 means no pain, and 10 means worst imaginable pain? 2    3 0 4   In general, would you say your health is: 2 3 2 3 3 2    2 3   In general, would you say your quality of life is: 3 3 3 3 3 4    4 3   In general, how would you rate your physical health? 2 3 2 3 3 2    2 3   In general, how would you rate your mental health, including your mood and your ability to think? 3 2 2 1 2 3    3 3   In general, how would you rate your satisfaction with your social activities and relationships? 2 3 3 1 2 2    2 2   In general, please rate how well you carry out your usual social activities and roles. (This includes activities at home, at work and in your community, and responsibilities as a parent, child, spouse, employee, friend, etc.) 3 4 3 1 3 1    1 2   To what extent are you able to carry out your everyday physical activities such as walking, climbing stairs, carrying groceries, or moving a chair? 5 4 4 4 4 4    4 4   In the past 7 days, how often have you been bothered by emotional problems such as feeling anxious, depressed, or irritable? 4 4 3 5 4 4    4 4   In the past 7 days, how would you rate your fatigue on average? 4 2 3 3 3 3    3 3   In the past 7 days, how would you rate your pain on average, where 0 means no pain, and 10 means worst imaginable pain? 2 0 2 6 3 0    0 4   Global Mental Health Score 10 10 11 6 9  11    11 10    Global Physical Health Score 13 16 13 13 14  14    14 13    PROMIS TOTAL - SUBSCORES 23 26 24 19 23  25    25 23        Patient-reported     PROMIS 10-Global Health (only subscores and total score):       11/14/2023     5:56 AM 2/29/2024     5:00 PM 6/5/2024     8:00 PM 7/26/2024    12:52 PM 10/23/2024    10:37 AM 1/31/2025     9:43 AM 5/13/2025     1:50 PM   PROMIS-10 Scores Only   Global Mental Health Score 10 10 11 6 9  11    11 10    Global Physical  Health Score 13 16 13 13 14  14    14 13    PROMIS TOTAL - SUBSCORES 23 26 24 19 23  25    25 23        Patient-reported         ASSESSMENT: Current Emotional / Mental Status (status of significant symptoms):   Risk status (Self / Other harm or suicidal ideation)   Patient denies current fears or concerns for personal safety.   Patient denies current or recent suicidal ideation or behaviors.   Patient denies current or recent homicidal ideation or behaviors.   Patient denies current or recent self injurious behavior or ideation.   Patient denies other safety concerns.   Patient reports there has been no change in risk factors since their last session.     Patient reports there has been no change in protective factors since their last session.     Recommended that patient call 911 or go to the local ED should there be a change in any of these risk factors     Appearance:   Appropriate    Eye Contact:   Good    Psychomotor Behavior: Normal    Attitude:   Cooperative  Friendly   Orientation:   All   Speech    Rate / Production: Normal     Volume:  Normal    Mood:    Anxious    Affect:    Worrisome    Thought Content:  Clear    Thought Form:  Coherent  Logical    Insight:    Good      Medication Review:   No changes to current psychiatric medication(s)      Medication Compliance:   Yes     Changes in Health Issues:   None reported     Chemical Use Review:   Substance Use: Chemical use reviewed, no active concerns identified      Tobacco Use: No current tobacco use.      Diagnosis:  1. Bipolar 1 disorder (H)      Collateral Reports Completed:   Not Applicable    PLAN: (Patient Tasks / Therapist Tasks / Other)  Homework:  Continue to job search. Consider alternate opportunities.      REYNOLD Vee                                                         _________________________________________________________________________________________________________________________                                             Individual Treatment Plan    Patient's Name: Tavo Key  YOB: 1980    Date of Creation: 1/2/2024  Date Treatment Plan Last Reviewed/Revised: 3/6/2025    DSM5 Diagnoses: 296.52 Bipolar I Disorder Current or Most Recent Episode Depressed, Moderate  Psychosocial / Contextual Factors: Trauma hx, health, work  PROMIS (reviewed every 90 days): 1/31/2025 Score: 25    Referral / Collaboration:  Referral to another professional/service is not indicated at this time. EMDR has been presented and client will research further.    Anticipated number of session for this episode of care: 9-12 sessions  Anticipation frequency of session: Biweekly  Anticipated Duration of each session: 53 or more minutes  Treatment plan will be reviewed in 90 days or when goals have been changed.       MeasurableTreatment Goal(s) related to diagnosis / functional impairment(s)  Goal 1: Patient will lower PHQ9 score to 3 or below.    I will know I've met my goal when I'm less irritable.      Objective #A (Patient Action)    Patient will identify stress management strategies for more effectively managing Bipolar Disorder.  Status: Continued - Date(s): 3/6/2025    Intervention(s)  Therapist will teach emotional recognition/identification.      Objective #B  Patient will Identify negative self-talk and behaviors: challenge core beliefs, myths, and actions.  Status: Continued - Date(s): 3/6/2025    Intervention(s)  Therapist will help client work on cognitive restructuring.    Objective #C  Patient will increase exercise.  Status: Continued - Date(s): 3/6/2025    Intervention(s)  Therapist will assign homework to increase level of activity especially aerobic.      Patient has reviewed and agreed to the above plan.      REYNOLD Vee  March 6, 2025              Answers submitted by the patient for this visit:  Patient Health Questionnaire (Submitted on 3/5/2025)  If you checked off any problems, how difficult have these  problems made it for you to do your work, take care of things at home, or get along with other people?: Very difficult  PHQ9 TOTAL SCORE: 11  Patient Health Questionnaire (G7) (Submitted on 3/5/2025)  MATTI 7 TOTAL SCORE: 10        Answers submitted by the patient for this visit:  Patient Health Questionnaire (Submitted on 4/22/2025)  If you checked off any problems, how difficult have these problems made it for you to do your work, take care of things at home, or get along with other people?: Somewhat difficult  PHQ9 TOTAL SCORE: 12  Patient Health Questionnaire (G7) (Submitted on 4/22/2025)  MATTI 7 TOTAL SCORE: 12

## 2025-05-17 ENCOUNTER — HEALTH MAINTENANCE LETTER (OUTPATIENT)
Age: 45
End: 2025-05-17

## 2025-05-18 DIAGNOSIS — E11.9 TYPE 2 DIABETES MELLITUS WITHOUT COMPLICATION, WITHOUT LONG-TERM CURRENT USE OF INSULIN (H): ICD-10-CM

## 2025-05-19 RX ORDER — TIRZEPATIDE 10 MG/.5ML
INJECTION, SOLUTION SUBCUTANEOUS
Qty: 6 ML | Refills: 0 | Status: SHIPPED | OUTPATIENT
Start: 2025-05-19

## 2025-05-20 DIAGNOSIS — G43.809 OTHER MIGRAINE WITHOUT STATUS MIGRAINOSUS, NOT INTRACTABLE: ICD-10-CM

## 2025-05-20 RX ORDER — NARATRIPTAN 2.5 MG/1
TABLET ORAL
Qty: 12 TABLET | Refills: 0 | Status: SHIPPED | OUTPATIENT
Start: 2025-05-20

## 2025-05-21 ENCOUNTER — MYC MEDICAL ADVICE (OUTPATIENT)
Dept: FAMILY MEDICINE | Facility: CLINIC | Age: 45
End: 2025-05-21
Payer: COMMERCIAL

## 2025-05-21 DIAGNOSIS — E11.9 TYPE 2 DIABETES MELLITUS WITHOUT COMPLICATION, WITHOUT LONG-TERM CURRENT USE OF INSULIN (H): Primary | ICD-10-CM

## 2025-05-22 RX ORDER — HYDROCHLOROTHIAZIDE 12.5 MG/1
CAPSULE ORAL
Qty: 6 EACH | Refills: 0 | Status: SHIPPED | OUTPATIENT
Start: 2025-05-22

## 2025-05-28 ASSESSMENT — ANXIETY QUESTIONNAIRES
3. WORRYING TOO MUCH ABOUT DIFFERENT THINGS: MORE THAN HALF THE DAYS
5. BEING SO RESTLESS THAT IT IS HARD TO SIT STILL: NOT AT ALL
1. FEELING NERVOUS, ANXIOUS, OR ON EDGE: MORE THAN HALF THE DAYS
GAD7 TOTAL SCORE: 11
IF YOU CHECKED OFF ANY PROBLEMS ON THIS QUESTIONNAIRE, HOW DIFFICULT HAVE THESE PROBLEMS MADE IT FOR YOU TO DO YOUR WORK, TAKE CARE OF THINGS AT HOME, OR GET ALONG WITH OTHER PEOPLE: VERY DIFFICULT
7. FEELING AFRAID AS IF SOMETHING AWFUL MIGHT HAPPEN: MORE THAN HALF THE DAYS
6. BECOMING EASILY ANNOYED OR IRRITABLE: SEVERAL DAYS
2. NOT BEING ABLE TO STOP OR CONTROL WORRYING: MORE THAN HALF THE DAYS
4. TROUBLE RELAXING: MORE THAN HALF THE DAYS

## 2025-05-28 ASSESSMENT — PATIENT HEALTH QUESTIONNAIRE - PHQ9: SUM OF ALL RESPONSES TO PHQ QUESTIONS 1-9: 12

## 2025-06-03 ENCOUNTER — VIRTUAL VISIT (OUTPATIENT)
Facility: CLINIC | Age: 45
End: 2025-06-03
Payer: COMMERCIAL

## 2025-06-03 DIAGNOSIS — F31.9 BIPOLAR 1 DISORDER (H): Primary | ICD-10-CM

## 2025-06-03 PROCEDURE — 90837 PSYTX W PT 60 MINUTES: CPT | Mod: 95 | Performed by: MARRIAGE & FAMILY THERAPIST

## 2025-06-03 ASSESSMENT — ANXIETY QUESTIONNAIRES
IF YOU CHECKED OFF ANY PROBLEMS ON THIS QUESTIONNAIRE, HOW DIFFICULT HAVE THESE PROBLEMS MADE IT FOR YOU TO DO YOUR WORK, TAKE CARE OF THINGS AT HOME, OR GET ALONG WITH OTHER PEOPLE: VERY DIFFICULT
1. FEELING NERVOUS, ANXIOUS, OR ON EDGE: MORE THAN HALF THE DAYS
GAD7 TOTAL SCORE: 11
6. BECOMING EASILY ANNOYED OR IRRITABLE: SEVERAL DAYS
4. TROUBLE RELAXING: MORE THAN HALF THE DAYS
2. NOT BEING ABLE TO STOP OR CONTROL WORRYING: MORE THAN HALF THE DAYS
GAD7 TOTAL SCORE: 11
5. BEING SO RESTLESS THAT IT IS HARD TO SIT STILL: NOT AT ALL
GAD7 TOTAL SCORE: 11
8. IF YOU CHECKED OFF ANY PROBLEMS, HOW DIFFICULT HAVE THESE MADE IT FOR YOU TO DO YOUR WORK, TAKE CARE OF THINGS AT HOME, OR GET ALONG WITH OTHER PEOPLE?: VERY DIFFICULT
7. FEELING AFRAID AS IF SOMETHING AWFUL MIGHT HAPPEN: MORE THAN HALF THE DAYS
3. WORRYING TOO MUCH ABOUT DIFFERENT THINGS: MORE THAN HALF THE DAYS
7. FEELING AFRAID AS IF SOMETHING AWFUL MIGHT HAPPEN: MORE THAN HALF THE DAYS

## 2025-06-03 ASSESSMENT — PATIENT HEALTH QUESTIONNAIRE - PHQ9
10. IF YOU CHECKED OFF ANY PROBLEMS, HOW DIFFICULT HAVE THESE PROBLEMS MADE IT FOR YOU TO DO YOUR WORK, TAKE CARE OF THINGS AT HOME, OR GET ALONG WITH OTHER PEOPLE: VERY DIFFICULT
SUM OF ALL RESPONSES TO PHQ QUESTIONS 1-9: 14
SUM OF ALL RESPONSES TO PHQ QUESTIONS 1-9: 14

## 2025-06-03 NOTE — PROGRESS NOTES
[facility]                                     Progress Note    Patient Name: Tavo Key  Date: 6/3/2025         Service Type: Individual      Session Start Time: 10:00AM  Session End Time: 10:50AM     Session Length: 50 minutes    Session #: 31    Attendees: Client attended alone    Service Modality:  Video Visit:      Provider verified identity through the following two step process.  Patient provided:  Patient is known previously to provider    Telemedicine Visit: The patient's condition can be safely assessed and treated via synchronous audio and visual telemedicine encounter.      Reason for Telemedicine Visit: Patient has requested telehealth visit    Originating Site (Patient Location): Patient's home    Distant Site (Provider Location): Provider Remote Setting- Home Office    Consent:  The patient/guardian has verbally consented to: the potential risks and benefits of telemedicine (video visit) versus in person care; bill my insurance or make self-payment for services provided; and responsibility for payment of non-covered services.     Patient would like the video invitation sent by:  My Chart    Mode of Communication:  Video Conference via [facility]    Distant Location (Provider):  Off-site    As the provider I attest to compliance with applicable laws and regulations related to telemedicine.    DATA  Extended Session (53+ minutes):   - Patient's presenting concerns require more intensive intervention than could be completed within the usual service  Interactive Complexity: No  Crisis: No        Progress Since Last Session (Related to Symptoms / Goals / Homework):   Symptoms: No change beginning to panic about job situation    Homework: Partially completed      Episode of Care Goals: Minimal progress - ACTION (Actively working towards change); Intervened by reinforcing change plan / affirming steps taken     Current / Ongoing Stressors and Concerns:   Tavo continues to worsen  due to the stress of job searching. He felt emotionally well on Sunday however ended up with food poisoning and this also had a negative impact on his mood. He is trying to find motivation which has been hard as of late. His blood sugars have been better than ever but he is not losing weight. Talked more today about finding ways to make the money he needs for his credit card including donating plasma. He is meeting with the Career office through the [facility] and looks forward to having someone support him in the job search. He is also looking at [employer] for possibly support outside of the home.      Treatment Objective(s) Addressed in This Session:   identify coping strategies for more effectively managing Bipolar Disorder  Work on managing his diabetes  Job search     Intervention:   CBT: Cognitive restructuring  Solution Focused: prep for move    Answers submitted by the patient for this visit:  Patient Health Questionnaire (Submitted on 6/3/2025)  If you checked off any problems, how difficult have these problems made it for you to do your work, take care of things at home, or get along with other people?: Very difficult  Patient Health Questionnaire (G7) (Submitted on 6/3/2025)  MATTI 7 TOTAL SCORE: 11      PROMIS 10-Global Health (all questions and answers displayed):       11/14/2023     5:56 AM 2/29/2024     5:00 PM 6/5/2024     8:00 PM 7/26/2024    12:52 PM 10/23/2024    10:37 AM 1/31/2025     9:43 AM 5/13/2025     1:50 PM   PROMIS 10   In general, would you say your health is: Fair    Good Fair Good   In general, would you say your quality of life is: Good    Good Very good Good   In general, how would you rate your physical health? Fair    Good Fair Good   In general, how would you rate your mental health, including your mood and your ability to think? Good    Fair Good Good   In general, how would you rate your satisfaction with your social activities and relationships? Fair    Fair Fair Fair   In general,  please rate how well you carry out your usual social activities and roles Good    Good Poor Fair   To what extent are you able to carry out your everyday physical activities such as walking, climbing stairs, carrying groceries, or moving a chair? Completely    Mostly Mostly Mostly   In the past 7 days, how often have you been bothered by emotional problems such as feeling anxious, depressed, or irritable? Often    Often Often Often   In the past 7 days, how would you rate your fatigue on average? Severe    Moderate Moderate Moderate   In the past 7 days, how would you rate your pain on average, where 0 means no pain, and 10 means worst imaginable pain? 2    3 0 4   In general, would you say your health is: 2 3 2 3 3 2    2 3   In general, would you say your quality of life is: 3 3 3 3 3 4    4 3   In general, how would you rate your physical health? 2 3 2 3 3 2    2 3   In general, how would you rate your mental health, including your mood and your ability to think? 3 2 2 1 2 3    3 3   In general, how would you rate your satisfaction with your social activities and relationships? 2 3 3 1 2 2    2 2   In general, please rate how well you carry out your usual social activities and roles. (This includes activities at home, at work and in your community, and responsibilities as a parent, child, spouse, employee, friend, etc.) 3 4 3 1 3 1    1 2   To what extent are you able to carry out your everyday physical activities such as walking, climbing stairs, carrying groceries, or moving a chair? 5 4 4 4 4 4    4 4   In the past 7 days, how often have you been bothered by emotional problems such as feeling anxious, depressed, or irritable? 4 4 3 5 4 4    4 4   In the past 7 days, how would you rate your fatigue on average? 4 2 3 3 3 3    3 3   In the past 7 days, how would you rate your pain on average, where 0 means no pain, and 10 means worst imaginable pain? 2 0 2 6 3 0    0 4   Global Mental Health Score 10 10 11 6 9   11    11 10    Global Physical Health Score 13 16 13 13 14  14    14 13    PROMIS TOTAL - SUBSCORES 23 26 24 19 23  25    25 23        Patient-reported     PROMIS 10-Global Health (only subscores and total score):       11/14/2023     5:56 AM 2/29/2024     5:00 PM 6/5/2024     8:00 PM 7/26/2024    12:52 PM 10/23/2024    10:37 AM 1/31/2025     9:43 AM 5/13/2025     1:50 PM   PROMIS-10 Scores Only   Global Mental Health Score 10 10 11 6 9  11    11 10    Global Physical Health Score 13 16 13 13 14  14    14 13    PROMIS TOTAL - SUBSCORES 23 26 24 19 23  25    25 23        Patient-reported         ASSESSMENT: Current Emotional / Mental Status (status of significant symptoms):   Risk status (Self / Other harm or suicidal ideation)   Patient denies current fears or concerns for personal safety.   Patient denies current or recent suicidal ideation or behaviors.   Patient denies current or recent homicidal ideation or behaviors.   Patient denies current or recent self injurious behavior or ideation.   Patient denies other safety concerns.   Patient reports there has been no change in risk factors since their last session.     Patient reports there has been no change in protective factors since their last session.     Recommended that patient call 911 or go to the local ED should there be a change in any of these risk factors     Appearance:   Appropriate    Eye Contact:   Good    Psychomotor Behavior: Normal    Attitude:   Cooperative  Friendly   Orientation:   All   Speech    Rate / Production: Normal     Volume:  Normal    Mood:    Anxious    Affect:    Worrisome    Thought Content:  Clear    Thought Form:  Coherent  Logical    Insight:    Good      Medication Review:   No changes to current psychiatric medication(s)      Medication Compliance:   Yes     Changes in Health Issues:   None reported     Chemical Use Review:   Substance Use: Chemical use reviewed, no active concerns identified      Tobacco Use: No current  tobacco use.      Diagnosis:  1. Bipolar 1 disorder (H)      Collateral Reports Completed:   Not Applicable    PLAN: (Patient Tasks / Therapist Tasks / Other)  Homework:  Continue job search. Meet with career force. Take it one day at a time.      Shanelle Caruso, REYNOLD                                                         _________________________________________________________________________________________________________________________                                            Individual Treatment Plan    Patient's Name: Tavo Key  YOB: 1980    Date of Creation: 1/2/2024  Date Treatment Plan Last Reviewed/Revised: 3/6/2025    DSM5 Diagnoses: 296.52 Bipolar I Disorder Current or Most Recent Episode Depressed, Moderate  Psychosocial / Contextual Factors: Trauma hx, health, work  PROMIS (reviewed every 90 days): 1/31/2025 Score: 25    Referral / Collaboration:  Referral to another professional/service is not indicated at this time. EMDR has been presented and client will research further.    Anticipated number of session for this episode of care: 9-12 sessions  Anticipation frequency of session: Biweekly  Anticipated Duration of each session: 53 or more minutes  Treatment plan will be reviewed in 90 days or when goals have been changed.       MeasurableTreatment Goal(s) related to diagnosis / functional impairment(s)  Goal 1: Patient will lower PHQ9 score to 3 or below.    I will know I've met my goal when I'm less irritable.      Objective #A (Patient Action)    Patient will identify stress management strategies for more effectively managing Bipolar Disorder.  Status: Continued - Date(s): 3/6/2025    Intervention(s)  Therapist will teach emotional recognition/identification.      Objective #B  Patient will Identify negative self-talk and behaviors: challenge core beliefs, myths, and actions.  Status: Continued - Date(s): 3/6/2025    Intervention(s)  Therapist will help client work on  cognitive restructuring.    Objective #C  Patient will increase exercise.  Status: Continued - Date(s): 3/6/2025    Intervention(s)  Therapist will assign homework to increase level of activity especially aerobic.      Patient has reviewed and agreed to the above plan.      Shanelle Caruso, REYNOLD  March 6, 2025

## 2025-06-04 ENCOUNTER — VIRTUAL VISIT (OUTPATIENT)
Dept: PHARMACY | Facility: CLINIC | Age: 45
End: 2025-06-04
Payer: COMMERCIAL

## 2025-06-04 DIAGNOSIS — E66.01 MORBID OBESITY (H): ICD-10-CM

## 2025-06-04 DIAGNOSIS — F31.62 BIPOLAR MIXED AFFECTIVE DISORDER, MODERATE (H): ICD-10-CM

## 2025-06-04 DIAGNOSIS — F41.1 GAD (GENERALIZED ANXIETY DISORDER): ICD-10-CM

## 2025-06-04 DIAGNOSIS — M54.9 BACK PAIN: ICD-10-CM

## 2025-06-04 DIAGNOSIS — F90.9 ATTENTION DEFICIT HYPERACTIVITY DISORDER (ADHD), UNSPECIFIED ADHD TYPE: ICD-10-CM

## 2025-06-04 DIAGNOSIS — E11.9 TYPE 2 DIABETES MELLITUS WITHOUT COMPLICATION, WITHOUT LONG-TERM CURRENT USE OF INSULIN (H): Primary | ICD-10-CM

## 2025-06-04 DIAGNOSIS — K21.00 GASTROESOPHAGEAL REFLUX DISEASE WITH ESOPHAGITIS, UNSPECIFIED WHETHER HEMORRHAGE: ICD-10-CM

## 2025-06-04 PROCEDURE — 99207 PR NO CHARGE LOS: CPT | Mod: 95 | Performed by: PHARMACIST

## 2025-06-04 RX ORDER — INSULIN LISPRO 100 [IU]/ML
12-14 INJECTION, SOLUTION INTRAVENOUS; SUBCUTANEOUS
Qty: 45 ML | Refills: 1 | Status: SHIPPED | OUTPATIENT
Start: 2025-06-04

## 2025-06-04 RX ORDER — INSULIN GLARGINE 100 [IU]/ML
38 INJECTION, SOLUTION SUBCUTANEOUS AT BEDTIME
Qty: 45 ML | Refills: 1 | Status: SHIPPED | OUTPATIENT
Start: 2025-06-04

## 2025-06-04 ASSESSMENT — PATIENT HEALTH QUESTIONNAIRE - PHQ9: SUM OF ALL RESPONSES TO PHQ QUESTIONS 1-9: 14

## 2025-06-04 NOTE — PATIENT INSTRUCTIONS
"Recommendations from today's MTM visit:                                                    MTM (medication therapy management) is a service provided by a clinical pharmacist designed to help you get the most of out of your medicines.   Today we reviewed what your medicines are for, how to know if they are working, that your medicines are safe and how to make your medicine regimen as easy as possible.      Increase Mounjaro to 12.5 mg weekly  Recheck lithium level and kidney/electrolyte labs.     Follow-up: Return in about 2 months (around 8/4/2025) for Medication Therapy Management.    It was great speaking with you today.  I value your experience and would be very thankful for your time in providing feedback in our clinic survey. In the next few days, you may receive an email or text message from [employer] with a link to a survey related to your  clinical pharmacist.\"     To schedule another MTM appointment, please call the clinic directly or you may call the MTM scheduling line at [phone] or toll-free at [phone].     My Clinical Pharmacist's contact information:                                                      Please feel free to contact me with any questions or concerns you have.      Madison Wheeler, PharmD  Medication Therapy Management Pharmacist     "

## 2025-06-04 NOTE — Clinical Note
MARIAH MOTA note, thanks!  Madison Wheeler, PharmD Medication Therapy Management Pharmacist [phone]

## 2025-06-04 NOTE — PROGRESS NOTES
Medication Therapy Management (MTM) Encounter    ASSESSMENT:                            Medication Adherence/Access: No issues identified.    Diabetes Type 2 Diabetes/Obesity: Patient is meeting A1c goal of < 7%.  Time in target is at goal of > 70% in target range of  mg/dL.  May benefit from increasing Mounjaro for weight loss benefit and to allow for decreasing Lantus and decreasing NovoLog doses.    GERD/Stomach symptoms: Stable.    Back pain: Meloxicam can increase lithium levels, needs monitoring.    Bipolar/Depression/Anxiety/ADHD: Interaction as above, may benefit from rechecking lithium level as well as kidney/electrolyte labs due to combination of meloxicam, lithium and lisinopril.  Of note lithium toxicity can include nausea/vomiting, unsure if episode on Sunday was related.    PLAN:                            Increase Mounjaro to 12.5 mg weekly  Recheck lithium level and kidney/electrolyte labs.     Follow-up: Return in about 2 months (around 8/4/2025) for Medication Therapy Management.    SUBJECTIVE/OBJECTIVE:                          Tavo Key is a 45 year old male seen for a follow-up visit.       Reason for visit: med review.    Allergies/ADRs: Reviewed in chart  Past Medical History: Reviewed in chart  Tobacco: He reports that he quit smoking about 25 years ago. His smoking use included cigarettes. He has never been exposed to tobacco smoke. He has never used smokeless tobacco.  Alcohol: none/very rarely    Medication Adherence/Access:   Patient uses pill box(es).  Patient takes medications 3 time(s) per day.   Per patient, misses medication 1-2 times per week.   The patient fills medications at [facility]: YES.    Diabetes   Type 2 Diabetes/Obesity:   Mounjaro 10 mg weekly x 3 months  Lantus 44 units once daily  Novolog 14-16 units before breakfast, 14-16 units before lunch and 14-16 units before supper  Aspirin 81 mg daily primary prevention    Emotional eating still a challenge when  depression is so strong, but able to make some changes and make healthier choices. Still has some nausea and he's found Tums manuel gummy really helps. Nausea didn't increase when he went up to Mounjaro 10 mg weekly, similar to what it was on 7.5 mg (he did have a terrible night Sunday night, up vomiting seven times, but thinks he ate something bad an wasn't typical the rest of the month).   He hasn't noticed much weight loss and is hoping for more effect there. He notes he has a lot of the 10 mg to go back to if increasing the dose doesn't go well.  This month he had a few episodes of hypoglycemia overnight but able to treat it right away and blood sugar came back up quickly, not having as significant of symptoms with lows like he was previously having: hot, cold sweats, confusion, lightheadedness  Reports no side effects.   SMBG:  Nasrin 3      Current diabetes symptoms: none  Medication history  -Trulicity dose reduced from 4.5 mg to 3 mg in the past due to nausea.  -Phentermine 18.75 mg daily-tried > 10 years ago, dry mouth  -Victoza worked really well in the past but stopped due to cost   -Copay for Januvia is around $510.   -Patient tried Ozempic previously without benefit but thinks he may not have been on this medication long enough to notice weight loss/glycemic control.   -Metformin -  stomach pain, diarrhea, gas, digestive issues    GERD/Stomach symptoms/nausea:    Esomeprazole 40 mg once daily   Tums/manuel gummy    He's had digestive issues for a long time and they continue, has had for 10 years, has been the worst the last 5-6 years. This was all preexisting prior to starting Trulicity or Mounjaro.  Nothing is worse with Mounjaro here.  Symptoms are primarily diarrhea and gas, nausea. The Tums manuel gummy have really helped the nausea. Does also have heartburn, takes esomeprazole 40 mg daily for this, reports effective.  The patient does notice symptoms if they miss a dose or esomeprazole.  Patient has  tried a trial off of therapy and is not interested in doing so.  Medication History:   Align probiotic - stomach cramps    Back pain:    Meloxicam 15 mg daily with food x 2 months  Acetaminophen 1000 mg daily as needed    This has been helpful, on his good days he and his wife have been able to do more gardening and activity. Now able to also start doing physical therapy exercises for his back.   Is not on any ibuprofen at this time due to meloxicam start..    Of note, also on lithium.    Bipolar/Depression/Anxiety/ADHD:  Lithium 1050 mg daily - 8 pm    Is working with psychiatry and psychology.   Hasn't had a lithium level rechecked since starting Meloxicam.   Notes mood is still low but stable, is still looking for a job.  Denies any symptoms of lithium toxicity including:  weakness, tremor (new-onset or worsening), mild ataxia, poor concentration, tinnitus (has at baseline, no increase), nausea, and diarrhea. More significant toxicity may result in vomiting, gross/coarse tremor, slurred speech, confusion, nystagmus disorder, dysarthria, and lethargy.   Denies side effects.   Medication History:  Vraylar increased blood sugar.  ziprazodone    Today's Vitals: There were no vitals taken for this visit.  ----------------      I spent 22 minutes with this patient today. All changes were made via collaborative practice agreement with Criselda Walsh PA-C.     A summary of these recommendations was sent via [employer].    Madison Wheeler, SarahD  Medication Therapy Management Pharmacist      Telemedicine Visit Details  The patient's medications can be safely assessed via a telemedicine encounter.  Type of service:  Video Conference via [employer]  Originating Location (pt. Location): Home    Distant Location (provider location):  Off-site  Start Time: 8:02 AM  End Time: 8:24 AM     Medication Therapy Recommendations  Bipolar mixed affective disorder, moderate (H)   1 Current Medication: lithium (ESKALITH CR/LITHOBID) 450 MG CR  tablet   Current Medication Sig: Take 2 tablets (900 mg) by mouth at bedtime. Take along with a 150 mg lithium capsule for a total of 1050 mg nightly   Rationale: Medication requires monitoring - Needs additional monitoring - Safety   Recommendation: Provide Adherence Intervention   Status: Patient Agreed - Adherence/Education   Identified Date: 6/4/2025 Completed Date: 6/4/2025         Diabetes mellitus, type 2 (H)   1 Current Medication: insulin glargine (LANTUS SOLOSTAR) 100 UNIT/ML pen (Discontinued)   Current Medication Sig: Inject 46 Units subcutaneously at bedtime.   Rationale: Dose too high - Dosage too high - Safety   Recommendation: Decrease Dose   Status: Accepted per CPA   Identified Date: 6/4/2025 Completed Date: 6/4/2025         2 Current Medication: insulin lispro (HUMALOG KWIKPEN) 100 UNIT/ML (1 unit dial) KWIKPEN   Current Medication Sig: Inject 12-14 Units subcutaneously 3 times daily (before meals). Place on file   Rationale: Dose too high - Dosage too high - Safety   Recommendation: Decrease Dose   Status: Accepted per CPA   Identified Date: 6/4/2025 Completed Date: 6/4/2025         3 Current Medication: tirzepatide (MOUNJARO) 10 MG/0.5ML SOAJ auto-injector pen   Current Medication Sig: INJECT 0.5 MLS (10 MG) SUBCUTANEOUSLY EVERY 7 DAYS.   Rationale: Dose too low - Dosage too low - Effectiveness   Recommendation: Increase Dose   Status: Accepted per CPA   Identified Date: 6/4/2025 Completed Date: 6/4/2025

## 2025-06-09 DIAGNOSIS — F31.30 BIPOLAR I DISORDER, MOST RECENT EPISODE DEPRESSED (H): ICD-10-CM

## 2025-06-09 RX ORDER — HYDROXYZINE PAMOATE 50 MG/1
50 CAPSULE ORAL 2 TIMES DAILY
Qty: 180 CAPSULE | Refills: 1 | Status: SHIPPED | OUTPATIENT
Start: 2025-06-09

## 2025-06-09 NOTE — TELEPHONE ENCOUNTER
Date of Last Office Visit: 4/23/25  Date of Next Office Visit: 7/23/25  No shows since last visit: No  More than one patient-initiated cancellation (with reschedule) since last seen in clinic? No    []Medication refilled per  Medication Refill in Ambulatory Care  policy.  []Scope of Practice: refill request processed by LPN/MA  [x]Medication unable to be refilled by RN due to criteria not met as indicated below:    []Eligibility: has not had a provider visit within last 6 months   []Supervision: no future appointment; < 7 days before next appointment   []Compliance: no shows; cancellations; lapse in therapy   [x]Verification: discrepancy between previous order and treatment plan/how pt reports taking medication. Script needs modification.  [] > 30-day supply request   []Advanced refill request: > 7 days before refill date   []Controlled medication   []Medication not included in policy   []Review: new med; med adjusted <= 30 days; safety alert; requires lab monitoring...   []Other:      Medication(s) requested:     -  hydrOXYzine (VISTARIL) 50 MG capsule  Date last ordered: 1/3/25  Qty: 90 (22-day supply)  Refills: 1  Appropriate for refill? Provider to review. Pt taking differently than previously prescribed. Now only taking two 50 MG capsules per day. Script needs modification.       Any Controlled Substance(s)? No      Requested medication(s) verified as identical to current order? Needs modified order for new dosing.      Any lapse in adherence to medication(s) greater than 5 days? N/A     Additional action taken? contacted patient via [employer] to see how pt is currently taking this medication.      Last visit treatment plan:   Continue Lexapro/escitalopram 20 mg daily for mood and anxiety.  Continue to MONITOR FOR SIMONA SYMPTOMS.   Continue Focalin XR 30 mg daily for ADHD. Monitor for simona.   Continue Focalin immediate release 5-10 mg daily as needed for ADHD.  Monitor for simona.  Continue Klonopin 0.5 mg  daily as needed for severe anxiety/panic.   Continue hydroxyzine 100 mg every AM for anxiety.   Continue lamotrigine 250 mg daily for bipolar depression.   Continue lithobid 1050 mg at bedtime for bipolar disorder. Labs up to date.   Continue propranolol per other prescriber for headaches (and anxiety)  Continue all other cares per primary care provider.   Continue all other medications as reviewed per electronic medical record today.   Safety plan reviewed. To the Emergency Department as needed or call after hours crisis line at [phone] or [phone]. [place] Crisis Text Line. Text [place] to 356168 or Suicide LifeLine Chat: suicidepreventionlifeline.org/chat  Continue therapy as planned.   Schedule an appointment with me in 4-6 weeks or sooner as needed. Call [facility] at [phone] to schedule.  Follow up with primary care provider as planned or for acute medical concerns.  Call the psychiatric nurse line with medication questions or concerns at [phone].  MyChart may be used to communicate with your provider, but this is not intended to be used for emergencies.    Any medication(s) require lab monitoring? No    Mariel Coleman RN on 6/9/2025 at 2:17 PM

## 2025-06-10 DIAGNOSIS — G43.809 OTHER MIGRAINE WITHOUT STATUS MIGRAINOSUS, NOT INTRACTABLE: ICD-10-CM

## 2025-06-11 RX ORDER — NARATRIPTAN 2.5 MG/1
TABLET ORAL
Qty: 12 TABLET | Refills: 0 | Status: SHIPPED | OUTPATIENT
Start: 2025-06-11

## 2025-07-01 DIAGNOSIS — G43.809 OTHER MIGRAINE WITHOUT STATUS MIGRAINOSUS, NOT INTRACTABLE: ICD-10-CM

## 2025-07-01 RX ORDER — NARATRIPTAN 2.5 MG/1
TABLET ORAL
Qty: 12 TABLET | Refills: 0 | Status: SHIPPED | OUTPATIENT
Start: 2025-07-01

## 2025-07-02 ENCOUNTER — VIRTUAL VISIT (OUTPATIENT)
Facility: CLINIC | Age: 45
End: 2025-07-02
Payer: COMMERCIAL

## 2025-07-02 DIAGNOSIS — M54.41 ACUTE BILATERAL LOW BACK PAIN WITH RIGHT-SIDED SCIATICA: ICD-10-CM

## 2025-07-02 DIAGNOSIS — F31.9 BIPOLAR 1 DISORDER (H): Primary | ICD-10-CM

## 2025-07-02 PROCEDURE — 90837 PSYTX W PT 60 MINUTES: CPT | Mod: 95 | Performed by: MARRIAGE & FAMILY THERAPIST

## 2025-07-02 NOTE — PROGRESS NOTES
[facility]                                     Progress Note    Patient Name: Tavo Key  Date: 7/2/2025         Service Type: Individual      Session Start Time: 1:00PM  Session End Time: 1:52PM     Session Length: 52 minutes    Session #: 32    Attendees: Client attended alone    Service Modality:  Video Visit:      Provider verified identity through the following two step process.  Patient provided:  Patient is known previously to provider    Telemedicine Visit: The patient's condition can be safely assessed and treated via synchronous audio and visual telemedicine encounter.      Reason for Telemedicine Visit: Patient has requested telehealth visit    Originating Site (Patient Location): Patient's home    Distant Site (Provider Location): Provider Remote Setting- Home Office    Consent:  The patient/guardian has verbally consented to: the potential risks and benefits of telemedicine (video visit) versus in person care; bill my insurance or make self-payment for services provided; and responsibility for payment of non-covered services.     Patient would like the video invitation sent by:  My Chart    Mode of Communication:  Video Conference via [facility]    Distant Location (Provider):  Off-site    As the provider I attest to compliance with applicable laws and regulations related to telemedicine.    DATA  Extended Session (53+ minutes):   - Patient's presenting concerns require more intensive intervention than could be completed within the usual service  Interactive Complexity: No  Crisis: No        Progress Since Last Session (Related to Symptoms / Goals / Homework):   Symptoms: No change still job searching    Homework: Partially completed      Episode of Care Goals: Minimal progress - ACTION (Actively working towards change); Intervened by reinforcing change plan / affirming steps taken     Current / Ongoing Stressors and Concerns:   Tavo is waiting to hear about a job he  interviewed for in [place]. He is worried they may be ghosting him which has happened before. He feels jaded after all this time job searching and the rejection reminds him of his relationship with Mindy. He continues to apply for jobs and met with the career force representative. He and Natalie are going to [place] for the holiday weekend. Natalie's mother loaned them money and they are caught up on bills. Blood sugars have been unstable and he plans to talk to his doctor about his appetite being irregular. His back has been feeling better lately with his increased activity and stretching.     Treatment Objective(s) Addressed in This Session:   identify coping strategies for more effectively managing Bipolar Disorder  Work on managing his diabetes  Job search     Intervention:   CBT: Cognitive restructuring  Solution Focused: prep for move      PROMIS 10-Global Health (all questions and answers displayed):       11/14/2023     5:56 AM 2/29/2024     5:00 PM 6/5/2024     8:00 PM 7/26/2024    12:52 PM 10/23/2024    10:37 AM 1/31/2025     9:43 AM 5/13/2025     1:50 PM   PROMIS 10   In general, would you say your health is: Fair    Good Fair Good   In general, would you say your quality of life is: Good    Good Very good Good   In general, how would you rate your physical health? Fair    Good Fair Good   In general, how would you rate your mental health, including your mood and your ability to think? Good    Fair Good Good   In general, how would you rate your satisfaction with your social activities and relationships? Fair    Fair Fair Fair   In general, please rate how well you carry out your usual social activities and roles Good    Good Poor Fair   To what extent are you able to carry out your everyday physical activities such as walking, climbing stairs, carrying groceries, or moving a chair? Completely    Mostly Mostly Mostly   In the past 7 days, how often have you been bothered by emotional problems such as feeling  anxious, depressed, or irritable? Often    Often Often Often   In the past 7 days, how would you rate your fatigue on average? Severe    Moderate Moderate Moderate   In the past 7 days, how would you rate your pain on average, where 0 means no pain, and 10 means worst imaginable pain? 2    3 0 4   In general, would you say your health is: 2 3 2 3 3 2    2 3   In general, would you say your quality of life is: 3 3 3 3 3 4    4 3   In general, how would you rate your physical health? 2 3 2 3 3 2    2 3   In general, how would you rate your mental health, including your mood and your ability to think? 3 2 2 1 2 3    3 3   In general, how would you rate your satisfaction with your social activities and relationships? 2 3 3 1 2 2    2 2   In general, please rate how well you carry out your usual social activities and roles. (This includes activities at home, at work and in your community, and responsibilities as a parent, child, spouse, employee, friend, etc.) 3 4 3 1 3 1    1 2   To what extent are you able to carry out your everyday physical activities such as walking, climbing stairs, carrying groceries, or moving a chair? 5 4 4 4 4 4    4 4   In the past 7 days, how often have you been bothered by emotional problems such as feeling anxious, depressed, or irritable? 4 4 3 5 4 4    4 4   In the past 7 days, how would you rate your fatigue on average? 4 2 3 3 3 3    3 3   In the past 7 days, how would you rate your pain on average, where 0 means no pain, and 10 means worst imaginable pain? 2 0 2 6 3 0    0 4   Global Mental Health Score 10 10 11 6 9  11    11 10    Global Physical Health Score 13 16 13 13 14  14    14 13    PROMIS TOTAL - SUBSCORES 23 26 24 19 23  25    25 23        Patient-reported     PROMIS 10-Global Health (only subscores and total score):       11/14/2023     5:56 AM 2/29/2024     5:00 PM 6/5/2024     8:00 PM 7/26/2024    12:52 PM 10/23/2024    10:37 AM 1/31/2025     9:43 AM 5/13/2025     1:50 PM    PROMIS-10 Scores Only   Global Mental Health Score 10 10 11 6 9  11    11 10    Global Physical Health Score 13 16 13 13 14  14    14 13    PROMIS TOTAL - SUBSCORES 23 26 24 19 23  25    25 23        Patient-reported         ASSESSMENT: Current Emotional / Mental Status (status of significant symptoms):   Risk status (Self / Other harm or suicidal ideation)   Patient denies current fears or concerns for personal safety.   Patient denies current or recent suicidal ideation or behaviors.   Patient denies current or recent homicidal ideation or behaviors.   Patient denies current or recent self injurious behavior or ideation.   Patient denies other safety concerns.   Patient reports there has been no change in risk factors since their last session.     Patient reports there has been no change in protective factors since their last session.     Recommended that patient call 911 or go to the local ED should there be a change in any of these risk factors     Appearance:   Appropriate    Eye Contact:   Good    Psychomotor Behavior: Normal    Attitude:   Cooperative  Friendly   Orientation:   All   Speech    Rate / Production: Normal     Volume:  Normal    Mood:    Anxious    Affect:    Worrisome    Thought Content:  Clear    Thought Form:  Coherent  Logical    Insight:    Good      Medication Review:   No changes to current psychiatric medication(s)      Medication Compliance:   Yes     Changes in Health Issues:   None reported     Chemical Use Review:   Substance Use: Chemical use reviewed, no active concerns identified      Tobacco Use: No current tobacco use.      Diagnosis:  1. Bipolar 1 disorder (H)        Collateral Reports Completed:   Not Applicable    PLAN: (Patient Tasks / Therapist Tasks / Other)  Homework:  Continue job search. See family in [place].      Shanelle Caruso, REYNOLD                                                          _________________________________________________________________________________________________________________________                                            Individual Treatment Plan    Patient's Name: Tavo Key  YOB: 1980    Date of Creation: 1/2/2024  Date Treatment Plan Last Reviewed/Revised: 5/13/2025    DSM5 Diagnoses: 296.52 Bipolar I Disorder Current or Most Recent Episode Depressed, Moderate  Psychosocial / Contextual Factors: Trauma hx, health, work  PROMIS (reviewed every 90 days): 5/13/2025 Score: 23    Referral / Collaboration:  Referral to another professional/service is not indicated at this time. EMDR has been presented and client will research further.    Anticipated number of session for this episode of care: 9-12 sessions  Anticipation frequency of session: Biweekly  Anticipated Duration of each session: 53 or more minutes  Treatment plan will be reviewed in 90 days or when goals have been changed.       MeasurableTreatment Goal(s) related to diagnosis / functional impairment(s)  Goal 1: Patient will lower PHQ9 score to 3 or below.    I will know I've met my goal when I'm less irritable.      Objective #A (Patient Action)    Patient will identify stress management strategies for more effectively managing Bipolar Disorder.  Status: Continued - Date(s): 5/13/2025    Intervention(s)  Therapist will teach emotional recognition/identification.      Objective #B  Patient will Identify negative self-talk and behaviors: challenge core beliefs, myths, and actions.  Status: Continued - Date(s): 5/13/2025    Intervention(s)  Therapist will help client work on cognitive restructuring.    Objective #C  Patient will increase exercise.  Status: Continued - Date(s): 5/13/2025    Intervention(s)  Therapist will assign homework to increase level of activity especially aerobic.      Patient has reviewed and agreed to the above plan.      Shanelle Caruso, REYNOLD  May 13,  2025

## 2025-07-03 DIAGNOSIS — F31.30 BIPOLAR I DISORDER, MOST RECENT EPISODE DEPRESSED (H): ICD-10-CM

## 2025-07-03 RX ORDER — MELOXICAM 15 MG/1
15 TABLET ORAL DAILY
Qty: 90 TABLET | Refills: 0 | Status: SHIPPED | OUTPATIENT
Start: 2025-07-03

## 2025-07-03 RX ORDER — CLONAZEPAM 0.5 MG/1
0.5 TABLET ORAL DAILY PRN
Qty: 15 TABLET | Refills: 0 | Status: SHIPPED | OUTPATIENT
Start: 2025-07-03

## 2025-07-14 ENCOUNTER — MYC REFILL (OUTPATIENT)
Dept: PSYCHIATRY | Facility: CLINIC | Age: 45
End: 2025-07-14
Payer: COMMERCIAL

## 2025-07-14 DIAGNOSIS — F41.1 GAD (GENERALIZED ANXIETY DISORDER): ICD-10-CM

## 2025-07-14 DIAGNOSIS — F31.9 BIPOLAR 1 DISORDER (H): ICD-10-CM

## 2025-07-14 RX ORDER — LAMOTRIGINE 100 MG/1
50 TABLET ORAL DAILY
Qty: 15 TABLET | Refills: 0 | Status: SHIPPED | OUTPATIENT
Start: 2025-07-14

## 2025-07-14 RX ORDER — ESCITALOPRAM OXALATE 20 MG/1
20 TABLET ORAL DAILY
Qty: 30 TABLET | Refills: 0 | Status: SHIPPED | OUTPATIENT
Start: 2025-07-14

## 2025-07-14 NOTE — TELEPHONE ENCOUNTER
Date of Last Office Visit: 04/23/2025  Date of Next Office Visit: 07/23/2025  No shows since last visit: No  More than one patient-initiated cancellation (with reschedule) since last seen in clinic? No     []Medication refilled per  Medication Refill in Ambulatory Care  policy.  [x]Scope of Practice: refill request processed by LPN/MA  []Medication unable to be refilled by RN due to criteria not met as indicated below:    []Eligibility: has not had a provider visit within last 6 months   []Supervision: no future appointment; < 7 days before next appointment   []Compliance: no shows; cancellations; lapse in therapy   []Verification: order discrepancy; may need modification...   [] > 30-day supply request   []Advanced refill request: > 7 days before refill date   []Controlled medication   []Medication not included in policy   []Review: new med; med adjusted <= 30 days; safety alert; requires lab monitoring...   []Other:      Medication(s) requested:     -  escitalopram (LEXAPRO) 20 MG tablet   Date last ordered: 04/23/2025  Qty: 90  Refills: 0  Appropriate for refill? Provider to review.            Any Controlled Substance(s)? No      Requested medication(s) verified as identical to current order? Yes    Any lapse in adherence to medication(s) greater than 5 days? No      Additional action taken? cued up medication/order(s) and routed encounter to provider for review.      Last visit treatment plan:   Return in about 3 months (around 7/23/2025).   Continue Lexapro/escitalopram 20 mg daily for mood and anxiety.  Continue to MONITOR FOR SIMONA SYMPTOMS.   Continue Focalin XR 30 mg daily for ADHD. Monitor for simona.   Continue Focalin immediate release 5-10 mg daily as needed for ADHD.  Monitor for simona.  Continue Klonopin 0.5 mg daily as needed for severe anxiety/panic.   Continue hydroxyzine 100 mg every AM for anxiety.   Continue lamotrigine 250 mg daily for bipolar depression.   Continue lithobid 1050 mg at bedtime  for bipolar disorder. Labs up to date.   Continue propranolol per other prescriber for headaches (and anxiety)    Is lab monitoring required? No    Juanita Iverson MA on 7/14/2025 at 10:21 AM

## 2025-07-14 NOTE — TELEPHONE ENCOUNTER
Date of Last Office Visit: 04/23/2025  Date of Next Office Visit: 07/23/2025  No shows since last visit: No  More than one patient-initiated cancellation (with reschedule) since last seen in clinic? No    []Medication refilled per  Medication Refill in Ambulatory Care  policy.  [x]Scope of Practice: refill request processed by LPN/MA  []Medication unable to be refilled by RN due to criteria not met as indicated below:    []Eligibility: has not had a provider visit within last 6 months   []Supervision: no future appointment; < 7 days before next appointment   []Compliance: no shows; cancellations; lapse in therapy   []Verification: order discrepancy; may need modification...   [] > 30-day supply request   []Advanced refill request: > 7 days before refill date   []Controlled medication   []Medication not included in policy   []Review: new med; med adjusted <= 30 days; safety alert; requires lab monitoring...   []Other:      Medication(s) requested:     -  lamoTRIgine (LAMICTAL) 200 MG tablet   Date last ordered: 04/23/2025  Qty: 90  Refills: 0  Appropriate for refill? Provider to review.            Any Controlled Substance(s)? No      Requested medication(s) verified as identical to current order? Yes    Any lapse in adherence to medication(s) greater than 5 days? No      Additional action taken? cued up medication/order(s) and routed encounter to provider for review.      Last visit treatment plan:   Return in about 3 months (around 7/23/2025).   Continue Lexapro/escitalopram 20 mg daily for mood and anxiety.  Continue to MONITOR FOR SIMONA SYMPTOMS.   Continue Focalin XR 30 mg daily for ADHD. Monitor for simona.   Continue Focalin immediate release 5-10 mg daily as needed for ADHD.  Monitor for simona.  Continue Klonopin 0.5 mg daily as needed for severe anxiety/panic.   Continue hydroxyzine 100 mg every AM for anxiety.   Continue lamotrigine 250 mg daily for bipolar depression.   Continue lithobid 1050 mg at bedtime  for bipolar disorder. Labs up to date.   Continue propranolol per other prescriber for headaches (and anxiety)  Is lab monitoring required? No    Juanita Iverson MA on 7/14/2025 at 9:51 AM

## 2025-07-16 DIAGNOSIS — F31.9 BIPOLAR 1 DISORDER (H): ICD-10-CM

## 2025-07-16 RX ORDER — LAMOTRIGINE 200 MG/1
200 TABLET ORAL DAILY
Qty: 14 TABLET | Refills: 0 | Status: SHIPPED | OUTPATIENT
Start: 2025-07-16

## 2025-07-17 ENCOUNTER — VIRTUAL VISIT (OUTPATIENT)
Facility: CLINIC | Age: 45
End: 2025-07-17
Payer: COMMERCIAL

## 2025-07-17 DIAGNOSIS — F31.9 BIPOLAR 1 DISORDER (H): Primary | ICD-10-CM

## 2025-07-17 NOTE — PROGRESS NOTES
[facility]                                     Progress Note    Patient Name: Tavo Key  Date: 7/17/2025         Service Type: Individual      Session Start Time: 1:00PM  Session End Time: 1:52PM     Session Length: 52 minutes    Session #: 33    Attendees: Client attended alone    Service Modality:  Video Visit:      Provider verified identity through the following two step process.  Patient provided:  Patient is known previously to provider    Telemedicine Visit: The patient's condition can be safely assessed and treated via synchronous audio and visual telemedicine encounter.      Reason for Telemedicine Visit: Patient has requested telehealth visit    Originating Site (Patient Location): Patient's home    Distant Site (Provider Location): Provider Remote Setting- Home Office    Consent:  The patient/guardian has verbally consented to: the potential risks and benefits of telemedicine (video visit) versus in person care; bill my insurance or make self-payment for services provided; and responsibility for payment of non-covered services.     Patient would like the video invitation sent by:  My Chart    Mode of Communication:  Video Conference via [facility]    Distant Location (Provider):  Off-site    As the provider I attest to compliance with applicable laws and regulations related to telemedicine.    DATA  Extended Session (53+ minutes):   - Patient's presenting concerns require more intensive intervention than could be completed within the usual service  Interactive Complexity: No  Crisis: No        Progress Since Last Session (Related to Symptoms / Goals / Homework):   Symptoms: No change still job searching    Homework: Partially completed      Episode of Care Goals: Minimal progress - ACTION (Actively working towards change); Intervened by reinforcing change plan / affirming steps taken     Current / Ongoing Stressors and Concerns:   Tavo continues to wait to hear about a job he  interviewed for in [place]. He is worried they may be ghosting him which has happened before. He feels jaded after all this time job searching and being rejected. Blood sugars have been stable but his eating has not as he is engaged in emotional eating. Trying to incorporate vegetables more and exercise. Has increased [place] three times with no weight loss. Talked about [employer] hack sheet as well as a referral to a nutritionist. Encouraged Tavo to consider strategies to avoid emotional eating. His back has been feeling better lately with his increased activity and stretching. Feels better able to walk more with the more moderate temps. He and Natalie had a nice time in [place] and would like to make day trips more of a regular thing. Still considering plasma donation. 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 Will also talk to [employer] about possibly time limited training he could consider doing.     Treatment Objective(s) Addressed in This Session:   identify coping strategies for more effectively managing Bipolar Disorder  Work on managing his diabetes  Job search     Intervention:   CBT: Cognitive restructuring  Solution Focused: prep for move      PROMIS 10-Global Health (all questions and answers displayed):       11/14/2023     5:56 AM 2/29/2024     5:00 PM 6/5/2024     8:00 PM 7/26/2024    12:52 PM 10/23/2024    10:37 AM 1/31/2025     9:43 AM 5/13/2025     1:50 PM   PROMIS 10   In general, would you say your health is: Fair    Good Fair Good   In general, would you say your quality of life is: Good    Good Very good Good   In general, how would you rate your physical health? Fair    Good Fair Good   In general, how would you rate your mental health, including your mood and your ability to think? Good    Fair Good Good   In general, how would you rate your satisfaction with your social activities and relationships? Fair    Fair Fair Fair   In general, please  rate how well you carry out your usual social activities and roles Good    Good Poor Fair   To what extent are you able to carry out your everyday physical activities such as walking, climbing stairs, carrying groceries, or moving a chair? Completely    Mostly Mostly Mostly   In the past 7 days, how often have you been bothered by emotional problems such as feeling anxious, depressed, or irritable? Often    Often Often Often   In the past 7 days, how would you rate your fatigue on average? Severe    Moderate Moderate Moderate   In the past 7 days, how would you rate your pain on average, where 0 means no pain, and 10 means worst imaginable pain? 2    3 0 4   In general, would you say your health is: 2 3 2 3 3 2    2 3   In general, would you say your quality of life is: 3 3 3 3 3 4    4 3   In general, how would you rate your physical health? 2 3 2 3 3 2    2 3   In general, how would you rate your mental health, including your mood and your ability to think? 3 2 2 1 2 3    3 3   In general, how would you rate your satisfaction with your social activities and relationships? 2 3 3 1 2 2    2 2   In general, please rate how well you carry out your usual social activities and roles. (This includes activities at home, at work and in your community, and responsibilities as a parent, child, spouse, employee, friend, etc.) 3 4 3 1 3 1    1 2   To what extent are you able to carry out your everyday physical activities such as walking, climbing stairs, carrying groceries, or moving a chair? 5 4 4 4 4 4    4 4   In the past 7 days, how often have you been bothered by emotional problems such as feeling anxious, depressed, or irritable? 4 4 3 5 4 4    4 4   In the past 7 days, how would you rate your fatigue on average? 4 2 3 3 3 3    3 3   In the past 7 days, how would you rate your pain on average, where 0 means no pain, and 10 means worst imaginable pain? 2 0 2 6 3 0    0 4   Global Mental Health Score 10 10 11 6 9  11    11  10    Global Physical Health Score 13 16 13 13 14  14    14 13    PROMIS TOTAL - SUBSCORES 23 26 24 19 23  25    25 23        Patient-reported     PROMIS 10-Global Health (only subscores and total score):       11/14/2023     5:56 AM 2/29/2024     5:00 PM 6/5/2024     8:00 PM 7/26/2024    12:52 PM 10/23/2024    10:37 AM 1/31/2025     9:43 AM 5/13/2025     1:50 PM   PROMIS-10 Scores Only   Global Mental Health Score 10 10 11 6 9  11    11 10    Global Physical Health Score 13 16 13 13 14  14    14 13    PROMIS TOTAL - SUBSCORES 23 26 24 19 23  25    25 23        Patient-reported         ASSESSMENT: Current Emotional / Mental Status (status of significant symptoms):   Risk status (Self / Other harm or suicidal ideation)   Patient denies current fears or concerns for personal safety.   Patient denies current or recent suicidal ideation or behaviors.   Patient denies current or recent homicidal ideation or behaviors.   Patient denies current or recent self injurious behavior or ideation.   Patient denies other safety concerns.   Patient reports there has been no change in risk factors since their last session.     Patient reports there has been no change in protective factors since their last session.     Recommended that patient call 911 or go to the local ED should there be a change in any of these risk factors     Appearance:   Appropriate    Eye Contact:   Good    Psychomotor Behavior: Normal    Attitude:   Cooperative  Friendly   Orientation:   All   Speech    Rate / Production: Normal     Volume:  Normal    Mood:    Anxious    Affect:    Worrisome    Thought Content:  Clear    Thought Form:  Coherent  Logical    Insight:    Good      Medication Review:   No changes to current psychiatric medication(s)      Medication Compliance:   Yes     Changes in Health Issues:   None reported     Chemical Use Review:   Substance Use: Chemical use reviewed, no active concerns identified      Tobacco Use: No current tobacco use.       Diagnosis:  1. Bipolar 1 disorder (H)        Collateral Reports Completed:   Not Applicable    PLAN: (Patient Tasks / Therapist Tasks / Other)  Homework:  Continue job search. Find alternatives to emotional eating.      REYNOLD Vee                                                         _________________________________________________________________________________________________________________________                                            Individual Treatment Plan    Patient's Name: Tavo Key  YOB: 1980    Date of Creation: 1/2/2024  Date Treatment Plan Last Reviewed/Revised: 5/13/2025    DSM5 Diagnoses: 296.52 Bipolar I Disorder Current or Most Recent Episode Depressed, Moderate  Psychosocial / Contextual Factors: Trauma hx, health, work  PROMIS (reviewed every 90 days): 5/13/2025 Score: 23    Referral / Collaboration:  Referral to another professional/service is not indicated at this time. EMDR has been presented and client will research further.    Anticipated number of session for this episode of care: 9-12 sessions  Anticipation frequency of session: Biweekly  Anticipated Duration of each session: 53 or more minutes  Treatment plan will be reviewed in 90 days or when goals have been changed.       MeasurableTreatment Goal(s) related to diagnosis / functional impairment(s)  Goal 1: Patient will lower PHQ9 score to 3 or below.    I will know I've met my goal when I'm less irritable.      Objective #A (Patient Action)    Patient will identify stress management strategies for more effectively managing Bipolar Disorder.  Status: Continued - Date(s): 5/13/2025    Intervention(s)  Therapist will teach emotional recognition/identification.      Objective #B  Patient will Identify negative self-talk and behaviors: challenge core beliefs, myths, and actions.  Status: Continued - Date(s): 5/13/2025    Intervention(s)  Therapist will help client work on cognitive  restructuring.    Objective #C  Patient will increase exercise.  Status: Continued - Date(s): 5/13/2025    Intervention(s)  Therapist will assign homework to increase level of activity especially aerobic.      Patient has reviewed and agreed to the above plan.      Shanelle Caruso, SUHAILFT  May 13, 2025

## 2025-07-20 ENCOUNTER — MYC MEDICAL ADVICE (OUTPATIENT)
Dept: FAMILY MEDICINE | Facility: CLINIC | Age: 45
End: 2025-07-20
Payer: COMMERCIAL

## 2025-07-20 DIAGNOSIS — E11.29 TYPE 2 DIABETES MELLITUS WITH OTHER DIABETIC KIDNEY COMPLICATION (H): Primary | ICD-10-CM

## 2025-07-22 NOTE — PROGRESS NOTES
[facility] [place]    Behavioral Health Clinician Progress Note   Mental Health & Addiction Services      7/23/25    Patient Name: Tavo Key       Service Type:  Individual   Service Location:  [facility] / Email (patient reached)   Visit Start Time: 803am  Visit End Time:  826am  Session Length: 16 - 37    Attendees: Client   Service Modality: Video Visit:      Provider verified identity through the following two step process.  Patient provided:  Patient is known previously to provider    Telemedicine Visit: The patient's condition can be safely assessed and treated via synchronous audio and visual telemedicine encounter.      Reason for Telemedicine Visit: Services only offered telehealth    Originating Site (Patient Location): Patient's home    Distant Site (Provider Location): Provider Remote Setting- Home Office    Consent:  The patient/guardian has verbally consented to: the potential risks and benefits of telemedicine (video visit) versus in person care; bill my insurance or make self-payment for services provided; and responsibility for payment of non-covered services.     Patient would like the video invitation sent by:  My Chart    Mode of Communication:  Video Conference via [facility]    Distant Location (Provider):  Off-site    As the provider I attest to compliance with applicable laws and regulations related to telemedicine.   Visit number: 4    [facility] Visit Activities (Refresh list every visit): [facility]     [place] Diagnostic Assessment: 11/16/23 Shanelle Caruso LM  Treatment Plan Review Date: 7/23/25      DATA:    Extended Session (60+ minutes): No   Interactive Complexity: No   Crisis: No   [facility] Patient: No     Assessments completed prior to visit:   PHQ9:       1/27/2025     8:08 AM 1/31/2025     9:40 AM 3/5/2025     9:24 PM 4/22/2025    11:01 AM 5/28/2025     9:04 PM 6/3/2025     9:58 AM 7/23/2025     7:56 AM   PHQ-9 SCORE   PHQ-9 Total Score  MyChart 12 (Moderate depression) 12 (Moderate depression) 11 (Moderate depression) 12 (Moderate depression) 12 (Moderate depression) 14 (Moderate depression) 13 (Moderate depression)   PHQ-9 Total Score 12  12  11  12  12  14  13        Patient-reported     GAD7:       12/31/2024     8:54 AM 1/31/2025     9:41 AM 3/5/2025     9:26 PM 4/22/2025    11:01 AM 5/28/2025     9:05 PM 6/3/2025     9:59 AM 7/23/2025     7:57 AM   MATTI-7 SCORE   Total Score 12 (moderate anxiety) 12 (moderate anxiety) 10 (moderate anxiety) 12 (moderate anxiety) 11 (moderate anxiety) 11 (moderate anxiety) 12 (moderate anxiety)   Total Score 12  12  10  12  11  11  12        Patient-reported         Reason for Visit/Presenting Concern:  ADHD, Anxiety, and Bipolar Disorder    Current Stressors / Issues:  MH update:  Irritability has been better. Chronic pain increased in neck/back due to job hunting.  On going job hunting without success.  Has to make follow up call about last interview.  Anxiety has better managed.  Less self blame.   Mood has been better.  Stresses: n/a  Appetite: injection, emotional eating.  Not losing weight.  Sleep: RAVI CPAP  Outpatient Provider updates:  Shanelle FLAHERTY.  EMDR in the future  SI/SIB/HI:  Denies  AASHISH:  social eTOH  Side effects/compliance:  Interventions:  [facility] engaged in discussion of food patterns/eating.  Discussion of reward behaviors.  Self soothing.  Swapping items. Building movement.  Most important:  frustrated with food patterns/eating.  Better mood.  Lower anxiety.  Less irritability.     4/23   update:  Doing okay.  Had a big interview yesterday, had a lot panic but managed.  Did affirmations and prep to help.  Using notion for tracking.  Discussion of rewarding self for interview and different reward for landing/follow through.  Discussion of narrative if job doesn't work.  Stresses:  more socially isolated- more agoraphobia.   Appetite: injection  Sleep: RAVI CPAP.  Hx of Melatonin.     Outpatient Provider updates:  Shanelle FLAHERTY.  EMDR in the future  SI/SIB/HI:  Very reduced.  Maybe 3 times in the last month.  Passive ideation.  No plan/intent.  Safety plan on file.  In response to social situations/hurt  AASHISH:  social ETOH  Side effects/compliance:  Interventions:  [facility] engaged in discussion about occupational stressors  Most important:  maybe more irritability- stress?  More anxiety with job stuff and socially.  Depression has improved.  Way more manageable.  No rush sx    4/23   update:  Doing mostly okay.  Day to day anxiety better.  Increased panic attacks- about job, new environments/feeling trapped.  Mood seems better.  Irritability with anxiety.  Maybe some hypomania?  Really small bursts of energy- no consistent sx of rush.  Stresses:  unemployment, 5 weeks left  Appetite: injection  Sleep: RAVI CPAP.  Hx of Melatonin.    Outpatient Provider updates:  Shanelle FLAHERTY  SI/SIB/HI:  Reduced.  Passive fleeting at best.  Minimal. No plan/intent.  Safety plan on file  AASHISH:  Social ETOH  Side effects/compliance:  Interventions:  [facility] engaged in discussing financial fears and coping skills.  Discussed TIP skills, comfort items, rewarding behaviors, creating flexibility with budget/family meeting  Most important:  Better mood and day to day anxiety.  On going panic    12/31   update:  Anxiety high. Mild panic panic.  Mood fairly level.  Recently depressed.  Hypomania last 3-4 weeks.  Typically more on depressive side.  Years since full blown rush.    Stresses: laid off in Sept, looking for work ([employer]), kiddo is 19 living in [place] comes down wkds,  T2D diabetic insulin  Appetite: on injection, stress eating  Sleep: RAVI CPAP, insomnia  Outpatient Provider updates: Shanelle FLAHERTY  SI/SIB/HI:  previous attempt age 15 overdose.  Current passive ideation, no plan intent.  Updated safety plan  AASHISH:  Social ETOH  Side effects/compliance:  Interventions:  [facility] engaged in  discussion about anxiety mgmt tools  Most important:  recent lexapro was really helpful. Agoraphobia/social anxiety has been worse.  Just started melatonin last night.  Using migraine med to also sleep/calm    Therapeutic Interventions:  Motivational Interviewing (MI): Validated patient's thoughts, feelings and experience. Expressed respect for patient's autonomy in decision making.  Asked open-ended questions to invite patient's self-reflection and self-direction around change and what is important for them in working towards their goals.     Response to treatment interventions:   Patient was receptive to interventions utilized.  Patient was engaged in the therapy process.       Progress on Treatment Objective(s) / Homework:   Satisfactory progress - PREPARATION (Decided to change - considering how); Intervened by negotiating a change plan and determining options / strategies for behavior change, identifying triggers, exploring social supports, and working towards setting a date to begin behavior change     Medication Review:   Changes to psychiatric medications, see updated Medication List in EPIC.      Medication Compliance:   Yes     Chemical Use Review:  Substance Use: Chemical use reviewed, no active concerns identified      Tobacco Use: No current tobacco use.       Assessment: Current Emotional / Mental Status (status of significant symptoms):    Risk status (Self / Other harm or suicidal ideation)   Patient has had a history of suicidal ideation: previous attempt via overdose at 15.  Current passive suicidal ideation without intent or planning   Patient denies current fears or concerns for personal safety.   Patient denies current or recent suicidal ideation or behaviors.   Patient denies current or recent homicidal ideation or behaviors.   Patient denies current or recent self injurious behavior or ideation.   Patient denies other safety concerns.   A safety and risk management plan has been developed  including: Patient consented to co-developed safety plan.  Safety and risk management plan was completed - see below.  Patient agreed to use safety plan should any safety concerns arise.  A copy was given to the patient.      ASSESSMENT:   Mental Status:     Appearance:   Appropriate    Eye Contact:   Good    Psychomotor Behavior: Normal    Attitude:   Cooperative    Orientation:   All   Speech Rate / Production: Normal    Volume:   Normal    Mood:    Normal   Affect:    Appropriate    Thought Content:  Clear    Thought Form:  Coherent  Logical    Insight:    Good          Diagnoses:      Bipolar 1 disorder (H)  ADHD (attention deficit hyperactivity disorder), inattentive type  Social anxiety disorder  MATTI (generalized anxiety disorder)    Collateral Reports Completed:   Communicated with: Dr Ag       Plan: (Homework, other):   Patient was provided No indications of CD issues  Patient was given information about behavioral services and encouraged to schedule a follow up appointment with the clinic [facility] as needed.         Fide Montalvo [facility], [facility]     _____________________________________________________________________________________________________________________________________                                              Individual Treatment Plan    Patient's Name: Tavo Key   YOB: 1980  Date of Creation: 12/31/24  Date Treatment Plan Last Reviewed/Revised: 7/23/25    DSM5 Diagnoses:    Bipolar 1 disorder (H)  MATTI (generalized anxiety disorder)  Social anxiety disorder  ADHD (attention deficit hyperactivity disorder), inattentive type    Psychosocial / Contextual Factors: Occupational Issues, Interpersonal Concerns, and Limited Social Support  PROMIS (reviewed every 90 days):   The following assessments were completed by patient for this visit:  PROMIS 10-Global Health (only subscores and total score):       11/14/2023     5:56 AM 2/29/2024     5:00 PM 6/5/2024     8:00 PM 7/26/2024     12:52 PM 10/23/2024    10:37 AM 1/31/2025     9:43 AM 5/13/2025     1:50 PM   PROMIS-10 Scores Only   Global Mental Health Score 10 10 11 6 9  11    11 10    Global Physical Health Score 13 16 13 13 14  14    14 13    PROMIS TOTAL - SUBSCORES 23 26 24 19 23  25    25 23        Patient-reported        Referral / Collaboration:  Referral to another professional/service is not indicated at this time..    Anticipated number of session for this episode of care:  6-9 sessions  Anticipation frequency of session: Monthly  Anticipated Duration of each session: 16-37 minutes  Treatment plan will be reviewed in 90 days or when goals have been changed.       MeasurableTreatment Goal(s) related to diagnosis / functional impairment(s)  Goal 1: Patient will improve daily functioning.    I will know I've met my goal when I can be more comfortable going out/in social situations.      Status: Continued - Date(s):  4/23/25 , 7/23/25    Intervention(s)  [facility] will Motivational Interviewing (MI): Validate patient's thoughts, feelings and experience. Express respect for patient's autonomy in decision making.  Ask open-ended questions to invite patient's self-reflection and self-direction around change and what is important for them in working towards their goals.      MeasurableTreatment Goal(s) related to diagnosis / functional impairment(s)  Goal 2: Patient will learn and regularly practice emotions regulations skills.    I will know I've met my goal when reduced suicidal ideation.      Status: Continued - Date(s):  4/23/25 , 7/23/25    Intervention(s)  [facility] will Safety: Co-develop safety plan for patient to follow. Review and update safety plan with patient.    Patient has reviewed and agreed to the above plan.     Written by  Fide Montalvo LPCC, [facility]

## 2025-07-23 ENCOUNTER — VIRTUAL VISIT (OUTPATIENT)
Dept: BEHAVIORAL HEALTH | Facility: CLINIC | Age: 45
End: 2025-07-23
Payer: COMMERCIAL

## 2025-07-23 ENCOUNTER — VIRTUAL VISIT (OUTPATIENT)
Dept: PSYCHIATRY | Facility: CLINIC | Age: 45
End: 2025-07-23
Payer: COMMERCIAL

## 2025-07-23 DIAGNOSIS — F40.10 SOCIAL ANXIETY DISORDER: ICD-10-CM

## 2025-07-23 DIAGNOSIS — F31.9 BIPOLAR 1 DISORDER (H): Primary | ICD-10-CM

## 2025-07-23 DIAGNOSIS — F41.1 GAD (GENERALIZED ANXIETY DISORDER): ICD-10-CM

## 2025-07-23 DIAGNOSIS — F31.30 BIPOLAR I DISORDER, MOST RECENT EPISODE DEPRESSED (H): ICD-10-CM

## 2025-07-23 DIAGNOSIS — F90.0 ADHD (ATTENTION DEFICIT HYPERACTIVITY DISORDER), INATTENTIVE TYPE: ICD-10-CM

## 2025-07-23 PROCEDURE — 98006 SYNCH AUDIO-VIDEO EST MOD 30: CPT | Performed by: PSYCHIATRY & NEUROLOGY

## 2025-07-23 PROCEDURE — 90832 PSYTX W PT 30 MINUTES: CPT | Mod: 95 | Performed by: COUNSELOR

## 2025-07-23 RX ORDER — DEXMETHYLPHENIDATE HYDROCHLORIDE 10 MG/1
10 TABLET ORAL DAILY
Qty: 30 TABLET | Refills: 0 | Status: SHIPPED | OUTPATIENT
Start: 2025-09-21 | End: 2025-10-21

## 2025-07-23 RX ORDER — ESCITALOPRAM OXALATE 20 MG/1
20 TABLET ORAL DAILY
Qty: 90 TABLET | Refills: 0 | Status: SHIPPED | OUTPATIENT
Start: 2025-07-23

## 2025-07-23 RX ORDER — DEXMETHYLPHENIDATE HYDROCHLORIDE 30 MG/1
30 CAPSULE, EXTENDED RELEASE ORAL DAILY
Qty: 30 CAPSULE | Refills: 0 | Status: SHIPPED | OUTPATIENT
Start: 2025-07-23 | End: 2025-08-22

## 2025-07-23 RX ORDER — DEXMETHYLPHENIDATE HYDROCHLORIDE 10 MG/1
10 TABLET ORAL DAILY
Qty: 30 TABLET | Refills: 0 | Status: SHIPPED | OUTPATIENT
Start: 2025-07-23 | End: 2025-08-22

## 2025-07-23 RX ORDER — DEXMETHYLPHENIDATE HYDROCHLORIDE 30 MG/1
30 CAPSULE, EXTENDED RELEASE ORAL DAILY
Qty: 30 CAPSULE | Refills: 0 | Status: SHIPPED | OUTPATIENT
Start: 2025-09-21 | End: 2025-10-21

## 2025-07-23 RX ORDER — LAMOTRIGINE 200 MG/1
200 TABLET ORAL DAILY
Qty: 90 TABLET | Refills: 0 | Status: SHIPPED | OUTPATIENT
Start: 2025-07-23

## 2025-07-23 RX ORDER — DEXMETHYLPHENIDATE HYDROCHLORIDE 30 MG/1
30 CAPSULE, EXTENDED RELEASE ORAL DAILY
Qty: 30 CAPSULE | Refills: 0 | Status: SHIPPED | OUTPATIENT
Start: 2025-08-22 | End: 2025-09-21

## 2025-07-23 RX ORDER — DEXMETHYLPHENIDATE HYDROCHLORIDE 10 MG/1
10 TABLET ORAL DAILY
Qty: 30 TABLET | Refills: 0 | Status: SHIPPED | OUTPATIENT
Start: 2025-08-22 | End: 2025-09-21

## 2025-07-23 RX ORDER — LITHIUM CARBONATE 150 MG/1
150 CAPSULE ORAL AT BEDTIME
Qty: 30 CAPSULE | Refills: 3 | Status: SHIPPED | OUTPATIENT
Start: 2025-07-23

## 2025-07-23 RX ORDER — LITHIUM CARBONATE 450 MG
900 TABLET, EXTENDED RELEASE ORAL AT BEDTIME
Qty: 60 TABLET | Refills: 3 | Status: SHIPPED | OUTPATIENT
Start: 2025-07-23

## 2025-07-23 RX ORDER — LAMOTRIGINE 100 MG/1
50 TABLET ORAL DAILY
Qty: 45 TABLET | Refills: 0 | Status: SHIPPED | OUTPATIENT
Start: 2025-07-23

## 2025-07-23 ASSESSMENT — ANXIETY QUESTIONNAIRES
1. FEELING NERVOUS, ANXIOUS, OR ON EDGE: MORE THAN HALF THE DAYS
7. FEELING AFRAID AS IF SOMETHING AWFUL MIGHT HAPPEN: NEARLY EVERY DAY
7. FEELING AFRAID AS IF SOMETHING AWFUL MIGHT HAPPEN: NEARLY EVERY DAY
6. BECOMING EASILY ANNOYED OR IRRITABLE: SEVERAL DAYS
1. FEELING NERVOUS, ANXIOUS, OR ON EDGE: MORE THAN HALF THE DAYS
3. WORRYING TOO MUCH ABOUT DIFFERENT THINGS: SEVERAL DAYS
4. TROUBLE RELAXING: NEARLY EVERY DAY
IF YOU CHECKED OFF ANY PROBLEMS ON THIS QUESTIONNAIRE, HOW DIFFICULT HAVE THESE PROBLEMS MADE IT FOR YOU TO DO YOUR WORK, TAKE CARE OF THINGS AT HOME, OR GET ALONG WITH OTHER PEOPLE: VERY DIFFICULT
4. TROUBLE RELAXING: NEARLY EVERY DAY
8. IF YOU CHECKED OFF ANY PROBLEMS, HOW DIFFICULT HAVE THESE MADE IT FOR YOU TO DO YOUR WORK, TAKE CARE OF THINGS AT HOME, OR GET ALONG WITH OTHER PEOPLE?: VERY DIFFICULT
8. IF YOU CHECKED OFF ANY PROBLEMS, HOW DIFFICULT HAVE THESE MADE IT FOR YOU TO DO YOUR WORK, TAKE CARE OF THINGS AT HOME, OR GET ALONG WITH OTHER PEOPLE?: VERY DIFFICULT
5. BEING SO RESTLESS THAT IT IS HARD TO SIT STILL: SEVERAL DAYS
GAD7 TOTAL SCORE: 12
GAD7 TOTAL SCORE: 12
2. NOT BEING ABLE TO STOP OR CONTROL WORRYING: SEVERAL DAYS
6. BECOMING EASILY ANNOYED OR IRRITABLE: SEVERAL DAYS
IF YOU CHECKED OFF ANY PROBLEMS ON THIS QUESTIONNAIRE, HOW DIFFICULT HAVE THESE PROBLEMS MADE IT FOR YOU TO DO YOUR WORK, TAKE CARE OF THINGS AT HOME, OR GET ALONG WITH OTHER PEOPLE: VERY DIFFICULT
GAD7 TOTAL SCORE: 12
5. BEING SO RESTLESS THAT IT IS HARD TO SIT STILL: SEVERAL DAYS
GAD7 TOTAL SCORE: 12
7. FEELING AFRAID AS IF SOMETHING AWFUL MIGHT HAPPEN: NEARLY EVERY DAY
2. NOT BEING ABLE TO STOP OR CONTROL WORRYING: SEVERAL DAYS
GAD7 TOTAL SCORE: 12
3. WORRYING TOO MUCH ABOUT DIFFERENT THINGS: SEVERAL DAYS

## 2025-07-23 NOTE — PATIENT INSTRUCTIONS
Treatment Plan:  Continue Lexapro/escitalopram 20 mg daily for mood and anxiety.  Continue to MONITOR FOR SIMONA SYMPTOMS.   Continue Focalin XR 30 mg daily for ADHD. Monitor for simona.   Continue Focalin immediate release 5-10 mg daily as needed for ADHD.  Monitor for simona.  Continue Klonopin 0.5 mg daily as needed for severe anxiety/panic.   Continue hydroxyzine 100 mg every AM for anxiety.   Continue lamotrigine 250 mg daily for bipolar depression.   Continue lithobid 1050 mg at bedtime for bipolar disorder. Labs up to date.   Continue propranolol per other prescriber for headaches (and anxiety)  Schedule an appointment with me in 2 months or sooner as needed ONLY if you HAVE NOT established care with new psychiatric provider. Discussed I bridge care up to 3 months or until your appointment with a long-term psychiatric provider, whichever comes first. Call Behavioral Health Access at [phone] to schedule.  Continue all other cares per primary care provider.   Continue all other medications as reviewed per electronic medical record today.   Safety plan reviewed. To the Emergency Department as needed or call after hours crisis line at [phone] or [phone]. [place] Crisis Text Line. Text [place] to 313513 or Suicide LifeLine Chat: suicidepreventionlifeline.org/chat  Continue therapy as planned.   Follow up with primary care provider as planned or for acute medical concerns.  Call the psychiatric nurse line with medication questions or concerns at [phone].  MyChart may be used to communicate with your provider, but this is not intended to be used for emergencies.    Risks of stimulant medication including, but not limited to, decreased appetite, risk of tics (and that they may be lasting), trouble sleeping, cardiac risks such as increased heart rate and blood pressure, and rare risk of sudden cardiac death.  Also risk of addiction/tolerance/dependence.    Risks of benzodiazepine (Ativan, Xanax,  Klonopin, Valium, etc) use including, but not limited to, sedation, tolerance, risk for addiction/dependence. Do not drink alcohol while taking benzodiazepines due to risk of trouble breathing and potential death. Do not drive or operate heavy machinery until it is known how the drug affects you. Discuss with physician or pharmacist before ever taking a benzodiazepine with a narcotic/opioid pain medication.     Lamictal (lamotrigine) can cause serious rashes including Hicks-Ion syndrome which may requiring hospitalization and discontinuation of treatment. If any signs of a rash occur, please see your Primary Care Provider or a dermatologist immediately.     Patient Education   Collaborative Care Psychiatry Service  What to Expect  Here's what to expect from your Collaborative Care Psychiatry Service (CCPS).   About CCPS  CCPS means 2 people work together to help you get better. You'll meet with a behavioral health clinician and a psychiatric doctor. A behavioral health clinician helps people with mental health problems by talking with them. A psychiatric doctor helps people by giving them medicine.  How it works  At every visit, you'll see the behavioral health clinician ([facility]) first. They'll talk with you about how you're doing and teach you how to feel better.   Then you'll see the psychiatric doctor. This doctor can help you deal with troubling thoughts and feelings by giving you medicine. They'll make sure you know the plan for your care.   CCPS usually takes 3 to 6 visits. If you need more visits, we may have you start seeing a different psychiatric doctor for ongoing care.  If you have any questions or concerns, we'll be glad to talk with you.  About visits  Be open  At your visits, please talk openly about your problems. It may feel hard, but it's the best way for us to help you.  Cancelling visits  If you can't come to your visit, please call us right away at [phone]. If you don't cancel at least  "24 hours (1 full day) before your visit, that's \"late cancellation.\"  Being late to visits  Being very late is the same as not showing up. You will be a \"no show\" if:  Your appointment starts with a C, and you're more than 15 minutes late for a 30-minute (half hour) visit. This will also cancel your appointment with the psychiatric doctor.  Your appointment is with a psychiatric doctor only, and you're more than 15 minutes late for a 30-minute (half hour) visit.  Your appointment is with a psychiatric doctor only, and you're more than 30 minutes late for a 60-minute (full hour) visit.  If you cancel late or don't show up 2 times within 6 months, we may end your care.   Getting help between visits  If you need help between visits, you can call us Monday to Friday from 8 a.m. to 4:30 p.m. at [phone].  Emergency care  Call 911 or go to the nearest emergency department if your life or someone else's life is in danger.  Call 988 anytime to reach the [facility] Suicide and Crisis hotline.  Medicine refills  To refill your medicine, call your pharmacy. You can also call [facility]'s Behavioral Access at [phone], Monday to Friday, 8 a.m. to 4:30 p.m. It can take 1 to 3 business days to get a refill.   Forms, letters, and tests  You may have papers to fill out, like FMLA, short-term disability, and workability. We can help you with these forms at your visits, but you must have an appointment. You may need more than 1 visit for this, to be in an intensive therapy program, or both.  Before we can give you medicine for ADHD, we may refer you to get tested for it or confirm it another way.  We may not be able to give you an emotional support animal letter.  We don't do mental health checks ordered by the court.   We don't do mental health testing, but we can refer you to get tested.   Thank you for choosing us for your care.  For informational purposes only. Not to replace the advice of your health care " provider. Copyright   2022 [facility]. All rights reserved. [employer] 374233 - Rev 11/24.

## 2025-07-23 NOTE — PROGRESS NOTES
"Virtual Visit Details    Type of service:  Video Visit     Originating Location (pt. Location): {video visit patient location:123715::\"Home\"}  {PROVIDER LOCATION On-site should be selected for visits conducted from your clinic location or adjoining [facility] hospital, academic office, or other nearby [facility] building. Off-site should be selected for all other provider locations, including home:683124}  Distant Location (provider location):  {virtual location provider:636369}  Platform used for Video Visit: {Virtual Visit Platforms:867768::\"TripFab\"}  "

## 2025-07-23 NOTE — PROGRESS NOTES
"Telemedicine Visit: The patient's condition can be safely assessed and treated via synchronous audio and visual telemedicine encounter.      Reason for Telemedicine Visit: Patient has requested telehealth visit    Originating Site (Patient Location): Patient's home     Distant Location (provider location):  Off-Site    Consent:  The patient/guardian has verbally consented to: the potential risks and benefits of telemedicine (video visit) versus in person care; bill my insurance or make self-payment for services provided; and responsibility for payment of non-covered services.     Mode of Communication:  Video Conference via Xolve    As the provider I attest to compliance with applicable laws and regulations related to telemedicine.         Outpatient Psychiatric Progress Note    Name: Tavo Key   : 1980                    Primary Care Provider: Criselda Walsh PA-C   Therapist: Shanelle FLAHERTY      PHQ-9 scores:      2025     9:04 PM 6/3/2025     9:58 AM 2025     7:56 AM   PHQ-9 SCORE   PHQ-9 Total Score MyChart 12 (Moderate depression) 14 (Moderate depression) 13 (Moderate depression)   PHQ-9 Total Score 12  14  13        Patient-reported       MATTI-7 scores:      2025     9:05 PM 6/3/2025     9:59 AM 2025     7:57 AM   MATTI-7 SCORE   Total Score 11 (moderate anxiety) 11 (moderate anxiety) 12 (moderate anxiety)   Total Score 11  11  12        Patient-reported       Patient Identification:  Patient is a 45 year old,   White Not  or  male  who presents for return visit with me.  Patient is currently unemployed. Patient attended the phone/video session alone. Patient prefers to be called: \"Tavo\".    Interim History:  Pt is a transfer of care from Dr. Colon. Dr. Colon last saw Tavo Key for outpatient psychiatry Return Visit on 10/23/2024.      I last saw patient for return visit on 2025: During that appointment, we:    Continue " Lexapro/escitalopram 20 mg daily for mood and anxiety.  Continue to MONITOR FOR RUSH SYMPTOMS.   Continue Focalin XR 30 mg daily for ADHD. Monitor for rush.   Continue Focalin immediate release 5-10 mg daily as needed for ADHD.  Monitor for rush.  Continue Klonopin 0.5 mg daily as needed for severe anxiety/panic.   Continue hydroxyzine 100 mg every AM for anxiety.   Continue lamotrigine 250 mg daily for bipolar depression.   Continue lithobid 1050 mg at bedtime for bipolar disorder. Labs up to date.   Continue propranolol per other prescriber for headaches (and anxiety)    7/23: Patient overall doing okay.  Working on healthier and more consistent diet.  Trying to exercise/move his body more frequently.  Back pain has been improving some since starting tirzepatide.  Still working to find employment which can be stressful.  Continues in psychotherapy.  Taking medication as prescribed.  No new medication related side effects.  No chest pain, racing heart, tics.  Patient does feel quite fatigued at times.  Is trying to take lamotrigine at different times to see if that helps improve fatigue levels.  Is not sure how to access CPAP machine data and will reach out to sleep medicine team to ensure he is getting restful sleep with CPAP.  No acute safety concerns.  No acute suicidality.  No problematic drug or alcohol use.  See [facility] note below for additional details.    Per [facility], [facility], during today's team-based visit:  Current Stressors / Issues:  MH update:  Irritability has been better. Chronic pain increased in neck/back due to job hunting.  On going job hunting without success.  Has to make follow up call about last interview.  Anxiety has better managed.  Less self blame.   Mood has been better.  Stresses: n/a  Appetite: injection, emotional eating.  Not losing weight.  Sleep: RAVI CPAP  Outpatient Provider updates:  Shanelle FLAHERTY.  EMDR in the future  SI/SIB/HI:  Denies  AASHISH:  social eTOH  Side  effects/compliance:  Interventions:  [facility] engaged in discussion of food patterns/eating.  Discussion of reward behaviors.  Self soothing.  Swapping items. Building movement.  Most important:  frustrated with food patterns/eating.  Better mood.  Lower anxiety.  Less irritability.      Past Psychiatric Med Trials:  At time of transfer of care 12/31/2024:  Buspar 30 mg BID-tolerating well but has not found it helpful at all  Klonopin 0.5 mg daily prn - as needed, works really well, takes for interviews and angst-provoking situations   Focalin XR 30 mg daily - uses daily, has been working pretty well, racing heart a little   Lexapro 10 mg daily -tolerating well and has found it the most helpful for mood on many many years ago and tolerated well   Gabapentin - has been taking twice daily 100 mg, no side effects, does not notice any positive or negative effects  Hydroxyzine - takes 100 mg every AM, it may be slightly helpful, well-tolerated  Lamotrigine 250 mg daily - maybe a higher dose in the past  Lithobid 1050 daily at bedtime  Propranolol - twice daily every day-taking for migraines/headaches    Per Dr. Colon note at intake 11/01/2023:  Med Trials:  Hydroxyzine - anxiety (replaced clonazepam)  Gabapentin - anxiety (replaced clonazepam)  Lamotrigine - on for 2-3 years; highest dose 200 mg also current dose  Lithium - on/off x 10 years, up to 1200 or 1500 mg  Caraprazine  Olanzapine - significant weight gain - tried in 2008  Aripiprazole - helped with depression, does not recall discontinuation reason  Adderall  Methylphenidate   atomoxetine    Psychiatric ROS:  See HPI above for all pertinent positives and negatives. Rest of systems negative.     PHQ9 and GAD7 scores were reviewed today if completed.   Medication side effects: Denies  Current stressors include: Symptoms and see HPI above  Coping mechanisms and supports include: Therapy, Family, Hobbies, and Friends    Current medications include:   Current Outpatient  Medications   Medication Sig Dispense Refill    acetaminophen (TYLENOL) 500 MG tablet Take 1,000 mg by mouth 3 times daily as needed for mild pain      albuterol (PROAIR HFA/PROVENTIL HFA/VENTOLIN HFA) 108 (90 Base) MCG/ACT inhaler Inhale 2 puffs into the lungs every 4 hours as needed for shortness of breath or wheezing. 18 g 5    Alcohol Swabs (B-D SINGLE USE SWABS REGULAR) PADS USE TO SWAB AREA OF INJECTION/FREIDA AS DIRECTED. 100 each 3    amLODIPine (NORVASC) 10 MG tablet TAKE ONE TABLET BY MOUTH ONCE DAILY 90 tablet 2    aspirin (ASA) 81 MG EC tablet Take 1 tablet (81 mg) by mouth daily      atorvastatin (LIPITOR) 20 MG tablet TAKE ONE TABLET BY MOUTH EVERY NIGHT AT BEDTIME. NEED APPOINTMENT FOR FURTHER REFILLS. 90 tablet 1    B Complex Vitamins (VITAMIN-B COMPLEX PO) Take by mouth. 1 gummy daily      clonazePAM (KLONOPIN) 0.5 MG tablet Take 1 tablet (0.5 mg) by mouth daily as needed for anxiety. 15 tabs to last at least 30 days 15 tablet 0    clotrimazole-betamethasone (LOTRISONE) 1-0.05 % external cream Apply topically 2 times daily Angles of mouth 45 g 3    Continuous Glucose Sensor (FREESTYLE ALDEN 3 PLUS SENSOR) MISC Change every 15 days. 6 each 0    Continuous Glucose Sensor (FREESTYLE ALDEN 3 SENSOR) MISC APPLY 1 SENSOR AND CHANGE EVERY 14 DAYS AS DIRECTED 2 each 5    dexmethylphenidate (FOCALIN XR) 30 MG 24 hr capsule Take 1 capsule (30 mg) by mouth daily. 30 capsule 0    escitalopram (LEXAPRO) 20 MG tablet Take 1 tablet (20 mg) by mouth daily. 30 tablet 0    esomeprazole (NEXIUM) 40 MG DR capsule Take 1 capsule (40 mg) by mouth every morning (before breakfast) Take 30-60 minutes before eating.      fluticasone (FLONASE) 50 MCG/ACT nasal spray Spray 1 spray into both nostrils daily (Patient taking differently: Spray 1 spray into both nostrils daily as needed for rhinitis or allergies.) 16 g 11    hydrOXYzine (VISTARIL) 50 MG capsule Take 1 capsule (50 mg) by mouth 2 times daily. 180 capsule 1    insulin  glargine (LANTUS SOLOSTAR) 100 UNIT/ML pen Inject 38 Units subcutaneously at bedtime. 45 mL 1    insulin lispro (HUMALOG KWIKPEN) 100 UNIT/ML (1 unit dial) KWIKPEN Inject 12-14 Units subcutaneously 3 times daily (before meals). Place on file 45 mL 1    insulin pen needle (PENTIPS) 31G X 5 MM miscellaneous USE 4 PEN NEEDLES DAILY OR AS DIRECTED. 400 each 1    lamoTRIgine (LAMICTAL) 100 MG tablet Take 0.5 tablets (50 mg) by mouth daily. Take along with a 200 mg tablet for a total of 250 mg daily 15 tablet 0    lamoTRIgine (LAMICTAL) 200 MG tablet Take 1 tablet (200 mg) by mouth daily. Take along with one half of a 100 mg tablet for a total of 250 mg daily 14 tablet 0    lithium (ESKALITH CR/LITHOBID) 450 MG CR tablet Take 2 tablets (900 mg) by mouth at bedtime. Take along with a 150 mg lithium capsule for a total of 1050 mg nightly 60 tablet 3    lithium (ESKALITH) 150 MG capsule Take 1 capsule (150 mg) by mouth at bedtime. Take along with two 450 mg extended release pills for a total of 1050 mg nightly 30 capsule 3    losartan (COZAAR) 50 MG tablet TAKE ONE TABLET BY MOUTH ONCE DAILY 90 tablet 0    meloxicam (MOBIC) 15 MG tablet TAKE 1 TABLET (15 MG) BY MOUTH DAILY. 90 tablet 0    naratriptan (AMERGE) 2.5 MG tablet TAKE ONE TABLET BY MOUTH AT ONSET OF HEADACHE FOR MIGRAINE, MAY REPEAT DOSE AFTER 4 HOURS IF NEEDED. DO NOT TAKE MORE THAN TWO TABLETS IN 24 HOURS. 12 tablet 0    propranolol ER (INDERAL LA) 160 MG 24 hr capsule Take 1 capsule (160 mg) by mouth daily. 90 capsule 1    sildenafil (REVATIO) 20 MG tablet TAKE ONE TO FIVE TABLETS (100MG) BY MOUTH ONCE DAILY AS NEEDED FOR ERECTILE DYSFUNCTION 30 tablet 0    testosterone (ANDROGEL 1.62 % PUMP) 20.25 MG/ACT gel PLACE 1 PUMP (20.25 MG) ONTO THE SKIN DAILY. APPLY FROM DISPENSER TO CLEAN, DRY, INTACT SKIN OF THE UPPER ARMS AND SHOULDERS. 75 g 5    tirzepatide (MOUNJARO) 10 MG/0.5ML SOAJ auto-injector pen INJECT 0.5 MLS (10 MG) SUBCUTANEOUSLY EVERY 7 DAYS. 6 mL 0     tirzepatide (MOUNJARO) 12.5 MG/0.5ML SOAJ auto-injector pen Inject 0.5 mLs (12.5 mg) subcutaneously every 7 days. 2 mL 2     No current facility-administered medications for this visit.       The [place] Prescription Monitoring Program has been reviewed and there are no concerns about diversionary activity for controlled substances at this time.   07/07/2025 07/07/2025 1 Clonazepam 0.5 Mg Tablet 15.00 30 Al Bau [id number] Joel ([id number]) 0/0 0.50 LME [place]   06/11/2025 01/31/2025 1 Dexmethylphenidate Er 30 Mg Cp 30.00 30 Al Bau [id number] Joel ([id number]) 0/0  [place]   06/11/2025 04/23/2025 1 Dexmethylphenidate 10 Mg Tab 30.00 30 Al Bau [id number] Joel ([id number][place] 0/0  [place]   05/28/2025 05/23/2025 1 Clonazepam 0.5 Mg Tablet 15.00 30 Al Kru [id number] Joel ([id number]) 0/0 0.50 [place]   05/02/2025 04/28/2025 1 Testosterone 1.62% Gel Pump 75.00 31 Am Warren [id number] Joel ([id number]) 0/5  [place]       Past Medical/Surgical History:  Past Medical History:   Diagnosis Date    Depressive disorder     Diabetes (H) 10/01/2020    Hypertension     Uncomplicated asthma       has a past medical history of Depressive disorder, Diabetes (H) (10/01/2020), Hypertension, and Uncomplicated asthma.    He has no past medical history of Arthritis, Cancer (H), Cerebral infarction (H), Congestive heart failure (H), COPD (chronic obstructive pulmonary disease) (H), Heart disease, History of blood transfusion, or Thyroid disease.    Social History:  Reviewed. No changes to social history except as noted above in HPI.    Vital Signs:   None. This is phone/video visit.     Labs:  Most recent laboratory results reviewed and the pertinent results include:   Lithium 0.74 on 7/26/24    TSH   Date Value Ref Range Status   07/26/2024 2.01 0.30 - 4.20 uIU/mL Final     Lab Results   Component Value Date    A1C 7.8 10/16/2024    A1C 8.3 07/26/2024    A1C 7.7 03/06/2024    A1C 6.8 11/10/2023    A1C 6.4 08/21/2023     Last Comprehensive Metabolic Panel:  Lab  Results   Component Value Date     04/07/2025    POTASSIUM 4.3 04/07/2025    CHLORIDE 107 04/07/2025    CO2 20 (L) 04/07/2025    ANIONGAP 11 04/07/2025     (H) 04/07/2025    BUN 16.8 04/07/2025    CR 0.97 04/07/2025    GFRESTIMATED >90 04/07/2025    FRANCO 9.6 04/07/2025     Last Comprehensive Metabolic Panel:  Lab Results   Component Value Date     04/07/2025    POTASSIUM 4.3 04/07/2025    CHLORIDE 107 04/07/2025    CO2 20 (L) 04/07/2025    ANIONGAP 11 04/07/2025     (H) 04/07/2025    BUN 16.8 04/07/2025    CR 0.97 04/07/2025    GFRESTIMATED >90 04/07/2025    FRANCO 9.6 04/07/2025     Recent Labs   Lab Test 07/26/24  0824 05/17/23  1030   CHOL 203* 134   HDL 40 38*   LDL 98 54   TRIG 324* 212*     Review of Systems:  10 systems (general, cardiovascular, respiratory, eyes, ENT, endocrine, GI, , M/S, neurological) were reviewed. Most pertinent finding(s) is/are:  denies fever, cough, persistent headaches, shortness of breath, chest pain, severe GI symptoms, trouble urinating, severe pain. The remaining systems are all unremarkable.    Mental Status Examination (limited as this is by phone/video):  Appearance: Awake, alert, appears stated age, no acute distress, well-groomed   Attitude:  cooperative, pleasant   Motor: No gross abnormalities observed via video, not formally tested   Oriented to:  person, place, time, and situation  Attention Span and Concentration:  normal  Speech:  clear, coherent, regular rate, rhythm, and volume  Language: intact  Mood: pretty good  Affect:  appropriate and in normal range and mood congruent  Associations:  no loose associations  Thought Process:  logical, linear and goal oriented  Thought Content:  no evidence of suicidal ideation or homicidal ideation, no evidence of psychotic thought, no auditory hallucinations present and no visual hallucinations present  Recent and Remote Memory:  Intact to interview. Not formally assessed. No amnesia.  Fund of Knowledge:  appropriate  Insight:  good  Judgment:  intact, adequate for safety  Impulse Control:  intact    Suicide Risk Assessment:  Today Tavo Key reports no acute suicidality. Based on all available evidence including the factors cited above, Tavo Key does not appear to be at imminent risk for self-harm, does not meet criteria for a 72-hr hold, and therefore remains appropriate for ongoing outpatient level of care.  A thorough assessment of risk factors related to suicide and self-harm have been reviewed and are noted above. The patient convincingly denies suicidality on several occasions. Local community safety resources reviewed for patient to use if needed. There was no deceit detected, and the patient presented in a manner that was believable.     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       Mild persistent asthma without complication 03/05/2021     Priority: Medium    MATTI (generalized anxiety disorder) 03/05/2021     Priority: Medium    Other migraine without status migrainosus, not intractable 03/05/2021     Priority: Medium    ADHD (attention deficit hyperactivity disorder), inattentive type 03/05/2021     Priority: Medium    Erectile dysfunction, unspecified erectile dysfunction type 03/05/2021     Priority: Medium    Gastroesophageal reflux disease with esophagitis, unspecified whether hemorrhage 03/05/2021     Priority: Medium    Sleep apnea with use of continuous positive airway pressure (CPAP) 03/05/2021     Priority: Medium     Obesity, unspecified 03/05/2021     Priority: Medium       Psychosocial & Contextual Factors: see HPI above    Assessment:  Most recent follow-up with Dr. Colon, 10/23/2024:  Patient currently undergoing depression with history of depression, rush, ADHD, anxiety and adverse childhood events. Sx improving but challenges remain with depression, ADHD medications.     12/31/2024:  Patient is transfer of care today.  Patient has been treated for ADHD, bipolar disorder, social anxiety, and generalized anxiety disorder.  Discussed working together to optimize patient's psychiatric medications and reduce polypharmacy.  Buspirone was added to patient's regimen prior to further optimizing Lexapro despite Lexapro being quite helpful for his symptoms.  No hypomania/rush.  We will taper off of buspirone since has been on max dose with little to no additional improvement in anxiety or other mental health symptoms.  We will increase Lexapro since tapering off buspirone.  Patient will also discontinue gabapentin since only taking 100 mg twice daily with little to no effect.  Before even trying to optimize gabapentin I would like to further optimize other medications at this time.  Would also be nice if we can discontinue hydroxyzine.  Could consider something like guanfacine in the future to target anxiety and ADHD symptoms further.  Tolerating Focalin XR 30 mg daily well.  Feels it adequately treats ADHD symptoms.  No crashes in the evening.  Slightly reactive heart rate when feeling anxious while on the stimulant medication.  Propranolol has been helpful.  Taking propranolol for migraines.  Sparing use of clonazepam-particularly for job interviews and angst provoking situations.  Lithium level therapeutic in July.  No concerns with thyroid or creatinine with July labs.  Could consider further optimization of lamotrigine in the future.  No acute safety concerns.  No suicidality.  No problematic drug or alcohol  use.    1/31/2025:  Able to discontinue gabapentin and buspirone without any problems.  Mood and day-to-day anxiety slightly better since last visit.  Evenings tend to be the most difficult.  Patient agreeable to trial with Focalin immediate release for the evenings to see if that is helpful.  Patient will continue to watch for worsening anxiety and hypomanic symptoms.  Would consider guanfacine, but not the best option since patient on twice daily propranolol.  Could consider low-dose olanzapine to use at bedtime to help with sleep, ruminations, and anxiety if booster dose of Focalin not helpful or poorly tolerated.  No acute safety concerns.  No acute suicidality.  No problematic drug or alcohol use.    4/23/2025:  Overall doing okay.  Ongoing high psychosocial stressors related to job search and interviews.  Handling well and managing okay.  Feels like medications are in a good place for now.  No medication changes today.  No acute safety concerns.  No acute suicidality.  No problematic drug or alcohol use.  Patient will be seen back in 3 months for follow-up.  If remains stable, could consider return of psychiatric care back to primary care provider.    7/23/2025:  Patient overall doing okay.  Medications continue to be stable.  Patient encouraged to look into his CPAP data to ensure he is getting restful sleep with adequate CPAP settings and mask seal.  No new negative side effects.  Still feeling a bit fatigued-hopefully this will improve with ongoing tirzepatide use.  Patient working on increasing physical activity.  No acute safety concerns.  No acute suicidality.  No problematic drug or alcohol use.  Referral placed for long-term psychiatric care.  Patient prefers to follow with long-term psychiatric care versus return of psychiatric care back to primary care provider.  Given more complex medication regimen and chronic mental health symptoms, I do agree that this is likely the best option.  Yearly labs have  been ordered-patient needs to schedule blood draw.    Medication side effects and alternatives were reviewed. Health promotion activities recommended and reviewed today. All questions addressed. Education and counseling completed regarding risks and benefits of medications and psychotherapy options. Recommend therapy for additional support.     Treatment Plan:  Continue Lexapro/escitalopram 20 mg daily for mood and anxiety.  Continue to MONITOR FOR SIMONA SYMPTOMS.   Continue Focalin XR 30 mg daily for ADHD. Monitor for simona.   Continue Focalin immediate release 5-10 mg daily as needed for ADHD.  Monitor for simona.  Continue Klonopin 0.5 mg daily as needed for severe anxiety/panic.   Continue hydroxyzine 100 mg every AM for anxiety.   Continue lamotrigine 250 mg daily for bipolar depression.   Continue lithobid 1050 mg at bedtime for bipolar disorder. Labs up to date.   Continue propranolol per other prescriber for headaches (and anxiety)  Schedule an appointment with me in 2 months or sooner as needed ONLY if you HAVE NOT established care with new psychiatric provider. Discussed I bridge care up to 3 months or until your appointment with a long-term psychiatric provider, whichever comes first. Call [facility] at [phone] to schedule.  Continue all other cares per primary care provider.   Continue all other medications as reviewed per electronic medical record today.   Safety plan reviewed. To the Emergency Department as needed or call after hours crisis line at [phone] or [phone]. [place] Crisis Text Line. Text [place] to 174465 or Suicide LifeLine Chat: suicidepreventionlifeline.org/chat  Continue therapy as planned.   Follow up with primary care provider as planned or for acute medical concerns.  Call the psychiatric nurse line with medication questions or concerns at [phone].  MyChart may be used to communicate with your provider, but this is not intended to be used for  emergencies.    Risks of stimulant medication including, but not limited to, decreased appetite, risk of tics (and that they may be lasting), trouble sleeping, cardiac risks such as increased heart rate and blood pressure, and rare risk of sudden cardiac death.  Also risk of addiction/tolerance/dependence.    Risks of benzodiazepine (Ativan, Xanax, Klonopin, Valium, etc) use including, but not limited to, sedation, tolerance, risk for addiction/dependence. Do not drink alcohol while taking benzodiazepines due to risk of trouble breathing and potential death. Do not drive or operate heavy machinery until it is known how the drug affects you. Discuss with physician or pharmacist before ever taking a benzodiazepine with a narcotic/opioid pain medication.     Lamictal (lamotrigine) can cause serious rashes including Hicks-Ion syndrome which may requiring hospitalization and discontinuation of treatment. If any signs of a rash occur, please see your Primary Care Provider or a dermatologist immediately.     Administrative Billing:   Phone Call/Video Duration: 13 Minutes  Start: 8:30a  Stop: 8:43a    Episode of Care #: 11 (First 7 visits with Dr. Colon)    Patient Status:  The patient has been referred to long term psychiatry care on 7/23/2025 and provider agreed to provide bridging for three months, or until patient is established with new community provider, whichever comes first.  Last day of [facility] care would be 10/23/2025, regardless if established with new provider yet or not (unless other arrangements were discussed and agreed upon), per collaborative care guidelines.  Patient verbally agreed to this recommended plan on 7/23/2025.      Signed:   Kait Ag DO  [facility]    Disclaimer: This note consists of symbols derived from keyboarding, dictation and/or voice recognition software. As a result, there may be errors in the script that have gone undetected. Please consider this when interpreting  information found in this chart.

## 2025-07-24 ENCOUNTER — PATIENT OUTREACH (OUTPATIENT)
Dept: CARE COORDINATION | Facility: CLINIC | Age: 45
End: 2025-07-24
Payer: COMMERCIAL

## 2025-07-26 ENCOUNTER — LAB (OUTPATIENT)
Dept: LAB | Facility: CLINIC | Age: 45
End: 2025-07-26
Payer: COMMERCIAL

## 2025-07-26 DIAGNOSIS — F31.62 BIPOLAR MIXED AFFECTIVE DISORDER, MODERATE (H): ICD-10-CM

## 2025-07-26 DIAGNOSIS — E29.1 HYPOGONADISM MALE: ICD-10-CM

## 2025-07-26 DIAGNOSIS — E11.29 TYPE 2 DIABETES MELLITUS WITH OTHER DIABETIC KIDNEY COMPLICATION (H): ICD-10-CM

## 2025-07-26 DIAGNOSIS — I10 HYPERTENSION GOAL BP (BLOOD PRESSURE) < 140/90: ICD-10-CM

## 2025-07-26 LAB
ANION GAP SERPL CALCULATED.3IONS-SCNC: 12 MMOL/L (ref 7–15)
BASOPHILS # BLD AUTO: 0.1 10E3/UL (ref 0–0.2)
BASOPHILS NFR BLD AUTO: 1 %
BUN SERPL-MCNC: 15.3 MG/DL (ref 6–20)
CALCIUM SERPL-MCNC: 9 MG/DL (ref 8.8–10.4)
CHLORIDE SERPL-SCNC: 107 MMOL/L (ref 98–107)
CHOLEST SERPL-MCNC: 141 MG/DL
CREAT SERPL-MCNC: 0.94 MG/DL (ref 0.67–1.17)
CREAT UR-MCNC: 130 MG/DL
EGFRCR SERPLBLD CKD-EPI 2021: >90 ML/MIN/1.73M2
EOSINOPHIL # BLD AUTO: 0.1 10E3/UL (ref 0–0.7)
EOSINOPHIL NFR BLD AUTO: 2 %
ERYTHROCYTE [DISTWIDTH] IN BLOOD BY AUTOMATED COUNT: 13.2 % (ref 10–15)
EST. AVERAGE GLUCOSE BLD GHB EST-MCNC: 137 MG/DL
FASTING STATUS PATIENT QL REPORTED: ABNORMAL
FASTING STATUS PATIENT QL REPORTED: YES
GLUCOSE SERPL-MCNC: 194 MG/DL (ref 70–99)
HBA1C MFR BLD: 6.4 %
HCO3 SERPL-SCNC: 22 MMOL/L (ref 22–29)
HCT VFR BLD AUTO: 41.5 % (ref 40–53)
HDLC SERPL-MCNC: 42 MG/DL
HGB BLD-MCNC: 14.3 G/DL (ref 13.3–17.7)
IMM GRANULOCYTES # BLD: 0 10E3/UL
IMM GRANULOCYTES NFR BLD: 0 %
LDLC SERPL CALC-MCNC: 70 MG/DL
LITHIUM SERPL-SCNC: 0.28 MMOL/L (ref 0.6–1.2)
LYMPHOCYTES # BLD AUTO: 2 10E3/UL (ref 0.8–5.3)
LYMPHOCYTES NFR BLD AUTO: 23 %
MCH RBC QN AUTO: 26.8 PG (ref 26.5–33)
MCHC RBC AUTO-ENTMCNC: 34.5 G/DL (ref 31.5–36.5)
MCV RBC AUTO: 78 FL (ref 78–100)
MICROALBUMIN UR-MCNC: <12 MG/L
MICROALBUMIN/CREAT UR: NORMAL MG/G{CREAT}
MONOCYTES # BLD AUTO: 0.5 10E3/UL (ref 0–1.3)
MONOCYTES NFR BLD AUTO: 5 %
NEUTROPHILS # BLD AUTO: 5.8 10E3/UL (ref 1.6–8.3)
NEUTROPHILS NFR BLD AUTO: 68 %
NONHDLC SERPL-MCNC: 99 MG/DL
NRBC # BLD AUTO: 0 10E3/UL
NRBC BLD AUTO-RTO: 0 /100
PLATELET # BLD AUTO: 229 10E3/UL (ref 150–450)
POTASSIUM SERPL-SCNC: 4.3 MMOL/L (ref 3.4–5.3)
RBC # BLD AUTO: 5.34 10E6/UL (ref 4.4–5.9)
SODIUM SERPL-SCNC: 141 MMOL/L (ref 135–145)
TRIGL SERPL-MCNC: 143 MG/DL
TSH SERPL DL<=0.005 MIU/L-ACNC: 1.21 UIU/ML (ref 0.3–4.2)
VIT B12 SERPL-MCNC: 880 PG/ML (ref 232–1245)
WBC # BLD AUTO: 8.5 10E3/UL (ref 4–11)

## 2025-07-26 PROCEDURE — 36415 COLL VENOUS BLD VENIPUNCTURE: CPT | Performed by: PATHOLOGY

## 2025-07-26 PROCEDURE — 80048 BASIC METABOLIC PNL TOTAL CA: CPT | Performed by: PATHOLOGY

## 2025-07-26 PROCEDURE — 82607 VITAMIN B-12: CPT | Performed by: PHYSICIAN ASSISTANT

## 2025-07-26 PROCEDURE — 83036 HEMOGLOBIN GLYCOSYLATED A1C: CPT | Performed by: PHYSICIAN ASSISTANT

## 2025-07-26 PROCEDURE — 82570 ASSAY OF URINE CREATININE: CPT | Performed by: PHYSICIAN ASSISTANT

## 2025-07-26 PROCEDURE — 99000 SPECIMEN HANDLING OFFICE-LAB: CPT | Performed by: PATHOLOGY

## 2025-07-26 PROCEDURE — 85025 COMPLETE CBC W/AUTO DIFF WBC: CPT | Performed by: PATHOLOGY

## 2025-07-26 PROCEDURE — 3048F LDL-C <100 MG/DL: CPT | Performed by: PATHOLOGY

## 2025-07-26 PROCEDURE — 80178 ASSAY OF LITHIUM: CPT | Performed by: PHYSICIAN ASSISTANT

## 2025-07-26 PROCEDURE — 80061 LIPID PANEL: CPT | Performed by: PATHOLOGY

## 2025-07-26 PROCEDURE — 84443 ASSAY THYROID STIM HORMONE: CPT | Performed by: PATHOLOGY

## 2025-07-26 PROCEDURE — 84403 ASSAY OF TOTAL TESTOSTERONE: CPT | Performed by: PHYSICIAN ASSISTANT

## 2025-07-28 ENCOUNTER — PATIENT OUTREACH (OUTPATIENT)
Dept: CARE COORDINATION | Facility: CLINIC | Age: 45
End: 2025-07-28
Payer: COMMERCIAL

## 2025-07-29 ENCOUNTER — MYC MEDICAL ADVICE (OUTPATIENT)
Dept: PSYCHIATRY | Facility: CLINIC | Age: 45
End: 2025-07-29

## 2025-07-29 ENCOUNTER — VIRTUAL VISIT (OUTPATIENT)
Dept: FAMILY MEDICINE | Facility: CLINIC | Age: 45
End: 2025-07-29
Payer: COMMERCIAL

## 2025-07-29 DIAGNOSIS — Z79.4 TYPE 2 DIABETES MELLITUS WITHOUT COMPLICATION, WITH LONG-TERM CURRENT USE OF INSULIN (H): ICD-10-CM

## 2025-07-29 DIAGNOSIS — E11.29 TYPE 2 DIABETES MELLITUS WITH OTHER DIABETIC KIDNEY COMPLICATION (H): ICD-10-CM

## 2025-07-29 DIAGNOSIS — R11.0 NAUSEA: ICD-10-CM

## 2025-07-29 DIAGNOSIS — G43.809 OTHER MIGRAINE WITHOUT STATUS MIGRAINOSUS, NOT INTRACTABLE: ICD-10-CM

## 2025-07-29 DIAGNOSIS — F31.30 BIPOLAR I DISORDER, MOST RECENT EPISODE DEPRESSED (H): ICD-10-CM

## 2025-07-29 DIAGNOSIS — E11.9 TYPE 2 DIABETES MELLITUS WITHOUT COMPLICATION, WITH LONG-TERM CURRENT USE OF INSULIN (H): ICD-10-CM

## 2025-07-29 DIAGNOSIS — E29.1 HYPOGONADISM MALE: Primary | ICD-10-CM

## 2025-07-29 LAB — TESTOST SERPL-MCNC: 256 NG/DL (ref 240–950)

## 2025-07-29 PROCEDURE — 98006 SYNCH AUDIO-VIDEO EST MOD 30: CPT | Performed by: PHYSICIAN ASSISTANT

## 2025-07-29 RX ORDER — ONDANSETRON 4 MG/1
4 TABLET, FILM COATED ORAL EVERY 8 HOURS PRN
Qty: 12 TABLET | Refills: 0 | Status: SHIPPED | OUTPATIENT
Start: 2025-07-29

## 2025-07-29 RX ORDER — CLONAZEPAM 0.5 MG/1
0.5 TABLET ORAL DAILY PRN
Qty: 15 TABLET | Refills: 0 | Status: SHIPPED | OUTPATIENT
Start: 2025-08-06

## 2025-07-29 RX ORDER — TESTOSTERONE 1.62 MG/G
2 GEL TRANSDERMAL DAILY
Qty: 75 G | Refills: 5 | Status: SHIPPED | OUTPATIENT
Start: 2025-07-29

## 2025-07-29 ASSESSMENT — ASTHMA QUESTIONNAIRES
QUESTION_3 LAST FOUR WEEKS HOW OFTEN DID YOUR ASTHMA SYMPTOMS (WHEEZING, COUGHING, SHORTNESS OF BREATH, CHEST TIGHTNESS OR PAIN) WAKE YOU UP AT NIGHT OR EARLIER THAN USUAL IN THE MORNING: NOT AT ALL
QUESTION_1 LAST FOUR WEEKS HOW MUCH OF THE TIME DID YOUR ASTHMA KEEP YOU FROM GETTING AS MUCH DONE AT WORK, SCHOOL OR AT HOME: SOME OF THE TIME
QUESTION_4 LAST FOUR WEEKS HOW OFTEN HAVE YOU USED YOUR RESCUE INHALER OR NEBULIZER MEDICATION (SUCH AS ALBUTEROL): NOT AT ALL
QUESTION_5 LAST FOUR WEEKS HOW WOULD YOU RATE YOUR ASTHMA CONTROL: WELL CONTROLLED
ACT_TOTALSCORE: 20
QUESTION_2 LAST FOUR WEEKS HOW OFTEN HAVE YOU HAD SHORTNESS OF BREATH: THREE TO SIX TIMES A WEEK

## 2025-07-29 NOTE — PROGRESS NOTES
"Tavo is a 45 year old who is being evaluated via a billable video visit.          Assessment & Plan     Hypogonadism male  Will increase to 2 pumps per day. Check labs again in 3 months.   - testosterone (ANDROGEL 1.62 % PUMP) 20.25 MG/ACT gel; Place 2 Pump (40.5 mg) onto the skin daily. Apply from dispenser to clean, dry, intact skin of the upper arms and shoulders.  - Testosterone total; Future    Nausea  Can use as needed.   - ondansetron (ZOFRAN) 4 MG tablet; Take 1 tablet (4 mg) by mouth every 8 hours as needed for nausea.    Type 2 diabetes mellitus without complication, with long-term current use of insulin (H)  A1C improved a lot. Doing well. Patient willing to try to increase moujaro to 12.5mg needs to monitor nausea.     Other migraine without status migrainosus, not intractable  Not well controlled. Has been on ER propranolol only 3 weeks, maybe will help more. Try physical therapy and likely needs neurology consult.   - Adult Neurology  Referral; Future  - Physical Therapy  Referral; Future    Type 2 diabetes mellitus with other diabetic kidney complication (H)  As above. On ACE. Stable.     The longitudinal plan of care for the diagnosis(es)/condition(s) as documented were addressed during this visit. Due to the added complexity in care, I will continue to support Tavo in the subsequent management and with ongoing continuity of care.    BMI  Estimated body mass index is 43.04 kg/m  as calculated from the following:    Height as of 4/7/25: 1.73 m (5' 8.11\").    Weight as of 7/11/25: 128.8 kg (284 lb).           Subjective   Tavo is a 45 year old, presenting for the following health issues:  Diabetes        7/29/2025     5:20 PM   Additional Questions   Roomed by Alta FONTENOT     History of Present Illness       Diabetes:   He presents for follow up of diabetes.   He is checking home blood glucose with a continuous glucose monitor.   He checks blood glucose before meals, after meals, " before and after meals and at bedtime.  Blood glucose is sometimes over 200 and sometimes under 70. He is aware of hypoglycemia symptoms including dizziness, weakness, lethargy and confusion.   He is concerned about other.   He is having weight gain.  The patient has had a diabetic eye exam in the last 12 months. Eye exam performed on 09/01/2024. [facility] in [place].        He eats 2-3 servings of fruits and vegetables daily.He consumes 1 sweetened beverage(s) daily.He exercises with enough effort to increase his heart rate 9 or less minutes per day.  He exercises with enough effort to increase his heart rate 3 or less days per week.   He is taking medications regularly.      Weight Management           7/29/2025   Return Weight Management Questionnaire   I have made the following changes to my diet since my last visit Smaller portions;Nutritious food choices   With regards to my diet, I am still struggling with Extra snacking between meals;Eating when I feel stressed or upset;(!) OTHER    I have made the following changes to my activity/exercise since my last visit Increased my steps per day   With regards to my activity/exercise, I am still struggling with Shortness of breath;Feeling too tired to exercise   Are you taking your weight loss medication as prescribed I am not prescribed a medication for weight loss      Has been struggling with nausea.   Tums/jus seltzer helps a little.   No vomiting. Only nausea.   No constipation. Acid reflux has been pretty good.   Hard to focus with the nausea.     Headaches- have become a major issue.   Has been an annoying pain for 2 weeks.   Had to get a migraine cocktail   Mornings are the worse. He does use his CPAP machine.     Mobic works well for his back but does not help for the headaches.                       Objective           Vitals:  No vitals were obtained today due to virtual visit.    Physical Exam   GENERAL: alert and no  distress  EYES: Eyes grossly normal to inspection.  No discharge or erythema, or obvious scleral/conjunctival abnormalities.  RESP: No audible wheeze, cough, or visible cyanosis.    SKIN: Visible skin clear. No significant rash, abnormal pigmentation or lesions.  NEURO: Cranial nerves grossly intact.  Mentation and speech appropriate for age.  PSYCH: Appropriate affect, tone, and pace of words          Video-Visit Details    Type of service:  Video Visit   Originating Location (pt. Location): Home    Distant Location (provider location):  On-site  Platform used for Video Visit: Sheila  Signed Electronically by: Criselda Walsh PA-C

## 2025-07-30 ENCOUNTER — PATIENT OUTREACH (OUTPATIENT)
Dept: CARE COORDINATION | Facility: CLINIC | Age: 45
End: 2025-07-30
Payer: COMMERCIAL

## 2025-08-04 DIAGNOSIS — E11.9 TYPE 2 DIABETES MELLITUS WITHOUT COMPLICATION, WITHOUT LONG-TERM CURRENT USE OF INSULIN (H): ICD-10-CM

## 2025-08-04 RX ORDER — PEN NEEDLE, DIABETIC 31 GX3/16"
4 NEEDLE, DISPOSABLE MISCELLANEOUS DAILY
Qty: 400 EACH | Refills: 3 | Status: SHIPPED | OUTPATIENT
Start: 2025-08-04

## 2025-08-04 RX ORDER — TIRZEPATIDE 10 MG/.5ML
INJECTION, SOLUTION SUBCUTANEOUS
Qty: 6 ML | Refills: 0 | OUTPATIENT
Start: 2025-08-04

## 2025-08-07 DIAGNOSIS — N52.9 ERECTILE DYSFUNCTION, UNSPECIFIED ERECTILE DYSFUNCTION TYPE: ICD-10-CM

## 2025-08-07 DIAGNOSIS — G43.809 OTHER MIGRAINE WITHOUT STATUS MIGRAINOSUS, NOT INTRACTABLE: ICD-10-CM

## 2025-08-07 PROBLEM — E66.9 OBESITY, UNSPECIFIED: Status: RESOLVED | Noted: 2021-03-05 | Resolved: 2025-08-07

## 2025-08-07 PROBLEM — E11.9 DIABETES MELLITUS, TYPE 2 (H): Status: RESOLVED | Noted: 2021-10-01 | Resolved: 2025-08-07

## 2025-08-07 RX ORDER — SILDENAFIL CITRATE 20 MG/1
TABLET ORAL
Qty: 30 TABLET | Refills: 0 | Status: SHIPPED | OUTPATIENT
Start: 2025-08-07

## 2025-08-07 RX ORDER — NARATRIPTAN 2.5 MG/1
TABLET ORAL
Qty: 12 TABLET | Refills: 0 | Status: SHIPPED | OUTPATIENT
Start: 2025-08-07

## 2025-08-10 DIAGNOSIS — E11.9 TYPE 2 DIABETES MELLITUS WITHOUT COMPLICATION, WITHOUT LONG-TERM CURRENT USE OF INSULIN (H): ICD-10-CM

## 2025-08-12 DIAGNOSIS — E11.9 TYPE 2 DIABETES MELLITUS WITHOUT COMPLICATION, WITHOUT LONG-TERM CURRENT USE OF INSULIN (H): ICD-10-CM

## 2025-08-12 RX ORDER — TIRZEPATIDE 10 MG/.5ML
INJECTION, SOLUTION SUBCUTANEOUS
Qty: 6 ML | Refills: 0 | OUTPATIENT
Start: 2025-08-12

## 2025-08-12 RX ORDER — HYDROCHLOROTHIAZIDE 12.5 MG/1
CAPSULE ORAL
Qty: 6 EACH | Refills: 5 | Status: SHIPPED | OUTPATIENT
Start: 2025-08-12

## 2025-08-31 DIAGNOSIS — G43.809 OTHER MIGRAINE WITHOUT STATUS MIGRAINOSUS, NOT INTRACTABLE: ICD-10-CM

## 2025-08-31 RX ORDER — PROPRANOLOL HYDROCHLORIDE 160 MG/1
160 CAPSULE, EXTENDED RELEASE ORAL DAILY
Qty: 90 CAPSULE | Refills: 1 | Status: SHIPPED | OUTPATIENT
Start: 2025-08-31

## 2025-09-01 DIAGNOSIS — R11.0 NAUSEA: ICD-10-CM

## 2025-09-01 RX ORDER — ONDANSETRON 4 MG/1
4 TABLET, FILM COATED ORAL EVERY 8 HOURS PRN
Qty: 12 TABLET | Refills: 0 | Status: SHIPPED | OUTPATIENT
Start: 2025-09-01

## 2025-09-02 DIAGNOSIS — G43.809 OTHER MIGRAINE WITHOUT STATUS MIGRAINOSUS, NOT INTRACTABLE: ICD-10-CM

## 2025-09-02 RX ORDER — NARATRIPTAN 2.5 MG/1
TABLET ORAL
Qty: 12 TABLET | Refills: 0 | Status: SHIPPED | OUTPATIENT
Start: 2025-09-02

## 2025-09-03 ENCOUNTER — VIRTUAL VISIT (OUTPATIENT)
Facility: CLINIC | Age: 45
End: 2025-09-03
Payer: COMMERCIAL

## 2025-09-03 DIAGNOSIS — F31.9 BIPOLAR 1 DISORDER (H): Primary | ICD-10-CM

## 2025-09-03 DIAGNOSIS — I10 HYPERTENSION GOAL BP (BLOOD PRESSURE) < 140/90: ICD-10-CM

## 2025-09-03 DIAGNOSIS — R80.9 PROTEINURIA, UNSPECIFIED TYPE: ICD-10-CM

## 2025-09-03 DIAGNOSIS — F31.30 BIPOLAR I DISORDER, MOST RECENT EPISODE DEPRESSED (H): ICD-10-CM

## 2025-09-03 PROCEDURE — 90837 PSYTX W PT 60 MINUTES: CPT | Mod: 95 | Performed by: MARRIAGE & FAMILY THERAPIST

## 2025-09-03 RX ORDER — LOSARTAN POTASSIUM 50 MG/1
50 TABLET ORAL DAILY
Qty: 90 TABLET | Refills: 2 | Status: SHIPPED | OUTPATIENT
Start: 2025-09-03

## 2025-09-03 RX ORDER — CLONAZEPAM 0.5 MG/1
0.5 TABLET ORAL DAILY PRN
Qty: 15 TABLET | Refills: 0 | Status: SHIPPED | OUTPATIENT
Start: 2025-09-10

## 2025-11-03 ENCOUNTER — PRE VISIT (OUTPATIENT)
Dept: NEUROLOGY | Facility: CLINIC | Age: 45
End: 2025-11-03